# Patient Record
Sex: FEMALE | Race: WHITE | NOT HISPANIC OR LATINO | Employment: OTHER | URBAN - METROPOLITAN AREA
[De-identification: names, ages, dates, MRNs, and addresses within clinical notes are randomized per-mention and may not be internally consistent; named-entity substitution may affect disease eponyms.]

---

## 2019-05-11 ENCOUNTER — TRANSFERRED RECORDS (OUTPATIENT)
Dept: SURGERY | Facility: CLINIC | Age: 79
End: 2019-05-11

## 2019-06-12 ENCOUNTER — HOSPITAL ENCOUNTER (OUTPATIENT)
Dept: LAB | Facility: CLINIC | Age: 79
Discharge: HOME | End: 2019-06-12
Attending: SURGERY | Admitting: SURGERY
Payer: COMMERCIAL

## 2019-06-12 ENCOUNTER — OFFICE VISIT (OUTPATIENT)
Dept: SURGERY | Facility: CLINIC | Age: 79
End: 2019-06-12
Payer: COMMERCIAL

## 2019-06-12 VITALS
WEIGHT: 243 LBS | SYSTOLIC BLOOD PRESSURE: 118 MMHG | HEIGHT: 66 IN | BODY MASS INDEX: 39.05 KG/M2 | HEART RATE: 96 BPM | RESPIRATION RATE: 16 BRPM | OXYGEN SATURATION: 98 % | DIASTOLIC BLOOD PRESSURE: 82 MMHG

## 2019-06-12 DIAGNOSIS — Z78.0 POST-MENOPAUSAL: ICD-10-CM

## 2019-06-12 DIAGNOSIS — E83.52 HYPERCALCEMIA: ICD-10-CM

## 2019-06-12 DIAGNOSIS — E83.52 HYPERCALCEMIA: Primary | ICD-10-CM

## 2019-06-12 DIAGNOSIS — Z91.89 AT RISK FOR LOSS OF BONE DENSITY: ICD-10-CM

## 2019-06-12 LAB
CALCIUM SERPL-MCNC: 10.2 MG/DL (ref 8.5–10.1)
PTH-INTACT SERPL-MCNC: 97 PG/ML (ref 18–80)

## 2019-06-12 PROCEDURE — 83970 ASSAY OF PARATHORMONE: CPT | Performed by: SURGERY

## 2019-06-12 PROCEDURE — 99204 OFFICE O/P NEW MOD 45 MIN: CPT | Performed by: SURGERY

## 2019-06-12 PROCEDURE — 82310 ASSAY OF CALCIUM: CPT | Performed by: SURGERY

## 2019-06-12 PROCEDURE — 36415 COLL VENOUS BLD VENIPUNCTURE: CPT | Performed by: SURGERY

## 2019-06-12 ASSESSMENT — MIFFLIN-ST. JEOR: SCORE: 1598.99

## 2019-06-12 NOTE — PATIENT INSTRUCTIONS
DEXA SCAN     Date: 7-18-19    Time: 10:00 AM     Location: [facility] [address]  [address]  [place]  [address]          Please check in at 9:45 am

## 2019-06-12 NOTE — LETTER
2019       Re: Raven Carlin - 1940    Raven Carlin is a 79 year old female who is sent by Ena Peraza PA-C for evaluation of hypercalcemia. She had an elevated calcium found on screening bloodwork.  She has a calcium level as high as 10.9 (upper normal for the lab is 10.5).   She has had the elevated calcium from a recent blood test.  She has constitutional symptoms of none.  She has no history of kidney stones.   She has had bony fractures, toes and ankle. A DEXA scan shows normal but some time ago.     There is no family history of parathyroid disease.  Raven has no prior neck surgery..  She is not on thyroid medications.       Smoking History:  has never smoked.     Review of Systems:  Back pain, high BP, easy bruising, stomach ulcers.     Physical Exam:  There were no vitals taken for this visit.  Well developed, well nourished female in no apparent distress, in a walker.  HEENT:  Normocephalic, atraumatic.  Neck:   Short and thick.              Range of motion is decreased.              No neck masses.  Thyroid:  Palpably normal.  Lymph:  No cervical adenopathy.  Respirations:  Unlabored.  Neurologic:  Alert.  Speech is clear.  Moves all extremities with reasonable strength.   Skin:  Warm, dry and no rash.  Psychologic:  Alert, appropriate range of emotions.     Labs:  Calcium-10.9  PTH-90     Imaging:  All imaging personally reviewed with Raven Estrada     Assessment and Plan:  Raven has hypercalcemia and possible primary hyperparathyroidism.  She does not meet criteria for surgery at present.  I would like to re-check her calcium and PTH and see if she meets criteria for surgery before proceeding with imaging.  Indications for surgery would be calcium level 1 standard deviation above normal, age <50, symptoms, kidney stones, osteoporosis or osteopenia.  It is reasonable to repeat her DEXA scan as it has been about 10 years she believes since her last one.  If she is a candidate for surgery,  "we discussed the imaging options and the possibility of single adenoma (85%) vs dual adenoma or hyperplasia (15%).  If the imaging studies suggest that there is a good probability of a single adenoma and she meets indications for surgery, I recommend a minimally invasive neck exploration with the sestimibi injection to minimize the operative incision and the rapid PTH assay to assess the completeness of the procedure.  Raven is aware of the risks to the recurrent laryngeal nerve and the risk of hypocalcemia, particularly with treatment of  parathyroid hyperplasia. She is also aware of the 1-2 % risk of \"failure to cure\".  She is not anxious to proceed with surgery unless needed.  If criteria are not met, her calcium should be checked every 6 months.     Latonia Anguiano MD        ADDENDUM:  Calcium 10.2 (normal 8.5-10.1) PTH was 97.  Awaiting DEXA scan.        "

## 2019-09-05 ENCOUNTER — ANCILLARY PROCEDURE (OUTPATIENT)
Dept: BONE DENSITY | Facility: CLINIC | Age: 79
End: 2019-09-05
Attending: SURGERY
Payer: COMMERCIAL

## 2019-09-05 DIAGNOSIS — Z78.0 POST-MENOPAUSAL: ICD-10-CM

## 2019-09-05 DIAGNOSIS — E83.52 HYPERCALCEMIA: ICD-10-CM

## 2019-09-05 PROCEDURE — 77080 DXA BONE DENSITY AXIAL: CPT | Performed by: INTERNAL MEDICINE

## 2019-09-11 ENCOUNTER — HOSPITAL ENCOUNTER (OUTPATIENT)
Age: 79
End: 2019-09-11
Payer: COMMERCIAL

## 2019-09-11 DIAGNOSIS — M85.80 OSTEOPENIA: Primary | ICD-10-CM

## 2019-09-18 ENCOUNTER — HOSPITAL ENCOUNTER (OUTPATIENT)
Dept: NUCLEAR MEDICINE | Facility: CLINIC | Age: 79
Setting detail: NUCLEAR MEDICINE
End: 2019-09-18
Attending: SURGERY
Payer: COMMERCIAL

## 2019-09-18 ENCOUNTER — HOSPITAL ENCOUNTER (OUTPATIENT)
Dept: NUCLEAR MEDICINE | Facility: CLINIC | Age: 79
Setting detail: NUCLEAR MEDICINE
Discharge: HOME | End: 2019-09-18
Attending: SURGERY | Admitting: SURGERY
Payer: COMMERCIAL

## 2019-09-18 DIAGNOSIS — M85.80 OSTEOPENIA: ICD-10-CM

## 2019-09-18 PROCEDURE — 78072 PARATHYRD PLANAR W/SPECT&CT: CPT

## 2019-09-18 PROCEDURE — A9516 IODINE I-123 SOD IODIDE MIC: HCPCS | Performed by: SURGERY

## 2019-09-18 PROCEDURE — 34300033 ZZH RX 343: Performed by: SURGERY

## 2019-09-18 PROCEDURE — A9500 TC99M SESTAMIBI: HCPCS | Performed by: SURGERY

## 2019-09-18 RX ADMIN — Medication 891 UCI.: at 09:34

## 2019-09-18 RX ADMIN — Medication 26.4 MILLICURIE: at 11:30

## 2019-09-19 ENCOUNTER — TELEPHONE (OUTPATIENT)
Dept: SURGERY | Facility: CLINIC | Age: 79
End: 2019-09-19

## 2019-09-19 DIAGNOSIS — E21.0 PRIMARY HYPERPARATHYROIDISM (H): Primary | ICD-10-CM

## 2019-09-19 NOTE — TELEPHONE ENCOUNTER
4D CT SOFT TISSUE NECK     Date: 9-25-19  Time: 11:40 AM   Location: [facility]  [id number] [place] [address]  [id number]        Please check in at 11:10 AM

## 2019-09-25 ENCOUNTER — HOSPITAL ENCOUNTER (OUTPATIENT)
Dept: CT IMAGING | Facility: CLINIC | Age: 79
Discharge: HOME | End: 2019-09-25
Attending: SURGERY | Admitting: SURGERY
Payer: COMMERCIAL

## 2019-09-25 DIAGNOSIS — E21.0 PRIMARY HYPERPARATHYROIDISM (H): ICD-10-CM

## 2019-09-25 LAB
CREAT BLD-MCNC: 1.3 MG/DL (ref 0.52–1.04)
GFR SERPL CREATININE-BSD FRML MDRD: 40 ML/MIN/{1.73_M2}

## 2019-09-25 PROCEDURE — 25000125 ZZHC RX 250: Performed by: SURGERY

## 2019-09-25 PROCEDURE — 70492 CT SFT TSUE NCK W/O & W/DYE: CPT

## 2019-09-25 PROCEDURE — 82565 ASSAY OF CREATININE: CPT

## 2019-09-25 PROCEDURE — 25000128 H RX IP 250 OP 636: Performed by: SURGERY

## 2019-09-25 RX ORDER — IOPAMIDOL 755 MG/ML
500 INJECTION, SOLUTION INTRAVASCULAR ONCE
Status: COMPLETED | OUTPATIENT
Start: 2019-09-25 | End: 2019-09-25

## 2019-09-25 RX ADMIN — SODIUM CHLORIDE 65 ML: 9 INJECTION, SOLUTION INTRAVENOUS at 11:32

## 2019-09-25 RX ADMIN — IOPAMIDOL 75 ML: 755 INJECTION, SOLUTION INTRAVENOUS at 11:32

## 2019-10-02 ENCOUNTER — TELEPHONE (OUTPATIENT)
Dept: SURGERY | Facility: CLINIC | Age: 79
End: 2019-10-02

## 2019-10-02 NOTE — TELEPHONE ENCOUNTER
Called patient about results.  Raven would like to come in and see me before scheduling surgery.  Please have the  call her to schedule a follow up appointment.    Will discuss results at follow up and schedule at that time.  I believe the superior findings are NOT parathyroid glands, the left inferior nodule is suspicious for an adenoma.  Latonia Anguiano MD

## 2019-10-09 ENCOUNTER — TELEPHONE (OUTPATIENT)
Dept: SURGERY | Facility: CLINIC | Age: 79
End: 2019-10-09

## 2019-10-09 ENCOUNTER — OFFICE VISIT (OUTPATIENT)
Dept: SURGERY | Facility: CLINIC | Age: 79
End: 2019-10-09
Payer: COMMERCIAL

## 2019-10-09 ENCOUNTER — PREP FOR PROCEDURE (OUTPATIENT)
Dept: SURGERY | Facility: CLINIC | Age: 79
End: 2019-10-09

## 2019-10-09 VITALS
HEIGHT: 66 IN | HEART RATE: 123 BPM | DIASTOLIC BLOOD PRESSURE: 78 MMHG | SYSTOLIC BLOOD PRESSURE: 116 MMHG | RESPIRATION RATE: 16 BRPM | OXYGEN SATURATION: 96 % | WEIGHT: 243 LBS | BODY MASS INDEX: 39.05 KG/M2

## 2019-10-09 DIAGNOSIS — E21.0 PRIMARY HYPERPARATHYROIDISM (H): Primary | ICD-10-CM

## 2019-10-09 PROCEDURE — 99213 OFFICE O/P EST LOW 20 MIN: CPT | Performed by: SURGERY

## 2019-10-09 ASSESSMENT — MIFFLIN-ST. JEOR: SCORE: 1598.99

## 2019-10-09 NOTE — LETTER
[facility]    [address] [facility], [address]  [place], [place] [address]  Phone [phone]  Fax [phone] [address], [address]  [place] [address]  Phone [phone]  Fax [phone]    [email or web address]   October 9, 2019      Raven JAKOB Carlin  [address] [facility] [address]      We realize with scheduling surgery, one of your first questions is, how much will this cost?  Below we have provided you with the information you will need to receive an estimate for your surgery.    You are scheduled for the following procedure:  Neck exploration, excision parathyroid adenoma with rapid PTH assay and pre-operative sestamibi injection      Surgeon:  Dr. Anguiano      Physician Assistant:  Yes      Please make sure to have your insurance card available at the time of calling.    Surgeon & Physician Assistant charges and facility charges for [facility], [facility] or [facility]:    Consumer Price Line at [phone]   -  It is important to note that there may be a Physician Assistant assisting with your surgery.  Please be sure to mention this when calling for the estimate.      Facility Charges at [facility], [facility] or [facility]:  [facility] [place] at [phone]  [facility] at [phone]  [facility] at [phone] or [phone]    Anesthesiologist Charges:  [facility], [place], operated by [facility] at [phone]    [facility] - Nurse Anesthetist Charges:  [facility] at [phone]

## 2019-10-09 NOTE — TELEPHONE ENCOUNTER
Type of surgery: NECK EXPLORATION, EXCISION PARATHYROID ADENOMA WITH RAPID PTH ASSAY AND PRE-OPERATIVE SESTAMIBI INJECTION   Location of surgery: [facility]  Date and time of surgery: 11-25-19, 9 AM   Surgeon: DR. GOMEZ   Pre-Op Appt Date: PATIENT TO SCHEDULE   Post-Op Appt Date: PATIENT TO SCHEDULE    Packet sent out: GIVEN TO PATIENT   Pre-cert/Authorization completed:  Not Applicable  Date: 10-9-19       NECK EXPLORATION, EXCISION PARATHYROID ADENOMA WITH RAPID PTH ASSAY AND PRE-OPERATIVE SESTAMIBI INJECTION    GENERAL   PT INST TO HAVE H&P WITH DR. TSAI   2.5 HRS REQ   *AKS/JCK ASSIST NLG*   ALW   Sestamibi Inj at 8 am  alw   Rapid PTH assay ordered  alw

## 2019-11-20 RX ORDER — CELECOXIB 200 MG/1
200 CAPSULE ORAL 2 TIMES DAILY
COMMUNITY
End: 2020-04-06

## 2019-11-20 RX ORDER — NIACIN 500 MG
500 TABLET ORAL
Status: ON HOLD | COMMUNITY
End: 2022-04-28

## 2019-11-20 RX ORDER — CEPHALEXIN 500 MG/1
500 CAPSULE ORAL DAILY
Status: ON HOLD | COMMUNITY
End: 2022-04-10

## 2019-11-20 RX ORDER — TOLTERODINE TARTRATE 2 MG/1
2 TABLET, EXTENDED RELEASE ORAL 2 TIMES DAILY
COMMUNITY

## 2019-11-20 RX ORDER — OXYCODONE AND ACETAMINOPHEN 7.5; 325 MG/1; MG/1
1 TABLET ORAL 2 TIMES DAILY
Status: ON HOLD | COMMUNITY
End: 2022-04-28

## 2019-11-20 RX ORDER — BACLOFEN 20 MG
500 TABLET ORAL DAILY
Status: ON HOLD | COMMUNITY
End: 2022-04-28

## 2019-11-20 RX ORDER — PERPHENAZINE/AMITRIPTYLINE HCL 4 MG-25 MG
1 TABLET ORAL DAILY
Status: ON HOLD | COMMUNITY
End: 2022-04-28

## 2019-11-25 ENCOUNTER — APPOINTMENT (OUTPATIENT)
Dept: SURGERY | Facility: PHYSICIAN GROUP | Age: 79
End: 2019-11-25
Payer: COMMERCIAL

## 2019-11-25 ENCOUNTER — HOSPITAL ENCOUNTER (OUTPATIENT)
Facility: CLINIC | Age: 79
Discharge: HOME | End: 2019-11-25
Attending: SURGERY | Admitting: SURGERY
Payer: COMMERCIAL

## 2019-11-25 ENCOUNTER — HOSPITAL ENCOUNTER (OUTPATIENT)
Dept: NUCLEAR MEDICINE | Facility: CLINIC | Age: 79
Setting detail: NUCLEAR MEDICINE
Discharge: HOME | End: 2019-11-25
Attending: SURGERY | Admitting: SURGERY
Payer: COMMERCIAL

## 2019-11-25 ENCOUNTER — ANESTHESIA (OUTPATIENT)
Dept: SURGERY | Facility: CLINIC | Age: 79
End: 2019-11-25
Payer: COMMERCIAL

## 2019-11-25 ENCOUNTER — ANESTHESIA EVENT (OUTPATIENT)
Dept: SURGERY | Facility: CLINIC | Age: 79
End: 2019-11-25
Payer: COMMERCIAL

## 2019-11-25 VITALS
BODY MASS INDEX: 39.22 KG/M2 | TEMPERATURE: 97.8 F | HEART RATE: 97 BPM | DIASTOLIC BLOOD PRESSURE: 90 MMHG | OXYGEN SATURATION: 100 % | SYSTOLIC BLOOD PRESSURE: 136 MMHG | RESPIRATION RATE: 16 BRPM | HEIGHT: 66 IN

## 2019-11-25 DIAGNOSIS — E21.0 PRIMARY HYPERPARATHYROIDISM (H): ICD-10-CM

## 2019-11-25 LAB
PTH-INTACT SERPL-MCNC: 28.2 PG/ML (ref 18–80)
PTH-INTACT SERPL-MCNC: 34.8 PG/ML (ref 18–80)
PTH-INTACT SERPL-MCNC: 72 PG/ML (ref 18–80)

## 2019-11-25 PROCEDURE — 88331 PATH CONSLTJ SURG 1 BLK 1SPC: CPT | Mod: 26 | Performed by: SURGERY

## 2019-11-25 PROCEDURE — 83970 ASSAY OF PARATHORMONE: CPT | Mod: 91 | Performed by: SURGERY

## 2019-11-25 PROCEDURE — 36000093 ZZH SURGERY LEVEL 4 1ST 30 MIN: Performed by: SURGERY

## 2019-11-25 PROCEDURE — 25800030 ZZH RX IP 258 OP 636: Performed by: NURSE ANESTHETIST, CERTIFIED REGISTERED

## 2019-11-25 PROCEDURE — 88305 TISSUE EXAM BY PATHOLOGIST: CPT | Performed by: SURGERY

## 2019-11-25 PROCEDURE — 34300033 ZZH RX 343: Performed by: SURGERY

## 2019-11-25 PROCEDURE — 25000125 ZZHC RX 250: Performed by: NURSE ANESTHETIST, CERTIFIED REGISTERED

## 2019-11-25 PROCEDURE — 37000008 ZZH ANESTHESIA TECHNICAL FEE, 1ST 30 MIN: Performed by: SURGERY

## 2019-11-25 PROCEDURE — 25000128 H RX IP 250 OP 636: Performed by: NURSE ANESTHETIST, CERTIFIED REGISTERED

## 2019-11-25 PROCEDURE — 83970 ASSAY OF PARATHORMONE: CPT | Mod: 91 | Performed by: PHYSICIAN ASSISTANT

## 2019-11-25 PROCEDURE — 93010 ELECTROCARDIOGRAM REPORT: CPT | Performed by: INTERNAL MEDICINE

## 2019-11-25 PROCEDURE — 60500 EXPLORE PARATHYROID GLANDS: CPT | Mod: AS | Performed by: PHYSICIAN ASSISTANT

## 2019-11-25 PROCEDURE — 71000014 ZZH RECOVERY PHASE 1 LEVEL 2 FIRST HR: Performed by: SURGERY

## 2019-11-25 PROCEDURE — 25000128 H RX IP 250 OP 636: Performed by: SURGERY

## 2019-11-25 PROCEDURE — 25000128 H RX IP 250 OP 636: Performed by: PHYSICIAN ASSISTANT

## 2019-11-25 PROCEDURE — 25800030 ZZH RX IP 258 OP 636: Performed by: ANESTHESIOLOGY

## 2019-11-25 PROCEDURE — 27210794 ZZH OR GENERAL SUPPLY STERILE: Performed by: SURGERY

## 2019-11-25 PROCEDURE — 71000027 ZZH RECOVERY PHASE 2 EACH 15 MINS: Performed by: SURGERY

## 2019-11-25 PROCEDURE — A9500 TC99M SESTAMIBI: HCPCS | Performed by: SURGERY

## 2019-11-25 PROCEDURE — 40000306 ZZH STATISTIC PRE PROC ASSESS II: Performed by: SURGERY

## 2019-11-25 PROCEDURE — 36415 COLL VENOUS BLD VENIPUNCTURE: CPT | Performed by: PHYSICIAN ASSISTANT

## 2019-11-25 PROCEDURE — 88305 TISSUE EXAM BY PATHOLOGIST: CPT | Mod: 26 | Performed by: SURGERY

## 2019-11-25 PROCEDURE — 37000009 ZZH ANESTHESIA TECHNICAL FEE, EACH ADDTL 15 MIN: Performed by: SURGERY

## 2019-11-25 PROCEDURE — 60500 EXPLORE PARATHYROID GLANDS: CPT | Performed by: SURGERY

## 2019-11-25 PROCEDURE — 40000113 NM PARATHYROID SURGICAL INJECTION

## 2019-11-25 PROCEDURE — 36000063 ZZH SURGERY LEVEL 4 EA 15 ADDTL MIN: Performed by: SURGERY

## 2019-11-25 PROCEDURE — 88331 PATH CONSLTJ SURG 1 BLK 1SPC: CPT | Performed by: SURGERY

## 2019-11-25 RX ORDER — LIDOCAINE 40 MG/G
CREAM TOPICAL
Status: DISCONTINUED | OUTPATIENT
Start: 2019-11-25 | End: 2019-11-25 | Stop reason: HOSPADM

## 2019-11-25 RX ORDER — DIMENHYDRINATE 50 MG/ML
25 INJECTION, SOLUTION INTRAMUSCULAR; INTRAVENOUS
Status: DISCONTINUED | OUTPATIENT
Start: 2019-11-25 | End: 2019-11-25 | Stop reason: HOSPADM

## 2019-11-25 RX ORDER — FENTANYL CITRATE 50 UG/ML
INJECTION, SOLUTION INTRAMUSCULAR; INTRAVENOUS PRN
Status: DISCONTINUED | OUTPATIENT
Start: 2019-11-25 | End: 2019-11-25

## 2019-11-25 RX ORDER — DEXAMETHASONE SODIUM PHOSPHATE 4 MG/ML
INJECTION, SOLUTION INTRA-ARTICULAR; INTRALESIONAL; INTRAMUSCULAR; INTRAVENOUS; SOFT TISSUE PRN
Status: DISCONTINUED | OUTPATIENT
Start: 2019-11-25 | End: 2019-11-25

## 2019-11-25 RX ORDER — MEPERIDINE HYDROCHLORIDE 25 MG/ML
12.5 INJECTION INTRAMUSCULAR; INTRAVENOUS; SUBCUTANEOUS
Status: DISCONTINUED | OUTPATIENT
Start: 2019-11-25 | End: 2019-11-25 | Stop reason: HOSPADM

## 2019-11-25 RX ORDER — ONDANSETRON 2 MG/ML
INJECTION INTRAMUSCULAR; INTRAVENOUS PRN
Status: DISCONTINUED | OUTPATIENT
Start: 2019-11-25 | End: 2019-11-25

## 2019-11-25 RX ORDER — FENTANYL CITRATE 50 UG/ML
25-50 INJECTION, SOLUTION INTRAMUSCULAR; INTRAVENOUS
Status: DISCONTINUED | OUTPATIENT
Start: 2019-11-25 | End: 2019-11-25 | Stop reason: HOSPADM

## 2019-11-25 RX ORDER — ONDANSETRON 4 MG/1
4 TABLET, ORALLY DISINTEGRATING ORAL EVERY 30 MIN PRN
Status: DISCONTINUED | OUTPATIENT
Start: 2019-11-25 | End: 2019-11-25 | Stop reason: HOSPADM

## 2019-11-25 RX ORDER — HYDROCODONE BITARTRATE AND ACETAMINOPHEN 5; 325 MG/1; MG/1
1-2 TABLET ORAL EVERY 4 HOURS PRN
Qty: 15 TABLET | Refills: 0 | Status: SHIPPED | OUTPATIENT
Start: 2019-11-25 | End: 2020-04-06

## 2019-11-25 RX ORDER — SODIUM CHLORIDE, SODIUM LACTATE, POTASSIUM CHLORIDE, CALCIUM CHLORIDE 600; 310; 30; 20 MG/100ML; MG/100ML; MG/100ML; MG/100ML
INJECTION, SOLUTION INTRAVENOUS CONTINUOUS
Status: DISCONTINUED | OUTPATIENT
Start: 2019-11-25 | End: 2019-11-25 | Stop reason: HOSPADM

## 2019-11-25 RX ORDER — NALOXONE HYDROCHLORIDE 0.4 MG/ML
.1-.4 INJECTION, SOLUTION INTRAMUSCULAR; INTRAVENOUS; SUBCUTANEOUS
Status: DISCONTINUED | OUTPATIENT
Start: 2019-11-25 | End: 2019-11-25 | Stop reason: HOSPADM

## 2019-11-25 RX ORDER — HYDROMORPHONE HYDROCHLORIDE 1 MG/ML
.3-.5 INJECTION, SOLUTION INTRAMUSCULAR; INTRAVENOUS; SUBCUTANEOUS EVERY 10 MIN PRN
Status: DISCONTINUED | OUTPATIENT
Start: 2019-11-25 | End: 2019-11-25 | Stop reason: HOSPADM

## 2019-11-25 RX ORDER — PROPOFOL 10 MG/ML
INJECTION, EMULSION INTRAVENOUS PRN
Status: DISCONTINUED | OUTPATIENT
Start: 2019-11-25 | End: 2019-11-25

## 2019-11-25 RX ORDER — BUPIVACAINE HYDROCHLORIDE 2.5 MG/ML
INJECTION, SOLUTION EPIDURAL; INFILTRATION; INTRACAUDAL PRN
Status: DISCONTINUED | OUTPATIENT
Start: 2019-11-25 | End: 2019-11-25 | Stop reason: HOSPADM

## 2019-11-25 RX ORDER — ONDANSETRON 2 MG/ML
4 INJECTION INTRAMUSCULAR; INTRAVENOUS EVERY 30 MIN PRN
Status: DISCONTINUED | OUTPATIENT
Start: 2019-11-25 | End: 2019-11-25 | Stop reason: HOSPADM

## 2019-11-25 RX ORDER — ALBUTEROL SULFATE 0.83 MG/ML
2.5 SOLUTION RESPIRATORY (INHALATION) EVERY 4 HOURS PRN
Status: DISCONTINUED | OUTPATIENT
Start: 2019-11-25 | End: 2019-11-25 | Stop reason: HOSPADM

## 2019-11-25 RX ORDER — METOPROLOL TARTRATE 1 MG/ML
1-2 INJECTION, SOLUTION INTRAVENOUS EVERY 5 MIN PRN
Status: DISCONTINUED | OUTPATIENT
Start: 2019-11-25 | End: 2019-11-25 | Stop reason: HOSPADM

## 2019-11-25 RX ORDER — CALCIUM CARBONATE 500(1250)
2 TABLET ORAL EVERY 8 HOURS
Qty: 100 TABLET | Refills: 0 | Status: SHIPPED | OUTPATIENT
Start: 2019-11-25 | End: 2019-12-05

## 2019-11-25 RX ORDER — CEFAZOLIN SODIUM 2 G/100ML
2 INJECTION, SOLUTION INTRAVENOUS
Status: COMPLETED | OUTPATIENT
Start: 2019-11-25 | End: 2019-11-25

## 2019-11-25 RX ORDER — GLYCOPYRROLATE 0.2 MG/ML
INJECTION, SOLUTION INTRAMUSCULAR; INTRAVENOUS PRN
Status: DISCONTINUED | OUTPATIENT
Start: 2019-11-25 | End: 2019-11-25

## 2019-11-25 RX ORDER — CEFAZOLIN SODIUM 1 G/3ML
1 INJECTION, POWDER, FOR SOLUTION INTRAMUSCULAR; INTRAVENOUS SEE ADMIN INSTRUCTIONS
Status: DISCONTINUED | OUTPATIENT
Start: 2019-11-25 | End: 2019-11-25 | Stop reason: HOSPADM

## 2019-11-25 RX ORDER — HYDROCODONE BITARTRATE AND ACETAMINOPHEN 5; 325 MG/1; MG/1
2 TABLET ORAL
Status: DISCONTINUED | OUTPATIENT
Start: 2019-11-25 | End: 2019-11-25 | Stop reason: HOSPADM

## 2019-11-25 RX ORDER — LIDOCAINE HYDROCHLORIDE 10 MG/ML
INJECTION, SOLUTION INFILTRATION; PERINEURAL PRN
Status: DISCONTINUED | OUTPATIENT
Start: 2019-11-25 | End: 2019-11-25

## 2019-11-25 RX ADMIN — FENTANYL CITRATE 250 MCG: 50 INJECTION, SOLUTION INTRAMUSCULAR; INTRAVENOUS at 12:10

## 2019-11-25 RX ADMIN — ONDANSETRON HYDROCHLORIDE 4 MG: 2 INJECTION, SOLUTION INTRAVENOUS at 13:30

## 2019-11-25 RX ADMIN — Medication 5 MG: at 12:10

## 2019-11-25 RX ADMIN — Medication 15 MG: at 12:23

## 2019-11-25 RX ADMIN — PROPOFOL 150 MG: 10 INJECTION, EMULSION INTRAVENOUS at 12:10

## 2019-11-25 RX ADMIN — DEXAMETHASONE SODIUM PHOSPHATE 4 MG: 4 INJECTION, SOLUTION INTRA-ARTICULAR; INTRALESIONAL; INTRAMUSCULAR; INTRAVENOUS; SOFT TISSUE at 12:10

## 2019-11-25 RX ADMIN — GLYCOPYRROLATE 0.2 MG: 0.2 INJECTION, SOLUTION INTRAMUSCULAR; INTRAVENOUS at 12:10

## 2019-11-25 RX ADMIN — Medication 24.8 MILLICURIE: at 11:26

## 2019-11-25 RX ADMIN — Medication 100 MG: at 12:10

## 2019-11-25 RX ADMIN — PHENYLEPHRINE HYDROCHLORIDE 50 MCG: 10 INJECTION INTRAVENOUS at 13:09

## 2019-11-25 RX ADMIN — SODIUM CHLORIDE, POTASSIUM CHLORIDE, SODIUM LACTATE AND CALCIUM CHLORIDE: 600; 310; 30; 20 INJECTION, SOLUTION INTRAVENOUS at 10:32

## 2019-11-25 RX ADMIN — HYDROMORPHONE HYDROCHLORIDE 1 MG: 1 INJECTION, SOLUTION INTRAMUSCULAR; INTRAVENOUS; SUBCUTANEOUS at 12:41

## 2019-11-25 RX ADMIN — LIDOCAINE HYDROCHLORIDE 30 MG: 10 INJECTION, SOLUTION INFILTRATION; PERINEURAL at 12:10

## 2019-11-25 RX ADMIN — PROPOFOL 50 MG: 10 INJECTION, EMULSION INTRAVENOUS at 12:31

## 2019-11-25 RX ADMIN — CEFAZOLIN SODIUM 2 G: 2 INJECTION, SOLUTION INTRAVENOUS at 12:04

## 2019-11-25 RX ADMIN — PROPOFOL 30 MG: 10 INJECTION, EMULSION INTRAVENOUS at 12:46

## 2019-11-25 ASSESSMENT — LIFESTYLE VARIABLES: TOBACCO_USE: 1

## 2019-11-25 NOTE — DISCHARGE INSTRUCTIONS
HOME CARE FOLLOWING PARATHYROID SURGERY  RAND Machado J. Shaheen    Special instructions for Raven Carlin:  --Discharge medications: Calcium supplement and Norco  Calcium Taper Schedule:  Take two tablets every 8 hours for one week, then decrease to two tablets every 12 hours for one week, then decrease to one tablet every 12 hours for one week, then decrease to one tablet daily until gone.  --Follow up appointment with Dr. Anguiano in 1-3 weeks, follow up with PCP in 4-8 weeks.     RESULTS:  Call the office regarding your final pathology report if you have not received your results after 2 full business days.     INCISIONAL CARE:    You may expect a small amount of drainage from your previous drain site.  Cover with bandage as needed.    After removing the dressing, you may shower.  Do not submerse the incision for 1 week.    Sutures will absorb and do not need to be removed.    Leave the steri-strip (white paper tape) in place until it falls off, or remove at 2 weeks after surgery.    A lump/ridge under the incision is normal and will gradually resolve.    ACTIVITY:  Light Activity -- you may immediately be up and about as tolerated.  Driving -- you may drive when comfortable and off narcotic pain medications.  Light Work -- resume when comfortable off pain medications.  (If you can drive, you probably can work.)  Strenuous Work/Activity -- limit lifting to less than 10 pounds for 1 week.  Progressively increase with time.  Active Sports (running, biking, etc.) -- resume when comfortable.    DIET:  No restrictions.  Increased fluid intake is recommended. While taking pain medications, increase dietary fiber or add a fiber supplementation like Metamucil or Citrucel to help prevent constipation - a possible side effect of pain medications.    DISCOMFORT:  Use pain medications as prescribed by your surgeon.  Take the pain medication with some food, when possible, to minimize side effects.   Intermittent use of ice packs may help during the first 48 hours.  Expect gradual improvement.    CALCIUM SUPPLEMENTATION:  Most patients are prescribed to take a calcium supplement after surgery.  Please take as prescribed.  Watch for symptoms of numbness or tingling around the mouth or in the fingers or toes.  This may be a sign of a low calcium level.  If this happens, take an extra dose of your calcium supplement.  If the symptoms persist, please contact the office.  You may need to have your blood calcium level checked by doing a simple blood test.  We may also need to make further adjustments in your dose of calcium supplementation.  At times, an office visit is required.     RETURN APPOINTMENT:  Schedule a follow-up visit 1-3 weeks post-op.  Office Phone:  [phone]     CONTACT US IF THE FOLLOWING DEVELOPS:   1. A fever that is above 101     2. If there is a large amount of drainage, bleeding, or swelling.   3. Severe pain that is not relieved by your prescription.   4. Drainage that is thick, cloudy, yellow, green or white.   5. Any other questions not answered by  Frequently Asked Questions  sheet.      FREQUENTLY ASKED QUESTIONS:    Q:  How should my incision look?    A:  Normally your incision will appear slightly swollen with light redness directly along the incision itself as it heals.  It may feel like a bump or ridge as the healing/scarring happens, and over time (3-4 months) this bump or ridge feeling should slowly go away.  In general, clear or pink watery drainage can be normal at first as your incision heals, but should decrease over time.    Q:  How do I know if my incision is infected?  A:  Look at your incision for signs of infection, like redness around the incision spreading to surrounding skin, or drainage of cloudy or foul-smelling drainage.  If you feel warm, check your temperature to see if you are running a fever.    **If any of these things occur, please notify the nurse at our  office.  We may need you to come into the office for an incision check.      Q:  How do I take care of my incision?  A:  If you have a dressing in place - Starting the day after surgery, replace the dressing 1-2 times a day until there is no further drainage from the incision.  At that time, a dressing is no longer needed.  Try to minimize tape on the skin if irritation is occurring at the tape sites.  If you have significant irritation from tape on the skin, please call the office to discuss other method of dressing your incision.    Small pieces of tape called  steri-strips  may be present directly overlying your incision; these may be removed 10 days after surgery unless otherwise specified by your surgeon.  If these tapes start to loosen at the ends, you may trim them back until they fall off or are removed.    A:  If you had  Dermabond  tissue glue used as a dressing (this causes your incision to look shiny with a clear covering over it) - This type of dressing wears off with time and does not require more dressings over the top unless it is draining around the glue as it wears off.  Do not apply ointments or lotions over the incisions until the glue has completely worn off.    Q:  There is a piece of tape or a sticky  lead  still on my skin.  Can I remove this?  A:  Sometimes the sticky  leads  used for monitoring during surgery or for evaluation in the emergency department are not all removed while you are in the hospital.  These sometimes have a tab or metal dot on them.  You can easily remove these on your own, like taking off a band-aid.  If there is a gel substance under the  lead , simply wipe/clean it off with a washcloth or paper towel.      Q:  What can I do to minimize constipation (very hard stools, or lack of stools)?  A:  Stay well hydrated.  Increase your dietary fiber intake or take a fiber supplement -with plenty of water.  Walk around frequently.  You may consider an over-the-counter  stool-softener.  Your Pharmacist can assist you with choosing one that is stocked at your pharmacy.  Constipation is also one of the most common side effects of pain medication.  If you are using pain medication, be pro-active and try to PREVENT problems with constipation by taking the steps above BEFORE constipation becomes a problem.    Q:  What do I do if I need more pain medications?  A:  Call the office to receive refills.  Be aware that certain pain meds cannot be called into a pharmacy and actually require a paper prescription.  A change may be made in your pain med as you progress thru your recovery period or if you have side effects to certain meds.    --Pain meds are NOT refilled after 5pm on weekdays, and NOT AT ALL on the weekends, so please look ahead to prevent problems.      Q:  Why am I having a hard time sleeping now that I am at home?  A:  Many medications you receive while you are in the hospital can impact your sleep for a number of days after your surgery/hospitalization.  Decreased level of activity and naps during the day may also make sleeping at night difficult.  Try to minimize day-time naps, and get up frequently during the day to walk around your home during your recovery time.  Sleep aides may be of some help, but are not recommended for long-term use.      Q:  I am having some back discomfort.  What should I do?  A:  This may be related to certain positioning that was required for your surgery, extended periods of time in bed, or other changes in your overall activity level.  You may try ice, heat, acetaminophen, or ibuprofen to treat this temporarily.  Note that many pain medications have acetaminophen in them and would state this on the prescription bottle.  Be sure not to exceed the maximum of 4000mg per day of acetaminophen.     **If the pain you are having does not resolve, is severe, or is a flare of back pain you have had on other occasions prior to surgery, please contact your  primary physician for further recommendations or for an appointment to be examined at their office.    Q:  Why am I having headaches?  A:  Headaches can be caused by many things:  caffeine withdrawal, use of pain meds, dehydration, high blood pressure, lack of sleep, over-activity/exhaustion, flare-up of usual migraine headaches.  If you feel this is related to muscle tension (a band-like feeling around the head, or a pressure at the low-back of the head) you may try ice or heat to this area.  You may need to drink more fluids (try electrolyte drink like Gatorade), rest, or take your usual migraine medications.   **If your headaches do not resolve, worsen, are accompanied by other symptoms, or if your blood pressure is high, please call your primary physician for recommendation and/or examination.    Q:  I am unable to urinate.  What do I do?  A:  A small percentage of people can have difficulty urinating initially after surgery.  This includes being able to urinate only a very small amount at a time and feeling discomfort or pressure in the very low abdomen.  This is called  urinary retention , and is actually an urgent situation.  Proceed to your nearest Emergency department for evaluation (not an Urgent Care Center).  Sometimes the bladder does not work correctly after certain medications you receive during surgery, or related to certain procedures.  You may need to have a catheter placed until your bladder recovers.  When planning to go to an Emergency department, it may help to call the ER to let them know you are coming in for this problem after a surgery.  This may help you get in quicker to be evaluated.  **If you have symptoms of a urinary tract infection, please contact your primary physician for the proper evaluation and treatment.          If you have other questions, please call the office Monday thru Friday between 8am and 5pm to discuss with the nurse or physician assistant.  #[phone]    There  is a surgeon ON CALL on weekday evenings and over the weekend in case of urgent need only, and may be contacted at the same number.    If you are having an emergency, call 911 or proceed to your nearest emergency department.    GENERAL ANESTHESIA OR SEDATION ADULT DISCHARGE INSTRUCTIONS   SPECIAL PRECAUTIONS FOR 24 HOURS AFTER SURGERY    IT IS NOT UNUSUAL TO FEEL LIGHT-HEADED OR FAINT, UP TO 24 HOURS AFTER SURGERY OR WHILE TAKING PAIN MEDICATION.  IF YOU HAVE THESE SYMPTOMS; SIT FOR A FEW MINUTES BEFORE STANDING AND HAVE SOMEONE ASSIST YOU WHEN YOU GET UP TO WALK OR USE THE BATHROOM.    YOU SHOULD REST AND RELAX FOR THE NEXT 24 HOURS AND YOU MUST MAKE ARRANGEMENTS TO HAVE SOMEONE STAY WITH YOU FOR AT LEAST 24 HOURS AFTER YOUR DISCHARGE.  AVOID HAZARDOUS AND STRENUOUS ACTIVITIES.  DO NOT MAKE IMPORTANT DECISIONS FOR 24 HOURS.    DO NOT DRIVE ANY VEHICLE OR OPERATE MECHANICAL EQUIPMENT FOR 24 HOURS FOLLOWING THE END OF YOUR SURGERY.  EVEN THOUGH YOU MAY FEEL NORMAL, YOUR REACTIONS MAY BE AFFECTED BY THE MEDICATION YOU HAVE RECEIVED.    DO NOT DRINK ALCOHOLIC BEVERAGES FOR 24 HOURS FOLLOWING YOUR SURGERY.    DRINK CLEAR LIQUIDS (APPLE JUICE, GINGER ALE, 7-UP, BROTH, ETC.).  PROGRESS TO YOUR REGULAR DIET AS YOU FEEL ABLE.    YOU MAY HAVE A DRY MOUTH, A SORE THROAT, MUSCLES ACHES OR TROUBLE SLEEPING.  THESE SHOULD GO AWAY AFTER 24 HOURS.    CALL YOUR DOCTOR FOR ANY OF THE FOLLOWING:  SIGNS OF INFECTION (FEVER, GROWING TENDERNESS AT THE SURGERY SITE, A LARGE AMOUNT OF DRAINAGE OR BLEEDING, SEVERE PAIN, FOUL-SMELLING DRAINAGE, REDNESS OR SWELLING.    IT HAS BEEN OVER 8 TO 10 HOURS SINCE SURGERY AND YOU ARE STILL NOT ABLE TO URINATE (PASS WATER).     Maximum acetaminophen (Tylenol) dose from all sources should not exceed 4 grams (4000 mg) per day. You have been prescribed Norco which contains tylenol 325 mg.

## 2019-11-25 NOTE — PROGRESS NOTES
"SPIRITUAL HEALTH SERVICES Progress Note  [facility] Pre-surgical    SH consult per pt's request for chaplaincy support prior to her procedure.   Met with pt, Raven, and her dtr, Rhonda.   Raven notes that she identifies as Judaism. She shares that she is feeling \"worried any time I get put to sleep\" and expresses desire for prayers.   We shared in prayer together during which she became tearful. Raven then shared memories around the death of her spouse in 2013 and how he gave her their cat, \"Cornell\", which she cherishes. Provided supportive listening, affirmation of her expressed hopes for surgery, and emotional support through normalization/validation of her expressed emotions. SH remains available.     PRATIK Coleman.  Staff    Pager #[phone]   Pronouns: he/him/his    "

## 2019-11-25 NOTE — ANESTHESIA POSTPROCEDURE EVALUATION
Patient: Raven Carlin    Procedure(s):  NECK EXPLORATION, EXCISION PARATHYROID ADENOMA WITH RAPID PTH ASSAY AND PRE-OPERATIVE SESTAMIBI INJECTION    Diagnosis:Primary hyperparathyroidism (H) [E21.0]  Diagnosis Additional Information: No value filed.    Anesthesia Type:  General, ETT    Note:  Anesthesia Post Evaluation    Patient location during evaluation: PACU  Patient participation: Able to fully participate in evaluation  Level of consciousness: awake and alert  Pain management: adequate  multimodal analgesia used between 6 hours prior to anesthesia start to PACU dischargeAirway patency: patent  Cardiovascular status: acceptable  Respiratory status: acceptable  two or more mitigation strategies used for obstructive sleep apneaHydration status: acceptable  PONV: none     Anesthetic complications: None          Last vitals:  Vitals:    11/25/19 1415 11/25/19 1430 11/25/19 1445   BP: (!) 133/103 (!) 153/117 (!) 158/93   Pulse:      Resp: 23 9 (!) 43   Temp:      SpO2: 99% 100% 100%         Electronically Signed By: Albaro Arredondo MD  November 25, 2019  3:03 PM

## 2019-11-25 NOTE — ANESTHESIA CARE TRANSFER NOTE
Patient: Raven Carlin    Procedure(s):  NECK EXPLORATION, EXCISION PARATHYROID ADENOMA WITH RAPID PTH ASSAY AND PRE-OPERATIVE SESTAMIBI INJECTION    Diagnosis: Primary hyperparathyroidism (H) [E21.0]  Diagnosis Additional Information: No value filed.    Anesthesia Type:   General, ETT     Note:  Airway :Face Mask  Patient transferred to:PACU  Handoff Report: Identifed the Patient, Identified the Reponsible Provider, Reviewed the pertinent medical history, Discussed the surgical course, Reviewed Intra-OP anesthesia mangement and issues during anesthesia, Set expectations for post-procedure period and Allowed opportunity for questions and acknowledgement of understanding      Vitals: (Last set prior to Anesthesia Care Transfer)    [facility] VITALS  11/25/2019 1326 - 11/25/2019 1406      11/25/2019             Resp Rate (observed):  (!) 7                Electronically Signed By: Dean Dennis Severson, APRN CRNA  November 25, 2019  2:06 PM

## 2019-11-25 NOTE — OP NOTE
General Surgery Operative Note    PREOPERATIVE DIAGNOSIS:  Primary hyperparathyroidism.    POSTOPERATIVE DIAGNOSIS:  Primary hyperparathyroidism.    PROCEDURE:   Excision of left inferior parathyroid adenoma, Focused neck exploration.    ANESTHESIA:  General.    PREOPERATIVE MEDICATIONS:  Ancef 2 gm.    SURGEON:  Latonia Anguiano MD    ASSISTANT:  Sayda Alvarez PA-C  - the physician assistant was medically necessary in providing adequate exposure in the operating field, maintaining hemostasis, cutting suture, clamping and ligating blood vessels, and visualization of the anatomic structures throughout the surgical procedure.     ESTIMATED BLOOD LOSS:  20 cc's    INDICATIONS:  Raven Carlin is a 79 year old female who has primary hyperparathyroidism. She has had symptoms of none.  She has been found to have an elevated calcium of 10.9.  She has a localizing 4 D CT in the left inferior neck.  She presents today for neck exploration, excision of parathyroid adenoma.  Her PTH preoperatively is 72.  Her BMI is 39.22.    DESCRIPTION OF PROCEDURE:  Raven was placed supine, head and neck in extension and a bump between the scapulae.  Transverse cervical neck creases had been marked in the preinduction area and the one most suitable was utilized for exposure.  The gamma probe was utilized to find the area of increased counts which correlated with the preoperative localizing sestimibi.  Incision was made, superior and inferior skin flaps raised.  Midline fascia opened and reflected to the left.  An abnormal parathyroid was identified at the left inferior location just deep to the left thyroid lower pole.  It was meticulously dissected from the surrounding tissue and submitted for frozen section which confirmed a 0.402 gram hypercellular parathyroid.  The site was irrigated and inspected for hemostasis.  The PTH assays were sent at 15 and 25 minutes post-excision and the first value came back at 34.8, signifying the  completeness of the dissection.  The patient's incision site was then closed with running 3-0 Vicryl for the midline fascia, interrupted for platysma and 4-0 subcuticular Monocryl for skin.  Marcaine 0.25% plain was instilled and the patient transferred to recovery in good condition.    INTRAOPERATIVE FINDINGS:  1.  A 0.402 gram hypercellular left inferior parathyroid correlated nicely with sestamibi scan.  2.  PTH assay diminished from 72 to 34.8 at 15 minutes post excision.  3.  Grossly normal thyroid.    Specimens:   ID Type Source Tests Collected by Time Destination   1 : PTH O.R. Blood, venous Blood PARATHYROID HORMONE INTACT INTRAOPERATIVE Latonia Anguiano MD 11/25/2019  1:18 PM    2 : PTH O.R. Blood, venous Blood PARATHYROID HORMONE INTACT INTRAOPERATIVE Latonia Anguiano MD 11/25/2019  1:28 PM    A : Left Inferior Parathyroid Gland Tissue Parathyroid SURGICAL PATHOLOGY EXAM Latonia Anguiano MD 11/25/2019  1:03 PM        Latonia Anguiano MD

## 2019-11-25 NOTE — ANESTHESIA PREPROCEDURE EVALUATION
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 but states she is clear     Dyslipidemia      Dyspnea on exertion      Eczema      Fractured pelvis (H)      Gastric ulcer, unspecified as acute or chronic, without mention of hemorrhage or perforation      Gastro-oesophageal reflux disease     pt denies     Hypertension      Sleep apnea     CPAP     Past Surgical History:   Procedure Laterality Date     ARTHROPLASTY KNEE  2011    right     ARTHROPLASTY KNEE  2011    Left, Surgeon:SOO GOODRICH     ARTHROPLASTY KNEE  2012    Procedure:ARTHROPLASTY KNEE; LEFT KNEE REMOVAL OF ANTIBIOTIC SPACER , REPLACEMENT WITH LEFT HINGED KNEE; Surgeon:NE FERRARA; Location: OR     ARTHROPLASTY REVISION KNEE  11/15/2011    Procedure:ARTHROPLASTY REVISION KNEE; Left Knee Poly-Exchange and Femoral Component Revision   ; Surgeon:SOO GOODRICH; Location:[facility] OR     ASPIRATION NEEDLE KNEE Left 2015    Procedure: ASPIRATION NEEDLE KNEE;  Surgeon: Soo Goodrich MD;  Location:  OR     EXCHANGE POLY COMPONENT ARTHROPLASTY KNEE  2012    Procedure: EXCHANGE POLY COMPONENT ARTHROPLASTY KNEE;  left knee arthroplasty pole exchange;  Surgeon: Ne Ferrara MD;  Location:  OR     HERNIA REPAIR       MAMMOPLASTY AUGMENTATION       REMOVE HARDWARE ARTHROPLASTY KNEE, IRRIG & JOE, PLACE ANTIBIOTIC CEMENT SPACER, COMBINED Left 7/15/2015    Procedure: COMBINED REMOVE HARDWARE ARTHROPLASTY KNEE, IRRIGATION AND DEBRIDEMENT, PLACE ANTIBIOTIC CEMENT BEADS / SPACER;  Surgeon: Soo Goodrich MD;   Location: [facility] OR     RHINOPLASTY       TONSILLECTOMY       Anesthesia Evaluation     .             ROS/MED HX    ENT/Pulmonary:     (+)sleep apnea, tobacco use, Past use asthma uses CPAP , . .    Neurologic:  - neg neurologic ROS     Cardiovascular:  - neg cardiovascular ROS   (+) hypertension----. : . . . :. .       METS/Exercise Tolerance:     Hematologic:  - neg hematologic  ROS       Musculoskeletal:  - neg musculoskeletal ROS       GI/Hepatic:     (+) GERD       Renal/Genitourinary:     (+) chronic renal disease, type: CRI,       Endo: Comment: .Body mass index is 39.22 kg/m .      (+) Obesity, .      Psychiatric:  - neg psychiatric ROS   (+) psychiatric history depression      Infectious Disease:  - neg infectious disease ROS       Malignancy:         Other: Comment: .Lab Test        01/05/16     11/30/15     11/16/15      --          07/18/15     07/17/15     07/16/15                                                                              --           0830          0620          0700          WBC          4.8          3.9          5.4            < >        7.1          6.4          7.8           HGB          9.9*         8.4*         8.9*           < >        8.5*         8.8*         9.1*  9.1*   MCV          92           94           90             < >        99           100          101*          PLT          220          234          279            < >        157          137*         148*          INR           --           --           --           --          2.37*        1.78*        1.24*          < > = values in this interval not displayed.                  Lab Test        06/12/19     01/18/16     01/05/16     11/30/15     11/16/15     11/09/15      --          09/21/15                       1505                                                                                                     --                             NA            --          142          140          140          140           140           --          140           POTASSIUM     --          4.5          4.5          4.4          4.0          4.1           --          4.2           CHLORIDE      --          108          106          105          105          107           --          106           CO2           --           --           --          29           27           26            --           --           BUN           --          26           28            --           --           --           --          29            CR            --          1.31         1.46         1.56         1.12         1.05           < >        1.48          ANIONGAP      --          5            9            6            8            7             --          5             LAUREL          10.2*        9.4          9.9           --           --           --           --          10.2          GLC           --          79           90            --           --           --           --          90             < > = values in this interval not displayed.                 \                         Physical Exam  Normal systems: cardiovascular and pulmonary    Airway   Mallampati: II    Dental     Cardiovascular   Rhythm and rate: regular and normal      Pulmonary    breath sounds clear to auscultation            Lab Results   Component Value Date    WBC 4.8 01/05/2016    HGB 9.9 (A) 01/05/2016    HCT 30.6 01/05/2016     01/05/2016    CRP 13.6 08/27/2015    SED 89 08/27/2015     01/18/2016    POTASSIUM 4.5 01/18/2016    CHLORIDE 108 01/18/2016    CO2 29 11/30/2015    BUN 26 01/18/2016    CR 1.31 01/18/2016    GLC 79 01/18/2016    LAUREL 10.2 (H) 06/12/2019    PHOS 4.9 (H) 02/03/2012    MAG 1.9 02/03/2012    ALBUMIN 3.5 02/01/2012    PROTTOTAL 6.3 (L) 02/01/2012    ALT 33 11/30/2015    AST 36 11/30/2015    ALKPHOS 112 11/30/2015    BILITOTAL 0.5 02/01/2012    LIPASE 51 04/22/2009    AMYLASE 37 04/22/2009    PTT 45 (H) 02/02/2012    INR 2.37  "(H) 07/18/2015    HCG Negative 03/14/2011       Preop Vitals  BP Readings from Last 3 Encounters:   11/25/19 (!) 136/91   10/09/19 116/78   06/12/19 118/82    Pulse Readings from Last 3 Encounters:   10/09/19 123   06/12/19 96   02/05/16 64      Resp Readings from Last 3 Encounters:   11/25/19 18   10/09/19 16   06/12/19 16    SpO2 Readings from Last 3 Encounters:   11/25/19 97%   10/09/19 96%   06/12/19 98%      Temp Readings from Last 1 Encounters:   11/25/19 99  F (37.2  C) (Temporal)    Ht Readings from Last 1 Encounters:   11/25/19 1.676 m (5' 6\")      Wt Readings from Last 1 Encounters:   10/09/19 110.2 kg (243 lb)    Estimated body mass index is 39.22 kg/m  as calculated from the following:    Height as of this encounter: 1.676 m (5' 6\").    Weight as of 10/9/19: 110.2 kg (243 lb).       Anesthesia Plan      History & Physical Review  History and physical reviewed and following examination; no interval change.    ASA Status:  3 .        Plan for General and ETT with Intravenous induction.   PONV prophylaxis:  Ondansetron (or other 5HT-3) and Dexamethasone or Solumedrol  Additional equipment: Videolaryngoscope      Postoperative Care      Consents  Anesthetic plan, risks, benefits and alternatives discussed with:  Patient or representative..                 Mao Moody DO                    .  "

## 2019-11-25 NOTE — PROGRESS NOTES
24.8mCi Tc99m Sestamibi injected in Left hand IV in Pre-Op for a parathyroid adenoma excision. Injected at 11:30am- D[facility]

## 2019-11-26 LAB — COPATH REPORT: NORMAL

## 2019-11-26 NOTE — RESULT ENCOUNTER NOTE
These results are as expected and will be discussed at the follow up visit.  Latonia Anguiano MD

## 2019-12-04 LAB — INTERPRETATION ECG - MUSE: NORMAL

## 2019-12-08 DIAGNOSIS — E21.0 PRIMARY HYPERPARATHYROIDISM (H): ICD-10-CM

## 2019-12-09 RX ORDER — CALCIUM CARBONATE/VITAMIN D3 500MG-5MCG
TABLET ORAL
Qty: 72 TABLET | Refills: 1 | Status: ON HOLD | OUTPATIENT
Start: 2019-12-09 | End: 2022-04-28

## 2019-12-16 ENCOUNTER — TELEPHONE (OUTPATIENT)
Dept: SURGERY | Facility: CLINIC | Age: 79
End: 2019-12-16

## 2019-12-16 NOTE — TELEPHONE ENCOUNTER
S/P Excision of left inferior parathyroid adenoma, Focused neck exploration with Dr. Anguiano 11/25/19.    Patient calling for pathology results.  Notified, parathyroid adenoma (benign). Dr. Anguiano will further review with patient at PO appointment.  Kiley Rivera RN on 12/16/2019 at 3:43 PM

## 2020-01-21 ENCOUNTER — OFFICE VISIT (OUTPATIENT)
Dept: SURGERY | Facility: CLINIC | Age: 80
End: 2020-01-21
Payer: COMMERCIAL

## 2020-01-21 VITALS
RESPIRATION RATE: 16 BRPM | HEART RATE: 112 BPM | BODY MASS INDEX: 41.3 KG/M2 | HEIGHT: 66 IN | WEIGHT: 257 LBS | OXYGEN SATURATION: 96 % | DIASTOLIC BLOOD PRESSURE: 78 MMHG | SYSTOLIC BLOOD PRESSURE: 116 MMHG

## 2020-01-21 DIAGNOSIS — Z09 SURGICAL FOLLOWUP VISIT: Primary | ICD-10-CM

## 2020-01-21 PROCEDURE — 99024 POSTOP FOLLOW-UP VISIT: CPT | Performed by: SURGERY

## 2020-01-21 ASSESSMENT — MIFFLIN-ST. JEOR: SCORE: 1662.49

## 2020-01-21 NOTE — PROGRESS NOTES
Progress Note/Post-op Follow up:  Raven is here for follow up after focused neck exploration, excision of a single parathyroid adenoma 7 weeks ago.  She reports good energy.  Overall, she has noticed improvement in her symptoms of voice and mood.  Denies numbness or tingling.  Incisional discomfort is resolved.    Smoking History:  has never smoked.    Physical Exam:  There were no vitals taken for this visit.  General:  Appears well, in no acute distress  HEENT:  Chvostek's sign is negative.   Neck:  Incision site healing nicely, Healing ridge is minimal.             Range of motion is normal.  Neuro:  Voice is Normal.    Pathology:  Parathyroid adenoma, 0.402 gm, left inferior parathyroid gland,  Hypercellular and consistent with an adenoma.    Assessment and Plan:  Raven is doing well after focused neck exploration, excision of a single parathyroid adenoma.  I recommend a follow up with Dr Peraza in the next few weeks for follow up.  I would recommend a follow up calcium level annually and only check PTH if she has an elevated calcium level.  I would be happy to see her if there are any ongoing issues, but currently, there are no signs of post-operative complications.    Latonia Anguiano MD  Please route or send letter to:  Primary Care Provider (PCP)

## 2020-01-21 NOTE — LETTER
2020    RE: Raven aCrlin, : 1940      Progress Note/Post-op Follow up:  Raven is here for follow up after focused neck exploration, excision of a single parathyroid adenoma 7 weeks ago.  She reports good energy.  Overall, she has noticed improvement in her symptoms of voice and mood.  Denies numbness or tingling.  Incisional discomfort is resolved.     Smoking History:  Has never smoked.     Physical Exam:  There were no vitals taken for this visit.  General:  Appears well, in no acute distress  HEENT:  Chvostek's sign is negative.   Neck:  Incision site healing nicely, Healing ridge is minimal.             Range of motion is normal.  Neuro:  Voice is Normal.     Pathology:  Parathyroid adenoma, 0.402 gm, left inferior parathyroid gland,  Hypercellular and consistent with an adenoma.     Assessment and Plan:  Raven is doing well after focused neck exploration, excision of a single parathyroid adenoma.  I recommend a follow up with Dr Peraza in the next few weeks for follow up.  I would recommend a follow up calcium level annually and only check PTH if she has an elevated calcium level.  I would be happy to see her if there are any ongoing issues, but currently, there are no signs of post-operative complications.     Latonia Anguiano MD

## 2020-02-19 ENCOUNTER — TELEPHONE (OUTPATIENT)
Dept: SURGERY | Facility: CLINIC | Age: 80
End: 2020-02-19

## 2020-02-19 NOTE — TELEPHONE ENCOUNTER
S/p Excision of left inferior parathyroid adenoma, Focused neck exploration. 11/25/19  Surgeon:  Dr. Anguiano    Patient was last seen in clinic for post-op visit on 1/21/20.  Rx for calcium was e-prescribed to [facility].  Received faxed request from [facility] to fill as 90 day supply.    I spoke with patient over the phone and let her know that Dr. Anguiano will not be in clinic until next week to sign rx.  Patient states that she has plenty at home right now so she is fine with waiting.     I reminded her to schedule a follow up appointment with her PCP as instructed by  at 1/21 OV.  She is aware that after this rx for calcium she should have PCP take over  prescribing calcium.

## 2020-03-12 ENCOUNTER — HOSPITAL ENCOUNTER (EMERGENCY)
Facility: CLINIC | Age: 80
Discharge: HOME | End: 2020-03-12
Attending: EMERGENCY MEDICINE | Admitting: EMERGENCY MEDICINE
Payer: COMMERCIAL

## 2020-03-12 ENCOUNTER — APPOINTMENT (OUTPATIENT)
Dept: GENERAL RADIOLOGY | Facility: CLINIC | Age: 80
End: 2020-03-12
Attending: EMERGENCY MEDICINE
Payer: COMMERCIAL

## 2020-03-12 VITALS
SYSTOLIC BLOOD PRESSURE: 136 MMHG | TEMPERATURE: 98.5 F | RESPIRATION RATE: 24 BRPM | HEART RATE: 106 BPM | OXYGEN SATURATION: 95 % | DIASTOLIC BLOOD PRESSURE: 102 MMHG

## 2020-03-12 DIAGNOSIS — I48.91 ATRIAL FIBRILLATION, UNSPECIFIED TYPE (H): ICD-10-CM

## 2020-03-12 LAB
ANION GAP SERPL CALCULATED.3IONS-SCNC: 13 MMOL/L (ref 3–14)
BASOPHILS # BLD AUTO: 0.1 10E9/L (ref 0–0.2)
BASOPHILS NFR BLD AUTO: 0.7 %
BUN SERPL-MCNC: 36 MG/DL (ref 7–30)
CALCIUM SERPL-MCNC: 9.5 MG/DL (ref 8.5–10.1)
CHLORIDE SERPL-SCNC: 102 MMOL/L (ref 94–109)
CO2 SERPL-SCNC: 21 MMOL/L (ref 20–32)
CREAT SERPL-MCNC: 1.23 MG/DL (ref 0.52–1.04)
DIFFERENTIAL METHOD BLD: ABNORMAL
EOSINOPHIL # BLD AUTO: 0.3 10E9/L (ref 0–0.7)
EOSINOPHIL NFR BLD AUTO: 2.2 %
ERYTHROCYTE [DISTWIDTH] IN BLOOD BY AUTOMATED COUNT: 12.5 % (ref 10–15)
GFR SERPL CREATININE-BSD FRML MDRD: 42 ML/MIN/{1.73_M2}
GLUCOSE SERPL-MCNC: 128 MG/DL (ref 70–99)
HCT VFR BLD AUTO: 44.1 % (ref 35–47)
HGB BLD-MCNC: 14.3 G/DL (ref 11.7–15.7)
IMM GRANULOCYTES # BLD: 0.1 10E9/L (ref 0–0.4)
IMM GRANULOCYTES NFR BLD: 0.4 %
LYMPHOCYTES # BLD AUTO: 2.2 10E9/L (ref 0.8–5.3)
LYMPHOCYTES NFR BLD AUTO: 18.6 %
MCH RBC QN AUTO: 30 PG (ref 26.5–33)
MCHC RBC AUTO-ENTMCNC: 32.4 G/DL (ref 31.5–36.5)
MCV RBC AUTO: 93 FL (ref 78–100)
MONOCYTES # BLD AUTO: 0.8 10E9/L (ref 0–1.3)
MONOCYTES NFR BLD AUTO: 6.3 %
NEUTROPHILS # BLD AUTO: 8.6 10E9/L (ref 1.6–8.3)
NEUTROPHILS NFR BLD AUTO: 71.8 %
NRBC # BLD AUTO: 0 10*3/UL
NRBC BLD AUTO-RTO: 0 /100
NT-PROBNP SERPL-MCNC: 774 PG/ML (ref 0–1800)
PLATELET # BLD AUTO: 237 10E9/L (ref 150–450)
POTASSIUM SERPL-SCNC: 4.2 MMOL/L (ref 3.4–5.3)
RBC # BLD AUTO: 4.77 10E12/L (ref 3.8–5.2)
SODIUM SERPL-SCNC: 136 MMOL/L (ref 133–144)
TROPONIN I SERPL-MCNC: <0.015 UG/L (ref 0–0.04)
WBC # BLD AUTO: 12 10E9/L (ref 4–11)

## 2020-03-12 PROCEDURE — 83880 ASSAY OF NATRIURETIC PEPTIDE: CPT | Performed by: EMERGENCY MEDICINE

## 2020-03-12 PROCEDURE — 71046 X-RAY EXAM CHEST 2 VIEWS: CPT

## 2020-03-12 PROCEDURE — 99285 EMERGENCY DEPT VISIT HI MDM: CPT | Mod: 25

## 2020-03-12 PROCEDURE — 84484 ASSAY OF TROPONIN QUANT: CPT | Performed by: EMERGENCY MEDICINE

## 2020-03-12 PROCEDURE — 80048 BASIC METABOLIC PNL TOTAL CA: CPT | Performed by: EMERGENCY MEDICINE

## 2020-03-12 PROCEDURE — 85025 COMPLETE CBC W/AUTO DIFF WBC: CPT | Performed by: EMERGENCY MEDICINE

## 2020-03-12 PROCEDURE — 93005 ELECTROCARDIOGRAM TRACING: CPT

## 2020-03-12 ASSESSMENT — ENCOUNTER SYMPTOMS
PALPITATIONS: 0
SHORTNESS OF BREATH: 0

## 2020-03-12 NOTE — ED TRIAGE NOTES
Has had a rapid heart rate for several  Months.  Went to clinic today for regular visit, they did an EKG due to elevated heart rate.  Her EKG showed a-fib which is new for patient.  patient is very anxious, hyperventilating.

## 2020-03-12 NOTE — ED PROVIDER NOTES
History     Chief Complaint:  Tachycardia       The history is provided by the patient.      Raven Carlin is a 79 year old female with a history of asthma, GERD and hypertension who presents for evaluation of tachycardia. The patient states that she was sent from clinic after a routine check up due to her heart rate being elevated and EKG revealing her to be in atrial fibrillation. The patient states that she had her parathyroid removed in November and since then has checked her blood pressure and heart rate daily. She states that her blood pressure has been around 120s/130s over 80 during this time, however that her heart rate has been over 100 almost daily. She states that she went into clinic today for a routine check up, and denies any recent chest pain, shortness of breath or palpitations. She notes that she felt well today until her appointment, but is now nervous. She also notes that she has chronic lymphedema in her legs which is under control. She presents today concerned for her atrial fibrillation, sent from her clinic.     Allergies:  Bactrim [Sulfamethoxazole W-Trimethoprim]  Epinephrine  Ketamine  Lisinopril  Simvastatin     Medications:    Amlodipine  Celebrex  Zyrtec  Glucosamine  Lutein  Niacin  Reservatrol  Detrol    Past Medical History:    Arthritis   Asthma   Chronic infection   Dyslipidemia   Dyspnea on exertion   Eczema   Fractured pelvis  Gastric ulcer, unspecified as acute or chronic, without mention of hemorrhage or perforation   Gastro-oesophageal reflux disease   Hypertension  Sleep apnea     Past Surgical History:    Tonsillectomy  Rhinoplasty  Remove hardware arthroplasty knee  Parathyroidectomy  Mammoplasty augmentation  Hernia repair  Aspiration needle knee, left  Arthroplasty revision knee  Arthroplasty knee x2    Family History:    Colon Cancer  Hypertension    Social History:  The patient presents to the ED with a male .  Smoking Status: Former Smoker  Smokeless  Tobacco: Never Used  Alcohol Use: Yes  Drug Use: No  PCP: Ena Peraza     Review of Systems   Respiratory: Negative for shortness of breath.    Cardiovascular: Negative for chest pain, palpitations and leg swelling (outside of baseline).   All other systems reviewed and are negative.    Physical Exam     Patient Vitals for the past 24 hrs:   BP Temp Temp src Pulse Resp SpO2   03/12/20 1800 (!) 136/102 -- -- 106 -- 95 %   03/12/20 1614 (!) 150/89 98.5  F (36.9  C) Temporal 112 24 96 %       Physical Exam  Constitutional: Alert, attentive  HENT:    Nose: Nose normal.    Mouth/Throat: Oropharynx is clear, mucous membranes are moist   Eyes: EOM are normal.   CV: tachycardic, irregularly irregular  Chest: Effort normal and breath sounds normal.   GI:  There is no tenderness. No distension. Normal bowel sounds  MSK: Normal range of motion. Trace bilateral pedal edema (baseline per patient)  Neurological: Alert, attentive  Skin: Skin is warm and dry.      Emergency Department Course     ECG:  Indication: Tachycardia  Time: 1629  Vent. Rate 108 bpm. AZ interval *. QRS duration 82. QT/QTc 318/426. P-R-T axis * -6. 166. Atrial fibrillation with rapid ventricular response. Inferior infarct, age undetermined. ST & T wave abnormality, consider alteral ischemia. Abnormal ECG.  No significant change compared to EKG dated 11/25/19  Read time: 1700      Imaging:  Radiology findings were communicated with the patient who voiced understanding of the findings.    XR Chest 2 views:   1.  Torsion aorta. Heart size upper limits of normal. Lungs clear. Calcified left breast prosthesis. Degenerative changes in the shoulders.  As per radiology.    Echocardiogram Complete:  Ordered    Laboratory:  Laboratory findings were communicated with the patient who voiced understanding of the findings.    CBC: WBC: 12.0 (high), HGB: 14.3, PLT: 237    BMP: Glucose 128 (high), Urea Nitrogen 36 (high) Creatinine 1.23 (high) GFR 42 (low), o/w  WNL    BNP: 774     1630 Troponin: <0.015     Emergency Department Course:  Past medical records, nursing notes, and vitals reviewed.    1639 I performed an exam of the patient as documented above.     EKG obtained in the ED, see results above.   IV was inserted and blood was drawn for laboratory testing, results above.  The patient was sent for a chest x-ray while in the emergency department, results above.     1740 I rechecked the patient and discussed the results of her workup thus far.     Findings and plan explained to the patient . Patient discharged home with instructions regarding supportive care, medications, and reasons to return. The importance of close follow-up was reviewed.    I personally reviewed the laboratory and imaging results with the patient and answered all related questions prior to discharge.     Impression & Plan     Medical Decision Making:  This is a 79-year-old female who presents for evaluation of tachycardia from clinic, found to be in atrial fibrillation.  She is asymptomatic and was presenting solely for routine clinic checkup.  She denies symptoms of shortness of breath, chest pain, or others to suggest CHF, and has unremarkable exam compared to her baseline of mild lower extremity lymphedema.  She is mildly tachycardic but otherwise stable.  Screening work-up shows no acute kidney injury, evidence of AMI, or chest x-ray abnormality such as CHF, PNA, etc.  We had an extended shared decision-making conversation.  It appears she may have been intermittently in atrial fibrillation -- although not aware of this -- since November.  She declines full anticoagulation at this time with a NOAC, preferring cardiology appointment first, demonstrating understanding of the risks of not doing so, specifically thromboembolic stroke.  Plan full aspirin daily until patient can be evaluated further and cardiology clinic.  Outpatient transthoracic echocardiogram ordered as well.  Strict return  precautions for shortness of breath, chest pain, or any other concerns.    Diagnosis:    ICD-10-CM    1. Atrial fibrillation, unspecified type (H)  I48.91 Echocardiogram Complete     CARDIOLOGY EVAL ADULT REFERRAL       Disposition:  Discharged to home.    Scribe Disclosure:  I, Mumtaz Haney, am serving as a scribe at 4:39 PM on 3/12/2020 to document services personally performed by Sonny Munoz MD based on my observations and the provider's statements to me.      Sonny Munoz MD  03/13/20 0232

## 2020-03-12 NOTE — ED AVS SNAPSHOT
[facility]  [address]  [facility] [address]  Phone:  [phone]  Fax:  [phone]                                    Raven Carlin   MRN: [id number]    Department:  [facility]   Date of Visit:  3/12/2020           After Visit Summary Signature Page    I have received my discharge instructions, and my questions have been answered. I have discussed any challenges I see with this plan with the nurse or doctor.    ..........................................................................................................................................  Patient/Patient Representative Signature      ..........................................................................................................................................  Patient Representative Print Name and Relationship to Patient    ..................................................               ................................................  Date                                   Time    ..........................................................................................................................................  Reviewed by Signature/Title    ...................................................              ..............................................  Date                                               Time          22EPIC Rev 08/18

## 2020-03-13 LAB — INTERPRETATION ECG - MUSE: NORMAL

## 2020-04-03 ENCOUNTER — HOSPITAL ENCOUNTER (OUTPATIENT)
Dept: CARDIOLOGY | Facility: CLINIC | Age: 80
Discharge: HOME | End: 2020-04-03
Attending: EMERGENCY MEDICINE | Admitting: EMERGENCY MEDICINE
Payer: COMMERCIAL

## 2020-04-03 ENCOUNTER — TELEPHONE (OUTPATIENT)
Dept: CARDIOLOGY | Facility: CLINIC | Age: 80
End: 2020-04-03

## 2020-04-03 DIAGNOSIS — I48.91 ATRIAL FIBRILLATION, UNSPECIFIED TYPE (H): ICD-10-CM

## 2020-04-03 PROCEDURE — 25500064 ZZH RX 255 OP 636: Performed by: EMERGENCY MEDICINE

## 2020-04-03 PROCEDURE — 93306 TTE W/DOPPLER COMPLETE: CPT | Mod: 26 | Performed by: INTERNAL MEDICINE

## 2020-04-03 RX ADMIN — HUMAN ALBUMIN MICROSPHERES AND PERFLUTREN 3 ML: 10; .22 INJECTION, SOLUTION INTRAVENOUS at 13:29

## 2020-04-06 ENCOUNTER — VIRTUAL VISIT (OUTPATIENT)
Dept: CARDIOLOGY | Facility: CLINIC | Age: 80
End: 2020-04-06
Attending: EMERGENCY MEDICINE
Payer: COMMERCIAL

## 2020-04-06 VITALS
SYSTOLIC BLOOD PRESSURE: 134 MMHG | BODY MASS INDEX: 39.05 KG/M2 | WEIGHT: 243 LBS | DIASTOLIC BLOOD PRESSURE: 92 MMHG | HEIGHT: 66 IN | HEART RATE: 106 BPM

## 2020-04-06 DIAGNOSIS — E21.0 PRIMARY HYPERPARATHYROIDISM (H): ICD-10-CM

## 2020-04-06 DIAGNOSIS — G47.33 OSA (OBSTRUCTIVE SLEEP APNEA): ICD-10-CM

## 2020-04-06 DIAGNOSIS — E66.01 MORBID OBESITY (H): ICD-10-CM

## 2020-04-06 DIAGNOSIS — I48.21 PERMANENT ATRIAL FIBRILLATION (H): Primary | ICD-10-CM

## 2020-04-06 DIAGNOSIS — I10 ESSENTIAL HYPERTENSION: ICD-10-CM

## 2020-04-06 DIAGNOSIS — N18.2 CHRONIC RENAL FAILURE, STAGE 2 (MILD): ICD-10-CM

## 2020-04-06 PROCEDURE — 99214 OFFICE O/P EST MOD 30 MIN: CPT | Mod: 95 | Performed by: INTERNAL MEDICINE

## 2020-04-06 RX ORDER — METOPROLOL TARTRATE 50 MG
50 TABLET ORAL 2 TIMES DAILY
Qty: 60 TABLET | Refills: 11 | Status: SHIPPED | OUTPATIENT
Start: 2020-04-06 | End: 2021-03-02

## 2020-04-06 ASSESSMENT — MIFFLIN-ST. JEOR: SCORE: 1593.99

## 2020-04-06 NOTE — PROGRESS NOTES
"Raven Carlin is a 80 year old female who is being evaluated via a billable telephone visit.      The patient has been notified of following:     \"This telephone visit will be conducted via a call between you and your physician/provider. We have found that certain health care needs can be provided without the need for a physical exam.  This service lets us provide the care you need with a  phone conversation.  If a prescription is necessary we can send it directly to your pharmacy.  If lab work is needed we can place an order for that and you can then stop by our lab to have the test done at a later time.    If during the course of the call the physician/provider feels a telephone visit is not appropriate, you will not be charged for this service.\"     Patient has given verbal consent for Telephone visit?  Yes    Raven Carlin complains of  No chief complaint on file.      I have reviewed and updated the patient's Past Medical History, Social History, Family History and Medication List.    ALLERGIES  Bactrim [sulfamethoxazole w-trimethoprim]; Epinephrine; Ketamine; Lisinopril; and Simvastatin      Patient questions amount of ASA daily, currently taking four baby aspirin daily per hospital.  Vitals obtained today are:  /92  Pulse 106  Her BP as been running between 106/44 to 132/87.    DENNYS Diaz MD provider Note    I had the pleasure of seeing Raven Carlin in cardiology consultation as part of a telephone visit due to ongoing coronavirus/covid 19 situation.    Raven is a pleasant 80-year-old female.  She has past medical history of hypertension, obstructive sleep apnea, obesity and some lymphedema.  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  Since her surgery, she has felt somewhat tired and fatigued and has had heart rates in the low 100s at rest.  She recently went to see her primary care physician for getting her medication refills and complaint of this " elevated heart rate.  They did an EKG which revealed atrial fibrillation with nonspecific ST changes.  I was able to review the EKG that occurred in the emergency room the same day.  She was sent in the emergency room by her primary care physician.  The emergency room physician put her on aspirin as she was hesitant to start anticoagulation at that time.  No AV ntae blockers were prescribed.  I also was able to review an EKG from November 25 which also revealed atrial fibrillation but patient was not told about that.  She now tells me that the EKG was done on the 25th as part of pre-surgery but the results were never conveyed to her.    For blood pressure, she is on amlodipine 10 mg/day.  She is on several supplements.  She is also on omega fatty acids and fish oil capsules.  She has chronic pain in her shoulders and back and takes morphine as well as other narcotics.    She did have an echo on 3 April which revealed normal systolic function with mild mitral dilatation and mild increase in gradients across the mitral valve with mitral valve thickening and calcification.  The mean brain was 4 mm.    Physical exam not performed as this was a telephone visit.    Impression  1.  Persistent atrial fibrillation since at least November of last year  Rate suboptimally controlled as noted from the EKG done in the emergency room.  Patient also feels elevated heart rate at home.  Her blood pressure at home by her home instrument was 134 x 92.  Today,, I discussed with her the pathophysiology of atrial fibrillation, risk of stroke in absence of anticoagulation.  We talked about her chads vascular score which in her case would be 4 given her age, hypertension and female sex.  With that score, she would benefit from anticoagulation.The indication for anticoagulation was discussed with the patient and/or his family in detail. The pros and cons of using warfarin versus newer anticoagulants was discussed including the need for INR  monitoring with warfarin, dietary restrictions with warfarin and low cost of warfarin versus high co-pay for newer anticoagulants and lack of reversal agent with agents like Eliquis and Xarelto. Agents like Pradaxa has a reversal agent although the reversal agent can cause serious adverse events. The risk of bleeding including major bleeding and intracranial bleeding was discussed with all these agents and the data of efficacy and safety of these agents versus warfarin was communicated. After detailed discussion, patient and family have decided to eliquis.  Her hemoglobin and platelet count were normal in the emergency room.  At this time, asked her to stop aspirin, NSAIDs and avoid fish oil capsules.  We also talked about starting rate control approach with metoprolol tartrate 50 mg twice daily.  While on metoprolol, will stop her amlodipine as her blood pressures remain somewhat on the low side at home.  In addition, amlodipine may be contributing to her leg edema.  Leg I would recommend a 24-hour Holter Holter in a month after starting metoprolol and then coming back and see my nurse practitioner at the [place] clinic.  I do not see a recent TSH and I recommended drawing a TSH at follow-up in a month.    2.  Hypertension  Hold amlodipine and start metoprolol.  If blood pressure remains high, we may be able to add some diltiazem if heart rate allows.    3.  Mild chronic renal insufficiency, last creatinine 1.13    4.  Obstructive sleep apnea  Continue CPAP    Eventually, after adequate anticoagulation, we may attempt an elective cardioversion at least once to see if she can convert to sinus rhythm and maintain it.  We will assess that at the next outpatient visit with my nurse practitioner/midlevel provider.    Thank you for allowing us to participate in the care of this nice patient.    Sincerely,    Holland Marcelo MD    ccopy KATY Zee    Phone call duration:23 minutes

## 2020-04-10 ENCOUNTER — TELEPHONE (OUTPATIENT)
Dept: CARDIOLOGY | Facility: CLINIC | Age: 80
End: 2020-04-10

## 2020-04-10 NOTE — TELEPHONE ENCOUNTER
Patient called and left  inquiring about medication changes from virtual visit on 4/6/20 with Dr. Marcelo. RN called patient and reviewed with her the changes. All of patient's questions were answered. Patient has no further questions at this time.

## 2020-04-29 ENCOUNTER — TELEPHONE (OUTPATIENT)
Dept: CARDIOLOGY | Facility: CLINIC | Age: 80
End: 2020-04-29

## 2020-04-29 NOTE — TELEPHONE ENCOUNTER
Called Pt and she was informed that holter OV and labs are to be moved out to June. Spoke with scheduling and Pt would have to come to [facility] for holter, and Pt wanted BV. Will inform Pt can wait 2 weeks for appts to see if BV reopens. JUSTIN Robert RN

## 2020-05-04 ENCOUNTER — TELEPHONE (OUTPATIENT)
Dept: CARDIOLOGY | Facility: CLINIC | Age: 80
End: 2020-05-04

## 2020-05-04 NOTE — TELEPHONE ENCOUNTER
Pt called in her after visit akbar said she was to stop her calcium and vit D supplement. Pt very concerned and has not stopped med and asking if this was intentional. Dr's note said nothing about stopping. JUSTIN Robert RN

## 2020-05-05 NOTE — TELEPHONE ENCOUNTER
Pt informed She is fine taking her Calcium and vit D it was not intentionally omitted from her med list per DR Marcelo. JUSTIN Robert RN

## 2020-06-08 ENCOUNTER — HOSPITAL ENCOUNTER (OUTPATIENT)
Dept: LAB | Facility: CLINIC | Age: 80
End: 2020-06-08
Attending: INTERNAL MEDICINE
Payer: COMMERCIAL

## 2020-06-08 ENCOUNTER — HOSPITAL ENCOUNTER (OUTPATIENT)
Dept: CARDIOLOGY | Facility: CLINIC | Age: 80
End: 2020-06-08
Attending: INTERNAL MEDICINE
Payer: COMMERCIAL

## 2020-06-08 DIAGNOSIS — I48.21 PERMANENT ATRIAL FIBRILLATION (H): ICD-10-CM

## 2020-06-08 LAB — TSH SERPL DL<=0.005 MIU/L-ACNC: 1.01 MU/L (ref 0.4–4)

## 2020-06-08 PROCEDURE — 93227 XTRNL ECG REC<48 HR R&I: CPT | Performed by: INTERNAL MEDICINE

## 2020-06-08 PROCEDURE — 93225 XTRNL ECG REC<48 HRS REC: CPT

## 2020-06-08 PROCEDURE — 84443 ASSAY THYROID STIM HORMONE: CPT | Performed by: INTERNAL MEDICINE

## 2020-06-08 PROCEDURE — 36415 COLL VENOUS BLD VENIPUNCTURE: CPT | Performed by: INTERNAL MEDICINE

## 2020-06-16 ENCOUNTER — VIRTUAL VISIT (OUTPATIENT)
Dept: CARDIOLOGY | Facility: CLINIC | Age: 80
End: 2020-06-16
Attending: INTERNAL MEDICINE
Payer: COMMERCIAL

## 2020-06-16 DIAGNOSIS — I48.21 PERMANENT ATRIAL FIBRILLATION (H): ICD-10-CM

## 2020-06-16 DIAGNOSIS — I48.0 PAROXYSMAL A-FIB (H): Primary | ICD-10-CM

## 2020-06-16 DIAGNOSIS — R06.09 DYSPNEA ON EXERTION: ICD-10-CM

## 2020-06-16 PROCEDURE — 99214 OFFICE O/P EST MOD 30 MIN: CPT | Mod: 95 | Performed by: PHYSICIAN ASSISTANT

## 2020-06-16 RX ORDER — HYDROXYZINE HYDROCHLORIDE 50 MG/1
TABLET, FILM COATED ORAL
Status: ON HOLD | COMMUNITY
Start: 2020-05-21 | End: 2022-04-10

## 2020-06-16 RX ORDER — FOLIC ACID 1 MG/1
1 TABLET ORAL DAILY
Status: ON HOLD | COMMUNITY
End: 2022-04-28

## 2020-06-16 NOTE — LETTER
"6/16/2020    Ena Peraza PA-C  [employer] [address]  [facility] [address]    RE: Raven ROBERT Tesfaye       Dear Colleague,    I had the pleasure of seeing Raven Carlin in the [facility].    Ms. Carlin is a pleasant 80 year old female with a PMHx including hypertension, obstructive sleep apnea, parathyroid tumor, and chronic lymphedema.  She had surgery for her parathyroid tumor last fall and since that time has felt somewhat tired and fatigued with higher heart rates in the low 100s. She was seen by Dr. Marcelo for evaluation in April after she saw her PCP and EKG revealed atrial fibrillation with nonspecific ST changes. She was sent to the ED and was put only on ASA as she was hesitant to start anticoagulation at that time.  No AV nate blockers were prescribed. Upon review by Dr. Marcelo, it appears she had atrial fibrillation last November as part of her presurgical evaluation EKG as well, but results were never conveyed to her.    As part of further evaluation, she had an echocardiogram in April which showed normal LV function 65-70%. Dr. Marcelo felt she had likely been in persistent atrial fibrillation since likely November of last year and rates appeared to be suboptimally controlled. He started her metoprolol tartrate 50mg BID and stopped her amlodipine. In addition, due to stroke risk, she was taken off the ASA and fish oil and placed on Eliquis 5mg BID. A follow up Holter monitor was requested after these changes.    Today, I was to see Raven for follow up of the atrial fibrillation. In efforts to limit possible exposures to COVID-19, we opted for a telephone visit instead, which she was agreeable to. She tells me that overall, she is feeling \"the best she's felt in a long time.\" She notes improved energy, and says she no longer requires leg wraps with much less swelling. Her weight was reported today at 217 lbs at home, and was in the 240s at her visit in April. " "She reports that home heart rates are down mostly in the 50-60s and BP is \"the best its been since I can remember\" running in the mid 100-110 range systolically. I relayed the results of her Holter monitor to her today which actually showed that she was no longer in atrial fibrillation, and was in sinus with first degree AVB with an average HR of 59 which is similar to what she reports at home from her BP monitor. She is tolerating anticoagulation well with no new concerns. She had a lab draw a few weeks ago showing her TSH within normal limits.         Assessment/Plan:    1. Paroxysmal atrial fibrillation.   --Seen in April 2020 for formal evaluation after EKG showed atrial fibrillation by her PCP. She had some fatigue but asymptomatic in regard to palpitations. In retrospect, it was also noted on an EKG in Nov 2019 as a preoperative eval, but she was not aware of this.    --After being placed on metoprolol 50mg BID, she has since converted back to sinus rhythm with heart rates in the 50-60s as confirmed via Holter monitor. Symptomatically she is much improved, and has had a significant amount of weight loss, no longer requiring lymphedema wraps. I've asked her to continue to monitor her heart rates at home via her BP cuff and call us if she notes a jump back to the 90s or higher. No changes were made today.    --Given her CHADS-VASC score of 4 given her age, hypertension and female gender, I recommended she continue the apixaban, which she was agreeable to. As she has been on this ~3 months, will check a routine CBC and BMP in the next few weeks.       2. Hypertension.   --Currently under excellent control, while back in sinus and on metoprolol 50mg BID. Her amlodipine was discontinued last visit which may also have been a contributor to her edema. She remains on low dose lasix as well which I continued, but may be able to adjust based on her labs (known CRI).    --Continue CPAP for known SAURABH.     Follow up plan: " Will request a 3-4 month follow up. As she requests to obtain her care in the [place] location will have her establish with a new MD at [facility] at that time.      I have reviewed the note as documented above.  This accurately captures the substance of my conversation with the patient.      Phone call contact time  Call Started at 1442  Call Ended at 1458    Betty Luna PA-C  [facility]  Pager [phone]      Thank you for allowing me to participate in the care of your patient.    Sincerely,     KATY Eugene     [facility] [place]

## 2020-06-16 NOTE — PATIENT INSTRUCTIONS
Visit Summary:    Today we discussed:   1. Your monitor showed you are back in normal sinus rhythm. I am pleased you are feeling well.  2. We will not make any medication changes today, continue everything without change.  3. Please keep checking you blood pressure and heart rates at home. If you notice the heart rates jump up to the 90-100s again, please give us a call.  4. Also call if you notice worsening swelling in your legs again or feeling dizzy/lightheaded.     Follow up:   We will check some routine labs in the next few weeks, and call you with results.  Plan a return to see a cardiologist in [place] (closer to your home) in ~4 months.     Please call my nurse Sveta at [phone] with any questions or concerns.

## 2020-06-16 NOTE — PROGRESS NOTES
CARDIOLOGY TELEPHONE VISIT    Raven Carlin is a 80 year old female who is being evaluated via a billable telephone visit.      The patient has been notified of following:     This telephone visit will be conducted via a call between you and your physician/provider. Given concern for spread of COVID 19 we are minimizing in person clinic visits when possible. We have found that certain health care needs can be provided without the need for a physical exam.  This service lets us provide the care you need with a short phone conversation.  If a prescription is necessary we can send it directly to your pharmacy.  If lab work is needed we can place an order for that and you can then stop by our lab to have the test done at a later time. If during the course of the call the physician/provider feels a telephone visit is not appropriate, you will not be charged for this service.    Telephone visits are billed at different rates depending on your insurance coverage. During this emergency period, for some insurers they may be billed the same as an in-person visit.  Please reach out to your insurance provider with any questions.    Patient has given verbal consent for Telephone visit?  Yes [phone]      I have reviewed and updated the patient's Past Medical History, Social History, Family History and Medication List.      MEDICATIONS:  Current Outpatient Medications   Medication Sig Dispense Refill     apixaban ANTICOAGULANT (ELIQUIS ANTICOAGULANT) 5 MG tablet Take 1 tablet (5 mg) by mouth 2 times daily 60 tablet 11     ascorbic acid (VITAMIN C) 500 MG CPCR Take 500 mg by mouth daily       BETA CAROTENE PO Take by mouth daily       BIOTIN FORTE PO Take by mouth daily       cephALEXin (KEFLEX) 500 MG capsule Take 500 mg by mouth daily       cetirizine (ZYRTEC) 5 MG tablet Take 10 mg by mouth daily        Cranberry (CRAN-MAX PO) Take by mouth daily       Flaxseed, Linseed, (FLAX SEEDS PO) Take by mouth daily        fluticasone  (FLOVENT DISKUS) 50 MCG/BLIST AEPB Spray 1 puff into both nostrils daily       FOLIC ACID PO Take 1 mg by mouth daily       FUROSEMIDE PO Take 20 mg by mouth daily        Garlic 10 MG CAPS Take by mouth 2 times daily       Glucos-MSM-C-Wu-Jkwcjd-Bibktt (GLUCOSAMINE MSM COMPLEX) TABS tablet Take 2 tablets by mouth daily       hydrOXYzine (ATARAX) 50 MG tablet TAKE 1 TABLET BY MOUTH TWICE A DAY AS NEEDED FOR ITCHING       Lutein 40 MG CAPS Take by mouth daily       Magnesium Oxide 500 MG TABS Take 1,000 mg by mouth daily       metoprolol tartrate (LOPRESSOR) 50 MG tablet Take 1 tablet (50 mg) by mouth 2 times daily 60 tablet 11     Morphine Sulfate (MS CONTIN PO) Take 15 mg by mouth every 12 hours       multivitamin, therapeutic with minerals (MULTI-VITAMIN) TABS Take 1 tablet by mouth daily       niacin 500 MG tablet Take by mouth daily (with breakfast)       NONFORMULARY 720 mg daily Tumeric, takes 2 tabs at bedtime       oxyCODONE-acetaminophen (PERCOCET) 7.5-325 MG per tablet Take 1 tablet by mouth 2 times daily Takes two in the morning and one before bedtime       OYSCO 500 + D 500-200 MG-UNIT tablet TAKE 2 TABLETS (1,000 MG) BY MOUTH EVERY 8 HOURS SEE DISCHARGE INSTRUCTION SHEET FOR TAPER. 72 tablet 1     potassium 99 MG TABS Take by mouth daily       Probiotic Product (PROBIOTIC DAILY PO) Take by mouth daily       RESVERATROL PO Take by mouth daily       tolterodine (DETROL) 2 MG tablet Take 2 mg by mouth 2 times daily       VITAMIN E COMPLEX PO Take 400 Units by mouth daily       Zinc Gluconate 50 MG CAPS Take by mouth daily         ALLERGIES  Bactrim [sulfamethoxazole w-trimethoprim]; Epinephrine; Ketamine; Lisinopril; and Simvastatin      Review Of Systems:    Ears/Nose/Throat: negative  Respiratory: No shortness of breath, dyspnea on exertion, cough, or hemoptysis  Cardiovascular: negative  Gastrointestinal: negative  Genitourinary: negative  Musculoskeletal: negative  Neurologic: negative  Psychiatric:  "negative  Hematologic/Lymphatic/Immunologic: negative  Endocrine: negative  Patient c/o itchiness  Ernestine Mckeon LPN    Self reported vitals: taken on 6/15/2020  Weight: 217LB  BP: 102/58  HR :54      Most recent labs: No new labs last 30 days.      Primary cardiologist: Dr. Holland Marcelo      HPI:  Ms. Carlin is a pleasant 80 year old female with a PMHx including hypertension, obstructive sleep apnea, parathyroid tumor, and chronic lymphedema.  She had surgery for her parathyroid tumor last fall and since that time has felt somewhat tired and fatigued with higher heart rates in the low 100s. She was seen by Dr. Marcelo for evaluation in April after she saw her PCP and EKG revealed atrial fibrillation with nonspecific ST changes. She was sent to the ED and was put only on ASA as she was hesitant to start anticoagulation at that time.  No AV nate blockers were prescribed. Upon review by Dr. Marcelo, it appears she had atrial fibrillation last November as part of her presurgical evaluation EKG as well, but results were never conveyed to her.    As part of further evaluation, she had an echocardiogram in April which showed normal LV function 65-70%. Dr. Marcelo felt she had likely been in persistent atrial fibrillation since likely November of last year and rates appeared to be suboptimally controlled. He started her metoprolol tartrate 50mg BID and stopped her amlodipine. In addition, due to stroke risk, she was taken off the ASA and fish oil and placed on Eliquis 5mg BID. A follow up Holter monitor was requested after these changes.    Today, I was to see Raven for follow up of the atrial fibrillation. In efforts to limit possible exposures to COVID-19, we opted for a telephone visit instead, which she was agreeable to. She tells me that overall, she is feeling \"the best she's felt in a long time.\" She notes improved energy, and says she no longer requires leg wraps with much less swelling. Her weight was reported today at " "217 lbs at home, and was in the 240s at her visit in April. She reports that home heart rates are down mostly in the 50-60s and BP is \"the best its been since I can remember\" running in the mid 100-110 range systolically. I relayed the results of her Holter monitor to her today which actually showed that she was no longer in atrial fibrillation, and was in sinus with first degree AVB with an average HR of 59 which is similar to what she reports at home from her BP monitor. She is tolerating anticoagulation well with no new concerns. She had a lab draw a few weeks ago showing her TSH within normal limits.         Assessment/Plan:    1. Paroxysmal atrial fibrillation.   --Seen in April 2020 for formal evaluation after EKG showed atrial fibrillation by her PCP. She had some fatigue but asymptomatic in regard to palpitations. In retrospect, it was also noted on an EKG in Nov 2019 as a preoperative eval, but she was not aware of this.    --After being placed on metoprolol 50mg BID, she has since converted back to sinus rhythm with heart rates in the 50-60s as confirmed via Holter monitor. Symptomatically she is much improved, and has had a significant amount of weight loss, no longer requiring lymphedema wraps. I've asked her to continue to monitor her heart rates at home via her BP cuff and call us if she notes a jump back to the 90s or higher. No changes were made today.    --Given her CHADS-VASC score of 4 given her age, hypertension and female gender, I recommended she continue the apixaban, which she was agreeable to. As she has been on this ~3 months, will check a routine CBC and BMP in the next few weeks.       2. Hypertension.   --Currently under excellent control, while back in sinus and on metoprolol 50mg BID. Her amlodipine was discontinued last visit which may also have been a contributor to her edema. She remains on low dose lasix as well which I continued, but may be able to adjust based on her labs (known " CRI).    --Continue CPAP for known SAURABH.     Follow up plan: Will request a 3-4 month follow up. As she requests to obtain her care in the [place] location will have her establish with a new MD at [facility] at that time.      I have reviewed the note as documented above.  This accurately captures the substance of my conversation with the patient.      Phone call contact time  Call Started at 1442  Call Ended at 1458    Betty Luna PA-C  [facility]  Pager [phone]

## 2020-06-17 ENCOUNTER — HOSPITAL ENCOUNTER (OUTPATIENT)
Dept: LAB | Facility: CLINIC | Age: 80
Discharge: HOME | End: 2020-06-17
Admitting: PHYSICIAN ASSISTANT
Payer: COMMERCIAL

## 2020-06-17 DIAGNOSIS — I48.0 PAROXYSMAL A-FIB (H): ICD-10-CM

## 2020-06-17 DIAGNOSIS — R06.09 DYSPNEA ON EXERTION: ICD-10-CM

## 2020-06-17 LAB
ANION GAP SERPL CALCULATED.3IONS-SCNC: 4 MMOL/L (ref 3–14)
BUN SERPL-MCNC: 35 MG/DL (ref 7–30)
CALCIUM SERPL-MCNC: 9.3 MG/DL (ref 8.5–10.1)
CHLORIDE SERPL-SCNC: 104 MMOL/L (ref 94–109)
CO2 SERPL-SCNC: 29 MMOL/L (ref 20–32)
CREAT SERPL-MCNC: 1.15 MG/DL (ref 0.52–1.04)
ERYTHROCYTE [DISTWIDTH] IN BLOOD BY AUTOMATED COUNT: 19.1 % (ref 10–15)
GFR SERPL CREATININE-BSD FRML MDRD: 45 ML/MIN/{1.73_M2}
GLUCOSE SERPL-MCNC: 115 MG/DL (ref 70–99)
HCT VFR BLD AUTO: 41.1 % (ref 35–47)
HGB BLD-MCNC: 12.8 G/DL (ref 11.7–15.7)
MCH RBC QN AUTO: 29.2 PG (ref 26.5–33)
MCHC RBC AUTO-ENTMCNC: 31.1 G/DL (ref 31.5–36.5)
MCV RBC AUTO: 94 FL (ref 78–100)
NT-PROBNP SERPL-MCNC: 1976 PG/ML (ref 0–450)
PLATELET # BLD AUTO: 215 10E9/L (ref 150–450)
POTASSIUM SERPL-SCNC: 4.4 MMOL/L (ref 3.4–5.3)
RBC # BLD AUTO: 4.38 10E12/L (ref 3.8–5.2)
SODIUM SERPL-SCNC: 137 MMOL/L (ref 133–144)
WBC # BLD AUTO: 6.8 10E9/L (ref 4–11)

## 2020-06-17 PROCEDURE — 80048 BASIC METABOLIC PNL TOTAL CA: CPT | Performed by: PHYSICIAN ASSISTANT

## 2020-06-17 PROCEDURE — 85027 COMPLETE CBC AUTOMATED: CPT | Performed by: PHYSICIAN ASSISTANT

## 2020-06-17 PROCEDURE — 36415 COLL VENOUS BLD VENIPUNCTURE: CPT | Performed by: PHYSICIAN ASSISTANT

## 2020-06-17 PROCEDURE — 83880 ASSAY OF NATRIURETIC PEPTIDE: CPT | Performed by: PHYSICIAN ASSISTANT

## 2020-06-18 ENCOUNTER — TELEPHONE (OUTPATIENT)
Dept: CARDIOLOGY | Facility: CLINIC | Age: 80
End: 2020-06-18

## 2020-06-18 NOTE — TELEPHONE ENCOUNTER
----- Message from KATY Eugene sent at 6/17/2020  4:13 PM CDT -----  Regarding: labs  Please let her know labs look ok.  Renal function is overall unchanged and no new anemia on the blood thinners.     Plan remains the same as discussed, no changes.    Thx  Betty    Component      Latest Ref Rng & Units 6/17/2020   Sodium      133 - 144 mmol/L 137   Potassium      3.4 - 5.3 mmol/L 4.4   Chloride      94 - 109 mmol/L 104   Carbon Dioxide      20 - 32 mmol/L 29   Anion Gap      3 - 14 mmol/L 4   Glucose      70 - 99 mg/dL 115 (H)   Urea Nitrogen      7 - 30 mg/dL 35 (H)   Creatinine      0.52 - 1.04 mg/dL 1.15 (H)   GFR Estimate      >60 mL/min/1.73:m2 45 (L)   GFR Estimate If Black      >60 mL/min/1.73:m2 52 (L)   Calcium      8.5 - 10.1 mg/dL 9.3   WBC      4.0 - 11.0 10e9/L 6.8   RBC Count      3.8 - 5.2 10e12/L 4.38   Hemoglobin      11.7 - 15.7 g/dL 12.8   Hematocrit      35.0 - 47.0 % 41.1   MCV      78 - 100 fl 94   MCH      26.5 - 33.0 pg 29.2   MCHC      31.5 - 36.5 g/dL 31.1 (L)   RDW      10.0 - 15.0 % 19.1 (H)   Platelet Count      150 - 450 10e9/L 215   N-Terminal Pro Bnp      0 - 450 pg/mL 1,976 (H)       ================    Called to patient with labs results and MARCUS comments and no change in plan. Patient states understanding and agreement, questions answered.  Sveta Arzate RN 06/18/20 9:04 AM

## 2020-07-23 ENCOUNTER — TELEPHONE (OUTPATIENT)
Dept: CARDIOLOGY | Facility: CLINIC | Age: 80
End: 2020-07-23

## 2020-07-23 NOTE — TELEPHONE ENCOUNTER
Call out to Pt she left message she would not be able to afford her Eliquis. [facility]. Will message regarding prior auth or if we need to get Pt assistance from ?  JUSTIN Robert RN

## 2020-07-24 ENCOUNTER — TELEPHONE (OUTPATIENT)
Dept: CARDIOLOGY | Facility: CLINIC | Age: 80
End: 2020-07-24

## 2020-07-24 NOTE — TELEPHONE ENCOUNTER
PA Not Needed, will attempt Tier Exception    PA Initiation    Medication: eliquis 5mg -   Insurance Company: [facility] - Phone [phone] Fax [phone]  Pharmacy Filling the Rx: [id number] - [place], [place] - [address]  Filling Pharmacy Phone: [phone]  Filling Pharmacy Fax: [phone]  Start Date: 7/24/2020           no deviation

## 2020-07-24 NOTE — TELEPHONE ENCOUNTER
Spoke with Pt informed her working on prior auth for Eliquis. Pt does not want to try warfarin. JUSTIN Robert rN

## 2020-07-24 NOTE — TELEPHONE ENCOUNTER
Prior Authorization Retail Medication Request    Medication/Dose: eliquis 5mg  ICD code (if different than what is on RX):  AF  Previously Tried and Failed:  asa  Rationale:  Persistent atrial fibrillation since at least November of last year  Rate suboptimally controlled as noted from the EKG done in the emergency room.  Patient also feels elevated heart rate at home.  Her blood pressure at home by her home instrument was 134 x 92.  Today,, I discussed with her the pathophysiology of atrial fibrillation, risk of stroke in absence of anticoagulation.  We talked about her chads vascular score which in her case would be 4 given her age, hypertension and female sex.  With that score, she would benefit from anticoagulation.The indication for anticoagulation was discussed with the patient and/or his family in detail. The pros and cons of using warfarin versus newer anticoagulants was discussed including the need for INR monitoring with warfarin, dietary restrictions with warfarin and low cost of warfarin versus high co-pay for newer anticoagulants and lack of reversal agent with agents like Eliquis and Xarelto. Agents like Pradaxa has a reversal agent although the reversal agent can cause serious adverse events. The risk of bleeding including major bleeding and intracranial bleeding was discussed with all these agents and the data of efficacy and safety of these agents versus warfarin was communicated. After detailed discussion, patient and family have decided to eliquis.  Her hemoglobin and platelet count were normal in the emergency room.  At this time, asked her to stop aspirin, NSAIDs and avoid fish oil capsules.  We also talked about starting rate control approach with metoprolol tartrate 50 mg twice daily.  Stable on eliquis started 4-6-2020     Insurance Name:  [facility]   Insurance ID:  [id number]      Pharmacy Information (if different than what is on RX)  Name:  [facility] on [address]  Phone:

## 2020-07-27 NOTE — TELEPHONE ENCOUNTER
Did discuss with Pt that prior auth team is already trying to get tire exception. JUSTIN Robert RN

## 2020-07-29 NOTE — TELEPHONE ENCOUNTER
"PRIOR AUTHORIZATION DENIED    Medication: eliquis 5mg - Tier Exception- DENIED    Denial Date: 7/24/2020    Denial Rational:     Medication is already covered on lowest tier possible        Appeal Information:     **Please advise if appeal is necessary and place a letter of medical necessity with clinical rationale under the \"letters\" tab in patient's chart and route back to [facility] [[id number]]        "

## 2020-07-30 NOTE — TELEPHONE ENCOUNTER
Send in an appeal or check with insurance if xarelto is cheaper. If either does not work, offer coumadin.

## 2020-08-14 ENCOUNTER — TELEPHONE (OUTPATIENT)
Dept: CARDIOLOGY | Facility: CLINIC | Age: 80
End: 2020-08-14

## 2020-09-28 ENCOUNTER — TELEPHONE (OUTPATIENT)
Dept: CARDIOLOGY | Facility: CLINIC | Age: 80
End: 2020-09-28

## 2020-09-28 NOTE — TELEPHONE ENCOUNTER
Pt is sending in paper work to get financial assist for Efrain Robert RN      9- I poke w/ pt and we decided that I would mail to her the application along w/ my printed directions, she will mail rickie and additional info back to me  mmunns lpn

## 2020-09-30 DIAGNOSIS — I48.21 PERMANENT ATRIAL FIBRILLATION (H): ICD-10-CM

## 2020-10-06 ENCOUNTER — DOCUMENTATION ONLY (OUTPATIENT)
Dept: CARDIOLOGY | Facility: CLINIC | Age: 80
End: 2020-10-06

## 2020-10-06 NOTE — PROGRESS NOTES
See Team 6 notes. Pt was given [employer] rickie, I had DR Marcelo sign his portion and RX, awaiting pt to send in her rickie, I discussed it with her, and she has my name, number and clinic address  mmunns lpn      Received pts completed application and accompanying documents-11 pages faxed to [facility]  10- mmunns lpn      10- received a fax yesterday dated 10--From [facility]-pt DID NOT qualify for free eliquis, house hold income over the current eligibility criteria    mmunns lpn

## 2020-10-28 NOTE — TELEPHONE ENCOUNTER
Called Pt and informed her she was denied for Pt assistance for drug coverage for Eliquis. JUSTIN Robert rN

## 2021-03-02 ENCOUNTER — VIRTUAL VISIT (OUTPATIENT)
Dept: CARDIOLOGY | Facility: CLINIC | Age: 81
End: 2021-03-02
Payer: COMMERCIAL

## 2021-03-02 DIAGNOSIS — I48.0 PAROXYSMAL A-FIB (H): ICD-10-CM

## 2021-03-02 PROCEDURE — 99213 OFFICE O/P EST LOW 20 MIN: CPT | Mod: 95 | Performed by: PHYSICIAN ASSISTANT

## 2021-03-02 RX ORDER — DILTIAZEM HYDROCHLORIDE 120 MG/1
120 CAPSULE, EXTENDED RELEASE ORAL DAILY
Qty: 30 CAPSULE | Refills: 1 | Status: SHIPPED | OUTPATIENT
Start: 2021-03-02 | End: 2021-03-29

## 2021-03-02 NOTE — LETTER
3/2/2021    Ena Peraza PA-C  [facility] [address]  [facility] [address]    RE: Raven Carlin       Dear Colleague,    I had the pleasure of seeing Raven Carlin in the [facility].    Raven is a 80 year old who is being evaluated via a billable telephone visit.      What phone number would you like to be contacted at? [phone]  How would you like to obtain your AVS? Mail a copy    Vital signs reported by patient  BP.135/59  P.70  Wt.N/A  Review Of Systems  Skin: NEGATIVE  Eyes:Ears/Nose/Throat: NEGATIVE  Respiratory: Sleep apnea, wears cpap  Cardiovascular:NEGATIVE  Gastrointestinal: NEGATIVE  Genitourinary:NEGATIVE   Musculoskeletal: NEGATIVE  Neurologic: NEGATIVE  Psychiatric: NEGATIVE  Hematologic/Lymphatic/Immunologic: NEGATIVE  Endocrine:  NEGATIVE  Ernestine Mckeon Lpn  __________________________    Raven Carlin is a 80 year old female who is being evaluated via a billable telephone visit.  This visit is being conducted as a virtual visit due to the emphasis on mitigation of the COVID-19 virus pandemic. The clinician has decided that the risk of an in-office visit outweighs the benefit for this patient.     I have reviewed and updated the patient's Past Medical History, Social History, Family History and Medication List.    ALLERGIES  Bactrim [sulfamethoxazole w-trimethoprim], Epinephrine, Ketamine, Lisinopril, and Simvastatin    MEDICATIONS  Current Outpatient Medications   Medication Sig Dispense Refill     apixaban ANTICOAGULANT (ELIQUIS ANTICOAGULANT) 5 MG tablet Take 1 tablet (5 mg) by mouth 2 times daily 180 tablet 3     ascorbic acid (VITAMIN C) 500 MG CPCR Take 500 mg by mouth daily       BETA CAROTENE PO Take by mouth daily       BIOTIN FORTE PO Take by mouth daily       cephALEXin (KEFLEX) 500 MG capsule Take 500 mg by mouth daily       Cranberry (CRAN-MAX PO) Take by mouth daily       Flaxseed, Linseed, (FLAX  SEEDS PO) Take by mouth daily        fluticasone (FLOVENT DISKUS) 50 MCG/BLIST AEPB Spray 1 puff into both nostrils daily       FOLIC ACID PO Take 1 mg by mouth daily       FUROSEMIDE PO Take 20 mg by mouth daily        Garlic 10 MG CAPS Take by mouth 2 times daily       Glucos-MSM-C-Cd-Zkkoiy-Rbkgzw (GLUCOSAMINE MSM COMPLEX) TABS tablet Take 2 tablets by mouth daily       hydrOXYzine (ATARAX) 50 MG tablet TAKE 1 TABLET BY MOUTH TWICE A DAY AS NEEDED FOR ITCHING       Lutein 40 MG CAPS Take by mouth daily       Magnesium Oxide 500 MG TABS Take 1,000 mg by mouth daily       metoprolol tartrate (LOPRESSOR) 50 MG tablet Take 1 tablet (50 mg) by mouth 2 times daily 60 tablet 11     Morphine Sulfate (MS CONTIN PO) Take 15 mg by mouth every 12 hours       multivitamin, therapeutic with minerals (MULTI-VITAMIN) TABS Take 1 tablet by mouth daily       niacin 500 MG tablet Take by mouth daily (with breakfast)       NONFORMULARY 720 mg daily Tumeric, takes 2 tabs at bedtime       oxyCODONE-acetaminophen (PERCOCET) 7.5-325 MG per tablet Take 1 tablet by mouth 2 times daily Takes two in the morning and one before bedtime       OYSCO 500 + D 500-200 MG-UNIT tablet TAKE 2 TABLETS (1,000 MG) BY MOUTH EVERY 8 HOURS SEE DISCHARGE INSTRUCTION SHEET FOR TAPER. 72 tablet 1     potassium 99 MG TABS Take by mouth daily       Probiotic Product (PROBIOTIC DAILY PO) Take by mouth daily       RESVERATROL PO Take by mouth daily       tolterodine (DETROL) 2 MG tablet Take 2 mg by mouth 2 times daily       VITAMIN E COMPLEX PO Take 400 Units by mouth daily       Zinc Gluconate 50 MG CAPS Take by mouth daily       cetirizine (ZYRTEC) 5 MG tablet Take 10 mg by mouth daily          HPI: (Please see Dr. Marcelo's note from 4/2020 for the patient's detailed history)  In brief, Ms. Carlin is a pleasant 80 year old female with a PMHx including hypertension, obstructive sleep apnea, parathyroid tumor s/p resection, paroxysmal atrial fibrillation and  atrial flutter.  She met Dr. Marcelo for evaluation in April 2020, after her PCP obtained an EKG that revealed atrial fibrillation with nonspecific ST changes. Upon review by Dr. Marcelo, it appears she also had documented atrial fibrillation in November 2019 during pre-surgical eval, but results were never conveyed to her. Echocardiogram was ordered, and showed normal LV function 65-70%. He started her metoprolol tartrate 50mg BID and stopped her amlodipine. Aspirin was stopped and Eliquis 5mg BID was initiated. A follow up Holter monitor in June showed sinus rhythm with 1st deg AVB and an average HR of 59 bpm. She had a follow up virtual visit with Betty after that, and was feeling great, no changes were made.     Today, she presents for an overdue follow up visit. She declined to come in for assessment due to COVID-19 concerns. Therefore, this is being conducted as a telephone visit. She tells me that overall, she is feeling great. She tells me about skinny ankles, controlled blood pressures and HR's per her home monitoring. She complains however, of a chronic loss of smell and taste as well as diffuse itching (with no rash), which started around the time she was placed on metoprolol. She is hesitant to change it because she feels it's working so well for her heart, but if she could take something similar that might not have the side effects she'd like to try it. Metoprolol does have a 5% side effect of pruritis per UpToDate. Of note, she has never had palpitations with her arrhythmias.    Assessment/Plan:  1. PAF/PAFL, rate-control strategy with Lopressor 50 BID, anticoagulated with Eliquis.   2. Hypertension, well-controlled per ambulatory recordings.   3. Reduced sense of smell and taste, diffuse pruritis, potentially related to metoprolol.     -- I've advised her that she can try switching from metoprolol to diltiazem 120 mg daily, to see if her potential side effects resolve. If not, she will go back to the  metoprolol at the current dose.   -- She's only been seen virtually by our clinic in the setting of COVID-19 concerns and limited mobility. She has not had blood work drawn since this past summer, and lives in [place]. Therefore, I have asked her to come in to our [place] clinic in 1 month for follow up with MARCUS + labs (CMP, Mag, CBC) and an EKG. She is agreeable.     Phone call duration: 15 minutes    Marlen Agrawal PA-C  [facility]  Pager: [phone]  Text Page  (7:30am - 4pm M-F)     cc:   KATY Eugene  [facility]  [address] [place] [phone]

## 2021-03-02 NOTE — PROGRESS NOTES
Alert/Awake Raven is a 80 year old who is being evaluated via a billable telephone visit.      What phone number would you like to be contacted at? [phone]  How would you like to obtain your AVS? Mail a copy    Vital signs reported by patient  BP.135/59  P.70  Wt.N/A  Review Of Systems  Skin: NEGATIVE  Eyes:Ears/Nose/Throat: NEGATIVE  Respiratory: Sleep apnea, wears cpap  Cardiovascular:NEGATIVE  Gastrointestinal: NEGATIVE  Genitourinary:NEGATIVE   Musculoskeletal: NEGATIVE  Neurologic: NEGATIVE  Psychiatric: NEGATIVE  Hematologic/Lymphatic/Immunologic: NEGATIVE  Endocrine:  NEGATIVE  Ernestine Mckeon Lpn  __________________________    Raven Carlin is a 80 year old female who is being evaluated via a billable telephone visit.  This visit is being conducted as a virtual visit due to the emphasis on mitigation of the COVID-19 virus pandemic. The clinician has decided that the risk of an in-office visit outweighs the benefit for this patient.     I have reviewed and updated the patient's Past Medical History, Social History, Family History and Medication List.    ALLERGIES  Bactrim [sulfamethoxazole w-trimethoprim], Epinephrine, Ketamine, Lisinopril, and Simvastatin    MEDICATIONS  Current Outpatient Medications   Medication Sig Dispense Refill     apixaban ANTICOAGULANT (ELIQUIS ANTICOAGULANT) 5 MG tablet Take 1 tablet (5 mg) by mouth 2 times daily 180 tablet 3     ascorbic acid (VITAMIN C) 500 MG CPCR Take 500 mg by mouth daily       BETA CAROTENE PO Take by mouth daily       BIOTIN FORTE PO Take by mouth daily       cephALEXin (KEFLEX) 500 MG capsule Take 500 mg by mouth daily       Cranberry (CRAN-MAX PO) Take by mouth daily       Flaxseed, Linseed, (FLAX SEEDS PO) Take by mouth daily        fluticasone (FLOVENT DISKUS) 50 MCG/BLIST AEPB Spray 1 puff into both nostrils daily       FOLIC ACID PO Take 1 mg by mouth daily       FUROSEMIDE PO Take 20 mg by mouth daily        Garlic 10 MG CAPS Take by mouth 2 times daily        Glucos-MSM-C-Jx-Ogxocy-Buuhlo (GLUCOSAMINE MSM COMPLEX) TABS tablet Take 2 tablets by mouth daily       hydrOXYzine (ATARAX) 50 MG tablet TAKE 1 TABLET BY MOUTH TWICE A DAY AS NEEDED FOR ITCHING       Lutein 40 MG CAPS Take by mouth daily       Magnesium Oxide 500 MG TABS Take 1,000 mg by mouth daily       metoprolol tartrate (LOPRESSOR) 50 MG tablet Take 1 tablet (50 mg) by mouth 2 times daily 60 tablet 11     Morphine Sulfate (MS CONTIN PO) Take 15 mg by mouth every 12 hours       multivitamin, therapeutic with minerals (MULTI-VITAMIN) TABS Take 1 tablet by mouth daily       niacin 500 MG tablet Take by mouth daily (with breakfast)       NONFORMULARY 720 mg daily Tumeric, takes 2 tabs at bedtime       oxyCODONE-acetaminophen (PERCOCET) 7.5-325 MG per tablet Take 1 tablet by mouth 2 times daily Takes two in the morning and one before bedtime       OYSCO 500 + D 500-200 MG-UNIT tablet TAKE 2 TABLETS (1,000 MG) BY MOUTH EVERY 8 HOURS SEE DISCHARGE INSTRUCTION SHEET FOR TAPER. 72 tablet 1     potassium 99 MG TABS Take by mouth daily       Probiotic Product (PROBIOTIC DAILY PO) Take by mouth daily       RESVERATROL PO Take by mouth daily       tolterodine (DETROL) 2 MG tablet Take 2 mg by mouth 2 times daily       VITAMIN E COMPLEX PO Take 400 Units by mouth daily       Zinc Gluconate 50 MG CAPS Take by mouth daily       cetirizine (ZYRTEC) 5 MG tablet Take 10 mg by mouth daily          HPI: (Please see Dr. Marcelo's note from 4/2020 for the patient's detailed history)  In brief, Ms. Carlin is a pleasant 80 year old female with a PMHx including hypertension, obstructive sleep apnea, parathyroid tumor s/p resection, paroxysmal atrial fibrillation and atrial flutter.  She met Dr. Marcelo for evaluation in April 2020, after her PCP obtained an EKG that revealed atrial fibrillation with nonspecific ST changes. Upon review by Dr. Marcelo, it appears she also had documented atrial fibrillation in November 2019 during pre-surgical  chencho, but results were never conveyed to her. Echocardiogram was ordered, and showed normal LV function 65-70%. He started her metoprolol tartrate 50mg BID and stopped her amlodipine. Aspirin was stopped and Eliquis 5mg BID was initiated. A follow up Holter monitor in June showed sinus rhythm with 1st deg AVB and an average HR of 59 bpm. She had a follow up virtual visit with Betty after that, and was feeling great, no changes were made.     Today, she presents for an overdue follow up visit. She declined to come in for assessment due to COVID-19 concerns. Therefore, this is being conducted as a telephone visit. She tells me that overall, she is feeling great. She tells me about skinny ankles, controlled blood pressures and HR's per her home monitoring. She complains however, of a chronic loss of smell and taste as well as diffuse itching (with no rash), which started around the time she was placed on metoprolol. She is hesitant to change it because she feels it's working so well for her heart, but if she could take something similar that might not have the side effects she'd like to try it. Metoprolol does have a 5% side effect of pruritis per UpToDate. Of note, she has never had palpitations with her arrhythmias.    Assessment/Plan:  1. PAF/PAFL, rate-control strategy with Lopressor 50 BID, anticoagulated with Eliquis.   2. Hypertension, well-controlled per ambulatory recordings.   3. Reduced sense of smell and taste, diffuse pruritis, potentially related to metoprolol.     -- I've advised her that she can try switching from metoprolol to diltiazem 120 mg daily, to see if her potential side effects resolve. If not, she will go back to the metoprolol at the current dose.   -- She's only been seen virtually by our clinic in the setting of COVID-19 concerns and limited mobility. She has not had blood work drawn since this past summer, and lives in [place]. Therefore, I have asked her to come in to our [place]  clinic in 1 month for follow up with MARCUS + labs (CMP, Mag, CBC) and an EKG. She is agreeable.     Phone call duration: 15 minutes    Marlen Agrawal PA-C  [facility]  Pager: [phone]  Text Page  (7:30am - 4pm M-F)

## 2021-03-11 ENCOUNTER — TELEPHONE (OUTPATIENT)
Dept: CARDIOLOGY | Facility: CLINIC | Age: 81
End: 2021-03-11

## 2021-03-11 NOTE — TELEPHONE ENCOUNTER
She needs to be seen in clinic soon and if symptoms worse, should go to ED as she may be back in afib. If has side effects with diltiazem, can go back to metoprolol. Virtual visit probably not ideal.

## 2021-03-11 NOTE — TELEPHONE ENCOUNTER
Called pt she now says she feels much better and all dizziness, and lightheadedness has resolved she is back to normal. Informed her if returns needs to be seen in clinic or go to [facility]. JUSTIN Robert RN

## 2021-03-11 NOTE — TELEPHONE ENCOUNTER
Pt called in she has elevated  with some dizziness and lightheaded, and BP' have decreased from 160-170 systolic to 127/76. Pt at last OV was switched from metoprolol tartrate to Diltiazem 120 mg because of pruritis and loss of smell and taste after further discussion she has had issues with all these symptoms for a long time the itching more so on metoprolol but feels metoprolol works very well for her??  Pt said last dose of diltiazem was yesterday. Per NP note she had informed Pt if switch did not work she could go back to metoprolol tartrate 50 mg BID. Per Pt non of her symptoms changed after 4 days on new med. Informed Pt she may be back in afib if her symptoms do not improve over next hour or so she may need to go to er.  Pt advised to call back if no improvement. Will message provider for any other recommendations. JUSTIN Robert

## 2021-03-22 DIAGNOSIS — I48.21 PERMANENT ATRIAL FIBRILLATION (H): ICD-10-CM

## 2021-03-22 RX ORDER — METOPROLOL TARTRATE 50 MG
50 TABLET ORAL 2 TIMES DAILY
Qty: 180 TABLET | Refills: 3 | Status: SHIPPED | OUTPATIENT
Start: 2021-03-22 | End: 2021-03-22

## 2021-03-22 RX ORDER — METOPROLOL TARTRATE 50 MG
50 TABLET ORAL 2 TIMES DAILY
Qty: 180 TABLET | Refills: 3 | Status: SHIPPED | OUTPATIENT
Start: 2021-03-22 | End: 2022-01-25

## 2021-03-29 DIAGNOSIS — I48.0 PAROXYSMAL A-FIB (H): ICD-10-CM

## 2021-03-29 RX ORDER — DILTIAZEM HYDROCHLORIDE 120 MG/1
120 CAPSULE, EXTENDED RELEASE ORAL DAILY
Qty: 90 CAPSULE | Refills: 0 | Status: SHIPPED | OUTPATIENT
Start: 2021-03-29 | End: 2021-04-01

## 2021-04-01 NOTE — PROGRESS NOTES
Called pharmacy canceled med Pt had lightheadedness and dizziness and went back on metoprolol. JUSTIN Robert RN

## 2021-06-21 ENCOUNTER — APPOINTMENT (OUTPATIENT)
Dept: ULTRASOUND IMAGING | Facility: CLINIC | Age: 81
End: 2021-06-21
Attending: EMERGENCY MEDICINE
Payer: COMMERCIAL

## 2021-06-21 ENCOUNTER — HOSPITAL ENCOUNTER (EMERGENCY)
Facility: CLINIC | Age: 81
Discharge: ANOTHER HOSPITAL | End: 2021-06-21
Attending: EMERGENCY MEDICINE | Admitting: EMERGENCY MEDICINE
Payer: COMMERCIAL

## 2021-06-21 ENCOUNTER — APPOINTMENT (OUTPATIENT)
Dept: GENERAL RADIOLOGY | Facility: CLINIC | Age: 81
End: 2021-06-21
Attending: EMERGENCY MEDICINE
Payer: COMMERCIAL

## 2021-06-21 VITALS
DIASTOLIC BLOOD PRESSURE: 52 MMHG | WEIGHT: 270 LBS | BODY MASS INDEX: 44.98 KG/M2 | SYSTOLIC BLOOD PRESSURE: 121 MMHG | TEMPERATURE: 98.7 F | HEART RATE: 59 BPM | HEIGHT: 65 IN | RESPIRATION RATE: 18 BRPM | OXYGEN SATURATION: 97 %

## 2021-06-21 DIAGNOSIS — S80.11XA HEMATOMA OF RIGHT LOWER EXTREMITY, INITIAL ENCOUNTER: ICD-10-CM

## 2021-06-21 DIAGNOSIS — R31.9 URINARY TRACT INFECTION WITH HEMATURIA, SITE UNSPECIFIED: ICD-10-CM

## 2021-06-21 DIAGNOSIS — S81.802A WOUND OF LEFT LOWER EXTREMITY, INITIAL ENCOUNTER: ICD-10-CM

## 2021-06-21 DIAGNOSIS — N39.0 URINARY TRACT INFECTION WITH HEMATURIA, SITE UNSPECIFIED: ICD-10-CM

## 2021-06-21 DIAGNOSIS — L03.115 CELLULITIS OF RIGHT LEG: ICD-10-CM

## 2021-06-21 LAB
ABO + RH BLD: NORMAL
ABO + RH BLD: NORMAL
ALBUMIN SERPL-MCNC: 2.3 G/DL (ref 3.4–5)
ALBUMIN UR-MCNC: 10 MG/DL
ALP SERPL-CCNC: 138 U/L (ref 40–150)
ALT SERPL W P-5'-P-CCNC: 12 U/L (ref 0–50)
ANION GAP SERPL CALCULATED.3IONS-SCNC: 6 MMOL/L (ref 3–14)
APPEARANCE UR: ABNORMAL
AST SERPL W P-5'-P-CCNC: 13 U/L (ref 0–45)
BACTERIA #/AREA URNS HPF: ABNORMAL /HPF
BASOPHILS # BLD AUTO: 0 10E9/L (ref 0–0.2)
BASOPHILS NFR BLD AUTO: 0.2 %
BILIRUB DIRECT SERPL-MCNC: 0.4 MG/DL (ref 0–0.2)
BILIRUB SERPL-MCNC: 1.2 MG/DL (ref 0.2–1.3)
BILIRUB UR QL STRIP: ABNORMAL
BLD GP AB SCN SERPL QL: NORMAL
BLD PROD TYP BPU: NORMAL
BLD PROD TYP BPU: NORMAL
BLD UNIT ID BPU: 0
BLOOD BANK CMNT PATIENT-IMP: NORMAL
BLOOD PRODUCT CODE: NORMAL
BPU ID: NORMAL
BUN SERPL-MCNC: 44 MG/DL (ref 7–30)
CALCIUM SERPL-MCNC: 8.8 MG/DL (ref 8.5–10.1)
CHLORIDE SERPL-SCNC: 103 MMOL/L (ref 94–109)
CO2 SERPL-SCNC: 25 MMOL/L (ref 20–32)
COLOR UR AUTO: YELLOW
CREAT SERPL-MCNC: 1.96 MG/DL (ref 0.52–1.04)
DIFFERENTIAL METHOD BLD: ABNORMAL
EOSINOPHIL # BLD AUTO: 0.1 10E9/L (ref 0–0.7)
EOSINOPHIL NFR BLD AUTO: 0.5 %
ERYTHROCYTE [DISTWIDTH] IN BLOOD BY AUTOMATED COUNT: 13.3 % (ref 10–15)
GFR SERPL CREATININE-BSD FRML MDRD: 24 ML/MIN/{1.73_M2}
GLUCOSE SERPL-MCNC: 132 MG/DL (ref 70–99)
GLUCOSE UR STRIP-MCNC: NEGATIVE MG/DL
HCT VFR BLD AUTO: 21.8 % (ref 35–47)
HGB BLD-MCNC: 6.6 G/DL (ref 11.7–15.7)
HGB UR QL STRIP: NEGATIVE
IMM GRANULOCYTES # BLD: 0.1 10E9/L (ref 0–0.4)
IMM GRANULOCYTES NFR BLD: 0.6 %
INR PPP: 1.78 (ref 0.86–1.14)
KETONES UR STRIP-MCNC: 10 MG/DL
LABORATORY COMMENT REPORT: NORMAL
LACTATE BLD-SCNC: 1.7 MMOL/L (ref 0.7–2)
LEUKOCYTE ESTERASE UR QL STRIP: ABNORMAL
LYMPHOCYTES # BLD AUTO: 0.8 10E9/L (ref 0.8–5.3)
LYMPHOCYTES NFR BLD AUTO: 6.2 %
MCH RBC QN AUTO: 26.5 PG (ref 26.5–33)
MCHC RBC AUTO-ENTMCNC: 30.3 G/DL (ref 31.5–36.5)
MCV RBC AUTO: 88 FL (ref 78–100)
MONOCYTES # BLD AUTO: 0.8 10E9/L (ref 0–1.3)
MONOCYTES NFR BLD AUTO: 5.9 %
MUCOUS THREADS #/AREA URNS LPF: PRESENT /LPF
NEUTROPHILS # BLD AUTO: 11.1 10E9/L (ref 1.6–8.3)
NEUTROPHILS NFR BLD AUTO: 86.6 %
NITRATE UR QL: NEGATIVE
NRBC # BLD AUTO: 0 10*3/UL
NRBC BLD AUTO-RTO: 0 /100
NUM BPU REQUESTED: 1
PH UR STRIP: 7 PH (ref 5–7)
PLATELET # BLD AUTO: 173 10E9/L (ref 150–450)
POTASSIUM SERPL-SCNC: 4.6 MMOL/L (ref 3.4–5.3)
PROT SERPL-MCNC: 6.2 G/DL (ref 6.8–8.8)
RBC # BLD AUTO: 2.49 10E12/L (ref 3.8–5.2)
RBC #/AREA URNS AUTO: 3 /HPF (ref 0–2)
SARS-COV-2 RNA RESP QL NAA+PROBE: NEGATIVE
SODIUM SERPL-SCNC: 134 MMOL/L (ref 133–144)
SOURCE: ABNORMAL
SP GR UR STRIP: 1.02 (ref 1–1.03)
SPECIMEN EXP DATE BLD: NORMAL
SPECIMEN SOURCE: NORMAL
SQUAMOUS #/AREA URNS AUTO: 1 /HPF (ref 0–1)
TRANSFUSION STATUS PATIENT QL: NORMAL
TRANSFUSION STATUS PATIENT QL: NORMAL
UROBILINOGEN UR STRIP-MCNC: 6 MG/DL (ref 0–2)
WBC # BLD AUTO: 12.8 10E9/L (ref 4–11)
WBC #/AREA URNS AUTO: 159 /HPF (ref 0–5)

## 2021-06-21 PROCEDURE — 85025 COMPLETE CBC W/AUTO DIFF WBC: CPT | Performed by: EMERGENCY MEDICINE

## 2021-06-21 PROCEDURE — 250N000011 HC RX IP 250 OP 636: Performed by: EMERGENCY MEDICINE

## 2021-06-21 PROCEDURE — 73590 X-RAY EXAM OF LOWER LEG: CPT | Mod: RT

## 2021-06-21 PROCEDURE — 85610 PROTHROMBIN TIME: CPT | Performed by: EMERGENCY MEDICINE

## 2021-06-21 PROCEDURE — 86922 COMPATIBILITY TEST ANTIGLOB: CPT | Performed by: EMERGENCY MEDICINE

## 2021-06-21 PROCEDURE — 80048 BASIC METABOLIC PNL TOTAL CA: CPT | Performed by: EMERGENCY MEDICINE

## 2021-06-21 PROCEDURE — 96375 TX/PRO/DX INJ NEW DRUG ADDON: CPT

## 2021-06-21 PROCEDURE — 36430 TRANSFUSION BLD/BLD COMPNT: CPT

## 2021-06-21 PROCEDURE — 80076 HEPATIC FUNCTION PANEL: CPT | Performed by: EMERGENCY MEDICINE

## 2021-06-21 PROCEDURE — 93005 ELECTROCARDIOGRAM TRACING: CPT

## 2021-06-21 PROCEDURE — 76882 US LMTD JT/FCL EVL NVASC XTR: CPT | Mod: RT

## 2021-06-21 PROCEDURE — 87086 URINE CULTURE/COLONY COUNT: CPT | Performed by: EMERGENCY MEDICINE

## 2021-06-21 PROCEDURE — 96365 THER/PROPH/DIAG IV INF INIT: CPT

## 2021-06-21 PROCEDURE — 99285 EMERGENCY DEPT VISIT HI MDM: CPT | Mod: 25

## 2021-06-21 PROCEDURE — 87040 BLOOD CULTURE FOR BACTERIA: CPT | Performed by: EMERGENCY MEDICINE

## 2021-06-21 PROCEDURE — 93970 EXTREMITY STUDY: CPT

## 2021-06-21 PROCEDURE — 258N000003 HC RX IP 258 OP 636: Performed by: EMERGENCY MEDICINE

## 2021-06-21 PROCEDURE — 87186 SC STD MICRODIL/AGAR DIL: CPT | Performed by: EMERGENCY MEDICINE

## 2021-06-21 PROCEDURE — 87635 SARS-COV-2 COVID-19 AMP PRB: CPT | Performed by: EMERGENCY MEDICINE

## 2021-06-21 PROCEDURE — 87088 URINE BACTERIA CULTURE: CPT | Performed by: EMERGENCY MEDICINE

## 2021-06-21 PROCEDURE — C9803 HOPD COVID-19 SPEC COLLECT: HCPCS

## 2021-06-21 PROCEDURE — 86900 BLOOD TYPING SEROLOGIC ABO: CPT | Performed by: EMERGENCY MEDICINE

## 2021-06-21 PROCEDURE — 81001 URINALYSIS AUTO W/SCOPE: CPT | Performed by: EMERGENCY MEDICINE

## 2021-06-21 PROCEDURE — 71045 X-RAY EXAM CHEST 1 VIEW: CPT

## 2021-06-21 PROCEDURE — 86850 RBC ANTIBODY SCREEN: CPT | Performed by: EMERGENCY MEDICINE

## 2021-06-21 PROCEDURE — P9016 RBC LEUKOCYTES REDUCED: HCPCS | Performed by: EMERGENCY MEDICINE

## 2021-06-21 PROCEDURE — 86901 BLOOD TYPING SEROLOGIC RH(D): CPT | Performed by: EMERGENCY MEDICINE

## 2021-06-21 PROCEDURE — 36415 COLL VENOUS BLD VENIPUNCTURE: CPT | Performed by: EMERGENCY MEDICINE

## 2021-06-21 PROCEDURE — 83605 ASSAY OF LACTIC ACID: CPT | Performed by: EMERGENCY MEDICINE

## 2021-06-21 RX ORDER — FUROSEMIDE 10 MG/ML
20 INJECTION INTRAMUSCULAR; INTRAVENOUS ONCE
Status: COMPLETED | OUTPATIENT
Start: 2021-06-21 | End: 2021-06-21

## 2021-06-21 RX ORDER — CEFAZOLIN SODIUM 1 G/50ML
1750 SOLUTION INTRAVENOUS ONCE
Status: COMPLETED | OUTPATIENT
Start: 2021-06-21 | End: 2021-06-21

## 2021-06-21 RX ADMIN — TAZOBACTAM SODIUM AND PIPERACILLIN SODIUM 4.5 G: 500; 4 INJECTION, SOLUTION INTRAVENOUS at 16:21

## 2021-06-21 RX ADMIN — FUROSEMIDE 20 MG: 10 INJECTION, SOLUTION INTRAMUSCULAR; INTRAVENOUS at 17:31

## 2021-06-21 RX ADMIN — VANCOMYCIN HYDROCHLORIDE 1750 MG: 10 INJECTION, POWDER, LYOPHILIZED, FOR SOLUTION INTRAVENOUS at 17:13

## 2021-06-21 ASSESSMENT — ENCOUNTER SYMPTOMS
LIGHT-HEADEDNESS: 1
COUGH: 0
WOUND: 1
RHINORRHEA: 0
COLOR CHANGE: 1
FEVER: 0
BLOOD IN STOOL: 0

## 2021-06-21 ASSESSMENT — MIFFLIN-ST. JEOR: SCORE: 1695.59

## 2021-06-21 NOTE — ED PROVIDER NOTES
History   Chief Complaint:  Bleeding/Bruising       HPI   Raven Carlin is a 81 year old female with history of chronic infection, CKD, hypertension, lymphedema, osteoarthritis, and atrial fibrillation on Eliquis who presents with concern for wound evaluation. Patient states she has a history of bilateral knee replacement, but currently has no left knee joint in place. Four days ago, she was trying to ambulate and had an onset of weakness. She sat down on the ground and EMS was called for assistance as she could not lift herself back up. No head injury. During their lift assist, patient reportedly struck her right leg and sustained bruising and a hematoma to her right low leg. Patient's ecchymosis is now spreading up her right thigh she reports. She endorses right leg pain with any movement. Patient also has an history of an abscess on her left posterior thigh.  Patient is unable to ambulate independently and was referred to the ED by her family doctor today. Patient also feels lightheaded here. No fever, chest pain, cough, rhinnorhea, black/bloody stool. Patient is taking Keflex prophylactically twice per day by her infectious disease provider given her chronic posterior thigh wound.    Review of Systems   Constitutional: Negative for fever.   HENT: Negative for rhinorrhea.    Respiratory: Negative for cough.    Cardiovascular: Negative for chest pain.   Gastrointestinal: Negative for blood in stool.   Skin: Positive for color change and wound.   Neurological: Positive for light-headedness.   All other systems reviewed and are negative.    Allergies:  Bactrim  Epinephrine  Ketamine  Lisinopril  Simvastatin  Clindamycin  Losartan   Atorvastatin      Medications:  Eliquis  Keflex  Zyrtec  Fluticasone  Glucosamine  Atarax  Lutein  Lopressor  Morphine sulfate  Niacin  Percocet  Oysco   Resveratrol  Detrol  Lasix    Past Medical History:    Arthritis   Asthma   Chronic infection  Chronic pain  CKD  Dyslipidemia  "  Eczema   Fractured pelvis  Gastric ulcer  GERD  Hypertension   Overactive bladder  Sleep apnea  Atrial fibrillation  Hyperparathyroidism   Osteoarthritis   Abdominal mass  Osteomyelitis   Anxiety  Lymphedema   Chronic edema  MRSA infection, left knee     Past Surgical History:    Knee arthroplasty, bilateral  Arthroplasty revision  Knee aspiration  Hernia repair  Mammoplasty augmentation  Exchange arthroplasty component, knee  Rhinoplasty   Tonsillectomy   Remove hardware arthroplasty, knee  Parathyroidectomy     Family History:    Colon cancer  Diabetes  Hypertension    Social History:  Patient presents with .    Physical Exam     Patient Vitals for the past 24 hrs:   BP Temp Temp src Pulse Resp SpO2 Height Weight   06/21/21 1630 -- -- -- -- -- 96 % -- --   06/21/21 1615 125/56 -- -- 62 -- 99 % -- --   06/21/21 1600 (!) 148/53 -- -- 146 -- -- -- --   06/21/21 1527 -- -- -- -- -- -- 1.651 m (5' 5\") 122.5 kg (270 lb)   06/21/21 1500 -- -- -- -- -- -- 1.676 m (5' 6\") --   06/21/21 1325 118/54 98.7  F (37.1  C) Oral 56 20 92 % -- --       Physical Exam  General: Alert. Appears uncomfortable  Head:  The scalp, face, and head appear normal without trauma  Eyes:  Sclera white;   ENT:    External ears normal.  No hemotympanum.      External nares normal.  No septal hematoma.    Neck:  No midline tenderness or pain with full ROM.  CV:  Rate as above with regular rhythm  2/2 radial and dorsal pedal pulses  Resp:  Lungs with bibasilar rales    Non-labored, no retractions or accessory muscle use  GI:  Abdomen soft, non-tender, non-distended    No rebound tenderness or guarding  MSK:  No midline tenderness or bony step-off    No deformity    Moves all extremities equally; L. Lower calf with significant ecchymosis extending up to mid thigh, soft compartments; TTP. No crepitance, though surrounding warmth/erythema to L. Calf; R. Calf with +1 LE swelling, no significant TTP; L. Posterior upper thigh with concerns for " underlying chronic abscess/cellulitis. Exam limited 2/2 to patient cooperation  Skin:  No rash or lesions noted.  Neuro:   No apparent deficit.    Strength 5/5 x4.  Sensation intact x4.     GCS: 15  Psych:  Normal affect.        Picture #1: RLE      Picture #2: L. Posterior upper thigh    Emergency Department Course     ECG (17:56:06):  Vent. rate 58 BPM LA interval 168 ms QRS duration 84 ms QT/QTc 478/469 ms P-R-T axes 56 -7 154    Sinus bradycardia with sinus arrhythmia  Low voltage QRS  ST & T wave abnormality, consider anterior ischmia     Interpreted at 1800     Imaging:  US bilateral lower extremity venous duplex  1. No evidence of deep venous thrombosis in the right lower extremity.  2. No evidence of deep venous thrombosis in the left lower extremity.  Left peroneal vein was not visualized.  Per radiology.    US Right extremity non vascular  1.  Large complex collection anterior shin would be consistent with complex hematoma  Per radiology.    XR Chest portable  Single portable AP view of the chest was obtained.  Cardiomediastinal silhouette is within normal limits. No suspicious  focal pulmonary opacities. Bilateral calcified breast implants.  Significant degenerative changes of the shoulder joints.  Per radiology.    XR Tibia & fibula right  Right total knee arthroplasty in place. It appears that  the patellar prosthetic component has been displaced inferior to the  patella. There is remodeling of the patella. Large amount of soft  tissue swelling in the anterior leg. No evidence of acute fracture.   Per radiology.    Laboratory:   CBC: WBC 12.8 (H), HGB 6.6 (LL),    BMP: Glucose 132 (H), BUN 44 (H), Creatinine 1.96 (H), GFR 24 (L), o/w WNL  INR: 1.78 (H)  Lactic acid (result time 1531) 1.7   ABO/Rh: ABO AB, RH pos, Ab neg  Hepatic panel: Bilirubin 0.4 (H), Albumin 2.3 (L), Protein total 6.2 (L), o/w WNL  UA: Bilin small (A), Ketones 10 (A), Albumin 10 (A), Urobilinogen 6.0 (H), Leukocyte esterase  large (A),  (H), RBC 3 (H), Bacteria few (A), Mucous present (A) o/w WNL  Asymptomatic COVID-19 virus by PCR: negative  Blood culture x2: pending  Urine culture: pending    Emergency Department Course:  Reviewed:  I reviewed nursing notes, vitals, past medical history and care everywhere    Assessments:  1456 I obtained history and examined the patient as noted above.     Consults:   4107 I spoke with Dr. Powers from [facility] regarding patient's presentation, findings, and plan of care.    Interventions:  1621 Zosyn, 4.5 g, IV  1713 Vancomycin, 1750 mg, IV  1731 Lasix, 20 mg, IV    Disposition:  The patient was transferred to [facility] via EMS. Dr. Powers accepted the patient for transfer.     Impression & Plan     Medical Decision Making:  Patient is an 81-year-old female presenting with reported leg wound.  She reports history of trauma 4 days prior and a known history of anticoagulation.  On exam she has notable swelling and ecchymosis to her right lower extremity.  Initial labs and imaging were started by my partner.  Fortunately no fracture to her lower extremity.  She did undergo formal ultrasound which shows concerns for an underlying complex hematoma.  Her hemoglobin is noted to be downtrending as well.  She was consented for blood, 1 unit transfused as well as given gentle diuresis given her known underlying history of CHF.  She is also noted to have a chronic appearing wound to her L. Posterior thigh. Labs with mild worsening of her creatinine as well as leukocytosis today.  I cannot exclude early sepsis for cellulitis around RLE. UA concerning for infection as well. She was given IV Zosyn as well as vancomycin.  Blood cultures have been sent.  She remained hemodynamically stable during her time in the ED.  Unfortunately no beds available within the [facility] as well as multiple surrounding hospitals.  [facility] graciously accepted the patient and family agreeable as well as patient to transfer to their  facility.  Patient to benefit from formal hematoma evacuation.  I did discuss INR reversal with [facility] though the were agreeable to hold off at this point time which I think is reasonable as I do not suspect that this is an active hematoma formation.  Her compartments are soft, no evidence to suggest deep soft tissue infection or compartment syndrome at this time    Covid-19  Raven Carlin was evaluated during a global COVID-19 pandemic, which necessitated consideration that the patient might be at risk for infection with the SARS-CoV-2 virus that causes COVID-19.   Applicable protocols for evaluation were followed during the patient's care.   COVID-19 was considered as part of the patient's evaluation. The plan for testing is:  a test was obtained during this visit.    Diagnosis:    ICD-10-CM    1. Wound of left lower extremity, initial encounter  S81.802A Blood component   2. Hematoma of right lower extremity, initial encounter  S80.11XA    3. Urinary tract infection with hematuria, site unspecified  N39.0     R31.9    4. Cellulitis of right leg  L03.115        Scribe Disclosure:  I, Etta Gonzalez, am serving as a scribe at 2:56 PM on 6/21/2021 to document services personally performed by Julia Haskins DO based on my observations and the provider's statements to me.      Julia Haskins DO  06/21/21 1916

## 2021-06-21 NOTE — ED NOTES
Report given to EMS and report was called to Kevin at [facility] ED earlier. Pt transferring with Vancomycin running and 1 unit of PRBCs running as well. Vitals stable. No signs of transfusion reaction.

## 2021-06-21 NOTE — ED NOTES
Bed: ED16  Expected date: 6/21/21  Expected time: 1:17 PM  Means of arrival: Ambulance  Comments:  BV1  81F  Leg injury/UTI

## 2021-06-21 NOTE — ED TRIAGE NOTES
Lift assist 5 days ago and bruised right shin. Getting worse. Upper thigh down to foot ecchymosis. Patient on thinners. Smells of urine. Was unable to get out of chair to get to toilet.

## 2021-06-22 LAB — INTERPRETATION ECG - MUSE: NORMAL

## 2021-06-25 LAB
BACTERIA SPEC CULT: ABNORMAL
BACTERIA SPEC CULT: ABNORMAL
Lab: ABNORMAL
SPECIMEN SOURCE: ABNORMAL

## 2021-06-27 LAB
BACTERIA SPEC CULT: NO GROWTH
BACTERIA SPEC CULT: NO GROWTH
SPECIMEN SOURCE: NORMAL
SPECIMEN SOURCE: NORMAL

## 2021-08-04 ENCOUNTER — TELEPHONE (OUTPATIENT)
Dept: CARDIOLOGY | Facility: CLINIC | Age: 81
End: 2021-08-04

## 2021-08-04 NOTE — TELEPHONE ENCOUNTER
"Patient called and advised RN she has been having some intermittent dizziness. Patient reports she was hospitalized for a couple of weeks last month d/t low Hgb after sustaining a fall. Patient reported to RN that her BP and HR were \"good\" per home care's report. Patient reports to RN she is unable to leave her home d/t immobility. Patient reports that she has not been evaluated by PMD since discharge from hospital d/t immobility. Patient also reports she has wound care coming three times weekly, but is unable to secure home care for her other needs. RN reviewed patient's PMD's notes and it appears that [facility] services are involved d/t vulnerable adult status. RN advised patient that if patient is not feeling well and is unable to come into clinic for evaluation she needs to go to ED via EMS. Patient verbalized understanding, but declined to call EMS at this time. RN informed patient that this RN was going to update her PMD team with her current status. Patient verbalized understanding. RN subsequently called patient's PMD and advised them of patient's current status today and inquired if they have an option to send someone out to patient's home for home care for further evaluation/check labs (Hgb). RN advised patient's PMD team to call this RN back should they have any further questions.   "

## 2022-01-20 DIAGNOSIS — I48.21 PERMANENT ATRIAL FIBRILLATION (H): ICD-10-CM

## 2022-01-21 ENCOUNTER — TELEPHONE (OUTPATIENT)
Dept: CARDIOLOGY | Facility: CLINIC | Age: 82
End: 2022-01-21
Payer: COMMERCIAL

## 2022-01-21 NOTE — TELEPHONE ENCOUNTER
I called Raven and told her I received a fax from [facility] requesting medication refills. We have sent her prescriptions to [facility] on [address] in [place] in the past. I asked her to call me back regarding where she wants her refills.    [FreeTextEntry1] : The patient was given verbal and written instructions. The patient verbalized understanding of the instructions. Will follow up next week for post procedure u/s.

## 2022-01-25 DIAGNOSIS — I48.21 PERMANENT ATRIAL FIBRILLATION (H): ICD-10-CM

## 2022-01-25 RX ORDER — METOPROLOL TARTRATE 50 MG
50 TABLET ORAL 2 TIMES DAILY
Qty: 180 TABLET | Refills: 0 | Status: ON HOLD | OUTPATIENT
Start: 2022-01-25 | End: 2022-04-28

## 2022-04-09 ENCOUNTER — APPOINTMENT (OUTPATIENT)
Dept: CT IMAGING | Facility: CLINIC | Age: 82
DRG: 872 | End: 2022-04-09
Attending: EMERGENCY MEDICINE
Payer: COMMERCIAL

## 2022-04-09 ENCOUNTER — HOSPITAL ENCOUNTER (INPATIENT)
Facility: CLINIC | Age: 82
LOS: 9 days | Discharge: HOME HEALTH | DRG: 872 | End: 2022-04-18
Attending: EMERGENCY MEDICINE | Admitting: INTERNAL MEDICINE
Payer: COMMERCIAL

## 2022-04-09 ENCOUNTER — APPOINTMENT (OUTPATIENT)
Dept: GENERAL RADIOLOGY | Facility: CLINIC | Age: 82
DRG: 872 | End: 2022-04-09
Attending: EMERGENCY MEDICINE
Payer: COMMERCIAL

## 2022-04-09 DIAGNOSIS — K80.50 CHOLEDOCHOLITHIASIS: Primary | ICD-10-CM

## 2022-04-09 DIAGNOSIS — R19.00 ABDOMINAL MASS, UNSPECIFIED ABDOMINAL LOCATION: ICD-10-CM

## 2022-04-09 DIAGNOSIS — Z96.652 S/P REVISION OF TOTAL KNEE, LEFT: ICD-10-CM

## 2022-04-09 DIAGNOSIS — R53.81 PHYSICAL DECONDITIONING: ICD-10-CM

## 2022-04-09 DIAGNOSIS — N39.0 URINARY TRACT INFECTION WITHOUT HEMATURIA, SITE UNSPECIFIED: ICD-10-CM

## 2022-04-09 DIAGNOSIS — A41.9 SEVERE SEPSIS (H): ICD-10-CM

## 2022-04-09 DIAGNOSIS — R65.20 SEVERE SEPSIS (H): ICD-10-CM

## 2022-04-09 LAB
ALBUMIN SERPL-MCNC: 3.4 G/DL (ref 3.4–5)
ALBUMIN UR-MCNC: 30 MG/DL
ALP SERPL-CCNC: 120 U/L (ref 40–150)
ALT SERPL W P-5'-P-CCNC: 13 U/L (ref 0–50)
ANION GAP SERPL CALCULATED.3IONS-SCNC: 7 MMOL/L (ref 3–14)
APPEARANCE UR: ABNORMAL
AST SERPL W P-5'-P-CCNC: 14 U/L (ref 0–45)
BACTERIA #/AREA URNS HPF: ABNORMAL /HPF
BASOPHILS # BLD AUTO: 0 10E3/UL (ref 0–0.2)
BASOPHILS NFR BLD AUTO: 0 %
BILIRUB DIRECT SERPL-MCNC: 0.4 MG/DL (ref 0–0.2)
BILIRUB SERPL-MCNC: 1.4 MG/DL (ref 0.2–1.3)
BILIRUB UR QL STRIP: ABNORMAL
BUN SERPL-MCNC: 37 MG/DL (ref 7–30)
CALCIUM SERPL-MCNC: 9.1 MG/DL (ref 8.5–10.1)
CHLORIDE BLD-SCNC: 103 MMOL/L (ref 94–109)
CO2 SERPL-SCNC: 27 MMOL/L (ref 20–32)
COLOR UR AUTO: YELLOW
CREAT SERPL-MCNC: 1.71 MG/DL (ref 0.52–1.04)
EOSINOPHIL # BLD AUTO: 0 10E3/UL (ref 0–0.7)
EOSINOPHIL NFR BLD AUTO: 0 %
ERYTHROCYTE [DISTWIDTH] IN BLOOD BY AUTOMATED COUNT: 15.1 % (ref 10–15)
FLUAV RNA SPEC QL NAA+PROBE: NEGATIVE
FLUBV RNA RESP QL NAA+PROBE: NEGATIVE
GFR SERPL CREATININE-BSD FRML MDRD: 30 ML/MIN/1.73M2
GLUCOSE BLD-MCNC: 155 MG/DL (ref 70–99)
GLUCOSE UR STRIP-MCNC: NEGATIVE MG/DL
HCT VFR BLD AUTO: 41 % (ref 35–47)
HGB BLD-MCNC: 13 G/DL (ref 11.7–15.7)
HGB UR QL STRIP: ABNORMAL
IMM GRANULOCYTES # BLD: 0.2 10E3/UL
IMM GRANULOCYTES NFR BLD: 1 %
KETONES UR STRIP-MCNC: 10 MG/DL
LACTATE SERPL-SCNC: 2.7 MMOL/L (ref 0.7–2)
LACTATE SERPL-SCNC: 2.8 MMOL/L (ref 0.7–2)
LEUKOCYTE ESTERASE UR QL STRIP: ABNORMAL
LIPASE SERPL-CCNC: 64 U/L (ref 73–393)
LYMPHOCYTES # BLD AUTO: 0.5 10E3/UL (ref 0.8–5.3)
LYMPHOCYTES NFR BLD AUTO: 3 %
MCH RBC QN AUTO: 26.8 PG (ref 26.5–33)
MCHC RBC AUTO-ENTMCNC: 31.7 G/DL (ref 31.5–36.5)
MCV RBC AUTO: 85 FL (ref 78–100)
MONOCYTES # BLD AUTO: 0.7 10E3/UL (ref 0–1.3)
MONOCYTES NFR BLD AUTO: 4 %
MUCOUS THREADS #/AREA URNS LPF: PRESENT /LPF
NEUTROPHILS # BLD AUTO: 17.8 10E3/UL (ref 1.6–8.3)
NEUTROPHILS NFR BLD AUTO: 92 %
NITRATE UR QL: POSITIVE
NRBC # BLD AUTO: 0 10E3/UL
NRBC BLD AUTO-RTO: 0 /100
PH UR STRIP: 6.5 [PH] (ref 5–7)
PLATELET # BLD AUTO: 200 10E3/UL (ref 150–450)
POTASSIUM BLD-SCNC: 4.5 MMOL/L (ref 3.4–5.3)
PROT SERPL-MCNC: 6.9 G/DL (ref 6.8–8.8)
RBC # BLD AUTO: 4.85 10E6/UL (ref 3.8–5.2)
RBC URINE: 18 /HPF
RSV RNA SPEC NAA+PROBE: NEGATIVE
SARS-COV-2 RNA RESP QL NAA+PROBE: NEGATIVE
SODIUM SERPL-SCNC: 137 MMOL/L (ref 133–144)
SP GR UR STRIP: 1.02 (ref 1–1.03)
UROBILINOGEN UR STRIP-MCNC: 6 MG/DL
WBC # BLD AUTO: 19.1 10E3/UL (ref 4–11)
WBC CLUMPS #/AREA URNS HPF: PRESENT /HPF
WBC URINE: >182 /HPF

## 2022-04-09 PROCEDURE — 96365 THER/PROPH/DIAG IV INF INIT: CPT

## 2022-04-09 PROCEDURE — 74177 CT ABD & PELVIS W/CONTRAST: CPT

## 2022-04-09 PROCEDURE — 80048 BASIC METABOLIC PNL TOTAL CA: CPT | Performed by: EMERGENCY MEDICINE

## 2022-04-09 PROCEDURE — 96361 HYDRATE IV INFUSION ADD-ON: CPT

## 2022-04-09 PROCEDURE — 87186 SC STD MICRODIL/AGAR DIL: CPT | Performed by: EMERGENCY MEDICINE

## 2022-04-09 PROCEDURE — 96375 TX/PRO/DX INJ NEW DRUG ADDON: CPT

## 2022-04-09 PROCEDURE — C9803 HOPD COVID-19 SPEC COLLECT: HCPCS

## 2022-04-09 PROCEDURE — 81001 URINALYSIS AUTO W/SCOPE: CPT | Performed by: EMERGENCY MEDICINE

## 2022-04-09 PROCEDURE — 250N000013 HC RX MED GY IP 250 OP 250 PS 637: Performed by: EMERGENCY MEDICINE

## 2022-04-09 PROCEDURE — 250N000009 HC RX 250: Performed by: EMERGENCY MEDICINE

## 2022-04-09 PROCEDURE — 250N000011 HC RX IP 250 OP 636: Performed by: EMERGENCY MEDICINE

## 2022-04-09 PROCEDURE — 258N000003 HC RX IP 258 OP 636: Performed by: EMERGENCY MEDICINE

## 2022-04-09 PROCEDURE — 80076 HEPATIC FUNCTION PANEL: CPT | Performed by: EMERGENCY MEDICINE

## 2022-04-09 PROCEDURE — 83690 ASSAY OF LIPASE: CPT | Performed by: EMERGENCY MEDICINE

## 2022-04-09 PROCEDURE — 85025 COMPLETE CBC W/AUTO DIFF WBC: CPT | Performed by: EMERGENCY MEDICINE

## 2022-04-09 PROCEDURE — 83605 ASSAY OF LACTIC ACID: CPT | Performed by: EMERGENCY MEDICINE

## 2022-04-09 PROCEDURE — 120N000001 HC R&B MED SURG/OB

## 2022-04-09 PROCEDURE — 87040 BLOOD CULTURE FOR BACTERIA: CPT | Performed by: EMERGENCY MEDICINE

## 2022-04-09 PROCEDURE — 99285 EMERGENCY DEPT VISIT HI MDM: CPT | Mod: 25

## 2022-04-09 PROCEDURE — 87637 SARSCOV2&INF A&B&RSV AMP PRB: CPT | Performed by: EMERGENCY MEDICINE

## 2022-04-09 PROCEDURE — 96366 THER/PROPH/DIAG IV INF ADDON: CPT

## 2022-04-09 PROCEDURE — 96368 THER/DIAG CONCURRENT INF: CPT

## 2022-04-09 PROCEDURE — 99223 1ST HOSP IP/OBS HIGH 75: CPT | Mod: AI | Performed by: INTERNAL MEDICINE

## 2022-04-09 PROCEDURE — 71046 X-RAY EXAM CHEST 2 VIEWS: CPT

## 2022-04-09 PROCEDURE — 36415 COLL VENOUS BLD VENIPUNCTURE: CPT | Performed by: EMERGENCY MEDICINE

## 2022-04-09 RX ORDER — IOPAMIDOL 755 MG/ML
500 INJECTION, SOLUTION INTRAVASCULAR ONCE
Status: COMPLETED | OUTPATIENT
Start: 2022-04-09 | End: 2022-04-09

## 2022-04-09 RX ORDER — CEFAZOLIN SODIUM 1 G/50ML
2500 SOLUTION INTRAVENOUS ONCE
Status: COMPLETED | OUTPATIENT
Start: 2022-04-09 | End: 2022-04-09

## 2022-04-09 RX ORDER — SODIUM CHLORIDE, SODIUM LACTATE, POTASSIUM CHLORIDE, CALCIUM CHLORIDE 600; 310; 30; 20 MG/100ML; MG/100ML; MG/100ML; MG/100ML
INJECTION, SOLUTION INTRAVENOUS CONTINUOUS
Status: DISCONTINUED | OUTPATIENT
Start: 2022-04-09 | End: 2022-04-10

## 2022-04-09 RX ORDER — ACETAMINOPHEN 500 MG
1000 TABLET ORAL ONCE
Status: COMPLETED | OUTPATIENT
Start: 2022-04-09 | End: 2022-04-09

## 2022-04-09 RX ORDER — ONDANSETRON 2 MG/ML
4 INJECTION INTRAMUSCULAR; INTRAVENOUS EVERY 30 MIN PRN
Status: DISCONTINUED | OUTPATIENT
Start: 2022-04-09 | End: 2022-04-10

## 2022-04-09 RX ORDER — MEROPENEM 1 G/1
1 INJECTION, POWDER, FOR SOLUTION INTRAVENOUS ONCE
Status: COMPLETED | OUTPATIENT
Start: 2022-04-09 | End: 2022-04-09

## 2022-04-09 RX ADMIN — VANCOMYCIN HYDROCHLORIDE 2500 MG: 10 INJECTION, POWDER, LYOPHILIZED, FOR SOLUTION INTRAVENOUS at 20:19

## 2022-04-09 RX ADMIN — ONDANSETRON 4 MG: 2 INJECTION INTRAMUSCULAR; INTRAVENOUS at 17:43

## 2022-04-09 RX ADMIN — SODIUM CHLORIDE, POTASSIUM CHLORIDE, SODIUM LACTATE AND CALCIUM CHLORIDE 2000 ML: 600; 310; 30; 20 INJECTION, SOLUTION INTRAVENOUS at 20:18

## 2022-04-09 RX ADMIN — ACETAMINOPHEN 1000 MG: 500 TABLET, FILM COATED ORAL at 18:21

## 2022-04-09 RX ADMIN — SODIUM CHLORIDE 65 ML: 9 INJECTION, SOLUTION INTRAVENOUS at 20:02

## 2022-04-09 RX ADMIN — IOPAMIDOL 100 ML: 755 INJECTION, SOLUTION INTRAVENOUS at 20:02

## 2022-04-09 RX ADMIN — MEROPENEM 1 G: 1 INJECTION, POWDER, FOR SOLUTION INTRAVENOUS at 18:45

## 2022-04-09 RX ADMIN — SODIUM CHLORIDE 1000 ML: 9 INJECTION, SOLUTION INTRAVENOUS at 17:47

## 2022-04-09 ASSESSMENT — ENCOUNTER SYMPTOMS
NAUSEA: 1
HEADACHES: 0
CHILLS: 1
DIARRHEA: 0
DYSURIA: 0
VOMITING: 0
SHORTNESS OF BREATH: 0
ABDOMINAL PAIN: 1
SORE THROAT: 0
FREQUENCY: 1
COUGH: 0

## 2022-04-09 ASSESSMENT — ACTIVITIES OF DAILY LIVING (ADL)
ADLS_ACUITY_SCORE: 12
ADLS_ACUITY_SCORE: 12

## 2022-04-09 ASSESSMENT — VISUAL ACUITY: OS: HAND MOTION

## 2022-04-09 NOTE — ED TRIAGE NOTES
"Presents via EMS from home. Pt is concerned for UTI. Pt is very verbal stating \"I've never been so sick.\" Pt has history of recurrent UTIs. Pt states last one was last week. Pt stated temp at home was 101.3. denies taking anything pain or fever. GCS 15. CMS intact.  "

## 2022-04-09 NOTE — ED PROVIDER NOTES
History   Chief Complaint:  Rule out Urinary Tract Infection    HPI   Raven Carlin is a 82 year old female,on Eliquis, who presents with nausea. The patient reports feeling sick to her stomach starting today. She is experiencing chills and abdominal tenderness. There has been an increased frequency of urination in an unspecified time frame, but no burning. She has no vomiting, body aches, sore throat, or headache. She is not experiencing any shortness of breathe or chest pain.     Review of Systems   Constitutional: Positive for chills.   HENT: Negative for sore throat.    Eyes: Negative for visual disturbance.   Respiratory: Negative for cough and shortness of breath.    Cardiovascular: Negative for chest pain and leg swelling.   Gastrointestinal: Positive for abdominal pain and nausea. Negative for diarrhea and vomiting.   Genitourinary: Positive for frequency. Negative for dysuria.   Skin: Negative for rash.   Neurological: Negative for headaches.   All other systems reviewed and are negative.        Allergies:  Bactrim [Sulfamethoxazole W-Trimethoprim]  Epinephrine  Ketamine  Lisinopril  Simvastatin  Clindamycin  Losartan  Atorvastatine    Medications:  apixaban  cephalexin  cetirizine  fluticasone  furosemide  hydroxyzine  Lutein  metoprolol tartrate  Morphine Sulfate  Nystatin  Percocet  Oysco  tolterodine  Dimethicone  Hydroxyzine pamoate  Polyethylene glycol  Sennoside  amlodipine  Ranitidine  celecoxib  cerizine    Past Medical History:     Arthritis   Asthma   Chronic infection   Chronic pain   Chronic kidney disease, stage III   Dyslipidemia   Dyspnea   Eczema   Fractured pelvis  Gastric ulcer   Gastro-oesophageal reflux disease   Hypertension   Overactive bladder  Sleep apnea   Obstructive sleep apnea  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  MRSA infection  Physical deconditioning  Infection and inflammatory reaction  due to internal orthopedic device  Lymphedema  Osteomyelitis  Anxiety  Abdominal mass  Edema  Urinary tract infection without hematuria  Primary osteoarthritis involving multiple joints  Urinary frequency  Primary hyperparathyroidism  Permanent atrial fibrillation  Cellulitis  Hematoma of lower leg  Degenerative joint disease of hip  Lymphedema  Hospital-acquired pneumonia    Past Surgical History:    Arthroplasty knee, bilateral  Arthroplasty knee, revision  Aspiration needle knee  Hernia repair  Mammoplasty augmentation  Parathyroidectomy  Remove hardware arthroplasty knee, irrig & praveena  Rhinoplasty  Tonsillectomy    Family History:    Father: Colon cancer  Daughter: Diabetes  Mother: Hypertension, Colon cancer    Social History:  Patient arrived to ED via EMS.  She currently lives with her daughter.    Physical Exam     Patient Vitals for the past 24 hrs:   BP Temp Temp src Pulse Resp SpO2 Weight   04/09/22 1950 -- -- -- -- -- 95 % --   04/09/22 1945 109/63 99.1  F (37.3  C) Oral 78 -- 92 % --   04/09/22 1940 (!) 128/39 -- -- 82 -- 97 % --   04/09/22 1820 -- -- -- -- -- -- 109.5 kg (241 lb 6.4 oz)   04/09/22 1800 (!) 140/85 -- -- -- -- 92 % --   04/09/22 1730 -- -- -- -- -- 90 % --   04/09/22 1711 -- (!) 100.8  F (38.2  C) Oral 74 22 94 % --       Physical Exam  Constitutional: Well developed, ill, nontox appearance  Head: Atraumatic.   Mouth/Throat: Oropharynx is clear and dry  Neck:  no stridor  Eyes: no scleral icterus  Cardiovascular: RRR, 2+ bilat radial pulses  Pulmonary/Chest: nml resp effort, Clear BS bilat  Abdominal: ND, soft, diffuse tenderness, no rebound or guarding   : CVA tenderness bilat  Ext: Warm, well perfused, no edema  Neurological: A&O, symmetric facies, moves ext x4  Skin: Skin is warm and dry.   Psychiatric: Somewhat histrionic and anxious l.   Nursing note and vitals reviewed.    Emergency Department Course     Imaging:  XR Chest 2 Views   Final Result   IMPRESSION: Lungs are grossly  clear. No consolidation. No pleural effusion. Stable borderline enlarged cardiac silhouette size. No pulmonary edema. Aortic atherosclerosis. Breast prosthetics.            CT Abdomen Pelvis w Contrast   Final Result   IMPRESSION:    1.  Choledocholithiasis with at least 5 stones in the common bile duct. No associated biliary dilatation. Unclear whether this is related to the patient's acute symptoms. Correlation with biliary markers is recommended. No evidence of pancreatitis.   2.  Cholelithiasis.   3.  Diffuse fatty infiltration of the liver.   4.  A 1.5 cm right adrenal nodule is present and is otherwise not well evaluated due to single phase contrast. In retrospect, it was likely present in 2009 when it measured 0.9 cm. This is likely a benign adenoma. If the patient has a history of    malignancy, a dedicated adrenal CT or a follow-up scan in one year could be considered. Otherwise, no specific imaging follow-up is recommended. Laboratory or clinical evaluation can be considered if the patient has signs or symptoms of a    hyperfunctioning adrenal nodule.        Report per radiology    Laboratory:  Labs Ordered and Resulted from Time of ED Arrival to Time of ED Departure   BASIC METABOLIC PANEL - Abnormal       Result Value    Sodium 137      Potassium 4.5      Chloride 103      Carbon Dioxide (CO2) 27      Anion Gap 7      Urea Nitrogen 37 (*)     Creatinine 1.71 (*)     Calcium 9.1      Glucose 155 (*)     GFR Estimate 30 (*)    LACTIC ACID WHOLE BLOOD - Abnormal    Lactic Acid 2.7 (*)    ROUTINE UA WITH MICROSCOPIC REFLEX TO CULTURE - Abnormal    Color Urine Yellow      Appearance Urine Cloudy (*)     Glucose Urine Negative      Bilirubin Urine Small (*)     Ketones Urine 10  (*)     Specific Gravity Urine 1.016      Blood Urine Small (*)     pH Urine 6.5      Protein Albumin Urine 30  (*)     Urobilinogen Urine 6.0 (*)     Nitrite Urine Positive (*)     Leukocyte Esterase Urine Large (*)     Bacteria Urine  Few (*)     WBC Clumps Urine Present (*)     Mucus Urine Present (*)     RBC Urine 18 (*)     WBC Urine >182 (*)    CBC WITH PLATELETS AND DIFFERENTIAL - Abnormal    WBC Count 19.1 (*)     RBC Count 4.85      Hemoglobin 13.0      Hematocrit 41.0      MCV 85      MCH 26.8      MCHC 31.7      RDW 15.1 (*)     Platelet Count 200      % Neutrophils 92      % Lymphocytes 3      % Monocytes 4      % Eosinophils 0      % Basophils 0      % Immature Granulocytes 1      NRBCs per 100 WBC 0      Absolute Neutrophils 17.8 (*)     Absolute Lymphocytes 0.5 (*)     Absolute Monocytes 0.7      Absolute Eosinophils 0.0      Absolute Basophils 0.0      Absolute Immature Granulocytes 0.2      Absolute NRBCs 0.0     HEPATIC FUNCTION PANEL - Abnormal    Bilirubin Total 1.4 (*)     Bilirubin Direct 0.4 (*)     Protein Total 6.9      Albumin 3.4      Alkaline Phosphatase 120      AST 14      ALT 13     LIPASE - Abnormal    Lipase 64 (*)    INFLUENZA A/B & SARS-COV2 PCR MULTIPLEX - Normal    Influenza A PCR Negative      Influenza B PCR Negative      RSV PCR Negative      SARS CoV2 PCR Negative     LACTIC ACID WHOLE BLOOD   BLOOD CULTURE   BLOOD CULTURE   URINE CULTURE        Procedures      Emergency Department Course:             Reviewed:  I reviewed nursing notes, vitals, past medical history and Care Everywhere    Assessments:  1725 I obtained history and examined the patient as noted above.     Consults:  2123 I consulted with Dr. Cuevas of hospital services.    Interventions:  1743 Ondansetron 4 mg IV  1747 NS 1L IV Bolus  1821 acetaminophen 1,000 mg PO  1845 Meropenem 1 g attached to  mL bag IV  2018 LR 2 2L IV Bolus  2019 Vancomycin 2,500 mg in  mL IV    Disposition:  The patient was admitted to the hospital under the care of Dr. Cuevas.     Impression & Plan     CMS Diagnoses:   The patient has signs of Severe Sepsis        If one the following conditions is present, a 30 mL/kg bolus is recommended as part of the 6  hour bundle (IBW can be used for BMI >30, or document refusal/contraindication):      1.   Initial hypotension  defined as 2 bps < 90 or map < 65 in the 6hrs before or 3hrs after time zero.     2.  Lactate >4.      The patient has signs of Severe Sepsis as evidenced by:    1. 2 SIRS criteria, AND  2. Suspected infection, AND   3. Organ dysfunction: Lactic Acidosis with value >2.0    Time severe sepsis diagnosis confirmed: 22 as this was the time when Lactate resulted, and the level was > 2.0    3 Hour Severe Sepsis Bundle Completion:    1. Initial Lactic Acid Result:   Recent Labs   Lab Test 22  17321  1512   LACT 2.7* 1.7     2. Blood Cultures before Antibiotics: Yes  3. Broad Spectrum Antibiotics Administered:  yes       Anti-infectives (From admission through now)    Start     Dose/Rate Route Frequency Ordered Stop    22 1835  vancomycin (VANCOCIN) 2,500 mg in sodium chloride 0.9 % 500 mL intermittent infusion         2,500 mg  over 2 Hours Intravenous ONCE 22 1830      22 1830  meropenem (MERREM) 1 g vial to attach to  mL bag         1 g  over 30 Minutes Intravenous ONCE 22 1828 22 1915          4. Is initial hypotension present?     No (IV fluid bolus NOT required). IV Fluid volume administered: 3000 ml                    Severe Sepsis reassessment:  1. Repeat Lactic Acid Level within 6 hours of time zero: 2.8  2. MAP>65 after initial IVF bolus, will continue to monitor fluid status and vital signs    Medical Decision Makin year old female presenting w/ fever, body aches     DDx includes severe sepsis, sepsis, UTI, pyelonephritis, intra-abdominal infection, pneumonia, viral illness, Covid, influenza.  EKG interpretation as noted above.  Doubt vascular catastrophe given patient is febrile.  Labs significant for elevated lactic acid level, leukocytosis.  Presentation is consistent with severe sepsis therefore sepsis protocol antibiotics ordered.   Patient was not given a full 30 ml/kg fluid bolus given concerns for volume overload/CHF.  Imaging sig for cholelithiasis and choledocholithiasis as described above although obstructive biliary etiology seems inconsistent with the patient's symptoms.  Upon reevaluation, patient appears significantly improved with normalization of her vital signs.  Her repeat abdominal exam demonstrated no right upper quadrant tenderness.  Presentation seems most consistent with severe sepsis secondary to urinary tract infection.  The patient was subsequently admitted to the hospitalist service for further evaluation and management.  Pt and son counseled on all results, disposition and diagnosis.  They are understanding and agreeable to plan. Patient admitted in guarded condition.       Critical care time: 30 minutes excluding procedures    Diagnosis:    ICD-10-CM    1. Severe sepsis (H)  A41.9     R65.20    2. Urinary tract infection without hematuria, site unspecified  N39.0        Discharge Medications:  New Prescriptions    No medications on file       Scribe Disclosure:  Benny GARCIA, am serving as a scribe at 5:25 PM on 4/9/2022 to document services personally performed by Leonard Ballard MD based on my observations and the provider's statements to me.              Leonard Ballard MD  04/10/22 0106

## 2022-04-10 LAB
ALBUMIN SERPL-MCNC: 2.5 G/DL (ref 3.4–5)
ALP SERPL-CCNC: 82 U/L (ref 40–150)
ALT SERPL W P-5'-P-CCNC: 10 U/L (ref 0–50)
AST SERPL W P-5'-P-CCNC: 12 U/L (ref 0–45)
BILIRUB DIRECT SERPL-MCNC: 0.3 MG/DL (ref 0–0.2)
BILIRUB SERPL-MCNC: 0.9 MG/DL (ref 0.2–1.3)
LACTATE SERPL-SCNC: 1.1 MMOL/L (ref 0.7–2)
LACTATE SERPL-SCNC: 2.5 MMOL/L (ref 0.7–2)
MAGNESIUM SERPL-MCNC: 2.1 MG/DL (ref 1.6–2.3)
POTASSIUM BLD-SCNC: 4.5 MMOL/L (ref 3.4–5.3)
PROT SERPL-MCNC: 5.5 G/DL (ref 6.8–8.8)

## 2022-04-10 PROCEDURE — 83605 ASSAY OF LACTIC ACID: CPT | Performed by: INTERNAL MEDICINE

## 2022-04-10 PROCEDURE — 83735 ASSAY OF MAGNESIUM: CPT | Performed by: INTERNAL MEDICINE

## 2022-04-10 PROCEDURE — 120N000001 HC R&B MED SURG/OB

## 2022-04-10 PROCEDURE — 250N000013 HC RX MED GY IP 250 OP 250 PS 637: Performed by: INTERNAL MEDICINE

## 2022-04-10 PROCEDURE — 94660 CPAP INITIATION&MGMT: CPT

## 2022-04-10 PROCEDURE — 36415 COLL VENOUS BLD VENIPUNCTURE: CPT | Performed by: INTERNAL MEDICINE

## 2022-04-10 PROCEDURE — 84132 ASSAY OF SERUM POTASSIUM: CPT | Performed by: INTERNAL MEDICINE

## 2022-04-10 PROCEDURE — 999N000157 HC STATISTIC RCP TIME EA 10 MIN

## 2022-04-10 PROCEDURE — 250N000011 HC RX IP 250 OP 636: Performed by: INTERNAL MEDICINE

## 2022-04-10 PROCEDURE — 99233 SBSQ HOSP IP/OBS HIGH 50: CPT | Performed by: INTERNAL MEDICINE

## 2022-04-10 PROCEDURE — 258N000003 HC RX IP 258 OP 636: Performed by: INTERNAL MEDICINE

## 2022-04-10 PROCEDURE — 5A09357 ASSISTANCE WITH RESPIRATORY VENTILATION, LESS THAN 24 CONSECUTIVE HOURS, CONTINUOUS POSITIVE AIRWAY PRESSURE: ICD-10-PCS | Performed by: INTERNAL MEDICINE

## 2022-04-10 PROCEDURE — 80076 HEPATIC FUNCTION PANEL: CPT | Performed by: INTERNAL MEDICINE

## 2022-04-10 RX ORDER — FAMOTIDINE 10 MG
10 TABLET ORAL 2 TIMES DAILY
Status: DISCONTINUED | OUTPATIENT
Start: 2022-04-10 | End: 2022-04-18 | Stop reason: HOSPADM

## 2022-04-10 RX ORDER — LACTOBACILLUS RHAMNOSUS GG 10B CELL
1 CAPSULE ORAL DAILY
Status: DISCONTINUED | OUTPATIENT
Start: 2022-04-10 | End: 2022-04-18 | Stop reason: HOSPADM

## 2022-04-10 RX ORDER — SODIUM CHLORIDE, SODIUM LACTATE, POTASSIUM CHLORIDE, CALCIUM CHLORIDE 600; 310; 30; 20 MG/100ML; MG/100ML; MG/100ML; MG/100ML
INJECTION, SOLUTION INTRAVENOUS CONTINUOUS
Status: DISCONTINUED | OUTPATIENT
Start: 2022-04-10 | End: 2022-04-10

## 2022-04-10 RX ORDER — POLYETHYLENE GLYCOL 3350 17 G/17G
17 POWDER, FOR SOLUTION ORAL DAILY PRN
Status: DISCONTINUED | OUTPATIENT
Start: 2022-04-10 | End: 2022-04-18 | Stop reason: HOSPADM

## 2022-04-10 RX ORDER — ONDANSETRON 2 MG/ML
4 INJECTION INTRAMUSCULAR; INTRAVENOUS EVERY 6 HOURS PRN
Status: DISCONTINUED | OUTPATIENT
Start: 2022-04-10 | End: 2022-04-16

## 2022-04-10 RX ORDER — NALOXONE HYDROCHLORIDE 0.4 MG/ML
0.2 INJECTION, SOLUTION INTRAMUSCULAR; INTRAVENOUS; SUBCUTANEOUS
Status: DISCONTINUED | OUTPATIENT
Start: 2022-04-10 | End: 2022-04-18 | Stop reason: HOSPADM

## 2022-04-10 RX ORDER — PROCHLORPERAZINE MALEATE 5 MG
5 TABLET ORAL EVERY 6 HOURS PRN
Status: DISCONTINUED | OUTPATIENT
Start: 2022-04-10 | End: 2022-04-18 | Stop reason: HOSPADM

## 2022-04-10 RX ORDER — FLUOCINOLONE ACETONIDE 0.1 MG/ML
SOLUTION TOPICAL 2 TIMES DAILY
Status: ON HOLD | COMMUNITY
End: 2022-04-28

## 2022-04-10 RX ORDER — PROCHLORPERAZINE 25 MG
12.5 SUPPOSITORY, RECTAL RECTAL EVERY 12 HOURS PRN
Status: DISCONTINUED | OUTPATIENT
Start: 2022-04-10 | End: 2022-04-18 | Stop reason: HOSPADM

## 2022-04-10 RX ORDER — FERROUS GLUCONATE 324(37.5)
1 TABLET ORAL DAILY
Status: ON HOLD | COMMUNITY
End: 2022-04-28

## 2022-04-10 RX ORDER — HYDROXYZINE HYDROCHLORIDE 25 MG/1
25-50 TABLET, FILM COATED ORAL EVERY 6 HOURS PRN
Status: DISCONTINUED | OUTPATIENT
Start: 2022-04-09 | End: 2022-04-10

## 2022-04-10 RX ORDER — ACETAMINOPHEN 325 MG/1
650 TABLET ORAL EVERY 4 HOURS PRN
Status: DISCONTINUED | OUTPATIENT
Start: 2022-04-10 | End: 2022-04-18 | Stop reason: HOSPADM

## 2022-04-10 RX ORDER — OXYCODONE AND ACETAMINOPHEN 7.5; 325 MG/1; MG/1
1 TABLET ORAL EVERY 6 HOURS PRN
Status: DISCONTINUED | OUTPATIENT
Start: 2022-04-09 | End: 2022-04-18 | Stop reason: HOSPADM

## 2022-04-10 RX ORDER — FLUTICASONE PROPIONATE 50 MCG
2 SPRAY, SUSPENSION (ML) NASAL DAILY
Status: ON HOLD | COMMUNITY
End: 2022-04-28

## 2022-04-10 RX ORDER — CETIRIZINE HYDROCHLORIDE 10 MG/1
10 TABLET ORAL DAILY
Status: DISCONTINUED | OUTPATIENT
Start: 2022-04-10 | End: 2022-04-18 | Stop reason: HOSPADM

## 2022-04-10 RX ORDER — LIDOCAINE 40 MG/G
CREAM TOPICAL
Status: DISCONTINUED | OUTPATIENT
Start: 2022-04-09 | End: 2022-04-18 | Stop reason: HOSPADM

## 2022-04-10 RX ORDER — AMOXICILLIN 250 MG
2 CAPSULE ORAL 2 TIMES DAILY PRN
Status: DISCONTINUED | OUTPATIENT
Start: 2022-04-10 | End: 2022-04-18 | Stop reason: HOSPADM

## 2022-04-10 RX ORDER — METOPROLOL TARTRATE 50 MG
50 TABLET ORAL 2 TIMES DAILY
Status: DISCONTINUED | OUTPATIENT
Start: 2022-04-10 | End: 2022-04-18 | Stop reason: HOSPADM

## 2022-04-10 RX ORDER — MEROPENEM 1 G/1
1 INJECTION, POWDER, FOR SOLUTION INTRAVENOUS EVERY 12 HOURS
Status: DISCONTINUED | OUTPATIENT
Start: 2022-04-10 | End: 2022-04-12

## 2022-04-10 RX ORDER — ALBUTEROL SULFATE 0.83 MG/ML
2.5 SOLUTION RESPIRATORY (INHALATION)
Status: DISCONTINUED | OUTPATIENT
Start: 2022-04-09 | End: 2022-04-18 | Stop reason: HOSPADM

## 2022-04-10 RX ORDER — TOLTERODINE TARTRATE 1 MG/1
2 TABLET, EXTENDED RELEASE ORAL 2 TIMES DAILY
Status: DISCONTINUED | OUTPATIENT
Start: 2022-04-10 | End: 2022-04-18 | Stop reason: HOSPADM

## 2022-04-10 RX ORDER — NALOXONE HYDROCHLORIDE 0.4 MG/ML
0.4 INJECTION, SOLUTION INTRAMUSCULAR; INTRAVENOUS; SUBCUTANEOUS
Status: DISCONTINUED | OUTPATIENT
Start: 2022-04-10 | End: 2022-04-18 | Stop reason: HOSPADM

## 2022-04-10 RX ORDER — ONDANSETRON 4 MG/1
4 TABLET, ORALLY DISINTEGRATING ORAL EVERY 6 HOURS PRN
Status: DISCONTINUED | OUTPATIENT
Start: 2022-04-10 | End: 2022-04-16

## 2022-04-10 RX ORDER — MORPHINE SULFATE 15 MG/1
15 TABLET, FILM COATED, EXTENDED RELEASE ORAL EVERY 12 HOURS
Status: DISCONTINUED | OUTPATIENT
Start: 2022-04-10 | End: 2022-04-18 | Stop reason: HOSPADM

## 2022-04-10 RX ORDER — HYDROXYZINE PAMOATE 50 MG/1
50 CAPSULE ORAL 2 TIMES DAILY PRN
Status: ON HOLD | COMMUNITY
End: 2022-04-28

## 2022-04-10 RX ORDER — AMOXICILLIN 250 MG
1 CAPSULE ORAL 2 TIMES DAILY PRN
Status: DISCONTINUED | OUTPATIENT
Start: 2022-04-10 | End: 2022-04-18 | Stop reason: HOSPADM

## 2022-04-10 RX ORDER — NYSTATIN 100000 U/G
CREAM TOPICAL 2 TIMES DAILY
Status: ON HOLD | COMMUNITY
End: 2022-04-28

## 2022-04-10 RX ORDER — FLUTICASONE PROPIONATE 50 MCG
2 SPRAY, SUSPENSION (ML) NASAL DAILY
Status: DISCONTINUED | OUTPATIENT
Start: 2022-04-10 | End: 2022-04-18 | Stop reason: HOSPADM

## 2022-04-10 RX ADMIN — METOPROLOL TARTRATE 50 MG: 50 TABLET, FILM COATED ORAL at 21:07

## 2022-04-10 RX ADMIN — VANCOMYCIN HYDROCHLORIDE 750 MG: 1 INJECTION, POWDER, LYOPHILIZED, FOR SOLUTION INTRAVENOUS at 21:11

## 2022-04-10 RX ADMIN — OXYCODONE AND ACETAMINOPHEN 1 TABLET: 7.5; 325 TABLET ORAL at 13:31

## 2022-04-10 RX ADMIN — APIXABAN 2.5 MG: 2.5 TABLET, FILM COATED ORAL at 13:40

## 2022-04-10 RX ADMIN — SENNOSIDES AND DOCUSATE SODIUM 1 TABLET: 8.6; 5 TABLET ORAL at 22:44

## 2022-04-10 RX ADMIN — MEROPENEM 1 G: 1 INJECTION, POWDER, FOR SOLUTION INTRAVENOUS at 07:08

## 2022-04-10 RX ADMIN — POLYETHYLENE GLYCOL 3350 17 G: 17 POWDER, FOR SOLUTION ORAL at 09:26

## 2022-04-10 RX ADMIN — MORPHINE SULFATE 15 MG: 15 TABLET, EXTENDED RELEASE ORAL at 09:26

## 2022-04-10 RX ADMIN — Medication 1 CAPSULE: at 16:02

## 2022-04-10 RX ADMIN — TOLTERODINE TARTRATE 2 MG: 1 TABLET, FILM COATED ORAL at 09:40

## 2022-04-10 RX ADMIN — CETIRIZINE HYDROCHLORIDE 10 MG: 10 TABLET, FILM COATED ORAL at 09:26

## 2022-04-10 RX ADMIN — MORPHINE SULFATE 15 MG: 15 TABLET, EXTENDED RELEASE ORAL at 21:07

## 2022-04-10 RX ADMIN — FLUTICASONE PROPIONATE 2 SPRAY: 50 SPRAY, METERED NASAL at 16:02

## 2022-04-10 RX ADMIN — SODIUM CHLORIDE, POTASSIUM CHLORIDE, SODIUM LACTATE AND CALCIUM CHLORIDE 500 ML: 600; 310; 30; 20 INJECTION, SOLUTION INTRAVENOUS at 01:07

## 2022-04-10 RX ADMIN — MEROPENEM 1 G: 1 INJECTION, POWDER, FOR SOLUTION INTRAVENOUS at 18:18

## 2022-04-10 RX ADMIN — TOLTERODINE TARTRATE 2 MG: 1 TABLET, FILM COATED ORAL at 21:06

## 2022-04-10 RX ADMIN — SENNOSIDES AND DOCUSATE SODIUM 1 TABLET: 8.6; 5 TABLET ORAL at 09:26

## 2022-04-10 RX ADMIN — FAMOTIDINE 10 MG: 10 TABLET ORAL at 21:07

## 2022-04-10 RX ADMIN — METOPROLOL TARTRATE 50 MG: 50 TABLET, FILM COATED ORAL at 09:26

## 2022-04-10 RX ADMIN — OXYCODONE AND ACETAMINOPHEN 1 TABLET: 7.5; 325 TABLET ORAL at 20:02

## 2022-04-10 ASSESSMENT — ACTIVITIES OF DAILY LIVING (ADL)
ADLS_ACUITY_SCORE: 17
ADLS_ACUITY_SCORE: 22
ADLS_ACUITY_SCORE: 22
ADLS_ACUITY_SCORE: 17
ADLS_ACUITY_SCORE: 22
ADLS_ACUITY_SCORE: 21
ADLS_ACUITY_SCORE: 17
ADLS_ACUITY_SCORE: 22
ADLS_ACUITY_SCORE: 22
ADLS_ACUITY_SCORE: 17
ADLS_ACUITY_SCORE: 22
ADLS_ACUITY_SCORE: 22
ADLS_ACUITY_SCORE: 17
ADLS_ACUITY_SCORE: 22
ADLS_ACUITY_SCORE: 22
ADLS_ACUITY_SCORE: 12
ADLS_ACUITY_SCORE: 17
ADLS_ACUITY_SCORE: 22

## 2022-04-10 NOTE — PLAN OF CARE
Goal Outcome Evaluation:    Plan of Care Reviewed With: patient     Pt on CPAP most of morning, stating she was still tired and wanting to sleep.  Refusing turns this am.    On RA this afternoon.   Spoke with pt about skin protection and convinced pt to turn.  Buttocks and back of thighs purple but blanchable.  Pt has chronic wound on back of L leg.  Foam drsg applied.  WOC ordered.   Lactic 2.5 today.  Pt drinking good PO.  LR stopped per order.   Afebrile.  Cont abx.  Bowel meds started today for constipation.   NPO after MN for possible tests.

## 2022-04-10 NOTE — ED NOTES
[facility]  ED Nurse Handoff Report    Raven Carlin is a 82 year old female   ED Chief complaint: Rule out Urinary Tract Infection  . ED Diagnosis:   Final diagnoses:   Severe sepsis (H)   Urinary tract infection without hematuria, site unspecified     Allergies:   Allergies   Allergen Reactions     Bactrim [Sulfamethoxazole W-Trimethoprim] Nausea     Epinephrine Other (See Comments)     Heart races     Ketamine      Felt like she was dying     Lisinopril Cough     Simvastatin Itching       Code Status: Full Code  Activity level - Baseline/Home:  Stand by Assist. Activity Level - Current:   Assist X 1. Lift room needed: No. Bariatric: Yes   Needed: No   Isolation: No. Infection: Not Applicable.     Vital Signs:   Vitals:    04/09/22 1820 04/09/22 1940 04/09/22 1945 04/09/22 1950   BP:  (!) 128/39 109/63    Pulse:  82 78    Resp:       Temp:   99.1  F (37.3  C)    TempSrc:   Oral    SpO2:  97% 92% 95%   Weight: 109.5 kg (241 lb 6.4 oz)          Cardiac Rhythm:  ,      Pain level:    Patient confused: No. Patient Falls Risk: Yes.   Elimination Status: Has voided   Patient Report - Initial Complaint: UTI.   Focused Assessment: Raven Carlin is a 82 year old female,on Eliquis, who presents with nausea. The patient reports feeling sick to her stomach starting today. She is experiencing chills and abdominal tenderness. There has been an increased frequency of urination in an unspecified time frame, but no burning. She has no vomiting, body aches, sore throat, or headache. She is not experiencing any shortness of breathe or chest pain.  Tests Performed:   Labs Ordered and Resulted from Time of ED Arrival to Time of ED Departure   BASIC METABOLIC PANEL - Abnormal       Result Value    Sodium 137      Potassium 4.5      Chloride 103      Carbon Dioxide (CO2) 27      Anion Gap 7      Urea Nitrogen 37 (*)     Creatinine 1.71 (*)     Calcium 9.1      Glucose 155 (*)     GFR Estimate 30 (*)    LACTIC  ACID WHOLE BLOOD - Abnormal    Lactic Acid 2.7 (*)    ROUTINE UA WITH MICROSCOPIC REFLEX TO CULTURE - Abnormal    Color Urine Yellow      Appearance Urine Cloudy (*)     Glucose Urine Negative      Bilirubin Urine Small (*)     Ketones Urine 10  (*)     Specific Gravity Urine 1.016      Blood Urine Small (*)     pH Urine 6.5      Protein Albumin Urine 30  (*)     Urobilinogen Urine 6.0 (*)     Nitrite Urine Positive (*)     Leukocyte Esterase Urine Large (*)     Bacteria Urine Few (*)     WBC Clumps Urine Present (*)     Mucus Urine Present (*)     RBC Urine 18 (*)     WBC Urine >182 (*)    CBC WITH PLATELETS AND DIFFERENTIAL - Abnormal    WBC Count 19.1 (*)     RBC Count 4.85      Hemoglobin 13.0      Hematocrit 41.0      MCV 85      MCH 26.8      MCHC 31.7      RDW 15.1 (*)     Platelet Count 200      % Neutrophils 92      % Lymphocytes 3      % Monocytes 4      % Eosinophils 0      % Basophils 0      % Immature Granulocytes 1      NRBCs per 100 WBC 0      Absolute Neutrophils 17.8 (*)     Absolute Lymphocytes 0.5 (*)     Absolute Monocytes 0.7      Absolute Eosinophils 0.0      Absolute Basophils 0.0      Absolute Immature Granulocytes 0.2      Absolute NRBCs 0.0     HEPATIC FUNCTION PANEL - Abnormal    Bilirubin Total 1.4 (*)     Bilirubin Direct 0.4 (*)     Protein Total 6.9      Albumin 3.4      Alkaline Phosphatase 120      AST 14      ALT 13     LIPASE - Abnormal    Lipase 64 (*)    LACTIC ACID WHOLE BLOOD - Abnormal    Lactic Acid 2.8 (*)    INFLUENZA A/B & SARS-COV2 PCR MULTIPLEX - Normal    Influenza A PCR Negative      Influenza B PCR Negative      RSV PCR Negative      SARS CoV2 PCR Negative     BLOOD CULTURE   BLOOD CULTURE   URINE CULTURE     XR Chest 2 Views   Final Result   IMPRESSION: Lungs are grossly clear. No consolidation. No pleural effusion. Stable borderline enlarged cardiac silhouette size. No pulmonary edema. Aortic atherosclerosis. Breast prosthetics.            CT Abdomen Pelvis w  Contrast   Final Result   IMPRESSION:    1.  Choledocholithiasis with at least 5 stones in the common bile duct. No associated biliary dilatation. Unclear whether this is related to the patient's acute symptoms. Correlation with biliary markers is recommended. No evidence of pancreatitis.   2.  Cholelithiasis.   3.  Diffuse fatty infiltration of the liver.   4.  A 1.5 cm right adrenal nodule is present and is otherwise not well evaluated due to single phase contrast. In retrospect, it was likely present in 2009 when it measured 0.9 cm. This is likely a benign adenoma. If the patient has a history of    malignancy, a dedicated adrenal CT or a follow-up scan in one year could be considered. Otherwise, no specific imaging follow-up is recommended. Laboratory or clinical evaluation can be considered if the patient has signs or symptoms of a    hyperfunctioning adrenal nodule.          Treatments provided: See Epic  Family Comments: NA  OBS brochure/video discussed/provided to patient:  No  ED Medications:   Medications   ondansetron (ZOFRAN) injection 4 mg (4 mg Intravenous Given 4/9/22 1743)   lactated ringers infusion (has no administration in time range)   0.9% sodium chloride BOLUS (0 mLs Intravenous Stopped 4/9/22 1956)   lactated ringers BOLUS 2,000 mL (2,000 mLs Intravenous New Bag 4/9/22 2018)   acetaminophen (TYLENOL) tablet 1,000 mg (1,000 mg Oral Given 4/9/22 1821)   meropenem (MERREM) 1 g vial to attach to  mL bag (1 g Intravenous New Bag 4/9/22 1845)   vancomycin (VANCOCIN) 2,500 mg in sodium chloride 0.9 % 500 mL intermittent infusion (2,500 mg Intravenous New Bag 4/9/22 2019)   CT Scan Flush (65 mLs Intravenous Given 4/9/22 2002)   iopamidol (ISOVUE-370) solution 500 mL (100 mLs Intravenous Given 4/9/22 2002)     Drips infusing:  No  For the majority of the shift, the patient's behavior Green. Interventions performed were NA.    Sepsis treatment initiated: No     Patient tested for COVID 19 prior to  admission: YES    ED Nurse Name/Phone Number: Kayla Aguilar RN,   10:54 PM    RECEIVING UNIT ED HANDOFF REVIEW    Above ED Nurse Handoff Report was reviewed: Yes  Reviewed by: Ember Thomas RN on April 9, 2022 at 11:30 PM

## 2022-04-10 NOTE — PHARMACY-VANCOMYCIN DOSING SERVICE
Pharmacy Vancomycin Initial Note  Date of Service April 10, 2022  Patient's  1940  82 year old, female    Indication: Sepsis and Urinary Tract Infection    Current estimated CrCl = Estimated Creatinine Clearance: 31.8 mL/min (A) (based on SCr of 1.71 mg/dL (H)).    Creatinine for last 3 days  2022:  5:30 PM Creatinine 1.71 mg/dL    Recent Vancomycin Level(s) for last 3 days  No results found for requested labs within last 72 hours.      Vancomycin IV Administrations (past 72 hours)                   vancomycin (VANCOCIN) 2,500 mg in sodium chloride 0.9 % 500 mL intermittent infusion (mg) 2,500 mg New Bag 22                Nephrotoxins and other renal medications (From now, onward)    Start     Dose/Rate Route Frequency Ordered Stop    04/10/22 2000  vancomycin (VANCOCIN) 750 mg in sodium chloride 0.9 % 250 mL intermittent infusion         750 mg  over 60-90 Minutes Intravenous EVERY 24 HOURS 04/10/22 0032            Contrast Orders - past 72 hours (72h ago, onward)            None          [employer] Prediction of Planned Initial Vancomycin Regimen  Loading dose: 2500mg IV x 1 @ 2019 on 22  Regimen: 750 mg IV every 24 hours.  Start time: 08:19 on 04/10/2022  Exposure target: AUC24 (range)400-600 mg/L.hr   AUC24,ss: 587 mg/L.hr  Probability of AUC24 > 400: 79 %  Ctrough,ss: 19.2 mg/L  Probability of Ctrough,ss > 20: 47 %  Probability of nephrotoxicity (Lodise DAYA ): 16 %          Plan:  1. Start vancomycin  750 mg IV q24h with 2500mg IV load   2. Vancomycin monitoring method: AUC  3. Vancomycin therapeutic monitoring goal: 400-600 mg*h/L  4. Pharmacy will check vancomycin levels as appropriate in 1-3 Days.    5. Serum creatinine levels will be ordered daily for the first week of therapy and at least twice weekly for subsequent weeks.      [facility]

## 2022-04-10 NOTE — PLAN OF CARE
A/Ox4. VSS. Max A2 with repositions; lift to transfer. Tele Afib CVR. Reported no SOB, RA, LS clear. Patient prefers manual BP on Rt lower forearm. R PIV, infusing continuous fluids, **Please careful of iv access, pt hard stick and also have low pain tolerance and skin sensitivity to lab draws and BP checks. On IV fluids and abx. GI consult in place. Continue to monitor. Safety bed alarm on and call light within reach.     /48 (BP Location: Other (Comment), Patient Position: Semi-Jarrell's)   Pulse 79   Temp 99  F (37.2  C) (Oral)   Resp 18   Wt 109.5 kg (241 lb 6.4 oz)   SpO2 98%   BMI 40.17 kg/m

## 2022-04-10 NOTE — PROGRESS NOTES
[facility]  Hospitalist Progress Note  Dominic Garibay MD 04/10/22    Reason for Stay (Diagnosis): Sepsis secondary to UTI, choledocholithiasis         Assessment and Plan:      Summary of Stay: Raven Carlin is a 82 year old female with history of A. fib on Eliquis, asthma, chronic pain syndrome on opiates, morbid obesity, CKD stage IIIb, HLD, GERD, gastric ulcer, HTN, ESBL E. coli, and SAURABH on CPAP who was admitted on 4/9/2022 for malaise and nausea where she was found to have fever in the ER and leukocytosis to 19.  Initial urinalysis showing pyuria consistent with UTI.  She also had an elevated lactic acid and PAULINE consistent with severe sepsis.  She was started on vancomycin IV and meropenem given history of ESBL UTI and sepsis.  Her bilirubin was slightly elevated and a CT the abdomen pelvis did show 5 stones in the CBD although she did not have any abdominal pain to suggest ascending cholangitis.  [facility] was consulted.  Had planned on ERCP tomorrow, however she has received her Eliquis before coming in and this morning so ERCP will likely be deferred until close follow-up as an outpatient unless she is not improving from her urosepsis.  Consulting PT for weakness.    Problem List/Assessment and Plan:   Severe sepsis secondary to UTI: Presenting with diffuse pain, nausea, and generalized malaise.  Initial temperature 100.8  F.  Leukocytosis at 19.1.  Urinalysis shows >182 WBCs, large LE, and positive nitrite consistent with UTI as the source of infection although she does not report dysuria or flank pain, but has had frequency urinary.  Lactic acid elevated at 2.8 and PAULINE consistent with severe sepsis.  She does have a history of ESBL UTIs so she is on meropenem and vancomycin for sepsis.  She was found to have 5 CBD stones on CT, however she has not had any abdominal pain and her bilirubin is only slightly elevated at 1.4 otherwise LFTs normal.  Ascending cholangitis secondary to  choledocholithiasis is much less likely.  -Continue IV vancomycin and meropenem for sepsis, likely stop vancomycin tomorrow  -Urine culture growing GNR's  -Blood cultures NGTD  -Check lactic acid now to determine if further IV fluids needed  -Hold PTA Lasix    Choledocholithiasis: As above presenting with feeling unwell and fevers that is most likely secondary to UTI.  Her CT did showcholelithiasis along with 5 small stones in the common bile duct.  Bilirubin is 1.4 otherwise alk phos and transaminases are WNL.  She has not had any abdominal pain previously or now and she is nontender on exam so this seems less likely etiology for her presentation with sepsis.  -[facility] consulted, agree that urosepsis more likely, but had planned for ERCP as this does need to be addressed at some point.  Patient had her Eliquis last night before coming in and this morning so ERCP should be deferred now as she would also likely need sphincterotomy unless absolutely necessary for sepsis control.  Usually anticoagulation held for 48 to 72 hours before ERCP.  Again, this is not felt to be the source of infection.  I spoke with Dr. Meredith who agrees so I have discontinued the n.p.o. at midnight order.  I will hold her Eliquis for now just in case she is not improving, but ERCP likely can be completed in close follow-up as an outpatient  -Checking LFTs this afternoon and repeat CMP tomorrow  -Monitor for any abdominal pain  -Hold Eliquis for now    A. fib: Chronically anticoagulated with Eliquis.  Also on metoprolol tartrate 50 mg daily.  -Took Eliquis yesterday and received this morning.  Hold for now in case not improving and requires urgent ERCP    PAULINE on CKD stage IIIb: Variable recent creatinine, baseline likely 1.3.  Presenting with PAULINE with creatinine 1.7 secondary to severe sepsis.  -Hold PTA Lasix, already received IV fluid  -Check BMP in the morning    Chronic pain syndrome: Resume her morphine sulfate 15 mg every 12 hours  and Percocet 7.5-325 mg, but ordered as every 6 hours as needed instead of scheduled twice daily.    SAURABH: Continue CPAP.    Morbid obesity: BMI 40.1.    Overactive bladder: Continue tolterodine 2 mg daily.    HTN: Resume PTA metoprolol tartrate 25 mg twice daily and Lasix 20 mg daily.  Has trace bilateral edema on exam.  -Continue metoprolol, but hold Lasix    HLD: not currently on meds.    GERD, history gastric ulcer: Resume famotidine twice daily.    DVT Prophylaxis: eliquis held, but on board  Code Status: Full Code  FEN: regular diet  Discharge Dispo: tbd. Consult PT//OT/SW. Per daughter Rhonda the patient lives with a different daughter, but at baseline sits in her recliner that goes up and down all day long and maybe gets up once to use the bathroom.  There have been issues with incontinence in the chair.  Rhonda  believes she needs more motivation.  There are health aides coming into the home every other day or so to help with bathing.  Estimated Disch Date / # of Days until Disch:  2-3 days     Clinically Significant Risk Factors Present on Admission               # Coagulation Defect: home medication list includes an anticoagulant medication                    Interval History (Subjective):      Patient feels better this morning.  No longer feels nauseous.  Did not have any vomiting.  Denies any abdominal pain previously or now.  She feels hungry and would like to eat.  She used her CPAP last night and she does not feel short of breath.  Denies any recent dysuria or flank pain.                  Physical Exam:      Last Vital Signs:  /55 (BP Location: Right arm)   Pulse 79   Temp 97.5  F (36.4  C) (Oral)   Resp 18   Wt 109.5 kg (241 lb 6.4 oz)   SpO2 98%   BMI 40.17 kg/m        Intake/Output Summary (Last 24 hours) at 4/10/2022 1433  Last data filed at 4/10/2022 1342  Gross per 24 hour   Intake 175 ml   Output 700 ml   Net -525 ml       Constitutional: Awake, NAD   Eyes: sclera white   HEENT:  atraumatic, MMM  Respiratory: no respiratory distress, lungs cta bilaterally, no crackles or wheeze  Cardiovascular: RRR.  No murmur   GI: Obese, nontender to palpation, bowel sounds present, not distended  Skin: Posterior left knee chronic appearing skin changes    Musculoskeletal/extremities: Trace bilateral lower extremity edema  Neurologic: A&O, answers questions appropriately  Psychiatric: calm, cooperative, normal affect         Medications:      All current medications were reviewed with changes reflected in problem list.         Data:      All new lab and imaging data was reviewed.   Labs:  Recent Labs   Lab 04/09/22  1730   WBC 19.1*   HGB 13.0   HCT 41.0   MCV 85        Recent Labs   Lab 04/10/22  0808 04/09/22  1730   NA  --  137   POTASSIUM 4.5 4.5   CHLORIDE  --  103   CO2  --  27   ANIONGAP  --  7   GLC  --  155*   BUN  --  37*   CR  --  1.71*   GFRESTIMATED  --  30*   LAUREL  --  9.1   MAG 2.1  --    PROTTOTAL  --  6.9   ALBUMIN  --  3.4   BILITOTAL  --  1.4*   ALKPHOS  --  120   AST  --  14   ALT  --  13     Lactic acid 2.5  All cultures:  Recent Labs   Lab 04/09/22  1940 04/09/22  1836 04/09/22  1803   CULTURE >100,000 CFU/mL Gram negative bacilli* No growth after 12 hours No growth after 12 hours     Imaging:   Recent Results (from the past 24 hour(s))   CT Abdomen Pelvis w Contrast    Narrative    EXAM: CT ABDOMEN PELVIS W CONTRAST  LOCATION: [facility]  DATE/TIME: 4/9/2022 8:01 PM    INDICATION: Nausea. Diffuse abdominal pain and tenderness.  COMPARISON: 4/22/2009  TECHNIQUE: CT scan of the abdomen and pelvis was performed following injection of IV contrast. Multiplanar reformats were obtained. Dose reduction techniques were used.  CONTRAST: 100mL Isovue 370    FINDINGS:   LOWER CHEST: Normal.    HEPATOBILIARY: Diffuse fatty infiltration of the liver. Cholelithiasis. There are multiple stones within the common bile duct, at least 5. The common bile duct is normal  caliber measuring 5 mm.    PANCREAS: Normal.    SPLEEN: Normal    ADRENAL GLANDS: A right adrenal nodule is present measuring 1.5 cm in diameter, enlarged from 2009 when it measured 0.9 cm.    KIDNEYS/BLADDER: Multiple small benign renal cysts. No follow-up needed. No hydronephrosis. The bladder is decompressed.    BOWEL: Bowel loops are normal caliber. No wall thickening. Mildly increased stool in the colon. Normal appendix.    LYMPH NODES: Normal.    VASCULATURE: Mild calcified plaque in the abdominal aorta without aneurysm.    PELVIC ORGANS: No large adnexal cysts or masses.    MUSCULOSKELETAL: Degenerative disc disease and facet arthritis in the lumbar spine.      Impression    IMPRESSION:   1.  Choledocholithiasis with at least 5 stones in the common bile duct. No associated biliary dilatation. Unclear whether this is related to the patient's acute symptoms. Correlation with biliary markers is recommended. No evidence of pancreatitis.  2.  Cholelithiasis.  3.  Diffuse fatty infiltration of the liver.  4.  A 1.5 cm right adrenal nodule is present and is otherwise not well evaluated due to single phase contrast. In retrospect, it was likely present in 2009 when it measured 0.9 cm. This is likely a benign adenoma. If the patient has a history of   malignancy, a dedicated adrenal CT or a follow-up scan in one year could be considered. Otherwise, no specific imaging follow-up is recommended. Laboratory or clinical evaluation can be considered if the patient has signs or symptoms of a   hyperfunctioning adrenal nodule.   XR Chest 2 Views    Narrative    EXAM: XR CHEST 2 VW  LOCATION: [facility] [place]  DATE/TIME: 4/9/2022 8:18 PM    INDICATION: Fever.  COMPARISON: 06/21/2021.      Impression    IMPRESSION: Lungs are grossly clear. No consolidation. No pleural effusion. Stable borderline enlarged cardiac silhouette size. No pulmonary edema. Aortic atherosclerosis. Breast prosthetics.                Dominic Garibay MD

## 2022-04-10 NOTE — CONSULTS
GASTROENTEROLOGY CONSULTATION      Raven Carlin  82 year old female  Admission Date: 4/9/2022  Care Provider: Ena Peraza was asked to see this patient in consultation by Dr. Garibay for evaluation of CBD stones.    HPI:  Raven Carlin is a 82 year old female who started feeling poorly yesterday with diffuse aches and nausea.  She denies any vomiting, abdominal  Pain, heartburn, acid reflux, diarrhea, constipation, blood in her stools, or decreased appetite.  She had not fever at home, but did in the ER.    She has frequent UTIs, and was found to have pyuria.  She denies any history of gallstones in the past.     MEDICAL HISTORY:  Patient Active Problem List    Diagnosis Date Noted     Severe sepsis (H) 04/09/2022     Priority: Medium     Choledocholithiasis 04/09/2022     Priority: Medium     Chronic renal failure, stage 2 (mild) 04/06/2020     Priority: Medium     Permanent atrial fibrillation (H) 04/06/2020     Priority: Medium     Primary hyperparathyroidism (H) 10/09/2019     Priority: Medium     Added automatically from request for surgery [id number]       Urinary frequency 02/01/2016     Priority: Medium     Edema, unspecified edema 01/05/2016     Priority: Medium     Urinary tract infection without hematuria, site unspecified 01/05/2016     Priority: Medium     Primary osteoarthritis involving multiple joints 01/05/2016     Priority: Medium     Essential hypertension 01/05/2016     Priority: Medium     Abdominal mass, unspecified abdominal location 12/30/2015     Priority: Medium     Other seasonal allergic rhinitis 12/07/2015     Priority: Medium     Itching 11/17/2015     Priority: Medium     Osteomyelitis of other site 11/17/2015     Priority: Medium     Anxiety 11/17/2015     Priority: Medium     Lymphedema 10/22/2015     Priority: Medium     S/P revision of total knee, 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     Priority: Medium     Left knee pain 08/31/2015     Priority: Medium     Gastroesophageal reflux disease  without esophagitis 08/06/2015     Priority: Medium     Infection and inflammatory reaction due to internal orthopedic device, implant, and graft (H) 07/27/2015     Priority: Medium     CKD (chronic kidney disease) stage 3, GFR 30-59 ml/min (H) 07/27/2015     Priority: Medium     Constipation due to pain medication 07/27/2015     Priority: Medium     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 7/15/15 07/24/2015     Priority: Medium     MRSA infection: Left knee ( also in 2011, 2012) 07/24/2015     Priority: Medium     Physical deconditioning 07/24/2015     Priority: Medium     Morbid obesity (H) 07/18/2015     Priority: Medium     Chronic edema 07/18/2015     Priority: Medium     HTN (hypertension) 07/18/2015     Priority: Medium     SAURABH (obstructive sleep apnea) 07/18/2015     Priority: Medium     Advanced directives, counseling/discussion 08/10/2012     Priority: Medium     Pt said she knows she does not have an AD and has an appointment for a facilitator to come to her home next week to complete. Honoring Choices. Pt took another copy of honoring choices.       A comprehensive ten point review of systems was performed and was negative aside from left leg pain.       :   Prior to Admission Medications   Prescriptions Last Dose Informant Patient Reported? Taking?   BETA CAROTENE PO   Yes No   Sig: Take by mouth daily   BIOTIN FORTE PO   Yes No   Sig: Take by mouth daily   Cranberry (CRAN-MAX PO)   Yes No   Sig: Take by mouth daily   FOLIC ACID PO   Yes No   Sig: Take 1 mg by mouth daily   FUROSEMIDE PO   Yes No   Sig: Take 20 mg by mouth daily    Flaxseed, Linseed, (FLAX SEEDS PO)   Yes No   Sig: Take by mouth daily    Garlic 10 MG CAPS   Yes No   Sig: Take by mouth 2 times daily   Glucos-MSM-C-In-Ysinoq-Rrwapn (GLUCOSAMINE MSM COMPLEX) TABS tablet   Yes No   Sig: Take 2 tablets by mouth daily   Lutein 40 MG CAPS   Yes No   Sig: Take  by mouth daily   Magnesium Oxide 500 MG TABS   Yes No   Sig: Take 1,000 mg by mouth daily   Morphine Sulfate (MS CONTIN PO)   Yes No   Sig: Take 15 mg by mouth every 12 hours   NONFORMULARY   Yes No   Si mg daily Tumeric, takes 2 tabs at bedtime   OYSCO 500 + D 500-200 MG-UNIT tablet   No No   Sig: TAKE 2 TABLETS (1,000 MG) BY MOUTH EVERY 8 HOURS SEE DISCHARGE INSTRUCTION SHEET FOR TAPER.   Probiotic Product (PROBIOTIC DAILY PO)   Yes No   Sig: Take by mouth daily   RESVERATROL PO   Yes No   Sig: Take by mouth daily   VITAMIN E COMPLEX PO   Yes No   Sig: Take 400 Units by mouth daily   Zinc Gluconate 50 MG CAPS   Yes No   Sig: Take by mouth daily   apixaban ANTICOAGULANT (ELIQUIS ANTICOAGULANT) 5 MG tablet   No No   Sig: Take 1 tablet (5 mg) by mouth 2 times daily Appointment required for further refills   ascorbic acid (VITAMIN C) 500 MG CPCR   Yes No   Sig: Take 500 mg by mouth daily   cephALEXin (KEFLEX) 500 MG capsule   Yes No   Sig: Take 500 mg by mouth daily   cetirizine (ZYRTEC) 5 MG tablet   Yes No   Sig: Take 10 mg by mouth daily    fluticasone (FLOVENT DISKUS) 50 MCG/BLIST AEPB   Yes No   Sig: Spray 1 puff into both nostrils daily   hydrOXYzine (ATARAX) 50 MG tablet   Yes No   Sig: TAKE 1 TABLET BY MOUTH TWICE A DAY AS NEEDED FOR ITCHING   metoprolol tartrate (LOPRESSOR) 50 MG tablet   No No   Sig: Take 1 tablet (50 mg) by mouth 2 times daily Appointment required for further refills   multivitamin, therapeutic with minerals (MULTI-VITAMIN) TABS   Yes No   Sig: Take 1 tablet by mouth daily   niacin 500 MG tablet   Yes No   Sig: Take by mouth daily (with breakfast)   oxyCODONE-acetaminophen (PERCOCET) 7.5-325 MG per tablet   Yes No   Sig: Take 1 tablet by mouth 2 times daily Takes two in the morning and one before bedtime   potassium 99 MG TABS   Yes No   Sig: Take by mouth daily   tolterodine (DETROL) 2 MG tablet   Yes No   Sig: Take 2 mg by mouth 2 times daily      Facility-Administered Medications:  None      :   Allergies   Allergen Reactions     Bactrim [Sulfamethoxazole W-Trimethoprim] Nausea     Epinephrine Other (See Comments)     Heart races     Ketamine      Felt like she was dying     Lisinopril Cough     Simvastatin Itching      Social History:  Social History     Tobacco Use     Smoking status: Former Smoker     Packs/day: 0.50     Years: 25.00     Pack years: 12.50     Start date:      Quit date: 1985     Years since quittin.7     Smokeless tobacco: Never Used   Substance Use Topics     Alcohol use: Yes     Comment: 2 weekly     Drug use: No       Family History: Mother with colon cancer age 79.  No other first degree relative with colon cancer, gastric cancer, crohn's disease, ulcerative colitis, or liver disease.     EXAM:  General Appearance: Alert, oriented x3, no acute distress.  /48 (BP Location: Other (Comment), Patient Position: Semi-Jarrell's)   Pulse 79   Temp 99  F (37.2  C) (Oral)   Resp 17   Wt 109.5 kg (241 lb 6.4 oz)   SpO2 99%   BMI 40.17 kg/m    Eye: sclera anicteric.  ENT: oropharynx clear, no thrush.  CV: RRR.  Resp: CTA bilaterally.  GI: Soft, NABS, NT/ND.  Musculoskeletal: diffuse left leg swelling.  Neuro: no asterixis.      Labs and imaging reviewed    Recent Labs   Lab Test 22  1354 20  1459 07/27/15  0000 07/18/15  0830 07/17/15  0620   WBC 19.1* 12.8* 6.8   < > 7.1 6.4   HGB 13.0 6.6* 12.8   < > 8.5* 8.8*   MCV 85 88 94   < > 99 100    173 215   < > 157 137*   INR  --  1.78*  --   --  2.37* 1.78*    < > = values in this interval not displayed.     Recent Labs   Lab Test 220 21  1354 20  1459   POTASSIUM 4.5 4.6 4.4   CHLORIDE 103 103 104   CO2 27 25 29   BUN 37* 44* 35*   ANIONGAP 7 6 4     Recent Labs   Lab Test 22  1940 22  1730 21  1354 11/30/15  0000   ALBUMIN  --  3.4 2.3*  --    BILITOTAL  --  1.4* 1.2  --    ALT  --  13 12 33   AST  --  14 13 36   PROTEIN 30 *  --  10*   --    LIPASE  --  64*  --   --        ASSESSMENT AND PLAN:  82 year old female with sepsis, pyuria, UTI.  Also found to have CBD stones on CT scan.  No abdominal pain, minimal elevation in bili, no CBD dilation.  I suspect that her biliary stones are not related to her current issue, however they should be addressed at some point.  Discussed with Dr. Burris, plan for ERCP tomorrow.  Will make NPO after midnight.    Thank you for this interesting consult, please feel to call me if any questions arise.    Tong Meredith MD

## 2022-04-10 NOTE — PHARMACY-ADMISSION MEDICATION HISTORY
Admission medication history interview status for this patient is complete. See [facility] admission navigator for allergy information, prior to admission medications and immunization status.     Medication history interview done, indicate source(s): Patient  Medication history resources (including written lists, pill bottles, clinic record): patient, Chante Hines  Pharmacy:  [facility]    Changes made to PTA medication list:  Added: ferrous gluconate, nystatin cream, fluocinolone ophthalmic, zantac 360  Changed: fluticasone (flovent)--> fluticasone (flonase) 2 sprays into each nostril daily  Reported as Not Taking:   Removed:   cephalexin, garlic    Actions taken by pharmacist (provider contacted, etc): left sticky to MD     Additional medication history information:None    Medication reconciliation/reorder completed by provider prior to medication history?  Y      Prior to Admission medications    Medication Sig Last Dose Taking? Auth Provider   apixaban ANTICOAGULANT (ELIQUIS ANTICOAGULANT) 5 MG tablet Take 1 tablet (5 mg) by mouth 2 times daily Appointment required for further refills 4/8/2022 Yes Holland Marcelo MD   ascorbic acid (VITAMIN C) 500 MG CPCR Take 500 mg by mouth daily 4/8/2022 Yes Reported, Patient   BETA CAROTENE PO Take by mouth daily 4/8/2022 Yes Reported, Patient   BIOTIN FORTE PO Take by mouth daily 4/8/2022 Yes Reported, Patient   cetirizine (ZYRTEC) 5 MG tablet Take 10 mg by mouth daily  Past Week at Unknown time Yes Reported, Patient   Cranberry (CRAN-MAX PO) Take by mouth daily Past Week at Unknown time Yes Reported, Patient   Famotidine (ZANTAC 360 PO) Take 1 tablet by mouth 2 times daily 4/8/2022 Yes Unknown, Entered By History   Ferrous Gluconate 324 (37.5 Fe) MG TABS Take 1 tablet by mouth in the morning. 4/8/2022 Yes Unknown, Entered By History   Flaxseed, Linseed, (FLAX SEEDS PO) Take by mouth daily  Past Week at Unknown time Yes Reported, Patient   fluocinolone  (SYNALAR) 0.01 % solution Apply topically 2 times daily 5 drops into both ears More than a month at Unknown time Yes Unknown, Entered By History   fluticasone (FLONASE) 50 MCG/ACT nasal spray Spray 2 sprays into both nostrils daily 4/8/2022 Yes Unknown, Entered By History   folic acid (FOLVITE) 1 MG tablet Take 1 mg by mouth daily 4/8/2022 Yes Reported, Patient   furosemide (LASIX) 20 MG tablet Take 20 mg by mouth daily  4/8/2022 Yes Reported, Patient   Glucos-MSM-C-Me-Ocpqbf-Soahmm (GLUCOSAMINE MSM COMPLEX) TABS tablet Take 2 tablets by mouth daily 4/8/2022 Yes Reported, Patient   hydrOXYzine (VISTARIL) 50 MG capsule Take 50 mg by mouth as needed in the morning and 50 mg as needed in the evening for itching. 4/8/2022 Yes Unknown, Entered By History   Lutein 40 MG CAPS Take 1 capsule by mouth in the morning. 4/8/2022 Yes Reported, Patient   Magnesium Oxide 500 MG TABS Take 500 mg by mouth daily 4/8/2022 Yes Reported, Patient   metoprolol tartrate (LOPRESSOR) 50 MG tablet Take 1 tablet (50 mg) by mouth 2 times daily Appointment required for further refills 4/8/2022 Yes Holland Marcelo MD   Morphine Sulfate (MS CONTIN PO) Take 15 mg by mouth every 12 hours 4/8/2022 Yes Reported, Patient   multivitamin w/minerals (THERA-VIT-M) tablet Take 1 tablet by mouth daily 4/8/2022 Yes Reported, Patient   niacin 500 MG tablet Take 500 mg by mouth daily (with breakfast) 4/8/2022 Yes Reported, Patient   NONFORMULARY 720 mg daily Tumeric, takes 2 tabs at bedtime Past Week at Unknown time Yes Reported, Patient   nystatin (MYCOSTATIN) 798775 UNIT/GM external cream Apply topically 2 times daily Past Week at Unknown time Yes Unknown, Entered By History   oxyCODONE-acetaminophen (PERCOCET) 7.5-325 MG per tablet Take 1 tablet by mouth in the morning and 1 tablet in the evening. Takes two in the morning and one before bedtime 4/8/2022 Yes Reported, Patient   OYSCO 500 + D 500-200 MG-UNIT tablet TAKE 2 TABLETS (1,000 MG) BY MOUTH  EVERY 8 HOURS SEE DISCHARGE INSTRUCTION SHEET FOR TAPER. 4/8/2022 Yes Daniel Lackey PA-C   potassium 99 MG TABS Take 1 tablet by mouth daily 4/8/2022 Yes Reported, Patient   Probiotic Product (PROBIOTIC DAILY PO) Take 1 tablet by mouth daily 4/8/2022 Yes Reported, Patient   RESVERATROL PO Take by mouth daily Past Week at Unknown time Yes Reported, Patient   tolterodine (DETROL) 2 MG tablet Take 2 mg by mouth 2 times daily 4/8/2022 Yes Reported, Patient   vitamin E (TOCOPHEROL) 400 units (180 mg) capsule Take 400 Units by mouth daily 4/8/2022 Yes Reported, Patient   Zinc Gluconate 50 MG CAPS Take 1 capsule by mouth daily 4/8/2022 Yes Reported, Patient

## 2022-04-10 NOTE — H&P
[facility]    History and Physical - Hospitalist Service       Date of Admission:  4/9/2022    Assessment & Plan      Raven Carlin is a 82 year old female with history of atrial fibrillation, chronic anticoagulation with Eliquis, asthma, chronic pain managed with MS Contin and Percocet, morbid obesity with BMI of 40, E. coli ESBL UTI, chronic kidney disease stage III, dyslipidemia, eczema, pelvic fracture, gastric ulcer, GERD, hypertension, overactive bladder, obstructive sleep apnea for which he uses CPAP, and hyperparathyroidism.  She presented to the [facility] emergency department today (4/9/2022) for evaluation of feeling poorly.  She had diffuse pain.  She felt nauseated.  Her daughter thought that she looked ill.  She came to the emergency department for evaluation.  She denied chest pain, shortness of breath, diarrhea, and dysuria.  She did have diffuse pain.  She denied focal weakness, dysarthria, or dysphagia.  Emergency department evaluation showed temperature 100.8 with otherwise unremarkable vital signs.  Laboratory evaluation showed white blood cells 19.1, lactic acid 2.7, BUN 37, creatinine 1.71, total bilirubin 1.4, and abnormal urinalysis with greater than 182 white blood cells, large leukocyte esterase, positive nitrites, white blood cell clumps, and few bacteria.  Testing for COVID-19, influenza, and RSV was negative.  Chest x-ray was unremarkable.  CT of abdomen pelvis showed 5 or more stones in the common bile duct without common bile duct dilatation.  There is no signs of pancreatitis.  Cholelithiasis was noted.  There is a 1.5 cm right adrenal lesion.  On CT from 2009 it measured 0.9 cm.  In the absence of underlying malignancy it was thought that this was likely an adenoma.  Raven was given meropenem and vancomycin for sepsis suspected to be due to urinary tract infection but also with consideration of choledocholithiasis and possible ascending  cholangitis.  I was asked to admit her to the hospital for further cares.    Problem list:    Sepsis (fever, lactic acid, leukocytosis)  Urinary tract infection  Choledocholithiasis  Possible ascending cholangitis  Lactic acidosis  Leukocytosis  -I suspect sepsis is due to the UTI.  She has really no abdominal pain to suggest cholangitis or symptomatic cholelithiasis or choledocholithiasis.  -Continue meropenem and vancomycin  -Follow urine culture and blood cultures  -I will ask GI to see regarding cholelithiasis and choledocholithiasis in the setting of sepsis  -Lactic acid went from 2.7-2.8.  Will give 500 mL of lactated Ringer's and repeat lactic acid in 2-1/2 hours.  -AM basic metabolic panel and CBC  -With sepsis, hold prior to admission Lasix    Acute kidney injury, likely due to sepsis  Chronic kidney disease stage III  -Baseline creatinine seems to be 1.2-1.5  -Presenting creatinine was 1.7  -Repeat BMP tomorrow after IV fluid hydration  -Hold prior to admission Lasix    Chronic pain  -Resume PTA MS Contin 15 mg PO BID  -Resume PTA percocet but make prn    Atrial fibrillation  Chronic anticoagulation with Eliquis  Hypertension  Dyslipidemia  -Resume prior to admission Eliquis and metoprolol  -If GI procedure recommended Eliquis would likely need to be held    Asthma, without acute exacerbation  -Arnuity Ellipta inhaler as substitute for Flovent discus inhaler  -Albuterol nebs as needed    Obstructive sleep apnea  -CPAP with sleep    GERD  History of gastric ulcer  -Not currently on acid lowering medication    COVID-19 PCR testing was negative         Diet: Combination Diet Regular Diet Adult    DVT Prophylaxis: DOAC  Thornton Catheter: Not present  Central Lines: None  Cardiac Monitoring: ACTIVE order. Indication: sepsis  Code Status: Full Code      Clinically Significant Risk Factors Present on Admission               # Coagulation Defect: home medication list includes an anticoagulant medication         Disposition Plan   Expected Discharge: 3-5 days  Anticipated discharge location: Home         The patient's care was discussed with the Patient.    Kirill Cuevas MD  Hospitalist Service  [facility]  Securely message with the Vocera Web Console (learn more here)  Text page via [facility] Paging/Directory         ______________________________________________________________________    Chief Complaint   Felt ill with nausea and diffuse pain    History is obtained from the patient    History of Present Illness   Raven Carlin is a 82 year old female with history of atrial fibrillation, chronic anticoagulation with Eliquis, asthma, chronic pain managed with MS Contin and Percocet, morbid obesity with BMI of 40, E. coli ESBL UTI, chronic kidney disease stage III, dyslipidemia, eczema, pelvic fracture, gastric ulcer, GERD, hypertension, overactive bladder, obstructive sleep apnea for which he uses CPAP, and hyperparathyroidism.  She presented to the [facility] emergency department today (4/9/2022) for evaluation of feeling poorly.  She had diffuse pain.  She felt nauseated.  Her daughter thought that she looked ill.  She came to the emergency department for evaluation.  She denied chest pain, shortness of breath, diarrhea, and dysuria.  She did have diffuse pain.  She denied focal weakness, dysarthria, or dysphagia.  Emergency department evaluation showed temperature 100.8 with otherwise unremarkable vital signs.  Laboratory evaluation showed white blood cells 19.1, lactic acid 2.7, BUN 37, creatinine 1.71, total bilirubin 1.4, and abnormal urinalysis with greater than 182 white blood cells, large leukocyte esterase, positive nitrites, white blood cell clumps, and few bacteria.  Testing for COVID-19, influenza, and RSV was negative.  Chest x-ray was unremarkable.  CT of abdomen pelvis showed 5 or more stones in the common bile duct without common bile duct dilatation.  There is no  signs of pancreatitis.  Cholelithiasis was noted.  There is a 1.5 cm right adrenal lesion.  On CT from 2009 it measured 0.9 cm.  In the absence of underlying malignancy it was thought that this was likely an adenoma.  Raven was given meropenem and vancomycin for sepsis suspected to be due to urinary tract infection but also with consideration of choledocholithiasis and possible ascending cholangitis.  I was asked to admit her to the hospital for further cares.    Review of Systems    The 10 point Review of Systems is negative other than noted in the HPI or here.     Past Medical History    I have reviewed this patient's medical history and updated it with pertinent information if needed.   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  Past Surgical History:   Procedure Laterality Date     ARTHROPLASTY KNEE  4/2011    right     ARTHROPLASTY KNEE  11/8/2011    Left, Surgeon:SOO FITZPATRICK     ARTHROPLASTY KNEE  5/8/2012    Procedure:ARTHROPLASTY KNEE; LEFT KNEE REMOVAL OF ANTIBIOTIC SPACER , REPLACEMENT WITH LEFT HINGED KNEE; Surgeon:NE FERRARA; Location: OR     ARTHROPLASTY REVISION KNEE  11/15/2011    Procedure:ARTHROPLASTY REVISION KNEE; Left Knee Poly-Exchange and Femoral Component Revision   ; Surgeon:SOO FITZPATRICK; Location: OR     ASPIRATION NEEDLE KNEE Left  2015    Procedure: ASPIRATION NEEDLE KNEE;  Surgeon: Miguel Goodrich MD;  Location: [facility] OR     EXCHANGE POLY COMPONENT ARTHROPLASTY KNEE  2012    Procedure: EXCHANGE POLY COMPONENT ARTHROPLASTY KNEE;  left knee arthroplasty pole exchange;  Surgeon: Jey Smith MD;  Location: [facility] OR     HERNIA REPAIR       MAMMOPLASTY AUGMENTATION       PARATHYROIDECTOMY Left 2019    Procedure: Excision of left inferior parathyroid adenoma, Focused neck exploration.;  Surgeon: Latonia Anguiano MD;  Location: [facility] OR     REMOVE HARDWARE ARTHROPLASTY KNEE, IRRIG & JOE, PLACE ANTIBIOTIC CEMENT SPACER, COMBINED Left 7/15/2015    Procedure: COMBINED REMOVE HARDWARE ARTHROPLASTY KNEE, IRRIGATION AND DEBRIDEMENT, PLACE ANTIBIOTIC CEMENT BEADS / SPACER;  Surgeon: Miguel Goodrich MD;  Location: [facility] OR     RHINOPLASTY       TONSILLECTOMY         Social History   I have reviewed this patient's social history and updated it with pertinent information if needed.  Social History     Tobacco Use     Smoking status: Former Smoker     Packs/day: 0.50     Years: 25.00     Pack years: 12.50     Start date:      Quit date: 1985     Years since quittin.7     Smokeless tobacco: Never Used   Substance Use Topics     Alcohol use: Yes     Comment: 2 weekly     Drug use: No       Family History   I have reviewed this patient's family history and updated it with pertinent information if needed.  Family History   Problem Relation Age of Onset     Colon Cancer Father      Diabetes Daughter      Hypertension Mother      Colon Cancer Mother        Prior to Admission Medications   Prior to Admission Medications   Prescriptions Last Dose Informant Patient Reported? Taking?   BETA CAROTENE PO   Yes No   Sig: Take by mouth daily   BIOTIN FORTE PO   Yes No   Sig: Take by mouth daily   Cranberry (CRAN-MAX PO)   Yes No   Sig: Take by mouth daily   FOLIC ACID PO   Yes No   Sig: Take 1 mg by mouth daily   FUROSEMIDE PO   Yes No    Sig: Take 20 mg by mouth daily    Flaxseed, Linseed, (FLAX SEEDS PO)   Yes No   Sig: Take by mouth daily    Garlic 10 MG CAPS   Yes No   Sig: Take by mouth 2 times daily   Glucos-MSM-C-Uo-Ztihro-Prpshi (GLUCOSAMINE MSM COMPLEX) TABS tablet   Yes No   Sig: Take 2 tablets by mouth daily   Lutein 40 MG CAPS   Yes No   Sig: Take by mouth daily   Magnesium Oxide 500 MG TABS   Yes No   Sig: Take 1,000 mg by mouth daily   Morphine Sulfate (MS CONTIN PO)   Yes No   Sig: Take 15 mg by mouth every 12 hours   NONFORMULARY   Yes No   Si mg daily Tumeric, takes 2 tabs at bedtime   OYSCO 500 + D 500-200 MG-UNIT tablet   No No   Sig: TAKE 2 TABLETS (1,000 MG) BY MOUTH EVERY 8 HOURS SEE DISCHARGE INSTRUCTION SHEET FOR TAPER.   Probiotic Product (PROBIOTIC DAILY PO)   Yes No   Sig: Take by mouth daily   RESVERATROL PO   Yes No   Sig: Take by mouth daily   VITAMIN E COMPLEX PO   Yes No   Sig: Take 400 Units by mouth daily   Zinc Gluconate 50 MG CAPS   Yes No   Sig: Take by mouth daily   apixaban ANTICOAGULANT (ELIQUIS ANTICOAGULANT) 5 MG tablet   No No   Sig: Take 1 tablet (5 mg) by mouth 2 times daily Appointment required for further refills   ascorbic acid (VITAMIN C) 500 MG CPCR   Yes No   Sig: Take 500 mg by mouth daily   cephALEXin (KEFLEX) 500 MG capsule   Yes No   Sig: Take 500 mg by mouth daily   cetirizine (ZYRTEC) 5 MG tablet   Yes No   Sig: Take 10 mg by mouth daily    fluticasone (FLOVENT DISKUS) 50 MCG/BLIST AEPB   Yes No   Sig: Spray 1 puff into both nostrils daily   hydrOXYzine (ATARAX) 50 MG tablet   Yes No   Sig: TAKE 1 TABLET BY MOUTH TWICE A DAY AS NEEDED FOR ITCHING   metoprolol tartrate (LOPRESSOR) 50 MG tablet   No No   Sig: Take 1 tablet (50 mg) by mouth 2 times daily Appointment required for further refills   multivitamin, therapeutic with minerals (MULTI-VITAMIN) TABS   Yes No   Sig: Take 1 tablet by mouth daily   niacin 500 MG tablet   Yes No   Sig: Take by mouth daily (with breakfast)    oxyCODONE-acetaminophen (PERCOCET) 7.5-325 MG per tablet   Yes No   Sig: Take 1 tablet by mouth 2 times daily Takes two in the morning and one before bedtime   potassium 99 MG TABS   Yes No   Sig: Take by mouth daily   tolterodine (DETROL) 2 MG tablet   Yes No   Sig: Take 2 mg by mouth 2 times daily      Facility-Administered Medications: None     Allergies   Allergies   Allergen Reactions     Bactrim [Sulfamethoxazole W-Trimethoprim] Nausea     Epinephrine Other (See Comments)     Heart races     Ketamine      Felt like she was dying     Lisinopril Cough     Simvastatin Itching       Physical Exam   Vital Signs: Temp: 99  F (37.2  C) Temp src: Oral BP: 108/48 Pulse: 74   Resp: 20 SpO2: 97 % O2 Device: None (Room air)    Weight: 241 lbs 6.4 oz    GENERAL: Pleasant and cooperative. No acute distress.  EYES: Pupils equal and round. No scleral erythema or icterus.  ENT: External ears are normal without deformity. Posterior oropharynx is without erythem, swelling, or exudate.  NECK: Supple. No masses or swelling. No tenderness. Thyroid is normal without mass or tenderness.  CHEST: Clear to auscultation. Normal breath sounds. No retractions.   CV: Regular rate and rhythm. No JVD. Pulses normal.  ABDOMEN: Bowel sounds present. No tenderness. No masses or hernia.  EXTREMETIES: No clubbing, cyanosis, or ischemia.  SKIN: Warm and dry to touch. No wounds or rashes.  NEUROLOGIC: Strength and sensation are normal. Deep tendon reflexes are normal. Cranial nerves are normal.        Data   Data reviewed today: I reviewed all medications, new labs and imaging results over the last 24 hours.     Recent Labs   Lab 04/09/22  1730   WBC 19.1*   HGB 13.0   MCV 85         POTASSIUM 4.5   CHLORIDE 103   CO2 27   BUN 37*   CR 1.71*   ANIONGAP 7   LAUREL 9.1   *   ALBUMIN 3.4   PROTTOTAL 6.9   BILITOTAL 1.4*   ALKPHOS 120   ALT 13   AST 14   LIPASE 64*     Recent Results (from the past 24 hour(s))   CT Abdomen Pelvis w  Contrast    Narrative    EXAM: CT ABDOMEN PELVIS W CONTRAST  LOCATION: [facility]  DATE/TIME: 4/9/2022 8:01 PM    INDICATION: Nausea. Diffuse abdominal pain and tenderness.  COMPARISON: 4/22/2009  TECHNIQUE: CT scan of the abdomen and pelvis was performed following injection of IV contrast. Multiplanar reformats were obtained. Dose reduction techniques were used.  CONTRAST: 100mL Isovue 370    FINDINGS:   LOWER CHEST: Normal.    HEPATOBILIARY: Diffuse fatty infiltration of the liver. Cholelithiasis. There are multiple stones within the common bile duct, at least 5. The common bile duct is normal caliber measuring 5 mm.    PANCREAS: Normal.    SPLEEN: Normal    ADRENAL GLANDS: A right adrenal nodule is present measuring 1.5 cm in diameter, enlarged from 2009 when it measured 0.9 cm.    KIDNEYS/BLADDER: Multiple small benign renal cysts. No follow-up needed. No hydronephrosis. The bladder is decompressed.    BOWEL: Bowel loops are normal caliber. No wall thickening. Mildly increased stool in the colon. Normal appendix.    LYMPH NODES: Normal.    VASCULATURE: Mild calcified plaque in the abdominal aorta without aneurysm.    PELVIC ORGANS: No large adnexal cysts or masses.    MUSCULOSKELETAL: Degenerative disc disease and facet arthritis in the lumbar spine.      Impression    IMPRESSION:   1.  Choledocholithiasis with at least 5 stones in the common bile duct. No associated biliary dilatation. Unclear whether this is related to the patient's acute symptoms. Correlation with biliary markers is recommended. No evidence of pancreatitis.  2.  Cholelithiasis.  3.  Diffuse fatty infiltration of the liver.  4.  A 1.5 cm right adrenal nodule is present and is otherwise not well evaluated due to single phase contrast. In retrospect, it was likely present in 2009 when it measured 0.9 cm. This is likely a benign adenoma. If the patient has a history of   malignancy, a dedicated adrenal CT or a follow-up  scan in one year could be considered. Otherwise, no specific imaging follow-up is recommended. Laboratory or clinical evaluation can be considered if the patient has signs or symptoms of a   hyperfunctioning adrenal nodule.   XR Chest 2 Views    Narrative    EXAM: XR CHEST 2 VW  LOCATION: [facility]  DATE/TIME: 4/9/2022 8:18 PM    INDICATION: Fever.  COMPARISON: 06/21/2021.      Impression    IMPRESSION: Lungs are grossly clear. No consolidation. No pleural effusion. Stable borderline enlarged cardiac silhouette size. No pulmonary edema. Aortic atherosclerosis. Breast prosthetics.

## 2022-04-11 ENCOUNTER — APPOINTMENT (OUTPATIENT)
Dept: OCCUPATIONAL THERAPY | Facility: CLINIC | Age: 82
DRG: 872 | End: 2022-04-11
Attending: INTERNAL MEDICINE
Payer: COMMERCIAL

## 2022-04-11 ENCOUNTER — APPOINTMENT (OUTPATIENT)
Dept: PHYSICAL THERAPY | Facility: CLINIC | Age: 82
DRG: 872 | End: 2022-04-11
Attending: INTERNAL MEDICINE
Payer: COMMERCIAL

## 2022-04-11 LAB
ALBUMIN SERPL-MCNC: 2.8 G/DL (ref 3.4–5)
ALP SERPL-CCNC: 92 U/L (ref 40–150)
ALT SERPL W P-5'-P-CCNC: 12 U/L (ref 0–50)
ANION GAP SERPL CALCULATED.3IONS-SCNC: 4 MMOL/L (ref 3–14)
AST SERPL W P-5'-P-CCNC: 16 U/L (ref 0–45)
BACTERIA UR CULT: ABNORMAL
BILIRUB SERPL-MCNC: 0.9 MG/DL (ref 0.2–1.3)
BUN SERPL-MCNC: 32 MG/DL (ref 7–30)
CALCIUM SERPL-MCNC: 9 MG/DL (ref 8.5–10.1)
CHLORIDE BLD-SCNC: 105 MMOL/L (ref 94–109)
CO2 SERPL-SCNC: 27 MMOL/L (ref 20–32)
CREAT SERPL-MCNC: 1.32 MG/DL (ref 0.52–1.04)
ERYTHROCYTE [DISTWIDTH] IN BLOOD BY AUTOMATED COUNT: 15.2 % (ref 10–15)
GFR SERPL CREATININE-BSD FRML MDRD: 40 ML/MIN/1.73M2
GLUCOSE BLD-MCNC: 78 MG/DL (ref 70–99)
HCT VFR BLD AUTO: 35.6 % (ref 35–47)
HGB BLD-MCNC: 11 G/DL (ref 11.7–15.7)
MAGNESIUM SERPL-MCNC: 2.1 MG/DL (ref 1.6–2.3)
MCH RBC QN AUTO: 26.8 PG (ref 26.5–33)
MCHC RBC AUTO-ENTMCNC: 30.9 G/DL (ref 31.5–36.5)
MCV RBC AUTO: 87 FL (ref 78–100)
PLATELET # BLD AUTO: 169 10E3/UL (ref 150–450)
POTASSIUM BLD-SCNC: 4.1 MMOL/L (ref 3.4–5.3)
PROT SERPL-MCNC: 6.2 G/DL (ref 6.8–8.8)
RBC # BLD AUTO: 4.11 10E6/UL (ref 3.8–5.2)
SODIUM SERPL-SCNC: 136 MMOL/L (ref 133–144)
WBC # BLD AUTO: 11.2 10E3/UL (ref 4–11)

## 2022-04-11 PROCEDURE — 250N000011 HC RX IP 250 OP 636: Performed by: INTERNAL MEDICINE

## 2022-04-11 PROCEDURE — 120N000001 HC R&B MED SURG/OB

## 2022-04-11 PROCEDURE — 83735 ASSAY OF MAGNESIUM: CPT | Performed by: INTERNAL MEDICINE

## 2022-04-11 PROCEDURE — 97530 THERAPEUTIC ACTIVITIES: CPT | Mod: GO

## 2022-04-11 PROCEDURE — 36415 COLL VENOUS BLD VENIPUNCTURE: CPT | Performed by: INTERNAL MEDICINE

## 2022-04-11 PROCEDURE — 80053 COMPREHEN METABOLIC PANEL: CPT | Performed by: INTERNAL MEDICINE

## 2022-04-11 PROCEDURE — 99233 SBSQ HOSP IP/OBS HIGH 50: CPT | Performed by: INTERNAL MEDICINE

## 2022-04-11 PROCEDURE — 97161 PT EVAL LOW COMPLEX 20 MIN: CPT | Mod: GP | Performed by: PHYSICAL THERAPIST

## 2022-04-11 PROCEDURE — 97530 THERAPEUTIC ACTIVITIES: CPT | Mod: GP | Performed by: PHYSICAL THERAPIST

## 2022-04-11 PROCEDURE — G0463 HOSPITAL OUTPT CLINIC VISIT: HCPCS

## 2022-04-11 PROCEDURE — 250N000013 HC RX MED GY IP 250 OP 250 PS 637: Performed by: INTERNAL MEDICINE

## 2022-04-11 PROCEDURE — 97165 OT EVAL LOW COMPLEX 30 MIN: CPT | Mod: GO

## 2022-04-11 PROCEDURE — 85014 HEMATOCRIT: CPT | Performed by: INTERNAL MEDICINE

## 2022-04-11 PROCEDURE — 94660 CPAP INITIATION&MGMT: CPT

## 2022-04-11 PROCEDURE — 999N000157 HC STATISTIC RCP TIME EA 10 MIN

## 2022-04-11 RX ORDER — FUROSEMIDE 20 MG
20 TABLET ORAL DAILY
Status: DISCONTINUED | OUTPATIENT
Start: 2022-04-12 | End: 2022-04-18 | Stop reason: HOSPADM

## 2022-04-11 RX ADMIN — FAMOTIDINE 10 MG: 10 TABLET ORAL at 08:26

## 2022-04-11 RX ADMIN — OXYCODONE AND ACETAMINOPHEN 1 TABLET: 7.5; 325 TABLET ORAL at 08:26

## 2022-04-11 RX ADMIN — POLYETHYLENE GLYCOL 3350 17 G: 17 POWDER, FOR SOLUTION ORAL at 11:56

## 2022-04-11 RX ADMIN — FAMOTIDINE 10 MG: 10 TABLET ORAL at 20:31

## 2022-04-11 RX ADMIN — Medication 1 CAPSULE: at 08:26

## 2022-04-11 RX ADMIN — MEROPENEM 1 G: 1 INJECTION, POWDER, FOR SOLUTION INTRAVENOUS at 18:13

## 2022-04-11 RX ADMIN — CETIRIZINE HYDROCHLORIDE 10 MG: 10 TABLET, FILM COATED ORAL at 08:26

## 2022-04-11 RX ADMIN — MEROPENEM 1 G: 1 INJECTION, POWDER, FOR SOLUTION INTRAVENOUS at 07:56

## 2022-04-11 RX ADMIN — Medication 1 MG: at 20:31

## 2022-04-11 RX ADMIN — MORPHINE SULFATE 15 MG: 15 TABLET, EXTENDED RELEASE ORAL at 10:21

## 2022-04-11 RX ADMIN — SENNOSIDES AND DOCUSATE SODIUM 1 TABLET: 8.6; 5 TABLET ORAL at 11:56

## 2022-04-11 RX ADMIN — TOLTERODINE TARTRATE 2 MG: 1 TABLET, FILM COATED ORAL at 20:31

## 2022-04-11 RX ADMIN — FLUTICASONE PROPIONATE 2 SPRAY: 50 SPRAY, METERED NASAL at 07:58

## 2022-04-11 RX ADMIN — MORPHINE SULFATE 15 MG: 15 TABLET, EXTENDED RELEASE ORAL at 20:30

## 2022-04-11 RX ADMIN — OXYCODONE AND ACETAMINOPHEN 1 TABLET: 7.5; 325 TABLET ORAL at 16:19

## 2022-04-11 RX ADMIN — TOLTERODINE TARTRATE 2 MG: 1 TABLET, FILM COATED ORAL at 08:25

## 2022-04-11 ASSESSMENT — ACTIVITIES OF DAILY LIVING (ADL)
ADLS_ACUITY_SCORE: 21
ADLS_ACUITY_SCORE: 22
ADLS_ACUITY_SCORE: 21
ADLS_ACUITY_SCORE: 22
ADLS_ACUITY_SCORE: 21
ADLS_ACUITY_SCORE: 22
ADLS_ACUITY_SCORE: 21
ADLS_ACUITY_SCORE: 21
ADLS_ACUITY_SCORE: 22
ADLS_ACUITY_SCORE: 21
ADLS_ACUITY_SCORE: 21
ADLS_ACUITY_SCORE: 22
ADLS_ACUITY_SCORE: 22
ADLS_ACUITY_SCORE: 21
ADLS_ACUITY_SCORE: 22
ADLS_ACUITY_SCORE: 21
ADLS_ACUITY_SCORE: 21

## 2022-04-11 NOTE — PROVIDER NOTIFICATION
Dr. Girish farrell messaging: Can have 4-5 today , will titration tomorrow to 2-3 if she is back to baseline

## 2022-04-11 NOTE — PLAN OF CARE
A/Ox4. Max A2 with repositions; lift to transfer. PT consult. Tele SB. Reported no SOB, RA, LS clear. Patient refused BP checks on nights; pt reports family will bring blood pressure device that is use at home. Rest of VS stable. Lactic 1.1, no report of fever, chills, n/v. On Abx. GI following. Safety bed alarm on and call light within reach.     /58 (BP Location: Right arm)   Pulse 55   Temp 97.8  F (36.6  C) (Oral)   Resp 18   Wt 107.3 kg (236 lb 9.6 oz)   SpO2 94%   BMI 39.37 kg/m

## 2022-04-11 NOTE — PLAN OF CARE
Goal Outcome Evaluation:   Vss. Refused use of automatic b/p. Requested staff to wait for her home wrist b/p cuff to get here to use. Cuff did not work as pt states she spilled soda on it. Pt did allow us to use manual b/p cuff and we were able to obtain a wnl b/p. Refused up to chair. Allowed boosting. Did stand at eob with therapy. Calls approp. Good appetite. Percocet for generalized pain. Heels elevated.

## 2022-04-11 NOTE — PROGRESS NOTES
"   04/11/22 0943   Quick Adds   Type of Visit Initial Occupational Therapy Evaluation   Living Environment   Living Environment Comments pt reports she lives at home with her daughter. accessible shower with sliding bench. daughter at home all day with pt.   Self-Care   Equipment Currently Used at Home tub bench;walker, rolling  (lift chart. lift shoe om L)   Fall history within last six months no   Activity/Exercise/Self-Care Comment pt reports she spends most of her time in lift recliner. has 40 steps to bathroom. uses 4WW and lift shoe given LE limb length differences. reports she has assistance come in \"every other day or so\" to assist with bathing and ADL. pt reports otherwise get to the bathroom, toilets and dresses self. reports daughter an assist at home.   Instrumental Activities of Daily Living (IADL)   IADL Comments pt reports she does not drive, it is difficult for her to get in and out of the car. daughter and caregiver assist with IADL   General Information   Onset of Illness/Injury or Date of Surgery 04/09/22   Referring Physician Dominic Garibay MD   Patient/Family Therapy Goal Statement (OT) pt wants home with home therapy   Additional Occupational Profile Info/Pertinent History of Current Problem Raven Carlin is a 82 year old female with history of A. fib on Eliquis, asthma, chronic pain syndrome on opiates, morbid obesity, CKD stage IIIb, HLD, GERD, gastric ulcer, HTN, ESBL E. coli, and SAURABH on CPAP who was admitted on 4/9/2022 for malaise and nausea where she was found to have fever in the ER and leukocytosis to 19. found to have sepsis   Existing Precautions/Restrictions fall   General Observations and Info agreeable to OT   Cognitive Status Examination   Orientation Status orientation to person, place and time   Affect/Mental Status (Cognitive) WFL   Follows Commands WFL   Cognitive Status Comments questionable insight into abilities and deficits. daughter and pt have conflicting " information regarding functional status   Visual Perception   Visual Impairment/Limitations WFL   Sensory   Sensory Quick Adds No deficits were identified   Pain Assessment   Patient Currently in Pain Yes, see Vital Sign flowsheet   Integumentary/Edema   Integumentary/Edema Comments LE redness on L below knee   Posture   Posture forward head position;protracted shoulders;kyphosis   Range of Motion Comprehensive   Comment, General Range of Motion UE limited ROM at SH to about 50%. L LE no knee flexion, pt reports lakc of joint, always extended. defer LE to PT   Strength Comprehensive (MMT)   Comment, General Manual Muscle Testing (MMT) Assessment UE 3/5 with limited ROM at SH   Coordination   Upper Extremity Coordination No deficits were identified   Bed Mobility   Bed Mobility supine-sit   Supine-Sit [place] (Bed Mobility) moderate assist (50% patient effort)   Comment (Bed Mobility) scooting to EOB from long sitting   Activities of Daily Living   BADL Assessment/Intervention lower body dressing   Lower Body Dressing Assessment/Training   Comment, (Lower Body Dressing) donning shoes at EOB   [place] Level (Lower Body Dressing) dependent (less than 25% patient effort)   Clinical Impression   Criteria for Skilled Therapeutic Interventions Met (OT) Yes, treatment indicated   OT Diagnosis decreased function in ADL   OT Problem List-Impairments impacting ADL problems related to;activity tolerance impaired;flexibility;mobility;strength;range of motion (ROM);pain   Assessment of Occupational Performance 5 or more Performance Deficits   Identified Performance Deficits bathing, dressing, toileting, home mgmt, mobility, self care   Planned Therapy Interventions (OT) ADL retraining;transfer training;progressive activity/exercise;bed mobility training   Clinical Decision Making Complexity (OT) low complexity   Risk & Benefits of therapy have been explained evaluation/treatment results reviewed;care plan/treatment  goals reviewed;risks/benefits reviewed;current/potential barriers reviewed;participants voiced agreement with care plan;participants included;patient   Clinical Impression Comments decreased function in ADL warrants skilled IP OT tx   OT Discharge Planning   OT Discharge Recommendation (DC Rec) Transitional Care Facility;home with home care occupational therapy;home with assist   OT Rationale for DC Rec pt risk for readmission. below baseline function in ADL and grossly deconditioned. rec TCU at discharge to increase function for indp self care and ADL. currently needing Ax2 for transfer from bed to BSC or chair. if home need HH OT. pt wanting to discharge to home.   OT Brief overview of current status Ax2 stand pivot to BSC or chair. dep ADL. must wear shoe for OOB mobility   Total Evaluation Time (Minutes)   Total Evaluation Time (Minutes) 10   OT Goals   Therapy Frequency (OT) Daily   OT Predicated Duration/Target Date for Goal Attainment 04/18/22   OT Goals Hygiene/Grooming;Upper Body Dressing;Lower Body Dressing;Toilet Transfer/Toileting   OT: Hygiene/Grooming while standing;moderate assist   OT: Upper Body Dressing Minimal assist   OT: Lower Body Dressing Minimal assist;using adaptive equipment   OT: Toilet Transfer/Toileting Minimal assist;toilet transfer;cleaning and garment management;using adaptive equipment  (BSC stand pivot with walker)

## 2022-04-11 NOTE — PROGRESS NOTES
[facility]  Hospitalist Progress Note  Dominic Garibay MD 04/11/22    Reason for Stay (Diagnosis): Sepsis secondary to UTI, choledocholithiasis         Assessment and Plan:      Summary of Stay: Raven Carlin is a 82 year old female with history of A. fib on Eliquis, asthma, chronic pain syndrome on opiates, morbid obesity, CKD stage IIIb, HLD, GERD, gastric ulcer, HTN, ESBL E. coli, and SAURABH on CPAP who was admitted on 4/9/2022 for malaise and nausea where she was found to have fever in the ER and leukocytosis to 19.  Initial urinalysis showing pyuria consistent with UTI.  She also had an elevated lactic acid and PAULINE consistent with severe sepsis.  She was started on vancomycin IV and meropenem given history of ESBL UTI and sepsis.  Her bilirubin was slightly elevated and a CT the abdomen pelvis did show 5 stones in the CBD although she did not have any abdominal pain to suggest ascending cholangitis.  [facility] was consulted.  Had planned on ERCP day after admission, however she has received her Eliquis before coming in and that morning so ERCP will either be done later this week if still hospitalized or in close follow-up as an outpatient.  For now holding Eliquis.  Urine culture growing ESBL E. coli.  Vancomycin discontinued.  Consulted PT for weakness.    Problem List/Assessment and Plan:   Severe sepsis secondary to UTI: Presenting with diffuse pain, nausea, and generalized malaise.  Initial temperature 100.8  F.  Leukocytosis at 19.1.  Urinalysis shows >182 WBCs, large LE, and positive nitrite consistent with UTI as the source of infection although she does not report dysuria or flank pain, but has had frequency urinary.  Lactic acid elevated at 2.8 and PAULINE consistent with severe sepsis.  She does have a history of ESBL UTIs so she is on meropenem and vancomycin for sepsis.  She was found to have 5 CBD stones on CT, however she has not had any abdominal pain and her bilirubin is only slightly  elevated at 1.4 otherwise LFTs normal.  Ascending cholangitis secondary to choledocholithiasis is much less likely.  -Continue IV meropenem today, p.o. levofloxacin appears to be an option when better  -Stop vancomycin  -Urine culture growing ESBL E. coli that is sensitive to Carbapenem's, fluoroquinolones, and Bactrim  -Blood cultures NGTD    Choledocholithiasis: As above presenting with feeling unwell and fevers that is most likely secondary to UTI.  Her CT did showcholelithiasis along with 5 small stones in the common bile duct.  Bilirubin is 1.4 otherwise alk phos and transaminases are WNL.  She has not had any abdominal pain previously or now and she is nontender on exam so this seems less likely etiology for her presentation with sepsis.  -[facility] consulted, agree that urosepsis more likely, but had planned for ERCP as this does need to be addressed at some point.  Patient had her Eliquis last night before coming in and this morning so ERCP should be deferred now as she would also likely need sphincterotomy unless absolutely necessary for sepsis control.  Usually anticoagulation held for 48 to 72 hours before ERCP.  Again, this is not felt to be the source of infection.  If still here by Thursday I think inpatient EUS/ERCP would be good to get done as I doubt her likelihood of following up to do this based on my discussion with her.  For now hold her Eliquis.  -Checking LFTs this afternoon and repeat CMP tomorrow  -Monitor for any abdominal pain  -Hold Eliquis for now    A. fib: Chronically anticoagulated with Eliquis.  Also on metoprolol tartrate 50 mg daily.  -Hold Eliquis for now in case still in the hospital when ERCP is next available on Thursday.  Last dose morning 4/10    PAULINE on CKD stage IIIb: Variable recent creatinine, baseline likely 1.3.  Presenting with PAULINE with creatinine 1.7 secondary to severe sepsis.  Resolved.  -Repeat BMP tomorrow morning and restart 20 mg Lasix p.o. tomorrow    Chronic  pain syndrome  Physical deconditioning  Chronic leg wound: Resume her morphine sulfate 15 mg every 12 hours and Percocet 7.5-325 mg, but ordered as every 6 hours as needed instead of scheduled twice daily.  Chronic left leg pain following previous surgeries.  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  Has some chronic skin changes and wound to the back of the left knee.  -WOC RN consulted  -PT consulted    SAURABH: Continue CPAP.    Morbid obesity: BMI 40.1.    Overactive bladder: Continue tolterodine 2 mg daily.    HTN: Resume PTA metoprolol tartrate 25 mg twice daily and Lasix 20 mg daily.  Has trace bilateral edema on exam.  -Continue metoprolol  -Resume Lasix tomorrow    HLD: not currently on meds.    GERD, history gastric ulcer: Resume famotidine twice daily.    DVT Prophylaxis: eliquis held, but on board  Code Status: Full Code  FEN: regular diet  Discharge Dispo: tbd. Consult PT//OT/SW. Per daughter Rhonda the patient lives with a different daughter, but at baseline sits in her recliner that goes up and down all day long and maybe gets up once to use the bathroom.  There have been issues with incontinence in the chair although patient denies.  Rhonda believes she needs more motivation.  There are health aides coming into the home every other day or so to help with bathing.  Per patient she was getting home physical therapy twice a week until recently.  She is not interested in TCU, but will need to demonstrate that she is able to ambulate with a walker to the bathroom and back at bare minimum to return home with home care.  Estimated Disch Date / # of Days until Disch: TBD.  Clinically improved from infection.  Depending on her mobility may be ready for discharge in the next 1 to 2 days.  However, if still here by Thursday I think inpatient EUS/ERCP would be good to get done as I doubt her likelihood of following up to do this based on my discussion with her.  For now hold her Eliquis.    Clinically Significant  Risk Factors Present on Admission                              Interval History (Subjective):      No acute events overnight.  Afebrile.  She denies any abdominal pain.  Overall she is feeling much better, but does feel diffusely weak.  I discussed with her the possibility of needing TCU, however she refused and says she has people coming to the home to help with bathing and she was previously getting home therapy.                  Physical Exam:      Last Vital Signs:  /68 (BP Location: Left arm)   Pulse 52   Temp 97.9  F (36.6  C) (Oral)   Resp 18   Wt 107.3 kg (236 lb 9.6 oz)   SpO2 95%   BMI 39.37 kg/m      Intake/Output Summary (Last 24 hours) at 4/11/2022 1519  Last data filed at 4/11/2022 1432  Gross per 24 hour   Intake 240 ml   Output 1600 ml   Net -1360 ml       Constitutional: Awake, NAD   Eyes: sclera white   HEENT:   MMM  Respiratory:  anterior lungs cta bilaterally, no crackles or wheeze  Cardiovascular: RRR.  No murmur   GI: Obese, nontender to palpation, bowel sounds present, not distended  Skin: Posterior left knee chronic appearing skin changes    Musculoskeletal/extremities: Trace bilateral lower extremity edema  Neurologic: A&O, answers questions appropriately  Psychiatric: upset when discussing possible tcu         Medications:      All current medications were reviewed with changes reflected in problem list.         Data:      All new lab and imaging data was reviewed.   Labs:  Recent Labs   Lab 04/11/22  0624 04/09/22  1730   WBC 11.2* 19.1*   HGB 11.0* 13.0   HCT 35.6 41.0   MCV 87 85    200     Recent Labs   Lab 04/11/22  0624 04/10/22  1606 04/10/22  0808 04/09/22  1730     --   --  137   POTASSIUM 4.1  --  4.5 4.5   CHLORIDE 105  --   --  103   CO2 27  --   --  27   ANIONGAP 4  --   --  7   GLC 78  --   --  155*   BUN 32*  --   --  37*   CR 1.32*  --   --  1.71*   GFRESTIMATED 40*  --   --  30*   LAUREL 9.0  --   --  9.1   MAG 2.1  --  2.1  --    PROTTOTAL 6.2* 5.5*   --  6.9   ALBUMIN 2.8* 2.5*  --  3.4   BILITOTAL 0.9 0.9  --  1.4*   ALKPHOS 92 82  --  120   AST 16 12  --  14   ALT 12 10  --  13     Lactic acid 1.1  All cultures:  Recent Labs   Lab 04/09/22  1940 04/09/22  1836 04/09/22  1803   CULTURE >100,000 CFU/mL Escherichia coli ESBL* No growth after 1 day No growth after 1 day     Imaging:   None today      Dominic Garibay MD

## 2022-04-11 NOTE — PROGRESS NOTES
"   04/11/22 1400   Quick Adds   Type of Visit Initial PT Evaluation   Living Environment   People in Home child(robinson), adult   Current Living Arrangements house   Home Accessibility stairs within home   Number of Stairs, Within Home, Primary six   Stair Railings, Within Home, Primary railings on both sides of stairs   Transportation Anticipated family or friend will provide   Living Environment Comments Pt lives in the lower level of a split level home with her daughter (who lives upstairs).  Pt has 2 walkers, one at the top and the other at the bottom.  Pt stays in the lower level and only leaves for MD appts.  Dtr reports pt has difficulty getting out of the car due to lower seat height and lack of L knee flexion.   Self-Care   Usual Activity Tolerance moderate   Current Activity Tolerance fair   Equipment Currently Used at Home tub bench;walker, rolling   Fall history within last six months no   Activity/Exercise/Self-Care Comment Per OT note: pt reports she spends most of her time in lift recliner. has 40 steps to bathroom. uses 4WW and lift shoe given LE limb length differences. reports she has assistance come in \"every other day or so\" to assist with bathing and ADL. pt reports otherwise get to the bathroom, toilets and dresses self. reports daughter an assist at home. Pt states she uses a commode over her toilet at home.  Pt had HHPT; however, were only coming ~1x/mo   General Information   Onset of Illness/Injury or Date of Surgery 04/09/22   Referring Physician Dr. Dominic Garibay   Patient/Family Therapy Goals Statement (PT) Pt would like to return home   Pertinent History of Current Problem (include personal factors and/or comorbidities that impact the POC) Raven Carlin is a 82 year old female with history of A. fib on Eliquis, asthma, chronic pain syndrome on opiates, morbid obesity, CKD stage IIIb, HLD, GERD, gastric ulcer, HTN, ESBL E. coli, and SAURABH on CPAP who was admitted on 4/9/2022 for malaise and " nausea where she was found to have fever in the ER and leukocytosis to 19. Pt found to have severe sepsis due to UTI.   Existing Precautions/Restrictions fall   General Observations Pt lyingi in bed with dtr present   Cognition   Orientation Status (Cognition) oriented x 4   Pain Assessment   Patient Currently in Pain No   Posture    Posture Forward head position;Protracted shoulders   Range of Motion (ROM)   ROM Comment L knee rigid in extension due to past knee surgeries and now significant leg length discrepancy   Strength Comprehensive (MMT)   Comment, General Manual Muscle Testing (MMT) Assessment L hip flex ~ 3/5, R LE atleast 3/5.   Strength (Manual Muscle Testing)   Strength Comments Generalized mms weakness and deconditioning   Bed Mobility   Bed Mobility supine-sit   Supine-Sit [place] (Bed Mobility) moderate assist (50% patient effort)   Comment, (Bed Mobility) Sup > sit with HOB elevated and Mod A at trunk to come to long sitting.  Pt with limited use of B UEs due to reports of shld pain/weakness.   Transfers   Transfers sit-stand transfer   Sit-Stand Transfer   Sit-Stand [place] (Transfers) minimum assist (75% patient effort);verbal cues   Assistive Device (Sit-Stand Transfers) walker, front-wheeled   Comment, (Sit-Stand Transfer) sit <> stand to FWW from elevated bed with Min A with L knee locked in extension.  Pt wearing B shoes (L with modified sole for leg length discrepancy)   Gait/Stairs (Locomotion)   [place] Level (Gait) contact guard   Assistive Device (Gait) walker, front-wheeled   Distance in Feet (Required for LE Total Joints) 20   Comment, (Gait/Stairs) pt amb 20'with FWW and CGA with slow gait wearing shoes, L knee extended throughout gait cycle.   Balance   Balance Comments Pt requires B UE support on FWW and CGA at trunk for safe standing   Coordination   Coordination no deficits were identified   Muscle Tone   Muscle Tone no deficits were identified   Clinical Impression    Criteria for Skilled Therapeutic Intervention Yes, treatment indicated   PT Diagnosis (PT) Decreased functional mobility   Influenced by the following impairments L knee rigid in extension, decreased B UE and LE strength, Impaired gait and balance, decreased activity tolerance   Functional limitations due to impairments Unable to amb functional household or community distances, assisted mobility and ADLs, unable to negotiate stairs at time of eval, increased falls risk   Clinical Presentation (PT Evaluation Complexity) Stable/Uncomplicated   Clinical Presentation Rationale multiple comorbidities   Clinical Decision Making (Complexity) low complexity   Planned Therapy Interventions (PT) bed mobility training;gait training;home exercise program;patient/family education;ROM (range of motion);stair training;strengthening;transfer training   Risk & Benefits of therapy have been explained evaluation/treatment results reviewed;care plan/treatment goals reviewed;risks/benefits reviewed;current/potential barriers reviewed;participants voiced agreement with care plan;participants included;patient;daughter   PT Discharge Planning   PT Discharge Recommendation (DC Rec) Transitional Care Facility;home with assist;home with home care physical therapy;Leaving home requires significant assistance;Leaving home requires significant taxing effort   PT Rationale for DC Rec Pt below baseline with all mobility.  Pt currently requires Mod A for sup > sit and Min A with STS and ambulating 20' with FWW and CGA. Pt will need to be able to negotiate 6 steps with B rails to access her living environment and be able to transfer out of a car.  Continued skilled PT in the TCU setting to maximize safety and independence with all mobility.  If pt and dtr decline TCU then pt will need A for all transfers, gait on stairs and ADLs and HHPT.   PT Brief overview of current status A x 1 FWW, encourage amb to bathroom with commode over toilet   Total  Evaluation Time   Total Evaluation Time (Minutes) 10   Physical Therapy Goals   PT Frequency Daily   PT Predicated Duration/Target Date for Goal Attainment 04/15/22   PT Goals Bed Mobility;Transfers;Gait;Stairs   PT: Bed Mobility Supervision/stand-by assist;Supine to/from sit  (with HOB elevated - pt sleeps in lift chair at home)   PT: Transfers Supervision/stand-by assist;Sit to/from stand;Bed to/from chair;Assistive device;Minimal assist  (SBA from elevated surface (commode/bed), Min A from lower surface (chair))   PT: Gait Supervision/stand-by assist;Rolling walker;50 feet  (with adapted shoes on)   PT: Stairs Minimal assist;6 stairs;Rail on both sides

## 2022-04-11 NOTE — PLAN OF CARE
Temp: 98.3  F (36.8  C) Temp src: Oral BP: 138/58 Pulse: 79   Resp: 18 SpO2: 98 % O2 Device: None (Room air)       Orientation:  x4  Pain:  denied in rest, but pain for any movement, even taking BP. Percocet PRN given prior to lifting for installing the pulsate mattress.  Tele: SB with inverted T   Activity:  turned Q2, does not want to ambulated due to pain. Installed pulsate mattress   Resp:   WDL  GI:  constipation, Senna PRN given , regular diet   : Purewick  In place, 700 ml output,fortunato in color   Notable Labs:  K+ 4.5, Mag 2.1, recheck in the morning. Lactic acid from 2.5 to  1.1  Skin:  wound on left hip - pink, minor drainage, covered with Mepilex   Lines: right arm SL,  Merrem given per schedule, Vancomycin given per schedule   Other: ISO contact due to history of ESBL  CPAP for night time placed by RT  ERCP was scheduled for tomorrow but due to Eliquist taken today, its rescheduled, probably as an outpatient   Plan:   Consult of PT // OT// SW, Discharge TBD

## 2022-04-11 NOTE — PLAN OF CARE
Goal Outcome Evaluation:        Pt refused VS, but has agreed to have them checked this christopher @ 2000.  A&Ox4.  Increased pain after physical therapy.  Given 1 Percocet with some relief.  Pt refusing repositioning in bed, explained why she should be off her bottom, best we could do was to have roll back and forth with bed change.

## 2022-04-11 NOTE — PROGRESS NOTES
GASTROENTEROLOGY PROGRESS NOTE     CC:   Nausea     SUBJECTIVE:  Patient denies abdominal pain or nausea. She is tolerating clear liquid diet. Patient reports her last bowel movement was three days.      OBJECTIVE:  General Appearance:  Not in any distress.   /58 (BP Location: Right arm)   Pulse 52   Temp 97.9  F (36.6  C) (Oral)   Resp 18   Wt 107.3 kg (236 lb 9.6 oz)   SpO2 95%   BMI 39.37 kg/m    Temp (24hrs), Av.1  F (36.7  C), Min:97.8  F (36.6  C), Max:98.5  F (36.9  C)    Patient Vitals for the past 72 hrs:   Weight   22 0454 107.3 kg (236 lb 9.6 oz)   22 1820 109.5 kg (241 lb 6.4 oz)       Intake/Output Summary (Last 24 hours) at 2022 1110  Last data filed at 2022 0900  Gross per 24 hour   Intake 240 ml   Output 1350 ml   Net -1110 ml        PHYSICAL EXAM  General: alert, oriented, NAD  SKIN: no suspicious lesions, rashes, jaundice, or spider angiomas  EYES: No scleral icterus  RESPIRATORY: Non labored breathing  GASTROINTESTINAL:Abdomen soft and non tender on palpation.   NEURO:Grossly WNL.  PSYCH: no abnormal anxiety/depression     Additional Comments:  ROS, FH, SH: See initial GI consult for details.     I have reviewed the patient's new clinical lab results:     Recent Labs   Lab Test 22  0624 22  1730 21  1354 07/27/15  0000 07/18/15  0830 07/17/15  0620   WBC 11.2* 19.1* 12.8*   < > 7.1 6.4   HGB 11.0* 13.0 6.6*   < > 8.5* 8.8*   MCV 87 85 88   < > 99 100    200 173   < > 157 137*   INR  --   --  1.78*  --  2.37* 1.78*    < > = values in this interval not displayed.     Recent Labs   Lab Test 22  0624 04/10/22  0808 22  1730 21  1354   POTASSIUM 4.1 4.5 4.5 4.6   CHLORIDE 105  --  103 103   CO2 27  --  27 25   BUN 32*  --  37* 44*   ANIONGAP 4  --  7 6     Recent Labs   Lab Test 22  0624 04/10/22  1606 22  1940 22  1730 21  1354   ALBUMIN 2.8* 2.5*  --  3.4 2.3*   BILITOTAL 0.9 0.9  --  1.4* 1.2    ALT 12 10  --  13 12   AST 16 12  --  14 13   PROTEIN  --   --  30 *  --  10*   LIPASE  --   --   --  64*  --         Imaging and procedures:  CT ABDOMEN PELVIS W CONTRAST 4/9/2022  IMPRESSION:   1.  Choledocholithiasis with at least 5 stones in the common bile duct. No associated biliary dilatation. Unclear whether this is related to the patient's acute symptoms. Correlation with biliary markers is recommended. No evidence of pancreatitis.  2.  Cholelithiasis.  3.  Diffuse fatty infiltration of the liver.  4.  A 1.5 cm right adrenal nodule is present and is otherwise not well evaluated due to single phase contrast. In retrospect, it was likely present in 2009 when it measured 0.9 cm. This is likely a benign adenoma. If the patient has a history of   malignancy, a dedicated adrenal CT or a follow-up scan in one year could be considered. Otherwise, no specific imaging follow-up is recommended. Laboratory or clinical evaluation can be considered if the patient has signs or symptoms of a   hyperfunctioning adrenal nodule.    Problem list pertaining to GI:  Cholelithiasis  Choledocholithiasis  Fatty liver    Assessment: This is a 82 y-o female who was admitted on 4/9/22 with sepsis and pyuria. CT scan showed incidental CBD stone. Hence, GI was consulted. Dr. Meredith consulted her on 4/10. He thought that her current symptoms are unrelated to CBD stones.     I discussed her case with Dr. Jaimes. We thought of adding her to EUS/ERCP today. However, patient already ate this morning and had Eliquis yesterday. Hence, we recommended ERCP as an outpatient in the near future.     Plan:  - Advance diet as tolerated.   - EUS/ERCP in the near future. [facility] will call to schedule this.   - Return to ER if she develops worsening abdominal pain and/or nausea.   - Hold Eliquis three days before procedure.   - Miralax and senna for constipation.    I will discuss with Dr. Jaimes and Dr. Urena and Nursing team     Time spent: 30  minutes, greater than 50% of the visit was spent in counseling/coordination of care.     Keiko Ayala CNP  [facility] ([facility])  [phone]

## 2022-04-11 NOTE — CONSULTS
[facility]  WOC Nurse Inpatient Assessment     Today's Assessment: Left posterior knee    Patient History (according to provider note(s):    Raven Carlin is a 82 year old female with history of A. fib on Eliquis, asthma, chronic pain syndrome on opiates, morbid obesity, CKD stage IIIb, HLD, GERD, gastric ulcer, HTN, ESBL E. coli, and SAURABH on CPAP who was admitted on 4/9/2022 for malaise and nausea where she was found to have fever in the ER and leukocytosis to 19.    AREAS ASSESSED:    focused Left posterior knee    Wound Location: Left posterior knee    Last photo: NA  Wound due to: Surgical Wound  Wound history/plan of care:   Patient with history of significant knee and leg surgery.  Wound since mostly healed, uses Aquaphor to leg.  Wound base: Minimal pinpoint scattered areas of dry drainage over old scar tissue     Palpation of the wound bed: normal      Drainage: none     Description of drainage: none     Measurements (length x width x depth, in cm) see above      Tunneling N/A     Undermining N/A  Periwound skin: dry/scaly and scar tissue      Color: normal and consistent with surrounding tissue      Temperature: normal   Odor: none  Pain: denies , none  Pain interventions prior to dressing change: N/A  Treatment goal: Protection  STATUS: initial assessment  Supplies ordered: supplies stored on unit       TREATMENT PLAN:     Left posterior knee wound(s): BID   1. Apply Sween cream to moisturize skin     RECOMMEND PRIMARY TEAM ORDER: None, at this time  Education provided to: plan of care  Discussed plan of care with: Patient  WOC Nurse follow-up plan:signing off  Notify WOC if wound(s) deteriorate.  Nursing to notify the Provider(s) and re-consult the WOC Nurse if new skin concern.    DATA:     Current support surface: Standard  Atmos Air mattress  BMI: Body mass index is 39.37 kg/m .   Active Diet Order: Orders Placed This Encounter      Regular Diet Adult     Output: I/O last 3  completed shifts:  In: 120 [P.O.:120]  Out: 1800 [Urine:1800]   Labs: Recent Labs   Lab Test 04/11/22  0624 04/09/22  1730 06/21/21  1354 09/10/15  0000 08/27/15  0000   ALBUMIN 2.8*   < > 2.3*  --   --    HGB 11.0*   < > 6.6*   < >  --    INR  --   --  1.78*  --   --    WBC 11.2*   < > 12.8*   < >  --    CRP  --   --   --   --  13.6    < > = values in this interval not displayed.      Pressure Injury Risk Assessment: Bob Risk Assessment  Sensory Perception: 4-->no impairment    Moisture: 3-->occasionally moist   Activity: 3-->walks occasionally     Mobility: 3-->slightly limited   Nutrition: 3-->adequate   Friction and Shear: 2-->potential problem  Bob Score: 18     Gautam Hussein RN CWN  Dept. Pager: [phone]  Dept. Office Number: [phone]

## 2022-04-12 LAB
ALBUMIN SERPL-MCNC: 2.8 G/DL (ref 3.4–5)
ALP SERPL-CCNC: 92 U/L (ref 40–150)
ALT SERPL W P-5'-P-CCNC: 14 U/L (ref 0–50)
ANION GAP SERPL CALCULATED.3IONS-SCNC: 3 MMOL/L (ref 3–14)
AST SERPL W P-5'-P-CCNC: 13 U/L (ref 0–45)
BILIRUB SERPL-MCNC: 0.9 MG/DL (ref 0.2–1.3)
BUN SERPL-MCNC: 28 MG/DL (ref 7–30)
CALCIUM SERPL-MCNC: 8.8 MG/DL (ref 8.5–10.1)
CHLORIDE BLD-SCNC: 105 MMOL/L (ref 94–109)
CO2 SERPL-SCNC: 27 MMOL/L (ref 20–32)
CREAT SERPL-MCNC: 1.17 MG/DL (ref 0.52–1.04)
ERYTHROCYTE [DISTWIDTH] IN BLOOD BY AUTOMATED COUNT: 15.2 % (ref 10–15)
GFR SERPL CREATININE-BSD FRML MDRD: 47 ML/MIN/1.73M2
GLUCOSE BLD-MCNC: 88 MG/DL (ref 70–99)
HCT VFR BLD AUTO: 35.8 % (ref 35–47)
HGB BLD-MCNC: 11.1 G/DL (ref 11.7–15.7)
MAGNESIUM SERPL-MCNC: 2.1 MG/DL (ref 1.6–2.3)
MCH RBC QN AUTO: 26.6 PG (ref 26.5–33)
MCHC RBC AUTO-ENTMCNC: 31 G/DL (ref 31.5–36.5)
MCV RBC AUTO: 86 FL (ref 78–100)
PLATELET # BLD AUTO: 181 10E3/UL (ref 150–450)
POTASSIUM BLD-SCNC: 4.3 MMOL/L (ref 3.4–5.3)
PROT SERPL-MCNC: 6.1 G/DL (ref 6.8–8.8)
RBC # BLD AUTO: 4.18 10E6/UL (ref 3.8–5.2)
SODIUM SERPL-SCNC: 135 MMOL/L (ref 133–144)
WBC # BLD AUTO: 6.3 10E3/UL (ref 4–11)

## 2022-04-12 PROCEDURE — 99233 SBSQ HOSP IP/OBS HIGH 50: CPT | Performed by: INTERNAL MEDICINE

## 2022-04-12 PROCEDURE — 250N000013 HC RX MED GY IP 250 OP 250 PS 637: Performed by: INTERNAL MEDICINE

## 2022-04-12 PROCEDURE — 80053 COMPREHEN METABOLIC PANEL: CPT | Performed by: INTERNAL MEDICINE

## 2022-04-12 PROCEDURE — 83735 ASSAY OF MAGNESIUM: CPT | Performed by: INTERNAL MEDICINE

## 2022-04-12 PROCEDURE — 250N000011 HC RX IP 250 OP 636: Performed by: INTERNAL MEDICINE

## 2022-04-12 PROCEDURE — 99221 1ST HOSP IP/OBS SF/LOW 40: CPT | Performed by: SURGERY

## 2022-04-12 PROCEDURE — 36415 COLL VENOUS BLD VENIPUNCTURE: CPT | Performed by: INTERNAL MEDICINE

## 2022-04-12 PROCEDURE — 120N000001 HC R&B MED SURG/OB

## 2022-04-12 PROCEDURE — 85027 COMPLETE CBC AUTOMATED: CPT | Performed by: INTERNAL MEDICINE

## 2022-04-12 RX ORDER — LEVOFLOXACIN 5 MG/ML
750 INJECTION, SOLUTION INTRAVENOUS
Status: DISCONTINUED | OUTPATIENT
Start: 2022-04-12 | End: 2022-04-15

## 2022-04-12 RX ORDER — BISACODYL 10 MG
10 SUPPOSITORY, RECTAL RECTAL DAILY PRN
Status: DISCONTINUED | OUTPATIENT
Start: 2022-04-12 | End: 2022-04-18 | Stop reason: HOSPADM

## 2022-04-12 RX ADMIN — FAMOTIDINE 10 MG: 10 TABLET ORAL at 10:08

## 2022-04-12 RX ADMIN — TOLTERODINE TARTRATE 2 MG: 1 TABLET, FILM COATED ORAL at 20:26

## 2022-04-12 RX ADMIN — FAMOTIDINE 10 MG: 10 TABLET ORAL at 20:26

## 2022-04-12 RX ADMIN — FUROSEMIDE 20 MG: 20 TABLET ORAL at 10:07

## 2022-04-12 RX ADMIN — MORPHINE SULFATE 15 MG: 15 TABLET, EXTENDED RELEASE ORAL at 20:43

## 2022-04-12 RX ADMIN — CETIRIZINE HYDROCHLORIDE 10 MG: 10 TABLET, FILM COATED ORAL at 10:07

## 2022-04-12 RX ADMIN — MORPHINE SULFATE 15 MG: 15 TABLET, EXTENDED RELEASE ORAL at 10:07

## 2022-04-12 RX ADMIN — METOPROLOL TARTRATE 50 MG: 50 TABLET, FILM COATED ORAL at 10:07

## 2022-04-12 RX ADMIN — POLYETHYLENE GLYCOL 3350 17 G: 17 POWDER, FOR SOLUTION ORAL at 12:57

## 2022-04-12 RX ADMIN — OXYCODONE AND ACETAMINOPHEN 1 TABLET: 7.5; 325 TABLET ORAL at 12:57

## 2022-04-12 RX ADMIN — Medication 1 CAPSULE: at 10:09

## 2022-04-12 RX ADMIN — MEROPENEM 1 G: 1 INJECTION, POWDER, FOR SOLUTION INTRAVENOUS at 05:54

## 2022-04-12 RX ADMIN — OXYCODONE AND ACETAMINOPHEN 1 TABLET: 7.5; 325 TABLET ORAL at 20:44

## 2022-04-12 RX ADMIN — LEVOFLOXACIN 750 MG: 750 INJECTION, SOLUTION INTRAVENOUS at 18:35

## 2022-04-12 RX ADMIN — TOLTERODINE TARTRATE 2 MG: 1 TABLET, FILM COATED ORAL at 10:07

## 2022-04-12 RX ADMIN — SENNOSIDES AND DOCUSATE SODIUM 2 TABLET: 50; 8.6 TABLET ORAL at 12:57

## 2022-04-12 RX ADMIN — FLUTICASONE PROPIONATE 2 SPRAY: 50 SPRAY, METERED NASAL at 10:11

## 2022-04-12 ASSESSMENT — ACTIVITIES OF DAILY LIVING (ADL)
ADLS_ACUITY_SCORE: 21

## 2022-04-12 NOTE — CONSULTS
Care Management Initial Consult    General Information  Assessment completed with: Patient,    Type of CM/SW Visit: Initial Assessment    Primary Care Provider verified and updated as needed: Yes   Readmission within the last 30 days: no previous admission in last 30 days      Reason for Consult: care coordination/care conference, discharge planning    Communication Assessment  Patient's communication style: spoken language (English or Bilingual)    Hearing Difficulty or Deaf: no   Wear Glasses or Blind: yes    Cognitive  Cognitive/Neuro/Behavioral: WDL                      Living Environment:   People in home: child(robinson), adult     Current living Arrangements: house      Able to return to prior arrangements: yes     Family/Social Support:  Care provided by: self, homecare agency, child(robinson)  Provides care for: no one     Children          Description of Support System: Supportive, Involved    Support Assessment: Adequate family and caregiver support    Current Resources:   Patient receiving home care services: [facility]      Community Resources:  [employer] meals, life alert button  Equipment currently used at home: tub bench, walker, rolling, raised toilet seat        Lifestyle & Psychosocial Needs:  Social Determinants of Health     Tobacco Use: Not on file   Alcohol Use: Not on file   Financial Resource Strain: Not on file   Food Insecurity: Not on file   Transportation Needs: Not on file   Physical Activity: Not on file   Stress: Not on file   Social Connections: Not on file   Intimate Partner Violence: Not on file   Depression: Not on file   Housing Stability: Not on file      Additional Information:  CM consulted for discharge planning. Patient was admitted with sepsis UTI, cholelithiasis. She will be having an ERCP done on Thursday in hospital before she is discharge ready. Physical Therapy/OT have been following and recommend TCU vs home with HC, assist for transfers, stairs and ADLs.     Met  "with patient at bedside to discuss plan of care and discharge recommendations. Patient interrupted with \"I am not going to Transitional Care Unit\" before conversation began. Patient explains that she has good support from her children, home services that are excellent and her house is equipped for her needs. Patient verified that she lives at home with her daughter. Her daughter is there all day to help her. She gets home care services for Physical Therapy/HHA through [facility] and one of them is there almost everyday. She gets [employer] meals delivered once a month and has a life alert band that she wears at home. She gets assistance from A for showers and small household needs. Her bathroom is equipped with a raised toilet seat and sliding shower chair. She has stair rails and a walker at home for ambulating. Her daughter takes very good care of her and helps her with all of her appts and finances. Her son helps with other needs as well. She again adamantly declines going to Transitional Care Unit. Explained to patient that CM would continue to follow for discharge planning and assist in arranging discharge HC and any other needs.     Call placed to [facility] [phone] to verify services. Confirmed patient has Physical Therapy/HHA services and may add on Occupational Therapy/RN if needed on discharge. Updated them on anticipated discharge at end of week. Orders should be faxed to [facility] at fax: [phone].    We discussed transportation. Patient states she has a friend that can help her with transport on discharge and she is able to get into her care. She is unsure if she will be available. We also discussed [employer] wheelchair transport and costs associated. Reviewed out of pocket cost for [employer] transport, $81.80 for base rate and $5.26 per mile to the destination. Patient calculated and feels it is a little expensive but would be easier for her. She is agreeable to cost and thinks she wants " transportation arranged but would like to revisit this on day of discharge.     She verified her PCP as Ena Peraza at [facility]. Her daughter will schedule any follow up appts needed.     CM will cont to follow for discharge plan of care.               Chandrika Vallejo RN BSN CM  Inpatient Care Coordination  [facility]  [phone]

## 2022-04-12 NOTE — CONSULTS
[facility] Surgery Consultation    Raven Carlin MRN# [id number]   Age: 82 year old YOB: 1940     Date of Admission:  4/9/2022    Reason for consult: gallstones       Requesting physician: Doroteo       Level of consult: Consult, follow and place orders           Assessment and Plan:   Assessment:   Choledocholithiasis, cholelithiasis  Patient Active Problem List    Diagnosis Date Noted     Severe sepsis (H) 04/09/2022     Priority: Medium     Choledocholithiasis 04/09/2022     Priority: Medium     Chronic renal failure, stage 2 (mild) 04/06/2020     Priority: Medium     Permanent atrial fibrillation (H) 04/06/2020     Priority: Medium     Primary hyperparathyroidism (H) 10/09/2019     Priority: Medium     Added automatically from request for surgery [id number]       Urinary frequency 02/01/2016     Priority: Medium     Edema, unspecified edema 01/05/2016     Priority: Medium     Urinary tract infection without hematuria, site unspecified 01/05/2016     Priority: Medium     Primary osteoarthritis involving multiple joints 01/05/2016     Priority: Medium     Essential hypertension 01/05/2016     Priority: Medium     Abdominal mass, unspecified abdominal location 12/30/2015     Priority: Medium     Other seasonal allergic rhinitis 12/07/2015     Priority: Medium     Itching 11/17/2015     Priority: Medium     Osteomyelitis of other site 11/17/2015     Priority: Medium     Anxiety 11/17/2015     Priority: Medium     Lymphedema 10/22/2015     Priority: Medium     S/P revision of total knee, 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     Priority: Medium     Left knee pain 08/31/2015     Priority: Medium     Gastroesophageal reflux disease without esophagitis 08/06/2015     Priority: Medium     Infection and inflammatory reaction due to internal orthopedic device, implant, and graft (H) 07/27/2015     Priority: Medium     CKD (chronic kidney disease) stage 3, GFR 30-59 ml/min (H) 07/27/2015     Priority: Medium     Constipation  due to pain medication 07/27/2015     Priority: Medium     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 7/15/15 07/24/2015     Priority: Medium     MRSA infection: Left knee ( also in 2011, 2012) 07/24/2015     Priority: Medium     Physical deconditioning 07/24/2015     Priority: Medium     Morbid obesity (H) 07/18/2015     Priority: Medium     Chronic edema 07/18/2015     Priority: Medium     HTN (hypertension) 07/18/2015     Priority: Medium     SAURABH (obstructive sleep apnea) 07/18/2015     Priority: Medium     Advanced directives, counseling/discussion 08/10/2012     Priority: Medium     Pt said she knows she does not have an AD and has an appointment for a facilitator to come to her home next week to complete. Honoring Choices. Pt took another copy of honoring choices.             Plan:   Discussed cholecystectomy - incisions, conversion to open, duct/organ injury, post kirby Sx and RX, expected recovery.   Cholecystectomy to prevent further choledocholithiasis, we are asked to coordinate with GI, they are planning ERCP on Thursday (holding Eliquis). Time of ERCP is unknown. I do not have any availability Thursday or Friday. Will chock to see if any other partners are available. Will also need to see if OR is available.   Discussed the option of leaving GB in place but with risk of future CBD stones or acute cholecystitis episodes.             Chief Complaint:   Urosepsis with incidental cholelithiasis/choledocholithiasis     History is obtained from the patient and electronic health record         History of Present Illness:   This patient is a 82 year old  female with a significant past medical history of A fib on anticoagulation, asthma, chronic pain, morbid obesity, CKD, GERD, SAURABH who presents with the following condition requiring a hospital admission: urosepsis. CT scanning was done and incidental findings include  cholelithiasis and choledocholithiasis. These do not seem to be an acute problem. We are asked to consider cholecystectomy to prevent future choledocholithiasis (GI planning ERCP). She denies Sx that could be attributed to her cholelithiasis.           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 but states she is clear     Chronic pain      CKD (chronic kidney disease), stage III      Dyslipidemia      Dyspnea on exertion      Eczema      Fractured pelvis (H)      Gastric ulcer, unspecified as acute or chronic, without mention of hemorrhage or perforation      Gastro-oesophageal reflux disease     pt denies     Hypertension      OAB (overactive bladder)      Sleep apnea     CPAP             Past Surgical History:     Past Surgical History:   Procedure Laterality Date     ARTHROPLASTY KNEE  4/2011    right     ARTHROPLASTY KNEE  11/8/2011    Left, Surgeon:SOO GOODRICH     ARTHROPLASTY KNEE  5/8/2012    Procedure:ARTHROPLASTY KNEE; LEFT KNEE REMOVAL OF ANTIBIOTIC SPACER , REPLACEMENT WITH LEFT HINGED KNEE; Surgeon:NE FERRARA; Location: OR     ARTHROPLASTY REVISION KNEE  11/15/2011    Procedure:ARTHROPLASTY REVISION KNEE; Left Knee Poly-Exchange and Femoral Component Revision   ; Surgeon:SOO GOODRICH; Location:[facility] OR     ASPIRATION NEEDLE KNEE Left 7/14/2015    Procedure: ASPIRATION NEEDLE KNEE;  Surgeon: Soo Goodrich MD;  Location: [facility] OR     EXCHANGE POLY COMPONENT ARTHROPLASTY KNEE  8/8/2012    Procedure: EXCHANGE POLY COMPONENT ARTHROPLASTY KNEE;  left knee arthroplasty pole exchange;  Surgeon: Ne Ferrara MD;  Location:  OR     HERNIA REPAIR       MAMMOPLASTY AUGMENTATION       PARATHYROIDECTOMY Left 11/25/2019    Procedure: Excision of left inferior parathyroid adenoma, Focused neck exploration.;  Surgeon: Latonia Anguiano,  MD;  Location: [facility] OR     REMOVE HARDWARE ARTHROPLASTY KNEE, IRRIG & JOE, PLACE ANTIBIOTIC CEMENT SPACER, COMBINED Left 7/15/2015    Procedure: COMBINED REMOVE HARDWARE ARTHROPLASTY KNEE, IRRIGATION AND DEBRIDEMENT, PLACE ANTIBIOTIC CEMENT BEADS / SPACER;  Surgeon: Miguel Goodrich MD;  Location: [facility] OR     RHINOPLASTY       TONSILLECTOMY               Social History:     Social History     Tobacco Use     Smoking status: Former Smoker     Packs/day: 0.50     Years: 25.00     Pack years: 12.50     Start date:      Quit date: 1985     Years since quittin.7     Smokeless tobacco: Never Used   Substance Use Topics     Alcohol use: Yes     Comment: 2 weekly             Family History:     Family History   Problem Relation Age of Onset     Colon Cancer Father      Diabetes Daughter      Hypertension Mother      Colon Cancer Mother              Immunizations:     VACCINE/DOSE   Diptheria   DPT   DTAP   HBIG   Hepatitis A   Hepatitis B   HIB   Influenza   Measles   Meningococcal   MMR   Mumps   Pneumococcal   Polio   Rubella   Small Pox   TDAP   Varicella   Zoster             Allergies:     Allergies   Allergen Reactions     Bactrim [Sulfamethoxazole W-Trimethoprim] Nausea     Epinephrine Other (See Comments)     Heart races     Ketamine      Felt like she was dying     Lisinopril Cough     Simvastatin Itching             Medications:     Current Facility-Administered Medications   Medication     acetaminophen (TYLENOL) tablet 650 mg     albuterol (PROVENTIL) neb solution 2.5 mg     [Held by provider] apixaban ANTICOAGULANT (ELIQUIS) tablet 2.5 mg     bisacodyl (DULCOLAX) Suppository 10 mg     cetirizine (zyrTEC) tablet 10 mg     famotidine (PEPCID) tablet 10 mg     fluticasone (FLONASE) 50 MCG/ACT spray 2 spray     furosemide (LASIX) tablet 20 mg     lactobacillus rhamnosus (GG) (CULTURELL) capsule 1 capsule     levofloxacin (LEVAQUIN) infusion 750 mg     lidocaine (LMX4) cream     lidocaine 1 % 0.1-1 mL      melatonin tablet 1 mg     metoprolol tartrate (LOPRESSOR) tablet 50 mg     morphine (MS CONTIN) 12 hr tablet 15 mg     naloxone (NARCAN) injection 0.2 mg    Or     naloxone (NARCAN) injection 0.4 mg    Or     naloxone (NARCAN) injection 0.2 mg    Or     naloxone (NARCAN) injection 0.4 mg     ondansetron (ZOFRAN-ODT) ODT tab 4 mg    Or     ondansetron (ZOFRAN) injection 4 mg     oxyCODONE-acetaminophen (PERCOCET) 7.5-325 MG per tablet 1 tablet     Patient is already receiving anticoagulation with heparin, enoxaparin (LOVENOX), warfarin (COUMADIN)  or other anticoagulant medication     polyethylene glycol (MIRALAX) Packet 17 g     prochlorperazine (COMPAZINE) injection 5 mg    Or     prochlorperazine (COMPAZINE) tablet 5 mg    Or     prochlorperazine (COMPAZINE) suppository 12.5 mg     senna-docusate (SENOKOT-S/PERICOLACE) 8.6-50 MG per tablet 1 tablet    Or     senna-docusate (SENOKOT-S/PERICOLACE) 8.6-50 MG per tablet 2 tablet     sodium chloride (PF) 0.9% PF flush 3 mL     sodium chloride (PF) 0.9% PF flush 3 mL     tolterodine (DETROL) tablet 2 mg             Review of Systems:   CV: NEGATIVE for chest pain, palpitations  POS for peripheral edema  C: NEGATIVE for change in weight  E/M: NEGATIVE for ear, mouth and throat problems  R: NEGATIVE for significant cough or SOB          Physical Exam:   All vitals have been reviewed  Patient Vitals for the past 24 hrs:   BP Temp Temp src Pulse Resp SpO2 Weight   04/12/22 1257 -- -- -- -- 20 -- --   04/12/22 1013 120/83 97.9  F (36.6  C) Axillary 56 20 100 % --   04/12/22 0900 -- -- -- -- -- 92 % --   04/12/22 0557 -- -- -- -- -- -- (P) 109.6 kg (241 lb 11.2 oz)   04/12/22 0030 -- -- -- 53 -- 99 % --   04/11/22 2218 -- 98.1  F (36.7  C) Oral 58 20 99 % --   04/11/22 1619 -- -- -- -- 20 -- --       Intake/Output Summary (Last 24 hours) at 4/12/2022 1458  Last data filed at 4/12/2022 1259  Gross per 24 hour   Intake --   Output 1450 ml   Net -1450 ml     Sclera:  non-icteric    Neck:   skin normal and no stridor     Chest / Breast:   Nl resp effort     Abdomen:   soft, non-distended, non-tender and voluntary guarding absent, umbilicus absent from prior hernia repair             Data:   All laboratory data reviewed  Results for orders placed or performed during the hospital encounter of 04/09/22   XR Chest 2 Views     Status: None    Narrative    EXAM: XR CHEST 2 VW  LOCATION: [facility]  DATE/TIME: 4/9/2022 8:18 PM    INDICATION: Fever.  COMPARISON: 06/21/2021.      Impression    IMPRESSION: Lungs are grossly clear. No consolidation. No pleural effusion. Stable borderline enlarged cardiac silhouette size. No pulmonary edema. Aortic atherosclerosis. Breast prosthetics.       CT Abdomen Pelvis w Contrast     Status: None    Narrative    EXAM: CT ABDOMEN PELVIS W CONTRAST  LOCATION: [facility]  DATE/TIME: 4/9/2022 8:01 PM    INDICATION: Nausea. Diffuse abdominal pain and tenderness.  COMPARISON: 4/22/2009  TECHNIQUE: CT scan of the abdomen and pelvis was performed following injection of IV contrast. Multiplanar reformats were obtained. Dose reduction techniques were used.  CONTRAST: 100mL Isovue 370    FINDINGS:   LOWER CHEST: Normal.    HEPATOBILIARY: Diffuse fatty infiltration of the liver. Cholelithiasis. There are multiple stones within the common bile duct, at least 5. The common bile duct is normal caliber measuring 5 mm.    PANCREAS: Normal.    SPLEEN: Normal    ADRENAL GLANDS: A right adrenal nodule is present measuring 1.5 cm in diameter, enlarged from 2009 when it measured 0.9 cm.    KIDNEYS/BLADDER: Multiple small benign renal cysts. No follow-up needed. No hydronephrosis. The bladder is decompressed.    BOWEL: Bowel loops are normal caliber. No wall thickening. Mildly increased stool in the colon. Normal appendix.    LYMPH NODES: Normal.    VASCULATURE: Mild calcified plaque in the abdominal aorta without  aneurysm.    PELVIC ORGANS: No large adnexal cysts or masses.    MUSCULOSKELETAL: Degenerative disc disease and facet arthritis in the lumbar spine.      Impression    IMPRESSION:   1.  Choledocholithiasis with at least 5 stones in the common bile duct. No associated biliary dilatation. Unclear whether this is related to the patient's acute symptoms. Correlation with biliary markers is recommended. No evidence of pancreatitis.  2.  Cholelithiasis.  3.  Diffuse fatty infiltration of the liver.  4.  A 1.5 cm right adrenal nodule is present and is otherwise not well evaluated due to single phase contrast. In retrospect, it was likely present in 2009 when it measured 0.9 cm. This is likely a benign adenoma. If the patient has a history of   malignancy, a dedicated adrenal CT or a follow-up scan in one year could be considered. Otherwise, no specific imaging follow-up is recommended. Laboratory or clinical evaluation can be considered if the patient has signs or symptoms of a   hyperfunctioning adrenal nodule.   Basic metabolic panel     Status: Abnormal   Result Value Ref Range    Sodium 137 133 - 144 mmol/L    Potassium 4.5 3.4 - 5.3 mmol/L    Chloride 103 94 - 109 mmol/L    Carbon Dioxide (CO2) 27 20 - 32 mmol/L    Anion Gap 7 3 - 14 mmol/L    Urea Nitrogen 37 (H) 7 - 30 mg/dL    Creatinine 1.71 (H) 0.52 - 1.04 mg/dL    Calcium 9.1 8.5 - 10.1 mg/dL    Glucose 155 (H) 70 - 99 mg/dL    GFR Estimate 30 (L) >60 mL/min/1.73m2   Lactic acid whole blood     Status: Abnormal   Result Value Ref Range    Lactic Acid 2.7 (H) 0.7 - 2.0 mmol/L   UA with Microscopic reflex to Culture     Status: Abnormal    Specimen: Urine, Catheter   Result Value Ref Range    Color Urine Yellow Colorless, Straw, Light Yellow, Yellow    Appearance Urine Cloudy (A) Clear    Glucose Urine Negative Negative mg/dL    Bilirubin Urine Small (A) Negative    Ketones Urine 10  (A) Negative mg/dL    Specific Gravity Urine 1.016 1.003 - 1.035    Blood Urine  Small (A) Negative    pH Urine 6.5 5.0 - 7.0    Protein Albumin Urine 30  (A) Negative mg/dL    Urobilinogen Urine 6.0 (A) Normal, 2.0 mg/dL    Nitrite Urine Positive (A) Negative    Leukocyte Esterase Urine Large (A) Negative    Bacteria Urine Few (A) None Seen /HPF    WBC Clumps Urine Present (A) None Seen /HPF    Mucus Urine Present (A) None Seen /LPF    RBC Urine 18 (H) <=2 /HPF    WBC Urine >182 (H) <=5 /HPF    Narrative    Urine Culture ordered based on laboratory criteria   Symptomatic; Unknown Influenza A/B & SARS-CoV2 (COVID-19) Virus PCR Multiplex Nasopharyngeal     Status: Normal    Specimen: Nasopharyngeal; Swab   Result Value Ref Range    Influenza A PCR Negative Negative    Influenza B PCR Negative Negative    RSV PCR Negative Negative    SARS CoV2 PCR Negative Negative    Narrative    Testing was performed using the Xpert Xpress CoV2/Flu/RSV Assay on the [employer] GeneXpert Instrument. This test should be ordered for the detection of SARS-CoV-2 and influenza viruses in individuals who meet clinical and/or epidemiological criteria. Test performance is unknown in asymptomatic patients. This test is for in vitro diagnostic use under the FDA EUA for laboratories certified under CLIA to perform high or moderate complexity testing. This test has not been FDA cleared or approved. A negative result does not rule out the presence of PCR inhibitors in the specimen or target RNA in concentration below the limit of detection for the assay. If only one viral target is positive but coinfection with multiple targets is suspected, the sample should be re-tested with another FDA cleared, approved, or authorized test, if coinfection would change clinical management. This test was validated by the [facility] [employer]. These laboratories are certified under the Clinical  Laboratory Improvement Amendments of 1988 (CLIA-88) as qualified to perform high complexity laboratory testing.   CBC with platelets and  differential     Status: Abnormal   Result Value Ref Range    WBC Count 19.1 (H) 4.0 - 11.0 10e3/uL    RBC Count 4.85 3.80 - 5.20 10e6/uL    Hemoglobin 13.0 11.7 - 15.7 g/dL    Hematocrit 41.0 35.0 - 47.0 %    MCV 85 78 - 100 fL    MCH 26.8 26.5 - 33.0 pg    MCHC 31.7 31.5 - 36.5 g/dL    RDW 15.1 (H) 10.0 - 15.0 %    Platelet Count 200 150 - 450 10e3/uL    % Neutrophils 92 %    % Lymphocytes 3 %    % Monocytes 4 %    % Eosinophils 0 %    % Basophils 0 %    % Immature Granulocytes 1 %    NRBCs per 100 WBC 0 <1 /100    Absolute Neutrophils 17.8 (H) 1.6 - 8.3 10e3/uL    Absolute Lymphocytes 0.5 (L) 0.8 - 5.3 10e3/uL    Absolute Monocytes 0.7 0.0 - 1.3 10e3/uL    Absolute Eosinophils 0.0 0.0 - 0.7 10e3/uL    Absolute Basophils 0.0 0.0 - 0.2 10e3/uL    Absolute Immature Granulocytes 0.2 <=0.4 10e3/uL    Absolute NRBCs 0.0 10e3/uL   Hepatic panel     Status: Abnormal   Result Value Ref Range    Bilirubin Total 1.4 (H) 0.2 - 1.3 mg/dL    Bilirubin Direct 0.4 (H) 0.0 - 0.2 mg/dL    Protein Total 6.9 6.8 - 8.8 g/dL    Albumin 3.4 3.4 - 5.0 g/dL    Alkaline Phosphatase 120 40 - 150 U/L    AST 14 0 - 45 U/L    ALT 13 0 - 50 U/L   Lipase     Status: Abnormal   Result Value Ref Range    Lipase 64 (L) 73 - 393 U/L   Lactic acid whole blood     Status: Abnormal   Result Value Ref Range    Lactic Acid 2.8 (H) 0.7 - 2.0 mmol/L   Lactic acid whole blood     Status: Abnormal   Result Value Ref Range    Lactic Acid 2.5 (H) 0.7 - 2.0 mmol/L   Magnesium     Status: Normal   Result Value Ref Range    Magnesium 2.1 1.6 - 2.3 mg/dL   Potassium     Status: Normal   Result Value Ref Range    Potassium 4.5 3.4 - 5.3 mmol/L   Lactic acid whole blood     Status: Normal   Result Value Ref Range    Lactic Acid 1.1 0.7 - 2.0 mmol/L   Hepatic panel     Status: Abnormal   Result Value Ref Range    Bilirubin Total 0.9 0.2 - 1.3 mg/dL    Bilirubin Direct 0.3 (H) 0.0 - 0.2 mg/dL    Protein Total 5.5 (L) 6.8 - 8.8 g/dL    Albumin 2.5 (L) 3.4 - 5.0 g/dL     Alkaline Phosphatase 82 40 - 150 U/L    AST 12 0 - 45 U/L    ALT 10 0 - 50 U/L   CBC with platelets     Status: Abnormal   Result Value Ref Range    WBC Count 11.2 (H) 4.0 - 11.0 10e3/uL    RBC Count 4.11 3.80 - 5.20 10e6/uL    Hemoglobin 11.0 (L) 11.7 - 15.7 g/dL    Hematocrit 35.6 35.0 - 47.0 %    MCV 87 78 - 100 fL    MCH 26.8 26.5 - 33.0 pg    MCHC 30.9 (L) 31.5 - 36.5 g/dL    RDW 15.2 (H) 10.0 - 15.0 %    Platelet Count 169 150 - 450 10e3/uL   Magnesium     Status: Normal   Result Value Ref Range    Magnesium 2.1 1.6 - 2.3 mg/dL   Comprehensive metabolic panel     Status: Abnormal   Result Value Ref Range    Sodium 136 133 - 144 mmol/L    Potassium 4.1 3.4 - 5.3 mmol/L    Chloride 105 94 - 109 mmol/L    Carbon Dioxide (CO2) 27 20 - 32 mmol/L    Anion Gap 4 3 - 14 mmol/L    Urea Nitrogen 32 (H) 7 - 30 mg/dL    Creatinine 1.32 (H) 0.52 - 1.04 mg/dL    Calcium 9.0 8.5 - 10.1 mg/dL    Glucose 78 70 - 99 mg/dL    Alkaline Phosphatase 92 40 - 150 U/L    AST 16 0 - 45 U/L    ALT 12 0 - 50 U/L    Protein Total 6.2 (L) 6.8 - 8.8 g/dL    Albumin 2.8 (L) 3.4 - 5.0 g/dL    Bilirubin Total 0.9 0.2 - 1.3 mg/dL    GFR Estimate 40 (L) >60 mL/min/1.73m2   CBC with platelets     Status: Abnormal   Result Value Ref Range    WBC Count 6.3 4.0 - 11.0 10e3/uL    RBC Count 4.18 3.80 - 5.20 10e6/uL    Hemoglobin 11.1 (L) 11.7 - 15.7 g/dL    Hematocrit 35.8 35.0 - 47.0 %    MCV 86 78 - 100 fL    MCH 26.6 26.5 - 33.0 pg    MCHC 31.0 (L) 31.5 - 36.5 g/dL    RDW 15.2 (H) 10.0 - 15.0 %    Platelet Count 181 150 - 450 10e3/uL   Comprehensive metabolic panel     Status: Abnormal   Result Value Ref Range    Sodium 135 133 - 144 mmol/L    Potassium 4.3 3.4 - 5.3 mmol/L    Chloride 105 94 - 109 mmol/L    Carbon Dioxide (CO2) 27 20 - 32 mmol/L    Anion Gap 3 3 - 14 mmol/L    Urea Nitrogen 28 7 - 30 mg/dL    Creatinine 1.17 (H) 0.52 - 1.04 mg/dL    Calcium 8.8 8.5 - 10.1 mg/dL    Glucose 88 70 - 99 mg/dL    Alkaline Phosphatase 92 40 - 150 U/L     AST 13 0 - 45 U/L    ALT 14 0 - 50 U/L    Protein Total 6.1 (L) 6.8 - 8.8 g/dL    Albumin 2.8 (L) 3.4 - 5.0 g/dL    Bilirubin Total 0.9 0.2 - 1.3 mg/dL    GFR Estimate 47 (L) >60 mL/min/1.73m2   Magnesium     Status: Normal   Result Value Ref Range    Magnesium 2.1 1.6 - 2.3 mg/dL   Blood Culture Peripheral Blood     Status: Normal (Preliminary result)    Specimen: Peripheral Blood   Result Value Ref Range    Culture No growth after 2 days    Blood Culture Hand, Left     Status: Normal (Preliminary result)    Specimen: Hand, Left; Blood   Result Value Ref Range    Culture No growth after 2 days    Urine Culture     Status: Abnormal    Specimen: Urine, Catheter   Result Value Ref Range    Culture >100,000 CFU/mL Escherichia coli ESBL (A)        Susceptibility    Escherichia coli ESBL - SUE*     Ampicillin >=32.0 Resistant ug/mL     Piperacillin/Tazobactam 64.0 Intermediate ug/mL     Cefazolin* >=64.0 Resistant ug/mL      * Cefazolin SUE breakpoints are for the treatment of uncomplicated urinary tract infections. For the treatment of systemic infections, please contact the laboratory for additional testing.     Cefoxitin >=64.0 Resistant ug/mL     Ceftazidime  Resistant      Ceftriaxone  Resistant      Cefepime  Resistant      Meropenem* <=0.25 Susceptible ug/mL      * Enterobacterales that are susceptible to meropenem are usually susceptible to ertapenem.     Gentamicin <=1.0 Susceptible ug/mL     Tobramycin <=1.0 Susceptible ug/mL     Ciprofloxacin <=0.25 Susceptible ug/mL     Levofloxacin <=0.12 Susceptible ug/mL     Nitrofurantoin <=16.0 Susceptible ug/mL     Trimethoprim/Sulfamethoxazole <=1/19 Susceptible ug/mL     * ESBL (extended spectrum beta lactamase) producing organisms require contact precautions.   CBC with platelets differential     Status: Abnormal    Narrative    The following orders were created for panel order CBC with platelets differential.  Procedure                               Abnormality          Status                     ---------                               -----------         ------                     CBC with platelets and d...[[id number]]  Abnormal            Final result                 Please view results for these tests on the individual orders.     CT scan of the abdomen:   Gallbladder: cholelithiasis with choledocholithiasis but no CBD dilation, GB wall not thickened  CT scan interpreted by radiologist        Attestation:  I have reviewed today's vital signs, notes, medications, labs and imaging.  Amount of time performed on this consult: 60 minutes.    Lj Winchester MD

## 2022-04-12 NOTE — PLAN OF CARE
"  Pt A&Ox4, refused to have BP taken all night.denies pain, Cpap on at HS, LS diminished. Regular diet, a2 with a walker and gb. Purewick changed and in place. No tele. Getting IV abx, K and Mag protocols - labs are in for this morning. GI/WOC/PT/OT/SW following.  Discharge plan TBD. Continue plan of care      Goal Outcome Evaluation:    Plan of Care Reviewed With: patient     Overall Patient Progress: no change      Problem: Plan of Care - These are the overarching goals to be used throughout the patient stay.    Goal: Plan of Care Review/Shift Note  Description: The Plan of Care Review/Shift note should be completed every shift.  The Outcome Evaluation is a brief statement about your assessment that the patient is improving, declining, or no change.  This information will be displayed automatically on your shift note.  Outcome: Ongoing, Progressing  Flowsheets (Taken 4/12/2022 0650)  Plan of Care Reviewed With: patient  Overall Patient Progress: no change  Goal: Patient-Specific Goal (Individualized)  Description: You can add care plan individualizations to a care plan. Examples of Individualization might be:  \"Parent requests to be called daily at 9am for status\", \"I have a hard time hearing out of my right ear\", or \"Do not touch me to wake me up as it startles me\".  Outcome: Ongoing, Progressing  Goal: Absence of Hospital-Acquired Illness or Injury  Outcome: Ongoing, Progressing  Intervention: Identify and Manage Fall Risk  Recent Flowsheet Documentation  Taken 4/12/2022 0030 by Severson, Amy C, RN  Safety Promotion/Fall Prevention:   activity supervised   bed alarm on   nonskid shoes/slippers when out of bed  Intervention: Prevent Skin Injury  Recent Flowsheet Documentation  Taken 4/12/2022 0030 by Severson, Amy C, RN  Body Position: position changed independently  Intervention: Prevent and Manage VTE (Venous Thromboembolism) Risk  Recent Flowsheet Documentation  Taken 4/12/2022 0030 by Severson, Amy C, " RN  Activity Management: activity adjusted per tolerance  Goal: Optimal Comfort and Wellbeing  Outcome: Ongoing, Progressing  Goal: Readiness for Transition of Care  Outcome: Ongoing, Progressing

## 2022-04-12 NOTE — PLAN OF CARE
Goal Outcome Evaluation:     A&O x4. Pt refused use of hospital b/p cuff stating it really hurts her and she can not tolerate it. She has her home b/p cuff here but it was not working for this RN. Pt states her b/p is always wnl. And denies light headed or dizziness. HR runs in low 50's. Metoprolol was given per parameters. This RN was able to get a b/p on right lower wrist with a manual b/p cuff. Refused getting up to chair or standing at eob d/t pain in her shoulders and generalized. She states she has a chair lift at home and doesn't get up out of it much because of her pain. Prn oxycodone given with some decrease. Pain worse with movement. Pt prefers to lay supine d/t pain. Right posterior thigh dark purple/blanchable. Did agree to place a pillow under left thigh  this afternoon . Dressing CDI to left posterior thigh wound. Intra dry to abd skin folds. Pt I son air mattress. Sats stable on RA. afebrile

## 2022-04-12 NOTE — PROGRESS NOTES
[facility]  Hospitalist Progress Note  Dominic Garibay MD 04/12/22    Reason for Stay (Diagnosis): Sepsis secondary to UTI, choledocholithiasis         Assessment and Plan:      Summary of Stay: Raven Carlin is a 82 year old female with history of A. fib on Eliquis, asthma, chronic pain syndrome on opiates, morbid obesity, CKD stage IIIb, HLD, GERD, gastric ulcer, HTN, ESBL E. coli, and SAURABH on CPAP who was admitted on 4/9/2022 for malaise and nausea where she was found to have fever in the ER and leukocytosis to 19.  Initial urinalysis showing pyuria consistent with UTI.  She also had an elevated lactic acid and PAULINE consistent with severe sepsis.  She was started on vancomycin IV and meropenem given history of ESBL UTI and sepsis.  Her bilirubin was slightly elevated and a CT the abdomen pelvis did show 5 stones in the CBD although she did not have any abdominal pain to suggest ascending cholangitis.  [facility] was consulted.  Had planned on ERCP day after admission, however she has received her Eliquis before coming in and that morning so ERCP postponed.  Urine culture growing ESBL E. coli.  Vancomycin discontinued.  Consulted PT for weakness.  IV meropenem changed to IV levofloxacin based on sensitivities.  Clinically much improved from UTI.  Holding anticoagulation for ERCP on Thursday.  Consult general surgery to see if laparoscopic cholecystectomy would be considered an option at the same time.    Problem List/Assessment and Plan:   Severe sepsis secondary to UTI, much improved: Presenting with diffuse pain, nausea, and generalized malaise.  Initial temperature 100.8  F.  Leukocytosis at 19.1.  Urinalysis shows >182 WBCs, large LE, and positive nitrite consistent with UTI as the source of infection although she does not report dysuria or flank pain, but has had frequency urinary.  Lactic acid elevated at 2.8 and PAULINE consistent with severe sepsis.  She does have a history of ESBL UTIs so she  is on meropenem and vancomycin for sepsis.  She was found to have 5 CBD stones on CT, however she has not had any abdominal pain and her bilirubin is only slightly elevated at 1.4 otherwise LFTs normal.  Ascending cholangitis secondary to choledocholithiasis is much less likely.  -Clinically back to baseline, still needs antibiotics to fully treat infection  -Vancomycin discontinued 4/11  -IV meropenem changed to IV levofloxacin 750 mg every 48 hours based on creatinine clearance on 4/12, moved to p.o. soon if tolerating diet  -Urine culture growing ESBL E. coli that is sensitive to Carbapenem's, fluoroquinolones, and Bactrim.  Has Bactrim allergy  -Blood cultures NGTD    Choledocholithiasis, cholelithiasis: As above presenting with feeling unwell and fevers that is most likely secondary to UTI.  Her CT did showcholelithiasis along with 5 small stones in the common bile duct.  Bilirubin is 1.4 otherwise alk phos and transaminases are WNL.  She has not had any abdominal pain previously or now and she is nontender on exam so this seems less likely etiology for her presentation with sepsis.  -[facility] consulted, agree that urosepsis more likely, but had planned for ERCP as this does need to be addressed at some point.  Patient had her Eliquis night before admission and morning afterwards so ERCP deferred until anticoagulation held for 72 hours.  It is unlikely that patient will follow-up for this per her daughter in speaking with the patient she is quite anxious regarding the procedures and I agree she is unlikely to return for follow-up.    -[facility] can perform ERCP on Thursday, I recommend continued hospitalization until then.  N.p.o. midnight Wednesday night.  Holding anticoagulation.  -As her anticoagulation will be held for this procedure and her likely to following up for laparoscopic cholecystectomy electively seems unlikely I have consulted general surgery today to see if they would consider doing  laparoscopic cholecystectomy at the same time as ERCP so this can all be take care of the patient we will run into future issues with choledocholithiasis    A. fib: Chronically anticoagulated with Eliquis.  Also on metoprolol tartrate 50 mg daily.  -Hold Eliquis with plans for ERCP on Thursday 4/14.  Needs to be held 72 hours prior to procedure.  Last dose morning 4/10    PAULINE on CKD stage IIIb: Variable recent creatinine, baseline likely 1.3.  Presenting with PAULINE with creatinine 1.7 secondary to severe sepsis.  Resolved.  -Restart p.o. Lasix 20 mg daily    Chronic pain syndrome  Physical deconditioning  Chronic leg wound: Resume her morphine sulfate 15 mg every 12 hours and Percocet 7.5-325 mg, but ordered as every 6 hours as needed instead of scheduled twice daily.  Chronic left leg pain following previous surgeries.  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  Has some chronic skin changes and wound to the back of the left knee.  -WOC RN consulted  -PT consulted    SAURABH: Continue CPAP.    Morbid obesity: BMI 40.1.    Overactive bladder: Continue tolterodine 2 mg daily.    HTN: Resume PTA metoprolol tartrate 25 mg twice daily and Lasix 20 mg daily.  Has trace bilateral edema on exam.  -Continue metoprolol  -Resume Lasix 20 mg daily    HLD: not currently on meds.    GERD, history gastric ulcer: Resume famotidine twice daily.    DVT Prophylaxis: Holding Eliquis for procedure, PCD's  Code Status: Full Code  FEN: regular diet  Discharge Dispo: tbd. Consult PT//OT/SW. Per daughter Rhonda the patient lives with a different daughter, but at baseline sits in her recliner that goes up and down all day long and maybe gets up once to use the bathroom.  There have been issues with incontinence in the chair although patient denies.  Rhonda believes she needs more motivation.  There are health aides coming into the home every other day or so to help with bathing.  Per patient she was getting home physical therapy twice a week  until recently.  She is not interested in TCU, but will need to demonstrate that she is able to ambulate with a walker to the bathroom and back at bare minimum to return home with home care.  Estimated Disch Date / # of Days until Disch: Recommend hospitalization until ERCP can be done safely on Thursday 4/14.  Likely able to discharge home on 4/15 if mobile with PT.    I discussed plan of care with patient's daughter Rhonda by phone today    Clinically Significant Risk Factors Present on Admission                              Interval History (Subjective):      Used her home CPAP last night which she tolerated much better.  Denies any cough or shortness of breath.  No abdominal pain.  Feeling much better overall.  No fevers overnight.  Discussed with her plan to do ERCP on Thursday which she is agreeable to.  Holding anticoagulation.  I also discussed with her if laparoscopic cholecystectomy could be considered at the same time which be open to this.  She is concerned about anesthesia and any surgery, but understands that she is at risk for choledocholithiasis again at any time so it was recommended to she would prefer to get both done at the same time.  She is aware that this is just a preliminary conversation and that General surgery will be consulted, but surgery is not necessarily going to be offered at this time.                  Physical Exam:      Last Vital Signs:  /83 (BP Location: Right arm)   Pulse 56   Temp 97.9  F (36.6  C) (Axillary)   Resp 20   Wt (P) 109.6 kg (241 lb 11.2 oz)   SpO2 100%   BMI (P) 40.22 kg/m      Intake/Output Summary (Last 24 hours) at 4/12/2022 1258  Last data filed at 4/12/2022 0639  Gross per 24 hour   Intake --   Output 1250 ml   Net -1250 ml       Constitutional: Awake, NAD   Eyes: sclera white   HEENT:   MMM  Respiratory: On her home CPAP.  Anterior lungs cta bilaterally, no focal crackles or wheeze  Cardiovascular: RRR.  No murmur   GI: Obese, nontender to  palpation, bowel sounds present, not distended  Skin: Posterior left knee chronic appearing skin changes, no change  Musculoskeletal/extremities: Trace bilateral lower extremity edema  Neurologic: A&O, answers questions appropriately  Psychiatric: Anxious when talking about ERCP and possible surgery         Medications:      All current medications were reviewed with changes reflected in problem list.         Data:      All new lab and imaging data was reviewed.   Labs:  Recent Labs   Lab 04/12/22  0745 04/11/22  0624 04/09/22  1730   WBC 6.3 11.2* 19.1*   HGB 11.1* 11.0* 13.0   HCT 35.8 35.6 41.0   MCV 86 87 85    169 200     Recent Labs   Lab 04/12/22  0745 04/11/22  0624 04/10/22  1606 04/10/22  0808 04/09/22  1730    136  --   --  137   POTASSIUM 4.3 4.1  --  4.5 4.5   CHLORIDE 105 105  --   --  103   CO2 27 27  --   --  27   ANIONGAP 3 4  --   --  7   GLC 88 78  --   --  155*   BUN 28 32*  --   --  37*   CR 1.17* 1.32*  --   --  1.71*   GFRESTIMATED 47* 40*  --   --  30*   LAUREL 8.8 9.0  --   --  9.1   MAG 2.1 2.1  --  2.1  --    PROTTOTAL 6.1* 6.2* 5.5*  --  6.9   ALBUMIN 2.8* 2.8* 2.5*  --  3.4   BILITOTAL 0.9 0.9 0.9  --  1.4*   ALKPHOS 92 92 82  --  120   AST 13 16 12  --  14   ALT 14 12 10  --  13     All cultures:  Recent Labs   Lab 04/09/22  1940 04/09/22  1836 04/09/22  1803   CULTURE >100,000 CFU/mL Escherichia coli ESBL* No growth after 2 days No growth after 2 days     Imaging:   None today      Dominic Garibay MD

## 2022-04-12 NOTE — PROGRESS NOTES
GASTROENTEROLOGY PROGRESS NOTE     CC:   Nausea     SUBJECTIVE:  Patient denies abdominal pain or nausea. She is tolerating clear liquid diet. Patient reports her last bowel movement was four days ago. She took miralax and stool senna but no results yet.      OBJECTIVE:  General Appearance:  Not in any distress.   /83 (BP Location: Right arm)   Pulse 56   Temp 97.9  F (36.6  C) (Axillary)   Resp 20   Wt (P) 109.6 kg (241 lb 11.2 oz)   SpO2 100%   BMI (P) 40.22 kg/m    Temp (24hrs), Av.1  F (36.7  C), Min:97.8  F (36.6  C), Max:98.5  F (36.9  C)    Patient Vitals for the past 72 hrs:   Weight   22 0557 (P) 109.6 kg (241 lb 11.2 oz)   22 0454 107.3 kg (236 lb 9.6 oz)   22 1820 109.5 kg (241 lb 6.4 oz)       Intake/Output Summary (Last 24 hours) at 2022 1110  Last data filed at 2022 0900  Gross per 24 hour   Intake 240 ml   Output 1350 ml   Net -1110 ml        PHYSICAL EXAM  General: alert, oriented, NAD  SKIN: no suspicious lesions, rashes, jaundice, or spider angiomas  EYES: No scleral icterus  RESPIRATORY: Non labored breathing  GASTROINTESTINAL:Abdomen soft and non tender on palpation.   NEURO:Grossly WNL.  PSYCH: no abnormal anxiety/depression     Additional Comments:  ROS, FH, SH: See initial GI consult for details.     I have reviewed the patient's new clinical lab results:     Recent Labs   Lab Test 22  0745 22  0624 22  1730 21  1354 07/27/15  0000 07/18/15  0830 07/17/15  0620   WBC 6.3 11.2* 19.1* 12.8*   < > 7.1 6.4   HGB 11.1* 11.0* 13.0 6.6*   < > 8.5* 8.8*   MCV 86 87 85 88   < > 99 100    169 200 173   < > 157 137*   INR  --   --   --  1.78*  --  2.37* 1.78*    < > = values in this interval not displayed.     Recent Labs   Lab Test 22  0745 22  0624 04/10/22  0808 22  1730   POTASSIUM 4.3 4.1 4.5 4.5   CHLORIDE 105 105  --  103   CO2 27 27  --  27   BUN 28 32*  --  37*   ANIONGAP 3 4  --  7     Recent Labs   Lab  Test 04/12/22  0745 04/11/22  0624 04/10/22  1606 04/09/22  1940 04/09/22  1730 04/09/22  1730 06/21/21  1354   ALBUMIN 2.8* 2.8* 2.5*  --    < > 3.4 2.3*   BILITOTAL 0.9 0.9 0.9  --    < > 1.4* 1.2   ALT 14 12 10  --    < > 13 12   AST 13 16 12  --    < > 14 13   PROTEIN  --   --   --  30 *  --   --  10*   LIPASE  --   --   --   --   --  64*  --     < > = values in this interval not displayed.        Imaging and procedures:  CT ABDOMEN PELVIS W CONTRAST 4/9/2022  IMPRESSION:   1.  Choledocholithiasis with at least 5 stones in the common bile duct. No associated biliary dilatation. Unclear whether this is related to the patient's acute symptoms. Correlation with biliary markers is recommended. No evidence of pancreatitis.  2.  Cholelithiasis.  3.  Diffuse fatty infiltration of the liver.  4.  A 1.5 cm right adrenal nodule is present and is otherwise not well evaluated due to single phase contrast. In retrospect, it was likely present in 2009 when it measured 0.9 cm. This is likely a benign adenoma. If the patient has a history of   malignancy, a dedicated adrenal CT or a follow-up scan in one year could be considered. Otherwise, no specific imaging follow-up is recommended. Laboratory or clinical evaluation can be considered if the patient has signs or symptoms of a   hyperfunctioning adrenal nodule.    Problem list pertaining to GI:  Cholelithiasis  Choledocholithiasis  Fatty liver    Assessment: This is a 82 y-o female who was admitted on 4/9/22 with sepsis and pyuria. CT scan showed incidental CBD stone. Hence, GI was consulted. Dr. Meredith consulted her on 4/10. He thought that her current symptoms are unrelated to CBD stones.     ERCP on Thursday is she is still in the hospital or as an outpatient. No abdominal pain, nausea or vomiting. She is constipated.     Plan:  - Advance diet as tolerated.   - NPO after midnight on Wednesday   - ERCP on Thursday if she is still here in hospital otherwise as an outpatient.     - Hold Eliquis three days before procedure.   - Miralax and senna for constipation. Repeat bowel bed and try dulcolax suppository today.   - Aggressive bowel regimen to help with constipation.     I will discuss with Dr. Moore.     Time spent: 20 minutes, greater than 50% of the visit was spent in counseling/coordination of care.     Keiko Ayala CNP  [facility] ([facility])  [phone]

## 2022-04-13 LAB
ANION GAP SERPL CALCULATED.3IONS-SCNC: 1 MMOL/L (ref 3–14)
BUN SERPL-MCNC: 23 MG/DL (ref 7–30)
CALCIUM SERPL-MCNC: 8.8 MG/DL (ref 8.5–10.1)
CHLORIDE BLD-SCNC: 105 MMOL/L (ref 94–109)
CO2 SERPL-SCNC: 31 MMOL/L (ref 20–32)
CREAT SERPL-MCNC: 1.15 MG/DL (ref 0.52–1.04)
GFR SERPL CREATININE-BSD FRML MDRD: 48 ML/MIN/1.73M2
GLUCOSE BLD-MCNC: 90 MG/DL (ref 70–99)
MAGNESIUM SERPL-MCNC: 2.1 MG/DL (ref 1.6–2.3)
POTASSIUM BLD-SCNC: 4.6 MMOL/L (ref 3.4–5.3)
SODIUM SERPL-SCNC: 137 MMOL/L (ref 133–144)

## 2022-04-13 PROCEDURE — 80048 BASIC METABOLIC PNL TOTAL CA: CPT | Performed by: INTERNAL MEDICINE

## 2022-04-13 PROCEDURE — 120N000001 HC R&B MED SURG/OB

## 2022-04-13 PROCEDURE — 36415 COLL VENOUS BLD VENIPUNCTURE: CPT | Performed by: INTERNAL MEDICINE

## 2022-04-13 PROCEDURE — 250N000013 HC RX MED GY IP 250 OP 250 PS 637: Performed by: INTERNAL MEDICINE

## 2022-04-13 PROCEDURE — 99233 SBSQ HOSP IP/OBS HIGH 50: CPT | Performed by: INTERNAL MEDICINE

## 2022-04-13 PROCEDURE — 83735 ASSAY OF MAGNESIUM: CPT | Performed by: INTERNAL MEDICINE

## 2022-04-13 RX ADMIN — FAMOTIDINE 10 MG: 10 TABLET ORAL at 20:53

## 2022-04-13 RX ADMIN — TOLTERODINE TARTRATE 2 MG: 1 TABLET, FILM COATED ORAL at 20:52

## 2022-04-13 RX ADMIN — MORPHINE SULFATE 15 MG: 15 TABLET, EXTENDED RELEASE ORAL at 20:52

## 2022-04-13 RX ADMIN — TOLTERODINE TARTRATE 2 MG: 1 TABLET, FILM COATED ORAL at 10:23

## 2022-04-13 RX ADMIN — FUROSEMIDE 20 MG: 20 TABLET ORAL at 10:22

## 2022-04-13 RX ADMIN — POLYETHYLENE GLYCOL 3350 17 G: 17 POWDER, FOR SOLUTION ORAL at 13:30

## 2022-04-13 RX ADMIN — Medication 1 CAPSULE: at 10:23

## 2022-04-13 RX ADMIN — CETIRIZINE HYDROCHLORIDE 10 MG: 10 TABLET, FILM COATED ORAL at 10:23

## 2022-04-13 RX ADMIN — FLUTICASONE PROPIONATE 2 SPRAY: 50 SPRAY, METERED NASAL at 10:48

## 2022-04-13 RX ADMIN — METOPROLOL TARTRATE 50 MG: 50 TABLET, FILM COATED ORAL at 20:53

## 2022-04-13 RX ADMIN — MORPHINE SULFATE 15 MG: 15 TABLET, EXTENDED RELEASE ORAL at 10:23

## 2022-04-13 RX ADMIN — METOPROLOL TARTRATE 50 MG: 50 TABLET, FILM COATED ORAL at 10:23

## 2022-04-13 RX ADMIN — FAMOTIDINE 10 MG: 10 TABLET ORAL at 10:23

## 2022-04-13 ASSESSMENT — ACTIVITIES OF DAILY LIVING (ADL)
ADLS_ACUITY_SCORE: 21
ADLS_ACUITY_SCORE: 21
ADLS_ACUITY_SCORE: 19
ADLS_ACUITY_SCORE: 21
ADLS_ACUITY_SCORE: 19
ADLS_ACUITY_SCORE: 21
ADLS_ACUITY_SCORE: 19
ADLS_ACUITY_SCORE: 21
ADLS_ACUITY_SCORE: 19
ADLS_ACUITY_SCORE: 21

## 2022-04-13 NOTE — PLAN OF CARE
"Pt refused blood pressure check and assessment despite multiple attempts to attain. \"I want to sleep\" \" I don't understand why are you doing this right now\" Wore cpap all night. No c/o pain. /52 (BP Location: Right arm)   Pulse 52   Temp 98.4  F (36.9  C) (Oral)   Resp 18   Wt (P) 109.6 kg (241 lb 11.2 oz)   SpO2 96%   BMI (P) 40.22 kg/m                          "

## 2022-04-13 NOTE — PLAN OF CARE
Goal Outcome Evaluation:        Admitting Diagnosis: Severe sepsis/UTI  Pertinent History: hx of Afib  asthma, chronic pain syndrome on opiates, obesity, CKD,  obesity,   Living Situation:Home   Pain plan: c/o generalized pain prn po oxy & morphine x 1 given.  Mobility: assistance, 2 people, lift team assistance  Baseline activity: with assistive equipment  Alarms/Safety: BA   LDA's: PIV.   Pertinent test results: K+ 4.3, Mg+ 2.1.   Consults:GI/PT/OT following.   Abnormals/Pending: hgb: 11.1  Other Cares/Comments:A & O x4, VSS, LS diminished, o2 95% RA, CIPAP @ bed time.   Discharge Disposition: TBD.  Discharge Time:TBD.

## 2022-04-13 NOTE — PROGRESS NOTES
"Resumed cares for Raven Carlin.[phone]    End of Shift Summary Note:   82-y/o female who presented to ED on 04/09/222 for evaluation of nausea, diffused pain and overall feeling ill. She was found to have elevated white count, fever and abnormal urinalysis with severe sepsis.     Pertinent Assessment   Alert and oriented x 4, chronic generalized pain on pain management. Vital signs stable, afebrile, on room air. Denies chest pain, chest pressure, no nausea. Tolerating diet. Up in the chair this evening. Pt had large bowel movement today.     Plan: NPO after midnight. Cont with Levofloxacin, plan for ERCP tomorrow 04/14/22. Eliquis on hold. General surgery and [facility] following. Plan of care reviewed with patient, all questions answered.     Discharge: Pending further workup. PT//OT/SW.     Vital signs:  Temp: 97.3  F (36.3  C) Temp src: Oral BP: 126/69 Pulse: 52   Resp: 16 SpO2: 96 % O2 Device: None (Room air)     Weight: (P) 109.6 kg (241 lb 11.2 oz)  Estimated body mass index is 40.22 kg/m  (pended) as calculated from the following:    Height as of 6/21/21: 1.651 m (5' 5\").    Weight as of this encounter: (P) 109.6 kg (241 lb 11.2 oz).    Sasha Francisco RN on 4/13/2022 at 10:27 PM    "

## 2022-04-13 NOTE — PLAN OF CARE
Temp: 97.3  F (36.3  C) Temp src: Oral BP: 126/69 Pulse: 52   Resp: 16 SpO2: 96 % O2 Device: None (Room air)       Orientation:  x4   VS: stable   Pain: non in rest. Pain while moving or turning. NO PRN meds needed this shift, scheduled pain meds only   Activity:  ambulated with lift into chair, and later onto bedside commode with lift and back to chair.   Resp:  WDL    GI:  had a large formed BM after getting Miralax PRN   : Purewick in while,   Notable Labs:  K+ and mag within limits   Skin:  wound on left thigh, Mepilex in place, skin folds clean,   Lines: SL right arm   Other: pt is very grateful for care when not in pain.    Plan:   ERCP planned for Thursday, time still tbd.  NPO after midnight

## 2022-04-13 NOTE — PROGRESS NOTES
GASTROENTEROLOGY PROGRESS NOTE     CC:   Nausea     SUBJECTIVE:  Patient continues to deny any abdominal pain or nausea. Great sense of humor.  Wanted to know if the gallbladder will be removed at the same time as the ERDP.     OBJECTIVE:  General Appearance:  Not in any distress.   /58 (BP Location: Left arm)   Pulse 53   Temp 98.2  F (36.8  C) (Oral)   Resp 18   Wt (P) 109.6 kg (241 lb 11.2 oz)   SpO2 97%   BMI (P) 40.22 kg/m    Temp (24hrs), Av.1  F (36.7  C), Min:97.8  F (36.6  C), Max:98.5  F (36.9  C)    Patient Vitals for the past 72 hrs:   Weight   22 0557 (P) 109.6 kg (241 lb 11.2 oz)   22 0454 107.3 kg (236 lb 9.6 oz)       Intake/Output Summary (Last 24 hours) at 2022 1110  Last data filed at 2022 0900  Gross per 24 hour   Intake 240 ml   Output 1350 ml   Net -1110 ml       PHYSICAL EXAM  General: alert, oriented, NAD  SKIN: no suspicious lesions, rashes, jaundice, or spider angiomas  EYES: No scleral icterus  RESPIRATORY: Non labored breathing  GASTROINTESTINAL:Abdomen soft and non tender on palpation.   PSYCH: no abnormal anxiety/depression     Additional Comments:  ROS, FH, SH: See initial GI consult for details.     I have reviewed the patient's new clinical lab results:     Recent Labs   Lab Test 22  0745 22  0624 22  1730 21  1354 07/27/15  0000 07/18/15  0830 07/17/15  0620   WBC 6.3 11.2* 19.1* 12.8*   < > 7.1 6.4   HGB 11.1* 11.0* 13.0 6.6*   < > 8.5* 8.8*   MCV 86 87 85 88   < > 99 100    169 200 173   < > 157 137*   INR  --   --   --  1.78*  --  2.37* 1.78*    < > = values in this interval not displayed.     Recent Labs   Lab Test 22  0744 22  0745 22  0624   POTASSIUM 4.6 4.3 4.1   CHLORIDE 105 105 105   CO2 31 27 27   BUN 23 28 32*   ANIONGAP 1* 3 4     Recent Labs   Lab Test 22  0745 22  0624 04/10/22  1606 22  1940 22  1730 22  1730 21  1354   ALBUMIN 2.8* 2.8* 2.5*  --     < > 3.4 2.3*   BILITOTAL 0.9 0.9 0.9  --    < > 1.4* 1.2   ALT 14 12 10  --    < > 13 12   AST 13 16 12  --    < > 14 13   PROTEIN  --   --   --  30 *  --   --  10*   LIPASE  --   --   --   --   --  64*  --     < > = values in this interval not displayed.        Imaging and procedures:  CT ABDOMEN PELVIS W CONTRAST 4/9/2022  IMPRESSION:   1.  Choledocholithiasis with at least 5 stones in the common bile duct. No associated biliary dilatation. Unclear whether this is related to the patient's acute symptoms. Correlation with biliary markers is recommended. No evidence of pancreatitis.  2.  Cholelithiasis.  3.  Diffuse fatty infiltration of the liver.  4.  A 1.5 cm right adrenal nodule is present and is otherwise not well evaluated due to single phase contrast. In retrospect, it was likely present in 2009 when it measured 0.9 cm. This is likely a benign adenoma. If the patient has a history of   malignancy, a dedicated adrenal CT or a follow-up scan in one year could be considered. Otherwise, no specific imaging follow-up is recommended. Laboratory or clinical evaluation can be considered if the patient has signs or symptoms of a   hyperfunctioning adrenal nodule.    Problem list pertaining to GI:  Cholelithiasis  Choledocholithiasis  Fatty liver    Assessment: This is a 82 y-o female who was admitted on 4/9/22 with sepsis and pyuria.  PMH of A fib and on Eliquis, asthma, chronic pain and morbid obesity, CKD, GERD, SAURABH.  CT scan showed incidental CBD stone. Hence, GI was consulted. Dr. Meredith consulted her on 4/10. He thought that her current symptoms are unrelated to CBD stones.     ERCP on Thursday. No abdominal pain, nausea or vomiting. She is constipated. Surgery consulted and trying to do cholecystectomy after ERCP, however patient is denying symptoms related to cholelithiasis.    Plan:  - NPO after midnight on Wednesday   - ERCP on Thursday  - Eliquis on hold since 4/11  - Miralax and senna for constipation.  Repeat bowel bed and try dulcolax suppository today.   - Aggressive bowel regimen to help with constipation.     I will discuss with Dr. Waters.     Time spent: 30 minutes, greater than 50% of the visit was spent in counseling/coordination of care.     Adia Benton PA-C  [facility] ([facility])  [phone]

## 2022-04-13 NOTE — PROGRESS NOTES
[facility]  Hospitalist Progress Note  Salena Ball MD 04/13/2022    Reason for Stay (Diagnosis): Sepsis secondary to UTI, choledocholithiasis         Assessment and Plan:      Summary of Stay: Raven Carlin is a 82 year old female with history of A. fib on Eliquis, asthma, chronic pain syndrome on opiates, morbid obesity, CKD stage IIIb, HLD, GERD, gastric ulcer, HTN, ESBL E. coli, and SAURABH on CPAP who was admitted on 4/9/2022 for malaise and nausea where she was found to have fever in the ER and leukocytosis to 19.  Initial urinalysis showing pyuria consistent with UTI.  She also had an elevated lactic acid and PAULINE consistent with severe sepsis.  She was started on vancomycin IV and meropenem given history of ESBL UTI and sepsis.  Her bilirubin was slightly elevated and a CT the abdomen pelvis did show 5 stones in the CBD although she did not have any abdominal pain to suggest ascending cholangitis.  [facility] was consulted.  Had planned on ERCP day after admission, however she has received her Eliquis before coming in and that morning so ERCP postponed.  Urine culture growing ESBL E. coli.  Vancomycin discontinued.  Consulted PT for weakness.  IV meropenem changed to IV levofloxacin based on sensitivities.  Clinically much improved from UTI.  Holding anticoagulation for ERCP on Thursday.     Problem List/Assessment and Plan:   Severe sepsis secondary to UTI, much improved: Presenting with diffuse pain, nausea, and generalized malaise.  Initial temperature 100.8  F.  Leukocytosis at 19.1.  Urinalysis shows >182 WBCs, large LE, and positive nitrite consistent with UTI as the source of infection although she does not report dysuria or flank pain, but has had frequency urinary.  Lactic acid elevated at 2.8 and PAULINE consistent with severe sepsis.  She does have a history of ESBL UTIs so she is on meropenem and vancomycin for sepsis.  She was found to have 5 CBD stones on CT, however she has not  had any abdominal pain and her bilirubin is only slightly elevated at 1.4 otherwise LFTs normal.  Ascending cholangitis secondary to choledocholithiasis is much less likely.  -Clinically back to baseline, still needs antibiotics to fully treat infection  -Vancomycin discontinued 4/11  -IV meropenem changed to IV levofloxacin 750 mg every 48 hours based on creatinine clearance on 4/12, moved to p.o. soon if tolerating diet  -Urine culture growing ESBL E. coli that is sensitive to Carbapenem's, fluoroquinolones, and Bactrim.  Has Bactrim allergy  -Blood cultures NGTD    Choledocholithiasis, cholelithiasis: As above presenting with feeling unwell and fevers that is most likely secondary to UTI.  Her CT did showcholelithiasis along with 5 small stones in the common bile duct.  Bilirubin is 1.4 otherwise alk phos and transaminases are WNL.  She has not had any abdominal pain previously or now and she is nontender on exam so this seems less likely etiology for her presentation with sepsis.  -[facility] consulted, agree that urosepsis more likely, but had planned for ERCP as this does need to be addressed at some point.  Patient had her Eliquis night before admission and morning afterwards so ERCP deferred until anticoagulation held for 72 hours.  It is unlikely that patient will follow-up for this per her daughter in speaking with the patient she is quite anxious regarding the procedures and I agree she is unlikely to return for follow-up.    -[facility] can perform ERCP on Thursday, I recommend continued hospitalization until then.  N.p.o. midnight Wednesday night.  Holding anticoagulation.  -As her anticoagulation will be held for this procedure and her likely to following up for laparoscopic cholecystectomy -no surgery consulted to coordinate possible laparoscopic cholecystectomy at the same time.    A. fib: Chronically anticoagulated with Eliquis.  Also on metoprolol tartrate 50 mg daily.  -Hold Eliquis with plans  for ERCP on Thursday 4/14.  Needs to be held 72 hours prior to procedure.  Last dose morning 4/10    PAULINE on CKD stage IIIb: Variable recent creatinine, baseline likely 1.3.  Presenting with PAULINE with creatinine 1.7 secondary to severe sepsis.  Resolved.  -Restart p.o. Lasix 20 mg daily    Chronic pain syndrome  Physical deconditioning  Chronic leg wound: Resume her morphine sulfate 15 mg every 12 hours and Percocet 7.5-325 mg, but ordered as every 6 hours as needed instead of scheduled twice daily.  Chronic left leg pain following previous surgeries.  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  Has some chronic skin changes and wound to the back of the left knee.  -WOC RN consulted  -PT consulted    SAURABH: Continue CPAP.    Morbid obesity: BMI 40.1.    Overactive bladder: Continue tolterodine 2 mg daily.    HTN: Resume PTA metoprolol tartrate 25 mg twice daily and Lasix 20 mg daily.  Has trace bilateral edema on exam.  -Continue metoprolol  -Resume Lasix 20 mg daily    HLD: not currently on meds.    GERD, history gastric ulcer: Resume famotidine twice daily.    DVT Prophylaxis: Holding Eliquis for procedure, PCD's  Code Status: Full Code  FEN: regular diet  Discharge Dispo: tbd. Consult PT//OT/SW. Per daughter Rhonda the patient lives with a different daughter, but at baseline sits in her recliner that goes up and down all day long and maybe gets up once to use the bathroom.  There have been issues with incontinence in the chair although patient denies.  Rhonda believes she needs more motivation.  There are health aides coming into the home every other day or so to help with bathing.  Per patient she was getting home physical therapy twice a week until recently.  She is not interested in TCU, but will need to demonstrate that she is able to ambulate with a walker to the bathroom and back at bare minimum to return home with home care.  Estimated Disch Date / # of Days until Disch: Recommend hospitalization until ERCP can  be done safely on Thursday 4/14.  Likely able to discharge home on 4/15 if mobile with PT.    I discussed plan of care with patient's daughter Rhonda by phone today    Clinically Significant Risk Factors Present on Admission                              Interval History (Subjective):      Assumed care reviewed chart.  Complains of generalized pain and discomfort in sitting position.  Requiring help to change her position.  Denies any chest pain or shortness of breath no abdominal pain.  Had some nausea which has  improved.  More than 10 point review of systems was carried out was otherwise negative                  Physical Exam:      Last Vital Signs:  /58 (BP Location: Left arm)   Pulse 53   Temp 98.2  F (36.8  C) (Oral)   Resp 18   Wt (P) 109.6 kg (241 lb 11.2 oz)   SpO2 97%   BMI (P) 40.22 kg/m      Intake/Output Summary (Last 24 hours) at 4/12/2022 1258  Last data filed at 4/12/2022 0639  Gross per 24 hour   Intake --   Output 1250 ml   Net -1250 ml       Constitutional: Awake, NAD   Eyes: sclera white   HEENT:   MMM  Respiratory: On her home CPAP.  Anterior lungs cta bilaterally, no focal crackles or wheeze  Cardiovascular: RRR.  No murmur   GI: Obese, nontender to palpation, bowel sounds present, not distended  Skin: Posterior left knee chronic appearing skin changes, no change  Musculoskeletal/extremities: Trace bilateral lower extremity edema  Neurologic: A&O, answers questions appropriately  Psychiatric: Anxious when talking about ERCP and possible surgery         Medications:      All current medications were reviewed with changes reflected in problem list.         Data:      All new lab and imaging data was reviewed.   Labs:  Recent Labs   Lab 04/12/22  0745 04/11/22  0624 04/09/22  1730   WBC 6.3 11.2* 19.1*   HGB 11.1* 11.0* 13.0   HCT 35.8 35.6 41.0   MCV 86 87 85    169 200     Recent Labs   Lab 04/13/22  0744 04/12/22  0745 04/11/22  0624 04/10/22  1606    135 136  --     POTASSIUM 4.6 4.3 4.1  --    CHLORIDE 105 105 105  --    CO2 31 27 27  --    ANIONGAP 1* 3 4  --    GLC 90 88 78  --    BUN 23 28 32*  --    CR 1.15* 1.17* 1.32*  --    GFRESTIMATED 48* 47* 40*  --    LAUREL 8.8 8.8 9.0  --    MAG 2.1 2.1 2.1  --    PROTTOTAL  --  6.1* 6.2* 5.5*   ALBUMIN  --  2.8* 2.8* 2.5*   BILITOTAL  --  0.9 0.9 0.9   ALKPHOS  --  92 92 82   AST  --  13 16 12   ALT  --  14 12 10     All cultures:  Recent Labs   Lab 04/09/22  1940 04/09/22  1836 04/09/22  1803   CULTURE >100,000 CFU/mL Escherichia coli ESBL* No growth after 3 days No growth after 3 days     Imaging:   None today      Salena Ball MD

## 2022-04-13 NOTE — PROGRESS NOTES
[facility]   General Surgery Progress Note          Assessment and Plan:   Assessment:   Choledocholithiasis and cholelithiasis      Plan:   -GI planning for ERCP tomorrow  -IV ABX: Levaquin for UTI  -Discussed cholecystectomy to prevent further choledocholithiasis  -We are asked to coordinate surgery with GI as they are planning ERCP on Thursday (holding Eliquis). Time of ERCP is unknown.      Agree with above, lap kirby scheduled for Friday afternoon with Dr. Winchester.   Charleen Dooley MD        Interval History:   Resting in bed, denies RUQ or epigastric pain. Has been up to restroom, voiding independently. No flatus or BM.         Physical Exam:   Blood pressure 137/58, pulse 53, temperature 98.2  F (36.8  C), temperature source Oral, resp. rate 18, weight (P) 109.6 kg (241 lb 11.2 oz), SpO2 97 %, not currently breastfeeding.    I/O last 3 completed shifts:  In: 240 [P.O.:240]  Out: 1350 [Urine:1350]    General: alert and no acute distress  Abdomen: soft, ND, NT, +BS          Data:     Recent Labs   Lab Test 04/12/22  0745 04/11/22  0624 04/09/22  1730   HGB 11.1* 11.0* 13.0   WBC 6.3 11.2* 19.1*     Recent Labs   Lab 04/12/22  0745 04/11/22  0624 04/10/22  1606   AST 13 16 12   ALT 14 12 10   ALKPHOS 92 92 82   BILITOTAL 0.9 0.9 0.9     Recent Labs   Lab 04/09/22  1730   LIPASE 64*            Daniel Lackey PA-C

## 2022-04-14 ENCOUNTER — ANESTHESIA (OUTPATIENT)
Dept: SURGERY | Facility: CLINIC | Age: 82
DRG: 872 | End: 2022-04-14
Payer: COMMERCIAL

## 2022-04-14 ENCOUNTER — APPOINTMENT (OUTPATIENT)
Dept: GENERAL RADIOLOGY | Facility: CLINIC | Age: 82
DRG: 872 | End: 2022-04-14
Attending: INTERNAL MEDICINE
Payer: COMMERCIAL

## 2022-04-14 ENCOUNTER — ANESTHESIA EVENT (OUTPATIENT)
Dept: SURGERY | Facility: CLINIC | Age: 82
DRG: 872 | End: 2022-04-14
Payer: COMMERCIAL

## 2022-04-14 LAB
ANION GAP SERPL CALCULATED.3IONS-SCNC: 3 MMOL/L (ref 3–14)
BACTERIA BLD CULT: NO GROWTH
BACTERIA BLD CULT: NO GROWTH
BUN SERPL-MCNC: 21 MG/DL (ref 7–30)
CALCIUM SERPL-MCNC: 9 MG/DL (ref 8.5–10.1)
CHLORIDE BLD-SCNC: 104 MMOL/L (ref 94–109)
CO2 SERPL-SCNC: 29 MMOL/L (ref 20–32)
CREAT SERPL-MCNC: 1.19 MG/DL (ref 0.52–1.04)
ERCP: NORMAL
GFR SERPL CREATININE-BSD FRML MDRD: 46 ML/MIN/1.73M2
GLUCOSE BLD-MCNC: 83 MG/DL (ref 70–99)
GLUCOSE BLDC GLUCOMTR-MCNC: 87 MG/DL (ref 70–99)
GLUCOSE BLDC GLUCOMTR-MCNC: 95 MG/DL (ref 70–99)
MAGNESIUM SERPL-MCNC: 2.2 MG/DL (ref 1.6–2.3)
POTASSIUM BLD-SCNC: 4.3 MMOL/L (ref 3.4–5.3)
SODIUM SERPL-SCNC: 136 MMOL/L (ref 133–144)

## 2022-04-14 PROCEDURE — 250N000011 HC RX IP 250 OP 636: Performed by: NURSE ANESTHETIST, CERTIFIED REGISTERED

## 2022-04-14 PROCEDURE — 80048 BASIC METABOLIC PNL TOTAL CA: CPT | Performed by: INTERNAL MEDICINE

## 2022-04-14 PROCEDURE — 250N000011 HC RX IP 250 OP 636: Performed by: INTERNAL MEDICINE

## 2022-04-14 PROCEDURE — 0FC98ZZ EXTIRPATION OF MATTER FROM COMMON BILE DUCT, VIA NATURAL OR ARTIFICIAL OPENING ENDOSCOPIC: ICD-10-PCS | Performed by: INTERNAL MEDICINE

## 2022-04-14 PROCEDURE — C1769 GUIDE WIRE: HCPCS | Performed by: INTERNAL MEDICINE

## 2022-04-14 PROCEDURE — 360N000082 HC SURGERY LEVEL 2 W/ FLUORO, PER MIN: Performed by: INTERNAL MEDICINE

## 2022-04-14 PROCEDURE — 999N000141 HC STATISTIC PRE-PROCEDURE NURSING ASSESSMENT: Performed by: INTERNAL MEDICINE

## 2022-04-14 PROCEDURE — 83735 ASSAY OF MAGNESIUM: CPT | Performed by: INTERNAL MEDICINE

## 2022-04-14 PROCEDURE — 99231 SBSQ HOSP IP/OBS SF/LOW 25: CPT | Performed by: SURGERY

## 2022-04-14 PROCEDURE — 250N000013 HC RX MED GY IP 250 OP 250 PS 637: Performed by: INTERNAL MEDICINE

## 2022-04-14 PROCEDURE — 250N000011 HC RX IP 250 OP 636: Performed by: ANESTHESIOLOGY

## 2022-04-14 PROCEDURE — C1726 CATH, BAL DIL, NON-VASCULAR: HCPCS | Performed by: INTERNAL MEDICINE

## 2022-04-14 PROCEDURE — 258N000003 HC RX IP 258 OP 636: Performed by: NURSE ANESTHETIST, CERTIFIED REGISTERED

## 2022-04-14 PROCEDURE — 99233 SBSQ HOSP IP/OBS HIGH 50: CPT | Performed by: INTERNAL MEDICINE

## 2022-04-14 PROCEDURE — 255N000002 HC RX 255 OP 636: Performed by: INTERNAL MEDICINE

## 2022-04-14 PROCEDURE — 250N000009 HC RX 250: Performed by: INTERNAL MEDICINE

## 2022-04-14 PROCEDURE — 36415 COLL VENOUS BLD VENIPUNCTURE: CPT | Performed by: INTERNAL MEDICINE

## 2022-04-14 PROCEDURE — 272N000001 HC OR GENERAL SUPPLY STERILE: Performed by: INTERNAL MEDICINE

## 2022-04-14 PROCEDURE — 999N000179 XR SURGERY CARM FLUORO LESS THAN 5 MIN W STILLS: Mod: TC

## 2022-04-14 PROCEDURE — 250N000009 HC RX 250: Performed by: NURSE ANESTHETIST, CERTIFIED REGISTERED

## 2022-04-14 PROCEDURE — 710N000009 HC RECOVERY PHASE 1, LEVEL 1, PER MIN: Performed by: INTERNAL MEDICINE

## 2022-04-14 PROCEDURE — 370N000017 HC ANESTHESIA TECHNICAL FEE, PER MIN: Performed by: INTERNAL MEDICINE

## 2022-04-14 PROCEDURE — 120N000001 HC R&B MED SURG/OB

## 2022-04-14 RX ORDER — INDOMETHACIN 50 MG/1
SUPPOSITORY RECTAL PRN
Status: DISCONTINUED | OUTPATIENT
Start: 2022-04-14 | End: 2022-04-14 | Stop reason: HOSPADM

## 2022-04-14 RX ORDER — ONDANSETRON 2 MG/ML
4 INJECTION INTRAMUSCULAR; INTRAVENOUS EVERY 30 MIN PRN
Status: DISCONTINUED | OUTPATIENT
Start: 2022-04-14 | End: 2022-04-14 | Stop reason: HOSPADM

## 2022-04-14 RX ORDER — ONDANSETRON 2 MG/ML
INJECTION INTRAMUSCULAR; INTRAVENOUS PRN
Status: DISCONTINUED | OUTPATIENT
Start: 2022-04-14 | End: 2022-04-14

## 2022-04-14 RX ORDER — ONDANSETRON 4 MG/1
4 TABLET, ORALLY DISINTEGRATING ORAL EVERY 6 HOURS PRN
Status: DISCONTINUED | OUTPATIENT
Start: 2022-04-14 | End: 2022-04-18 | Stop reason: HOSPADM

## 2022-04-14 RX ORDER — FENTANYL CITRATE 50 UG/ML
INJECTION, SOLUTION INTRAMUSCULAR; INTRAVENOUS PRN
Status: DISCONTINUED | OUTPATIENT
Start: 2022-04-14 | End: 2022-04-14

## 2022-04-14 RX ORDER — FLUMAZENIL 0.1 MG/ML
0.2 INJECTION, SOLUTION INTRAVENOUS
Status: ACTIVE | OUTPATIENT
Start: 2022-04-14 | End: 2022-04-15

## 2022-04-14 RX ORDER — LIDOCAINE HYDROCHLORIDE 10 MG/ML
INJECTION, SOLUTION INFILTRATION; PERINEURAL PRN
Status: DISCONTINUED | OUTPATIENT
Start: 2022-04-14 | End: 2022-04-14

## 2022-04-14 RX ORDER — ONDANSETRON 2 MG/ML
4 INJECTION INTRAMUSCULAR; INTRAVENOUS EVERY 6 HOURS PRN
Status: DISCONTINUED | OUTPATIENT
Start: 2022-04-14 | End: 2022-04-18 | Stop reason: HOSPADM

## 2022-04-14 RX ORDER — KETOROLAC TROMETHAMINE 30 MG/ML
15 INJECTION, SOLUTION INTRAMUSCULAR; INTRAVENOUS
Status: DISCONTINUED | OUTPATIENT
Start: 2022-04-14 | End: 2022-04-14 | Stop reason: HOSPADM

## 2022-04-14 RX ORDER — HYDROMORPHONE HCL IN WATER/PF 6 MG/30 ML
0.2 PATIENT CONTROLLED ANALGESIA SYRINGE INTRAVENOUS EVERY 5 MIN PRN
Status: DISCONTINUED | OUTPATIENT
Start: 2022-04-14 | End: 2022-04-14 | Stop reason: HOSPADM

## 2022-04-14 RX ORDER — METOPROLOL TARTRATE 1 MG/ML
1-2 INJECTION, SOLUTION INTRAVENOUS EVERY 5 MIN PRN
Status: DISCONTINUED | OUTPATIENT
Start: 2022-04-14 | End: 2022-04-14 | Stop reason: HOSPADM

## 2022-04-14 RX ORDER — GLYCOPYRROLATE 0.2 MG/ML
INJECTION, SOLUTION INTRAMUSCULAR; INTRAVENOUS PRN
Status: DISCONTINUED | OUTPATIENT
Start: 2022-04-14 | End: 2022-04-14

## 2022-04-14 RX ORDER — FENTANYL CITRATE 50 UG/ML
25 INJECTION, SOLUTION INTRAMUSCULAR; INTRAVENOUS EVERY 5 MIN PRN
Status: DISCONTINUED | OUTPATIENT
Start: 2022-04-14 | End: 2022-04-14 | Stop reason: HOSPADM

## 2022-04-14 RX ORDER — CEFAZOLIN SODIUM 2 G/100ML
2 INJECTION, SOLUTION INTRAVENOUS
Status: DISCONTINUED | OUTPATIENT
Start: 2022-04-15 | End: 2022-04-16

## 2022-04-14 RX ORDER — PROPOFOL 10 MG/ML
INJECTION, EMULSION INTRAVENOUS PRN
Status: DISCONTINUED | OUTPATIENT
Start: 2022-04-14 | End: 2022-04-14

## 2022-04-14 RX ORDER — LIDOCAINE 40 MG/G
CREAM TOPICAL
Status: DISCONTINUED | OUTPATIENT
Start: 2022-04-14 | End: 2022-04-14 | Stop reason: HOSPADM

## 2022-04-14 RX ORDER — OXYCODONE HYDROCHLORIDE 5 MG/1
5 TABLET ORAL EVERY 4 HOURS PRN
Status: DISCONTINUED | OUTPATIENT
Start: 2022-04-14 | End: 2022-04-14 | Stop reason: HOSPADM

## 2022-04-14 RX ORDER — CEFAZOLIN SODIUM 2 G/100ML
2 INJECTION, SOLUTION INTRAVENOUS
Status: DISCONTINUED | OUTPATIENT
Start: 2022-04-14 | End: 2022-04-14

## 2022-04-14 RX ORDER — ONDANSETRON 4 MG/1
4 TABLET, ORALLY DISINTEGRATING ORAL EVERY 30 MIN PRN
Status: DISCONTINUED | OUTPATIENT
Start: 2022-04-14 | End: 2022-04-14 | Stop reason: HOSPADM

## 2022-04-14 RX ORDER — HYDRALAZINE HYDROCHLORIDE 20 MG/ML
2.5-5 INJECTION INTRAMUSCULAR; INTRAVENOUS EVERY 10 MIN PRN
Status: DISCONTINUED | OUTPATIENT
Start: 2022-04-14 | End: 2022-04-14 | Stop reason: HOSPADM

## 2022-04-14 RX ORDER — SODIUM CHLORIDE, SODIUM LACTATE, POTASSIUM CHLORIDE, CALCIUM CHLORIDE 600; 310; 30; 20 MG/100ML; MG/100ML; MG/100ML; MG/100ML
INJECTION, SOLUTION INTRAVENOUS CONTINUOUS
Status: DISCONTINUED | OUTPATIENT
Start: 2022-04-14 | End: 2022-04-14 | Stop reason: HOSPADM

## 2022-04-14 RX ORDER — DEXAMETHASONE SODIUM PHOSPHATE 4 MG/ML
INJECTION, SOLUTION INTRA-ARTICULAR; INTRALESIONAL; INTRAMUSCULAR; INTRAVENOUS; SOFT TISSUE PRN
Status: DISCONTINUED | OUTPATIENT
Start: 2022-04-14 | End: 2022-04-14

## 2022-04-14 RX ORDER — CEFAZOLIN SODIUM 2 G/100ML
2 INJECTION, SOLUTION INTRAVENOUS SEE ADMIN INSTRUCTIONS
Status: DISCONTINUED | OUTPATIENT
Start: 2022-04-14 | End: 2022-04-14

## 2022-04-14 RX ORDER — NEOSTIGMINE METHYLSULFATE 1 MG/ML
VIAL (ML) INJECTION PRN
Status: DISCONTINUED | OUTPATIENT
Start: 2022-04-14 | End: 2022-04-14

## 2022-04-14 RX ORDER — SODIUM CHLORIDE, SODIUM LACTATE, POTASSIUM CHLORIDE, CALCIUM CHLORIDE 600; 310; 30; 20 MG/100ML; MG/100ML; MG/100ML; MG/100ML
INJECTION, SOLUTION INTRAVENOUS CONTINUOUS PRN
Status: DISCONTINUED | OUTPATIENT
Start: 2022-04-14 | End: 2022-04-14

## 2022-04-14 RX ORDER — HYDRALAZINE HYDROCHLORIDE 20 MG/ML
10 INJECTION INTRAMUSCULAR; INTRAVENOUS EVERY 4 HOURS PRN
Status: DISCONTINUED | OUTPATIENT
Start: 2022-04-14 | End: 2022-04-18 | Stop reason: HOSPADM

## 2022-04-14 RX ADMIN — GLYCOPYRROLATE 0.8 MG: 0.2 INJECTION, SOLUTION INTRAMUSCULAR; INTRAVENOUS at 13:10

## 2022-04-14 RX ADMIN — SODIUM CHLORIDE, POTASSIUM CHLORIDE, SODIUM LACTATE AND CALCIUM CHLORIDE: 600; 310; 30; 20 INJECTION, SOLUTION INTRAVENOUS at 11:55

## 2022-04-14 RX ADMIN — GLYCOPYRROLATE 0.2 MG: 0.2 INJECTION, SOLUTION INTRAMUSCULAR; INTRAVENOUS at 12:32

## 2022-04-14 RX ADMIN — POLYETHYLENE GLYCOL 3350 17 G: 17 POWDER, FOR SOLUTION ORAL at 20:39

## 2022-04-14 RX ADMIN — FENTANYL CITRATE 100 MCG: 50 INJECTION, SOLUTION INTRAMUSCULAR; INTRAVENOUS at 12:26

## 2022-04-14 RX ADMIN — TOLTERODINE TARTRATE 2 MG: 1 TABLET, FILM COATED ORAL at 20:41

## 2022-04-14 RX ADMIN — ONDANSETRON 4 MG: 2 INJECTION INTRAMUSCULAR; INTRAVENOUS at 13:49

## 2022-04-14 RX ADMIN — FAMOTIDINE 10 MG: 10 TABLET ORAL at 09:13

## 2022-04-14 RX ADMIN — FUROSEMIDE 20 MG: 20 TABLET ORAL at 09:13

## 2022-04-14 RX ADMIN — LIDOCAINE HYDROCHLORIDE 50 MG: 10 INJECTION, SOLUTION INFILTRATION; PERINEURAL at 12:26

## 2022-04-14 RX ADMIN — LEVOFLOXACIN 750 MG: 750 INJECTION, SOLUTION INTRAVENOUS at 20:42

## 2022-04-14 RX ADMIN — OXYCODONE AND ACETAMINOPHEN 1 TABLET: 7.5; 325 TABLET ORAL at 22:33

## 2022-04-14 RX ADMIN — ONDANSETRON HYDROCHLORIDE 4 MG: 2 INJECTION, SOLUTION INTRAVENOUS at 12:26

## 2022-04-14 RX ADMIN — PROPOFOL 150 MG: 10 INJECTION, EMULSION INTRAVENOUS at 12:26

## 2022-04-14 RX ADMIN — MORPHINE SULFATE 15 MG: 15 TABLET, EXTENDED RELEASE ORAL at 09:13

## 2022-04-14 RX ADMIN — Medication 1 CAPSULE: at 09:13

## 2022-04-14 RX ADMIN — DEXAMETHASONE SODIUM PHOSPHATE 4 MG: 4 INJECTION, SOLUTION INTRA-ARTICULAR; INTRALESIONAL; INTRAMUSCULAR; INTRAVENOUS; SOFT TISSUE at 12:26

## 2022-04-14 RX ADMIN — Medication 100 MG: at 12:26

## 2022-04-14 RX ADMIN — NEOSTIGMINE METHYLSULFATE 5 MG: 1 INJECTION, SOLUTION INTRAVENOUS at 13:10

## 2022-04-14 RX ADMIN — FAMOTIDINE 10 MG: 10 TABLET ORAL at 20:42

## 2022-04-14 RX ADMIN — ROCURONIUM BROMIDE 30 MG: 50 INJECTION, SOLUTION INTRAVENOUS at 12:31

## 2022-04-14 RX ADMIN — CETIRIZINE HYDROCHLORIDE 10 MG: 10 TABLET, FILM COATED ORAL at 09:13

## 2022-04-14 RX ADMIN — METOPROLOL TARTRATE 50 MG: 50 TABLET, FILM COATED ORAL at 20:41

## 2022-04-14 RX ADMIN — MORPHINE SULFATE 15 MG: 15 TABLET, EXTENDED RELEASE ORAL at 20:41

## 2022-04-14 RX ADMIN — GLYCOPYRROLATE 0.2 MG: 0.2 INJECTION, SOLUTION INTRAMUSCULAR; INTRAVENOUS at 12:26

## 2022-04-14 RX ADMIN — TOLTERODINE TARTRATE 2 MG: 1 TABLET, FILM COATED ORAL at 09:13

## 2022-04-14 ASSESSMENT — ACTIVITIES OF DAILY LIVING (ADL)
ADLS_ACUITY_SCORE: 27
ADLS_ACUITY_SCORE: 27
ADLS_ACUITY_SCORE: 23
ADLS_ACUITY_SCORE: 27
ADLS_ACUITY_SCORE: 23
ADLS_ACUITY_SCORE: 27
ADLS_ACUITY_SCORE: 23
ADLS_ACUITY_SCORE: 23
ADLS_ACUITY_SCORE: 27
ADLS_ACUITY_SCORE: 27
ADLS_ACUITY_SCORE: 19
ADLS_ACUITY_SCORE: 27
ADLS_ACUITY_SCORE: 27
ADLS_ACUITY_SCORE: 23
ADLS_ACUITY_SCORE: 23
ADLS_ACUITY_SCORE: 27
ADLS_ACUITY_SCORE: 23
ADLS_ACUITY_SCORE: 27
ADLS_ACUITY_SCORE: 23

## 2022-04-14 NOTE — PROGRESS NOTES
[facility]  Hospitalist Progress Note  Salena Ball MD 04/14/2022    Reason for Stay (Diagnosis): Sepsis secondary to UTI, choledocholithiasis         Assessment and Plan:      Summary of Stay: Raven Carlin is a 82 year old female with history of A. fib on Eliquis, asthma, chronic pain syndrome on opiates, morbid obesity, CKD stage IIIb, HLD, GERD, gastric ulcer, HTN, ESBL E. coli, and SAURABH on CPAP who was admitted on 4/9/2022 for malaise and nausea where she was found to have fever in the ER and leukocytosis to 19.  Initial urinalysis showing pyuria consistent with UTI.  She also had an elevated lactic acid and PAULINE consistent with severe sepsis.  She was started on vancomycin IV and meropenem given history of ESBL UTI and sepsis.  Her bilirubin was slightly elevated and a CT the abdomen pelvis did show 5 stones in the CBD although she did not have any abdominal pain to suggest ascending cholangitis.  [facility] was consulted.  Had planned on ERCP day after admission, however she has received her Eliquis before coming in and that morning so ERCP postponed.  Urine culture growing ESBL E. coli.  Vancomycin discontinued.  Consulted PT for weakness.  IV meropenem changed to IV levofloxacin based on sensitivities.  Clinically much improved from UTI.  Holding anticoagulation for ERCP today.  Cholecystectomy scheduled for 4/15/2022    Problem List/Assessment and Plan:   Severe sepsis secondary to UTI, much improved: Presenting with diffuse pain, nausea, and generalized malaise.  Initial temperature 100.8  F.  Leukocytosis at 19.1.  Urinalysis shows >182 WBCs, large LE, and positive nitrite consistent with UTI as the source of infection although she does not report dysuria or flank pain, but has had frequency urinary.  Lactic acid elevated at 2.8 and PAULINE consistent with severe sepsis.  She does have a history of ESBL UTIs so she is on meropenem and vancomycin for sepsis.  She was found to have 5 CBD  stones on CT, however she has not had any abdominal pain and her bilirubin is only slightly elevated at 1.4 otherwise LFTs normal.  Ascending cholangitis secondary to choledocholithiasis is much less likely.  -Clinically back to baseline, still needs antibiotics to fully treat infection  -Vancomycin discontinued 4/11  -IV meropenem changed to IV levofloxacin 750 mg every 48 hours based on creatinine clearance on 4/12, moved to p.o. soon if tolerating diet  -Urine culture growing ESBL E. coli that is sensitive to Carbapenem's, fluoroquinolones, and Bactrim.  Has Bactrim allergy  -Blood cultures NGTD    Choledocholithiasis, cholelithiasis: As above presenting with feeling unwell and fevers that is most likely secondary to UTI.  Her CT did showcholelithiasis along with 5 small stones in the common bile duct.  Bilirubin is 1.4 otherwise alk phos and transaminases are WNL.  She has not had any abdominal pain previously or now and she is nontender on exam so this seems less likely etiology for her presentation with sepsis.  -[facility] consulted, agree that urosepsis more likely, but had planned for ERCP as this does need to be addressed at some point.  Patient had her Eliquis night before admission and morning afterwards so ERCP deferred until anticoagulation held for 72 hours.  It is unlikely that patient will follow-up for this per her daughter in speaking with the patient she is quite anxious regarding the procedures and I agree she is unlikely to return for follow-up.    -[facility] can perform ERCP on 4/14,   -  N.p.o. midnight Wednesday night.  Holding anticoagulation.  -As her anticoagulation will be held for this procedure and her likely to following up for laparoscopic cholecystectomy -cholecystectomy scheduled for 4/15/2022    A. fib: Chronically anticoagulated with Eliquis.  Also on metoprolol tartrate 50 mg daily.  -Hold Eliquis with plans for ERCP on Thursday 4/14.  Needs to be held 72 hours prior to  procedure.  Last dose morning 4/10    PAULINE on CKD stage IIIb: Variable recent creatinine, baseline likely 1.3.  Presenting with PAULINE with creatinine 1.7 secondary to severe sepsis.  Resolved.  -Restart p.o. Lasix 20 mg daily    Chronic pain syndrome  Physical deconditioning  Chronic leg wound: Resume her morphine sulfate 15 mg every 12 hours and Percocet 7.5-325 mg, but ordered as every 6 hours as needed instead of scheduled twice daily.  Chronic left leg pain following previous surgeries.  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  Has some chronic skin changes and wound to the back of the left knee.  -WOC RN consulted  -PT consulted    SAURABH: Continue CPAP.    Morbid obesity: BMI 40.1.    Overactive bladder: Continue tolterodine 2 mg daily.    HTN: Resume PTA metoprolol tartrate 25 mg twice daily and Lasix 20 mg daily.  Has trace bilateral edema on exam.  -Continue metoprolol  -Resume Lasix 20 mg daily    HLD: not currently on meds.    GERD, history gastric ulcer: Resume famotidine twice daily.    DVT Prophylaxis: Holding Eliquis for procedure, PCD's  Code Status: Full Code  FEN: regular diet  Discharge Dispo: tbd. Consult PT//OT/SW. Per daughter Rhonda the patient lives with a different daughter, but at baseline sits in her recliner that goes up and down all day long and maybe gets up once to use the bathroom.  There have been issues with incontinence in the chair although patient denies.  Rhonda believes she needs more motivation.  There are health aides coming into the home every other day or so to help with bathing.  Per patient she was getting home physical therapy twice a week until recently.  She is not interested in TCU, but will need to demonstrate that she is able to ambulate with a walker to the bathroom and back at bare minimum to return home with home care.  Estimated Disch Date / # of Days until Disch: planned cholecystectomy tomorrow pending PT eval post op can  discontinue 4/16/22      Clinically  Significant Risk Factors Present on Admission                                Interval History (Subjective):      Reviewed chart underwent ERCP today. C/o discomfort and wanting to eat, refusing vitals  More than 10 point review of systems was carried out was otherwise negative                  Physical Exam:      Last Vital Signs:  BP (!) 165/74 (BP Location: Left arm, Patient Position: Semi-Jarrell's, Cuff Size: Adult Regular)   Pulse 51   Temp 98.2  F (36.8  C) (Oral)   Resp 20   Wt 109.6 kg (241 lb 9.6 oz)   SpO2 96%   BMI 40.20 kg/m      Intake/Output Summary (Last 24 hours) at 4/12/2022 1258  Last data filed at 4/12/2022 0639  Gross per 24 hour   Intake --   Output 1250 ml   Net -1250 ml       Constitutional: Awake, NAD   Eyes: sclera white   HEENT:   MMM  Respiratory: On her home CPAP.  Anterior lungs cta bilaterally, no focal crackles or wheeze  Cardiovascular: RRR.  No murmur   GI: Obese, nontender to palpation, bowel sounds present, not distended  Skin: Posterior left knee chronic appearing skin changes, no change  Musculoskeletal/extremities: Trace bilateral lower extremity edema  Neurologic: A&O, answers questions appropriately  Psychiatric: Anxious when talking about ERCP and possible surgery         Medications:      All current medications were reviewed with changes reflected in problem list.         Data:      All new lab and imaging data was reviewed.   Labs:  Recent Labs   Lab 04/12/22  0745 04/11/22  0624 04/09/22  1730   WBC 6.3 11.2* 19.1*   HGB 11.1* 11.0* 13.0   HCT 35.8 35.6 41.0   MCV 86 87 85    169 200     Recent Labs   Lab 04/14/22  0820 04/14/22  0608 04/14/22  0206 04/13/22  0744 04/12/22  0745 04/11/22  0624 04/10/22  1606     --   --  137 135 136  --    POTASSIUM 4.3  --   --  4.6 4.3 4.1  --    CHLORIDE 104  --   --  105 105 105  --    CO2 29  --   --  31 27 27  --    ANIONGAP 3  --   --  1* 3 4  --    GLC 83 87 95 90 88 78  --    BUN 21  --   --  23 28 32*  --    CR  1.19*  --   --  1.15* 1.17* 1.32*  --    GFRESTIMATED 46*  --   --  48* 47* 40*  --    LAUREL 9.0  --   --  8.8 8.8 9.0  --    MAG 2.2  --   --  2.1 2.1 2.1  --    PROTTOTAL  --   --   --   --  6.1* 6.2* 5.5*   ALBUMIN  --   --   --   --  2.8* 2.8* 2.5*   BILITOTAL  --   --   --   --  0.9 0.9 0.9   ALKPHOS  --   --   --   --  92 92 82   AST  --   --   --   --  13 16 12   ALT  --   --   --   --  14 12 10     All cultures:  Recent Labs   Lab 04/09/22  1940 04/09/22  1836 04/09/22  1803   CULTURE >100,000 CFU/mL Escherichia coli ESBL* No growth after 4 days No growth after 4 days     Imaging:   None today      Salena Ball MD

## 2022-04-14 NOTE — ANESTHESIA PREPROCEDURE EVALUATION
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 but states she is clear     Chronic pain      CKD (chronic kidney disease), stage III      Dyslipidemia      Dyspnea on exertion      Eczema      Fractured pelvis (H)      Gastric ulcer, unspecified as acute or chronic, without mention of hemorrhage or perforation      Gastro-oesophageal reflux disease     pt denies     Hypertension      OAB (overactive bladder)      Sleep apnea     CPAP      Past Surgical History:   Procedure Laterality Date     ARTHROPLASTY KNEE  2011    right     ARTHROPLASTY KNEE  2011    Left, Surgeon:SOO GOODRICH     ARTHROPLASTY KNEE  2012    Procedure:ARTHROPLASTY KNEE; LEFT KNEE REMOVAL OF ANTIBIOTIC SPACER , REPLACEMENT WITH LEFT HINGED KNEE; Surgeon:NE FERRARA; Location:[facility]     ARTHROPLASTY REVISION KNEE  11/15/2011    Procedure:ARTHROPLASTY REVISION KNEE; Left Knee Poly-Exchange and Femoral Component Revision   ; Surgeon:SOO GOODRICH; Location:[facility] OR     ASPIRATION NEEDLE KNEE Left 2015    Procedure: ASPIRATION NEEDLE KNEE;  Surgeon: Soo Goodrich MD;  Location: [facility]     EXCHANGE POLY COMPONENT ARTHROPLASTY KNEE  2012    Procedure: EXCHANGE POLY COMPONENT ARTHROPLASTY KNEE;  left knee arthroplasty pole exchange;  Surgeon: Ne Ferrara MD;  Location: [facility] OR     HERNIA REPAIR       MAMMOPLASTY AUGMENTATION       PARATHYROIDECTOMY Left 2019    Procedure: Excision of left inferior parathyroid adenoma, Focused neck exploration.;  Surgeon: Latonia Anguiano MD;  Location: [facility]     REMOVE HARDWARE ARTHROPLASTY KNEE, IRRIG & JOE, PLACE ANTIBIOTIC CEMENT SPACER, COMBINED Left 7/15/2015     Procedure: COMBINED REMOVE HARDWARE ARTHROPLASTY KNEE, IRRIGATION AND DEBRIDEMENT, PLACE ANTIBIOTIC CEMENT BEADS / SPACER;  Surgeon: Miguel Goodrich MD;  Location: [facility]     RHINOPLASTY       TONSILLECTOMY        Allergies   Allergen Reactions     Bactrim [Sulfamethoxazole W-Trimethoprim] Nausea     Epinephrine Other (See Comments)     Heart races     Ketamine      Felt like she was dying     Lisinopril Cough     Simvastatin Itching      Social History     Tobacco Use     Smoking status: Former Smoker     Packs/day: 0.50     Years: 25.00     Pack years: 12.50     Start date:      Quit date: 1985     Years since quittin.7     Smokeless tobacco: Never Used   Substance Use Topics     Alcohol use: Yes     Comment: 2 weekly      Wt Readings from Last 1 Encounters:   22 109.6 kg (241 lb 9.6 oz)        Anesthesia Evaluation            ROS/MED HX  ENT/Pulmonary:     (+) sleep apnea, moderate, uses CPAP, Intermittent, asthma Treatment: Inhaler prn,      Neurologic:  - neg neurologic ROS     Cardiovascular:     (+) hypertension-----PRITCHETT.     METS/Exercise Tolerance:     Hematologic:       Musculoskeletal:   (+) arthritis,     GI/Hepatic:     (+) GERD,     Renal/Genitourinary:     (+) renal disease, type: CRI,     Endo:     (+) Obesity,     Psychiatric/Substance Use:  - neg psychiatric ROS     Infectious Disease:  - neg infectious disease ROS     Malignancy:  - neg malignancy ROS     Other:            Physical Exam    Airway        Mallampati: II   TM distance: > 3 FB   Neck ROM: full   Mouth opening: > 3 cm    Respiratory Devices and Support         Dental  no notable dental history         Cardiovascular   cardiovascular exam normal          Pulmonary   pulmonary exam normal                OUTSIDE LABS:  CBC:   Lab Results   Component Value Date    WBC 6.3 2022    WBC 11.2 (H) 2022    HGB 11.1 (L) 2022    HGB 11.0 (L) 2022    HCT 35.8 2022    HCT 35.6 2022    PLT  181 04/12/2022     04/11/2022     BMP:   Lab Results   Component Value Date     04/14/2022     04/13/2022    POTASSIUM 4.3 04/14/2022    POTASSIUM 4.6 04/13/2022    CHLORIDE 104 04/14/2022    CHLORIDE 105 04/13/2022    CO2 29 04/14/2022    CO2 31 04/13/2022    BUN 21 04/14/2022    BUN 23 04/13/2022    CR 1.19 (H) 04/14/2022    CR 1.15 (H) 04/13/2022    GLC 83 04/14/2022    GLC 87 04/14/2022     COAGS:   Lab Results   Component Value Date    PTT 45 (H) 02/02/2012    INR 1.78 (H) 06/21/2021     POC:   Lab Results   Component Value Date     (H) 07/15/2015    HCG Negative 03/14/2011     HEPATIC:   Lab Results   Component Value Date    ALBUMIN 2.8 (L) 04/12/2022    PROTTOTAL 6.1 (L) 04/12/2022    ALT 14 04/12/2022    AST 13 04/12/2022    ALKPHOS 92 04/12/2022    BILITOTAL 0.9 04/12/2022     OTHER:   Lab Results   Component Value Date    PH 7.50 (H) 04/23/2009    LACT 1.1 04/10/2022    LAUREL 9.0 04/14/2022    PHOS 4.9 (H) 02/03/2012    MAG 2.2 04/14/2022    LIPASE 64 (L) 04/09/2022    AMYLASE 37 04/22/2009    TSH 1.01 06/08/2020    CRP 13.6 08/27/2015    SED 89 08/27/2015       Anesthesia Plan    ASA Status:  3      Anesthesia Type: General.     - Airway: ETT   Induction: Intravenous, Propofol.   Maintenance: Balanced.        Consents    Anesthesia Plan(s) and associated risks, benefits, and realistic alternatives discussed. Questions answered and patient/representative(s) expressed understanding.    - Discussed:     - Discussed with:  Patient      - Extended Intubation/Ventilatory Support Discussed: No.      - Patient is DNR/DNI Status: No    Use of blood products discussed: Yes.     - Discussed with: Patient.     - Consented: consented to blood products            Reason for refusal: other.     Postoperative Care    Pain management: IV analgesics.   PONV prophylaxis: Ondansetron (or other 5HT-3), Dexamethasone or Solumedrol     Comments:                Frankie Pineda MD

## 2022-04-14 NOTE — ANESTHESIA PROCEDURE NOTES
Airway       Patient location during procedure: OR  Staff -        CRNA: Malissa Livingston APRN CRNA       Performed By: CRNA  Consent for Airway        Urgency: elective  Indications and Patient Condition       Indications for airway management: shyam-procedural       Induction type:intravenous       Mask difficulty assessment: 2 - vent by mask + OA or adjuvant +/- NMBA    Final Airway Details       Final airway type: endotracheal airway       Successful airway: ETT - single and Oral  Endotracheal Airway Details        ETT size (mm): 7.0       Successful intubation technique: video laryngoscopy       VL Blade Size: Glidescope 3       Grade View of Cords: 1       Adjucts: stylet       Position: Right       Measured from: gums/teeth       Secured at (cm): 22       Bite block used: Soft    Post intubation assessment        Placement verified by: capnometry, equal breath sounds and chest rise        Number of attempts at approach: 1       Number of other approaches attempted: 0       Secured with: plastic tape       Ease of procedure: easy       Dentition: Lips/oral mucosa injury, Intact and Unchanged (upper lip knick while masking with OA on dry cracked lips)

## 2022-04-14 NOTE — ANESTHESIA CARE TRANSFER NOTE
Patient: Raven Carlin    Procedure: Procedure(s):  ENDOSCOPIC RETROGRADE CHOLANGIOPANCREATOGRAPHY, , sphincterotomy, stone extraction       Diagnosis: Cholelithiases [K80.20]  Diagnosis Additional Information: No value filed.    Anesthesia Type:   General     Note:    Oropharynx: oropharynx clear of all foreign objects  Level of Consciousness: awake  Oxygen Supplementation: face mask  Level of Supplemental Oxygen (L/min / FiO2): 4  Independent Airway: airway patency satisfactory and stable  Dentition: dentition unchanged  Vital Signs Stable: post-procedure vital signs reviewed and stable  Report to RN Given: handoff report given  Patient transferred to: PACU    Handoff Report: Identifed the Patient, Identified the Reponsible Provider, Reviewed the pertinent medical history, Discussed the surgical course, Reviewed Intra-OP anesthesia mangement and issues during anesthesia, Set expectations for post-procedure period and Allowed opportunity for questions and acknowledgement of understanding      Vitals:  Vitals Value Taken Time   BP     Temp     Pulse     Resp     SpO2         Electronically Signed By: KATIA Jimenez CRNA  April 14, 2022  1:31 PM

## 2022-04-14 NOTE — PROGRESS NOTES
Patient is refusing morning vitals, will reassess when patient is more awake.     Sasha Francisco RN on 4/14/2022 at 7:47 AM

## 2022-04-14 NOTE — ANESTHESIA POSTPROCEDURE EVALUATION
Patient: Raven Carlin    Procedure: Procedure(s):  ENDOSCOPIC RETROGRADE CHOLANGIOPANCREATOGRAPHY, , sphincterotomy, stone extraction       Anesthesia Type:  General    Note:     Postop Pain Control: Uneventful            Sign Out: Well controlled pain   PONV: No   Neuro/Psych: Uneventful            Sign Out: Acceptable/Baseline neuro status   Airway/Respiratory: Uneventful            Sign Out: Acceptable/Baseline resp. status   CV/Hemodynamics: Uneventful            Sign Out: Acceptable CV status; No obvious hypovolemia; No obvious fluid overload   Other NRE: NONE   DID A NON-ROUTINE EVENT OCCUR? No           Last vitals:  Vitals Value Taken Time   /97 04/14/22 1345   Temp     Pulse 57 04/14/22 1410   Resp 65 04/14/22 1410   SpO2 99 % 04/14/22 1408   Vitals shown include unvalidated device data.    Electronically Signed By: Frankie Pineda MD  April 14, 2022  2:16 PM

## 2022-04-14 NOTE — PROGRESS NOTES
"End of Shift Summary Note: 0700-1930  82-y/o female who presented to ED on 04/09/222 for evaluation of nausea, diffused pain and overall feeling ill. She was found to have elevated white count, fever and abnormal urinalysis with severe sepsis.      Pertinent Assessment   Alert and oriented x 4, chronic generalized pain on pain management. Vital signs stable, afebrile, on room air. Denies chest pain, chest pressure, no nausea.      Unable to complete Post-Op vital signs, patient refused blood pressure monitoring. Dr. Ball updated. Elevated SBP, unsure if truly elevated vs when obtaining BP patient is yelling and shaking arm and vehemently refusing.     Plan: [facility] following, S/p ERCP today 04/14. Anticipated laparoscopic cholecystectomy tomorrow 04/15 although patient is unsure if she will have the procedure tomorrow. NPO after midnight. Cont with Levofloxacin.      Discharge: Pending further workup. PT/OT/SW.     Vital signs:  Temp: 97.8  F (36.6  C) Temp src: Oral BP: (!) 168/77 Pulse: 52   Resp: 16 SpO2: 100 % O2 Device: Nasal cannula Oxygen Delivery: 2 LPM   Weight: 109.6 kg (241 lb 9.6 oz)  Estimated body mass index is 40.2 kg/m  as calculated from the following:    Height as of 6/21/21: 1.651 m (5' 5\").    Weight as of this encounter: 109.6 kg (241 lb 9.6 oz).    Sasha Francisco RN on 4/14/2022 at 6:19 PM    "

## 2022-04-14 NOTE — PROGRESS NOTES
"[facility]   General Surgery Progress Note          Assessment and Plan:   Assessment:   Choledocholithiasis and cholelithiasis      Plan:   -GI planning ERCP today  -NPO for procedure  -IV ABX: Levaquin for UTI  -Discussed cholecystectomy to prevent further choledocholithiasis  -Surgery scheduled for Friday afternoon with Dr. Winchester    ERCP completed, cannot see cystic duct or GB on films  Repeat labs in AM  Discussed surgery again with patient  If not ready for surgery tomorrow, consider discharge and elective cholecystectomy  Lj Winchester MD  4/14/2022 5:00 PM           Interval History:   Resting in bed, denies pain. Is NPO except for some meds. She had a BM and stated \"it was a 12 pound brick.\" She is voiding independently.         Physical Exam:   Blood pressure (!) 165/74, pulse 51, temperature 98.2  F (36.8  C), temperature source Oral, resp. rate 20, weight 109.6 kg (241 lb 9.6 oz), SpO2 96 %, not currently breastfeeding.    I/O last 3 completed shifts:  In: 240 [P.O.:240]  Out: 1100 [Urine:1100]    General: alert and no acute distress  Abdomen: obese, soft, ND, NT, +BS          Data:     Recent Labs   Lab Test 04/12/22  0745 04/11/22  0624 04/09/22  1730   HGB 11.1* 11.0* 13.0   WBC 6.3 11.2* 19.1*     Recent Labs   Lab 04/12/22  0745 04/11/22  0624 04/10/22  1606   AST 13 16 12   ALT 14 12 10   ALKPHOS 92 92 82   BILITOTAL 0.9 0.9 0.9     Recent Labs   Lab 04/09/22  1730   LIPASE 64*            Daniel Lackey PA-C    "

## 2022-04-14 NOTE — PLAN OF CARE
"Pt oriented, requested staff \"let me sleep without interruption.\" Pt informed refusal for complete head toe assessment, BP, PCDs, purewick change, pre-procedure quyen bath, bladder scan, etc (see flowsheets for details). Pt verbalized understanding of NPO @ 0000, BG 95, 87. Purewick in place. Slept overnight w/ home CPAP. Maintained contact iso precautions. Anticipatory ERCP 4/14. Will continue POC.     Goal Outcome Evaluation:        Overall Patient Progress: no change           "

## 2022-04-14 NOTE — PROGRESS NOTES
Patient returned from surgery. Per report patient tolerated procedures. Slightly hypertensive. Patient is not complaint with blood pressure monitoring. Will cont to encourage patient.     Sasha Francisco RN on 4/14/2022 at 2:28 PM

## 2022-04-15 ENCOUNTER — APPOINTMENT (OUTPATIENT)
Dept: PHYSICAL THERAPY | Facility: CLINIC | Age: 82
DRG: 872 | End: 2022-04-15
Payer: COMMERCIAL

## 2022-04-15 ENCOUNTER — APPOINTMENT (OUTPATIENT)
Dept: SURGERY | Facility: PHYSICIAN GROUP | Age: 82
End: 2022-04-15
Payer: COMMERCIAL

## 2022-04-15 LAB
ALBUMIN SERPL-MCNC: 2.7 G/DL (ref 3.4–5)
ALP SERPL-CCNC: 92 U/L (ref 40–150)
ALT SERPL W P-5'-P-CCNC: 14 U/L (ref 0–50)
ANION GAP SERPL CALCULATED.3IONS-SCNC: 6 MMOL/L (ref 3–14)
AST SERPL W P-5'-P-CCNC: 17 U/L (ref 0–45)
BASOPHILS # BLD AUTO: 0 10E3/UL (ref 0–0.2)
BASOPHILS NFR BLD AUTO: 0 %
BILIRUB DIRECT SERPL-MCNC: 0.3 MG/DL (ref 0–0.2)
BILIRUB SERPL-MCNC: 1.2 MG/DL (ref 0.2–1.3)
BUN SERPL-MCNC: 31 MG/DL (ref 7–30)
CALCIUM SERPL-MCNC: 9.1 MG/DL (ref 8.5–10.1)
CHLORIDE BLD-SCNC: 102 MMOL/L (ref 94–109)
CO2 SERPL-SCNC: 28 MMOL/L (ref 20–32)
CREAT SERPL-MCNC: 1.41 MG/DL (ref 0.52–1.04)
EOSINOPHIL # BLD AUTO: 0.1 10E3/UL (ref 0–0.7)
EOSINOPHIL NFR BLD AUTO: 1 %
ERYTHROCYTE [DISTWIDTH] IN BLOOD BY AUTOMATED COUNT: 14.5 % (ref 10–15)
GFR SERPL CREATININE-BSD FRML MDRD: 37 ML/MIN/1.73M2
GLUCOSE BLD-MCNC: 118 MG/DL (ref 70–99)
HCT VFR BLD AUTO: 34.9 % (ref 35–47)
HGB BLD-MCNC: 11.1 G/DL (ref 11.7–15.7)
IMM GRANULOCYTES # BLD: 0.1 10E3/UL
IMM GRANULOCYTES NFR BLD: 1 %
LIPASE SERPL-CCNC: 60 U/L (ref 73–393)
LYMPHOCYTES # BLD AUTO: 1.2 10E3/UL (ref 0.8–5.3)
LYMPHOCYTES NFR BLD AUTO: 10 %
MAGNESIUM SERPL-MCNC: 2.1 MG/DL (ref 1.6–2.3)
MCH RBC QN AUTO: 26.9 PG (ref 26.5–33)
MCHC RBC AUTO-ENTMCNC: 31.8 G/DL (ref 31.5–36.5)
MCV RBC AUTO: 85 FL (ref 78–100)
MONOCYTES # BLD AUTO: 1.1 10E3/UL (ref 0–1.3)
MONOCYTES NFR BLD AUTO: 8 %
NEUTROPHILS # BLD AUTO: 10.2 10E3/UL (ref 1.6–8.3)
NEUTROPHILS NFR BLD AUTO: 80 %
NRBC # BLD AUTO: 0 10E3/UL
NRBC BLD AUTO-RTO: 0 /100
PLATELET # BLD AUTO: 192 10E3/UL (ref 150–450)
POTASSIUM BLD-SCNC: 4.4 MMOL/L (ref 3.4–5.3)
PROT SERPL-MCNC: 6 G/DL (ref 6.8–8.8)
RBC # BLD AUTO: 4.13 10E6/UL (ref 3.8–5.2)
SODIUM SERPL-SCNC: 136 MMOL/L (ref 133–144)
WBC # BLD AUTO: 12.8 10E3/UL (ref 4–11)

## 2022-04-15 PROCEDURE — 97530 THERAPEUTIC ACTIVITIES: CPT | Mod: GP | Performed by: PHYSICAL THERAPIST

## 2022-04-15 PROCEDURE — 99231 SBSQ HOSP IP/OBS SF/LOW 25: CPT | Performed by: SURGERY

## 2022-04-15 PROCEDURE — 83690 ASSAY OF LIPASE: CPT | Performed by: INTERNAL MEDICINE

## 2022-04-15 PROCEDURE — 120N000001 HC R&B MED SURG/OB

## 2022-04-15 PROCEDURE — 36415 COLL VENOUS BLD VENIPUNCTURE: CPT | Performed by: SURGERY

## 2022-04-15 PROCEDURE — 250N000013 HC RX MED GY IP 250 OP 250 PS 637: Performed by: INTERNAL MEDICINE

## 2022-04-15 PROCEDURE — 83735 ASSAY OF MAGNESIUM: CPT | Performed by: INTERNAL MEDICINE

## 2022-04-15 PROCEDURE — 99233 SBSQ HOSP IP/OBS HIGH 50: CPT | Performed by: INTERNAL MEDICINE

## 2022-04-15 PROCEDURE — 82248 BILIRUBIN DIRECT: CPT | Performed by: SURGERY

## 2022-04-15 PROCEDURE — 85025 COMPLETE CBC W/AUTO DIFF WBC: CPT | Performed by: SURGERY

## 2022-04-15 PROCEDURE — 80053 COMPREHEN METABOLIC PANEL: CPT | Performed by: INTERNAL MEDICINE

## 2022-04-15 PROCEDURE — 250N000013 HC RX MED GY IP 250 OP 250 PS 637: Performed by: PHYSICIAN ASSISTANT

## 2022-04-15 RX ORDER — CALCIUM CARBONATE 500 MG/1
500 TABLET, CHEWABLE ORAL DAILY PRN
Status: DISCONTINUED | OUTPATIENT
Start: 2022-04-15 | End: 2022-04-18 | Stop reason: HOSPADM

## 2022-04-15 RX ADMIN — OXYCODONE AND ACETAMINOPHEN 1 TABLET: 7.5; 325 TABLET ORAL at 10:07

## 2022-04-15 RX ADMIN — FAMOTIDINE 10 MG: 10 TABLET ORAL at 22:12

## 2022-04-15 RX ADMIN — METOPROLOL TARTRATE 50 MG: 50 TABLET, FILM COATED ORAL at 22:33

## 2022-04-15 RX ADMIN — POLYETHYLENE GLYCOL 3350 17 G: 17 POWDER, FOR SOLUTION ORAL at 14:24

## 2022-04-15 RX ADMIN — FAMOTIDINE 10 MG: 10 TABLET ORAL at 08:38

## 2022-04-15 RX ADMIN — FLUTICASONE PROPIONATE 2 SPRAY: 50 SPRAY, METERED NASAL at 08:41

## 2022-04-15 RX ADMIN — MORPHINE SULFATE 15 MG: 15 TABLET, EXTENDED RELEASE ORAL at 08:38

## 2022-04-15 RX ADMIN — FUROSEMIDE 20 MG: 20 TABLET ORAL at 08:38

## 2022-04-15 RX ADMIN — MORPHINE SULFATE 15 MG: 15 TABLET, EXTENDED RELEASE ORAL at 22:12

## 2022-04-15 RX ADMIN — TOLTERODINE TARTRATE 2 MG: 1 TABLET, FILM COATED ORAL at 08:38

## 2022-04-15 RX ADMIN — CALCIUM CARBONATE (ANTACID) CHEW TAB 500 MG 500 MG: 500 CHEW TAB at 10:07

## 2022-04-15 RX ADMIN — METOPROLOL TARTRATE 50 MG: 50 TABLET, FILM COATED ORAL at 08:37

## 2022-04-15 RX ADMIN — APIXABAN 5 MG: 5 TABLET, FILM COATED ORAL at 22:12

## 2022-04-15 RX ADMIN — Medication 1 CAPSULE: at 08:38

## 2022-04-15 RX ADMIN — CETIRIZINE HYDROCHLORIDE 10 MG: 10 TABLET, FILM COATED ORAL at 08:37

## 2022-04-15 RX ADMIN — TOLTERODINE TARTRATE 2 MG: 1 TABLET, FILM COATED ORAL at 22:12

## 2022-04-15 RX ADMIN — OXYCODONE AND ACETAMINOPHEN 1 TABLET: 7.5; 325 TABLET ORAL at 22:39

## 2022-04-15 ASSESSMENT — ACTIVITIES OF DAILY LIVING (ADL)
ADLS_ACUITY_SCORE: 23

## 2022-04-15 NOTE — PROGRESS NOTES
"1500-1930: End of Shift Summary Note:   82-y/o female who presented to ED on 04/09/222 for evaluation of nausea, diffused pain and overall feeling ill. She was found to have elevated white count, fever and abnormal urinalysis with severe sepsis.      Pertinent Assessment   Alert and oriented x 4, chronic generalized pain on pain management. Refusing blood pressure monitoring. Otherwise bradycardia and afebrile. On room air, sats in the mid 90's. Tolerating diet.     Plan: S/p ERCP on 04/14. Laparoscopic cholecystectomy recommended but patient declined. Discussed with Dr. Ball this evening if anticoagulation can be resumed?, pharmacy consult added. Cont with Levofloxacin. Plan of care reviewed with patient, all questions answered.      Discharge: PT/OT/SW.     Vital signs:  Temp: 98.5  F (36.9  C) Temp src: Oral BP: 137/61 Pulse: 55   Resp: 18 SpO2: 100 % O2 Device: None (Room air) Oxygen Delivery: 2 LPM   Weight: 108.6 kg (239 lb 6.4 oz)  Estimated body mass index is 39.84 kg/m  as calculated from the following:    Height as of 6/21/21: 1.651 m (5' 5\").    Weight as of this encounter: 108.6 kg (239 lb 6.4 oz).    Sasha Francisco RN on 4/15/2022 at 6:55 PM    "

## 2022-04-15 NOTE — PROGRESS NOTES
Pt refuses surgery today, feels she needs more time to recover from yesterday's procedure  Discussed surgery with her  Will cancel surgery time for today  OK to resume diet  She can call our office to schedule when ready    From surgical standpoint - OK for discharge  Will sign off    Lj Winchester MD  4/15/2022 6:32 AM

## 2022-04-15 NOTE — PLAN OF CARE
Pt A/O x 4, pt refuses to have her vital signs taken. Pt denies headache, dizziness, N/V & SOB. Pt reports chronic leg (L) pain, shoulder pain and new throat pain. Administered scheduled meds - see MAR. Writer provided mouth cares which patients reports helped. Pt up Asst: 1-2 w/lift. Lung sounds diminished/clear, RA. Generalized bruising. GI following. IV Levaquin. Scheduled MS Contin, PRN Percocet. Pt refused to have AM labs drawn. Will continue with plan of care.

## 2022-04-15 NOTE — CONSULTS
"CLINICAL NUTRITION SERVICES  -  ASSESSMENT NOTE      Recommendations:   Continue diet as ordered.      Declined oral supplement, food focus.       MALNUTRITION:  % Weight Loss:  Weight loss does not meet criteria for malnutrition   % Intake:  Decreased intake does not meet criteria for malnutrition   Subcutaneous Fat Loss:  Upper arm region moderate depletion --> not using as indicator with only 1 region present   Muscle Loss:  Temporal region mild depletion, Clavicle bone region mild depletion and Acromion bone region mild depletion  Fluid Retention: Trace, LEs --> not using as an indicator given do not suspect masking true wt trends and may be above UBW slightly     Malnutrition Diagnosis: Patient does not meet two of the above criteria necessary for diagnosing malnutrition          REASON FOR ASSESSMENT  Raven Carlin is a 82 year old female seen by Registered Dietitian for LOS.    PMH of: Asthma, chronic pain, CKD stage 3, GERD, HTN, UTI.    Admit 2/2: Sepsis, UTI, choledocholithiasis.    NUTRITION HISTORY  - Information obtained from patient and chart.  - Diet at home: Regular verbalized.  - Usual intakes: Meals BID.  States she gets Mom's meals.  - Barriers to PO intakes: Denies decreased appetite or skipping of meals PTA.  - Use of oral supplements: None.  - Chewing/swallowing issues: Denied.  - Allergies: NKFA.      CURRENT NUTRITION ORDERS  Diet Order:     Regular    Current Intake/Tolerance:  % intakes since admission, many interruptions to diet while admitted given procedures.  Patient herself reports she's eating great, \"when they let me\".  She doesn't feel like she's skipping meals when compared to baseline.  She has been ordering meals consistently when has a diet order.      NUTRITION FOCUSED PHYSICAL ASSESSMENT FOR DIAGNOSING MALNUTRITION)  Yes    Obtained from Chart/Interdisciplinary Team:  - GI and surgery teams following, declined surgical intervention while inpatient  - No documentation of " "PI, WODONELL following for surgical wound  - Stooling patterns reviewed    ANTHROPOMETRICS  Height: 5' 5\"  Weight: 239 lbs 6.4 oz  Body mass index is 39.84 kg/m .  Weight Status:  Obesity Grade II BMI 35-39.9  Weight History:  Wt Readings from Last 10 Encounters:   04/15/22 108.6 kg (239 lb 6.4 oz)   06/21/21 122.5 kg (270 lb)   04/06/20 110.2 kg (243 lb)   01/21/20 116.6 kg (257 lb)   10/09/19 110.2 kg (243 lb)   06/12/19 110.2 kg (243 lb)   02/05/16 115.3 kg (254 lb 3.2 oz)   02/01/16 117.1 kg (258 lb 3.2 oz)   01/29/16 115.3 kg (254 lb 3.2 oz)   01/22/16 118.9 kg (262 lb 3.2 oz)     - Denies wt loss PTA.  ?Component of fluid shifts, some outliers?  Currently with trace, LE edema.      LABS  Labs reviewed:  Electrolytes  Potassium (mmol/L)   Date Value   04/15/2022 4.4   04/14/2022 4.3   04/13/2022 4.6   06/21/2021 4.6   06/17/2020 4.4   03/12/2020 4.2     Phosphorus (mg/dL)   Date Value   02/03/2012 4.9 (H)    Blood Glucose  Glucose (mg/dL)   Date Value   04/15/2022 118 (H)   04/14/2022 83   04/13/2022 90   04/12/2022 88   04/11/2022 78   06/21/2021 132 (H)   06/17/2020 115 (H)   03/12/2020 128 (H)   01/18/2016 79   01/05/2016 90     GLUCOSE BY METER POCT (mg/dL)   Date Value   04/14/2022 87   04/14/2022 95    Inflammatory Markers  CRP Inflammation   Date Value   08/27/2015 13.6   07/15/2015 30.6 mg/L (H)   01/30/2012 51.0 mg/L (H)     WBC (10e9/L)   Date Value   06/21/2021 12.8 (H)   06/17/2020 6.8   03/12/2020 12.0 (H)     WBC Count (10e3/uL)   Date Value   04/15/2022 12.8 (H)   04/12/2022 6.3   04/11/2022 11.2 (H)     Albumin (g/dL)   Date Value   04/15/2022 2.7 (L)   04/12/2022 2.8 (L)   04/11/2022 2.8 (L)   06/21/2021 2.3 (L)   02/01/2012 3.5   04/25/2009 3.2 (L)      Magnesium (mg/dL)   Date Value   04/15/2022 2.1   04/14/2022 2.2   04/13/2022 2.1   02/03/2012 1.9     Sodium (mmol/L)   Date Value   04/15/2022 136   04/14/2022 136   04/13/2022 137   06/21/2021 134   06/17/2020 137   03/12/2020 136    Renal  Urea " Nitrogen (mg/dL)   Date Value   04/15/2022 31 (H)   04/14/2022 21   04/13/2022 23   06/21/2021 44 (H)   06/17/2020 35 (H)   03/12/2020 36 (H)     Creatinine (mg/dL)   Date Value   04/15/2022 1.41 (H)   04/14/2022 1.19 (H)   04/13/2022 1.15 (H)   06/21/2021 1.96 (H)   06/17/2020 1.15 (H)   03/12/2020 1.23 (H)     Additional  Triglycerides (mg/dL)   Date Value   04/23/2009 213 (H)     Ketones Urine (mg/dL)   Date Value   04/09/2022 10  (A)   06/21/2021 10 (A)        B/P: 137/61, T: 97.1, P: 51, R: 18      MEDICATIONS  Medications reviewed:    ceFAZolin  2 g Intravenous Pre-Op/Pre-procedure x 1 dose     cetirizine  10 mg Oral Daily     famotidine  10 mg Oral BID     fluticasone  2 spray Both Nostrils Daily     furosemide  20 mg Oral Daily     lactobacillus rhamnosus (GG)  1 capsule Oral Daily     [START ON 4/16/2022] levofloxacin  750 mg Oral Q48H     metoprolol tartrate  50 mg Oral BID     morphine  15 mg Oral Q12H     sodium chloride (PF)  3 mL Intracatheter Q8H     tolterodine  2 mg Oral BID             ASSESSED NUTRITION NEEDS PER APPROVED PRACTICE GUIDELINES:    Dosing Weight 109 kg   Estimated Energy Needs: 15-20 Kcal/Kg  Justification: maintenance  Estimated Protein Needs: 1-1.2 g pro/Kg  Justification: preservation of lean body mass  Estimated Fluid Needs: per MD      NUTRITION DIAGNOSIS:  Inadequate oral intake related to interruptions to diet for procedures as evidenced by potential to meet <100% needs on average during admit.     NUTRITION INTERVENTIONS  Recommendations / Nutrition Prescription  Continue diet as ordered.      Declined oral supplement, food focus.        Implementation  Nutrition education: Provided education on above.    Collaboration and Referral of Nutrition care: An RD discussed POC with team during rounds. This writer completed assessment and discussed POC with RN.    Nutrition Goals  Patient to consume at least 50-75% of meals BID-TID to show improvement in PO trends.      MONITORING  AND EVALUATION:  Progress towards goals will be monitored and evaluated per protocol and Practice Guidelines          Lin Flores RDN, LD  Clinical Dietitian  3rd floor/ICU: [phone]  All other floors: [phone]  Weekend/holiday: [phone]  Office: [phone]

## 2022-04-15 NOTE — PROGRESS NOTES
"GASTROENTEROLOGY PROGRESS NOTE     CC:   Nausea     SUBJECTIVE:  Pt is reporting epigastric pain and thinks it is related to her typical \"stomach issues.\"  She denies that it feels any different than usual.  She doesn't want to go home if she feels like this.  Requesting TUMs and already received famotidine.     OBJECTIVE:  General Appearance:  .   /61 (BP Location: Left arm)   Pulse 51   Temp 97.1  F (36.2  C) (Axillary)   Resp 18   Wt 108.6 kg (239 lb 6.4 oz)   SpO2 100%   BMI 39.84 kg/m    Temp (24hrs), Av.1  F (36.7  C), Min:97.8  F (36.6  C), Max:98.5  F (36.9  C)    Patient Vitals for the past 72 hrs:   Weight   04/15/22 0523 108.6 kg (239 lb 6.4 oz)   22 0609 109.6 kg (241 lb 9.6 oz)       Intake/Output Summary (Last 24 hours) at 2022 1110  Last data filed at 2022 0900  Gross per 24 hour   Intake 240 ml   Output 1350 ml   Net -1110 ml       PHYSICAL EXAM  General: alert, oriented, stomach discomfort  SKIN: no suspicious lesions, rashes, jaundice, or spider angiomas, multiple bruises from lab draws and IV's  EYES\MOUTH: No scleral icterus, dry lips with flaking skin, complaints of mouth sores but non visualized  RESPIRATORY: Non labored breathing  GASTROINTESTINAL:Abdomen soft and LUQ slight tender on palpation, + BS x 4   PSYCH: no abnormal anxiety/depression     Additional Comments:  ROS, FH, SH: See initial GI consult for details.     I have reviewed the patient's new clinical lab results:     Recent Labs   Lab Test 22  0745 22  0624 22  1730 21  1354 07/27/15  0000 07/18/15  0830 07/17/15  0620   WBC 6.3 11.2* 19.1* 12.8*   < > 7.1 6.4   HGB 11.1* 11.0* 13.0 6.6*   < > 8.5* 8.8*   MCV 86 87 85 88   < > 99 100    169 200 173   < > 157 137*   INR  --   --   --  1.78*  --  2.37* 1.78*    < > = values in this interval not displayed.     Recent Labs   Lab Test 22  0820 22  0744 22  0745   POTASSIUM 4.3 4.6 4.3   CHLORIDE 104 105 " 105   CO2 29 31 27   BUN 21 23 28   ANIONGAP 3 1* 3     Recent Labs   Lab Test 04/12/22  0745 04/11/22  0624 04/10/22  1606 04/09/22  1940 04/09/22  1730 04/09/22  1730 06/21/21  1354   ALBUMIN 2.8* 2.8* 2.5*  --    < > 3.4 2.3*   BILITOTAL 0.9 0.9 0.9  --    < > 1.4* 1.2   ALT 14 12 10  --    < > 13 12   AST 13 16 12  --    < > 14 13   PROTEIN  --   --   --  30 *  --   --  10*   LIPASE  --   --   --   --   --  64*  --     < > = values in this interval not displayed.        Imaging and procedures:  4/14/22EGD                                                                                    Impression:           - Choledocholithiasis was found. Complete removal                             was accomplished by biliary sphincterotomy and                             balloon extraction.   CT ABDOMEN PELVIS W CONTRAST 4/9/2022  IMPRESSION:   1.  Choledocholithiasis with at least 5 stones in the common bile duct. No associated biliary dilatation. Unclear whether this is related to the patient's acute symptoms. Correlation with biliary markers is recommended. No evidence of pancreatitis.  2.  Cholelithiasis.  3.  Diffuse fatty infiltration of the liver.  4.  A 1.5 cm right adrenal nodule is present and is otherwise not well evaluated due to single phase contrast. In retrospect, it was likely present in 2009 when it measured 0.9 cm. This is likely a benign adenoma. If the patient has a history of   malignancy, a dedicated adrenal CT or a follow-up scan in one year could be considered. Otherwise, no specific imaging follow-up is recommended. Laboratory or clinical evaluation can be considered if the patient has signs or symptoms of a   hyperfunctioning adrenal nodule.    Problem list pertaining to GI:  Cholelithiasis  Choledocholithiasis  Fatty liver    Assessment: This is a 82 y-o female who was admitted on 4/9/22 with sepsis and pyuria.  PMH of A fib and on Eliquis, asthma, chronic pain and morbid obesity, CKD, GERD, SAURABH.   "CT scan showed incidental CBD stone. Hence, GI was consulted. Dr. Meredith consulted her on 4/10. He thought that her current symptoms are unrelated to CBD stones.     Surgery cancelled per pt request.  Surgery signed off and planning to do lap kirby as outpatient.  LFT's\lipase are ordered and need to be drawn; pt was declining. Afebrile.  New abdominal discomfort possible pancreatitis from ERCP.  Mostly like dyspepsia as pt thinks this feels the same as her usual \"stomach problems.\"    Plan:  - regular diet  - likely discharge today if she starts to feel better after antiacids  - lipase and hepatic panel wnl  - willl sign off    I will discuss with Dr. Hood.     Time spent: 30 minutes, greater than 50% of the visit was spent in counseling/coordination of care.     Adia Benton PA-C  [facility] ([facility])  [phone]      "

## 2022-04-15 NOTE — PROGRESS NOTES
[facility]  Hospitalist Progress Note  Salena Ball MD 04/15/2022    Reason for Stay (Diagnosis): Sepsis secondary to UTI, choledocholithiasis         Assessment and Plan:      Summary of Stay: Raven Carlin is a 82 year old female with history of A. fib on Eliquis, asthma, chronic pain syndrome on opiates, morbid obesity, CKD stage IIIb, HLD, GERD, gastric ulcer, HTN, ESBL E. coli, and SAURABH on CPAP who was admitted on 4/9/2022 for malaise and nausea where she was found to have fever in the ER and leukocytosis to 19.  Initial urinalysis showing pyuria consistent with UTI.  She also had an elevated lactic acid and PAULINE consistent with severe sepsis.  She was started on vancomycin IV and meropenem given history of ESBL UTI and sepsis.  Her bilirubin was slightly elevated and a CT the abdomen pelvis did show 5 stones in the CBD although she did not have any abdominal pain to suggest ascending cholangitis.  [facility] was consulted.  Had planned on ERCP day after admission, however she has received her Eliquis before coming in and that morning so ERCP postponed.  Urine culture growing ESBL E. coli.  Vancomycin discontinued.  Consulted PT for weakness.  IV meropenem changed to IV levofloxacin based on sensitivities.  Clinically much improved from UTI.  Holding anticoagulation for ERCP today.      Cholecystectomy was scheduled for 4/15/2022 but patient declined.  Complains of abdominal discomfort.      Problem List/Assessment and Plan:   Severe sepsis secondary to UTI, much improved: Presenting with diffuse pain, nausea, and generalized malaise.  Initial temperature 100.8  F.  Leukocytosis at 19.1.  Urinalysis shows >182 WBCs, large LE, and positive nitrite consistent with UTI as the source of infection although she does not report dysuria or flank pain, but has had frequency urinary.  Lactic acid elevated at 2.8 and PAULINE consistent with severe sepsis.  She does have a history of ESBL UTIs so she is  on meropenem and vancomycin for sepsis.  She was found to have 5 CBD stones on CT, however she has not had any abdominal pain and her bilirubin is only slightly elevated at 1.4 otherwise LFTs normal.  Ascending cholangitis secondary to choledocholithiasis is much less likely.  -Clinically back to baseline, still needs antibiotics to fully treat infection  -Vancomycin discontinued 4/11  -IV meropenem changed to IV levofloxacin 750 mg every 48 hours based on creatinine clearance on 4/12, moved to p.o. soon if tolerating diet  -Urine culture growing ESBL E. coli that is sensitive to Carbapenem's, fluoroquinolones, and Bactrim.  Has Bactrim allergy  -Blood cultures NGTD    Choledocholithiasis, cholelithiasis: As above presenting with feeling unwell and fevers that is most likely secondary to UTI.  Her CT did showcholelithiasis along with 5 small stones in the common bile duct.  Bilirubin is 1.4 otherwise alk phos and transaminases are WNL.  She has not had any abdominal pain previously or now and she is nontender on exam so this seems less likely etiology for her presentation with sepsis.  -[facility] consulted, agree that urosepsis more likely, but had planned for ERCP as this does need to be addressed at some point.  Patient had her Eliquis night before admission and morning afterwards so ERCP deferred until anticoagulation held for 72 hours.  It is unlikely that patient will follow-up for this per her daughter in speaking with the patient she is quite anxious regarding the procedures and I agree she is unlikely to return for follow-up.    -[facility] can perform ERCP on 4/14,   -  N.p.o. midnight Wednesday night.  Holding anticoagulation.  -As her anticoagulation will be held for this procedure and her likely to following up for laparoscopic cholecystectomy -cholecystectomy scheduled for 4/15/2022 but patient declined    A. fib: Chronically anticoagulated with Eliquis.  Also on metoprolol tartrate 50 mg  daily.  -Hold Eliquis with plans for ERCP on Thursday 4/14.  Needs to be held 72 hours prior to procedure.  Last dose morning 4/10    PAULINE on CKD stage IIIb: Variable recent creatinine, baseline likely 1.3.  Presenting with PAULINE with creatinine 1.7 secondary to severe sepsis.  Resolved.  -Restart p.o. Lasix 20 mg daily  -Creatinine trending up today    Chronic pain syndrome  Physical deconditioning  Chronic leg wound: Resume her morphine sulfate 15 mg every 12 hours and Percocet 7.5-325 mg, but ordered as every 6 hours as needed instead of scheduled twice daily.  Chronic left leg pain following previous surgeries.  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  Has some chronic skin changes and wound to the back of the left knee.  -WOC RN consulted  -PT consulted    SAURABH: Continue CPAP.    Morbid obesity: BMI 40.1.    Overactive bladder: Continue tolterodine 2 mg daily.    HTN: Resume PTA metoprolol tartrate 25 mg twice daily and Lasix 20 mg daily.  Has trace bilateral edema on exam.  -Continue metoprolol  -Resume Lasix 20 mg daily    HLD: not currently on meds.    GERD, history gastric ulcer: Resume famotidine twice daily.    DVT Prophylaxis: Holding Eliquis for procedure, PCD's  Code Status: Full Code  FEN: regular diet  Discharge Dispo: In 1 to 2 days if able to ambulate .  She is not interested in TCU, but will need to demonstrate that she is able to ambulate with a walker to the bathroom and back at bare minimum to return home with home care.  Estimated Disch Date / # of Days until Disch: Possibly 4/16/2022 if patient is cleared by physical therapy    Clinically Significant Risk Factors Present on Admission                                Interval History (Subjective):      Reviewed chart underwent ERCP toda complains of abdominal discomfort.  Feels epigastric discomfort 7 out of 10 feels that she cannot get up from the bed because of the pain.  She would like to go home if able to ambulate.  But not today as she has  significant pain and cannot even get out of bed.  Is what she says.  She has refused to participate in physical therapy due to abdominal discomfort.  No fever no chills no nausea no vomiting.  Patient declined cholecystectomy earlier today.  She is now ready for the procedure.  She would like to think about it and will do it as an outpatient.  More than 10 point review systems was carried out was otherwise negative               Physical Exam:      Last Vital Signs:  /61 (BP Location: Left arm)   Pulse 51   Temp 97.1  F (36.2  C) (Axillary)   Resp 18   Wt 108.6 kg (239 lb 6.4 oz)   SpO2 100%   BMI 39.84 kg/m      Intake/Output Summary (Last 24 hours) at 4/12/2022 1258  Last data filed at 4/12/2022 0639  Gross per 24 hour   Intake --   Output 1250 ml   Net -1250 ml       Constitutional: Awake, NAD   Eyes: sclera white   HEENT:   MMM  Respiratory: On her home CPAP.  Anterior lungs cta bilaterally, no focal crackles or wheeze  Cardiovascular: RRR.  No murmur   GI: Obese, nontender to palpation, bowel sounds present, not distended  Skin: Posterior left knee chronic appearing skin changes, no change  Musculoskeletal/extremities: Trace bilateral lower extremity edema  Neurologic: A&O, answers questions appropriately  Psychiatric: Anxious when talking about ERCP and possible surgery         Medications:      All current medications were reviewed with changes reflected in problem list.         Data:      All new lab and imaging data was reviewed.   Labs:  Recent Labs   Lab 04/15/22  1105 04/12/22  0745 04/11/22  0624   WBC 12.8* 6.3 11.2*   HGB 11.1* 11.1* 11.0*   HCT 34.9* 35.8 35.6   MCV 85 86 87    181 169     Recent Labs   Lab 04/15/22  1106 04/14/22  0820 04/14/22  0608 04/14/22  0206 04/13/22  0744 04/12/22  0745 04/11/22  0624    136  --   --  137 135 136   POTASSIUM 4.4 4.3  --   --  4.6 4.3 4.1   CHLORIDE 102 104  --   --  105 105 105   CO2 28 29  --   --  31 27 27   ANIONGAP 6 3  --   --   1* 3 4   * 83 87   < > 90 88 78   BUN 31* 21  --   --  23 28 32*   CR 1.41* 1.19*  --   --  1.15* 1.17* 1.32*   GFRESTIMATED 37* 46*  --   --  48* 47* 40*   LAUREL 9.1 9.0  --   --  8.8 8.8 9.0   MAG 2.1 2.2  --   --  2.1 2.1 2.1   PROTTOTAL 6.0*  --   --   --   --  6.1* 6.2*   ALBUMIN 2.7*  --   --   --   --  2.8* 2.8*   BILITOTAL 1.2  --   --   --   --  0.9 0.9   ALKPHOS 92  --   --   --   --  92 92   AST 17  --   --   --   --  13 16   ALT 14  --   --   --   --  14 12    < > = values in this interval not displayed.     All cultures:  Recent Labs   Lab 04/09/22  1940 04/09/22  1836 04/09/22  1803   CULTURE >100,000 CFU/mL Escherichia coli ESBL* No Growth No Growth     Imaging:   None today      Salena Ball MD

## 2022-04-16 LAB
ANION GAP SERPL CALCULATED.3IONS-SCNC: 5 MMOL/L (ref 3–14)
BUN SERPL-MCNC: 31 MG/DL (ref 7–30)
CALCIUM SERPL-MCNC: 9 MG/DL (ref 8.5–10.1)
CHLORIDE BLD-SCNC: 103 MMOL/L (ref 94–109)
CO2 SERPL-SCNC: 28 MMOL/L (ref 20–32)
CREAT SERPL-MCNC: 1.47 MG/DL (ref 0.52–1.04)
ERYTHROCYTE [DISTWIDTH] IN BLOOD BY AUTOMATED COUNT: 14.8 % (ref 10–15)
GFR SERPL CREATININE-BSD FRML MDRD: 35 ML/MIN/1.73M2
GLUCOSE BLD-MCNC: 123 MG/DL (ref 70–99)
HCT VFR BLD AUTO: 35.9 % (ref 35–47)
HGB BLD-MCNC: 11.2 G/DL (ref 11.7–15.7)
MAGNESIUM SERPL-MCNC: 2.1 MG/DL (ref 1.6–2.3)
MCH RBC QN AUTO: 27.1 PG (ref 26.5–33)
MCHC RBC AUTO-ENTMCNC: 31.2 G/DL (ref 31.5–36.5)
MCV RBC AUTO: 87 FL (ref 78–100)
PLATELET # BLD AUTO: 203 10E3/UL (ref 150–450)
POTASSIUM BLD-SCNC: 4.5 MMOL/L (ref 3.4–5.3)
RBC # BLD AUTO: 4.14 10E6/UL (ref 3.8–5.2)
SODIUM SERPL-SCNC: 136 MMOL/L (ref 133–144)
WBC # BLD AUTO: 10.5 10E3/UL (ref 4–11)

## 2022-04-16 PROCEDURE — 36415 COLL VENOUS BLD VENIPUNCTURE: CPT | Performed by: INTERNAL MEDICINE

## 2022-04-16 PROCEDURE — 250N000013 HC RX MED GY IP 250 OP 250 PS 637: Performed by: INTERNAL MEDICINE

## 2022-04-16 PROCEDURE — 120N000001 HC R&B MED SURG/OB

## 2022-04-16 PROCEDURE — 83735 ASSAY OF MAGNESIUM: CPT | Performed by: INTERNAL MEDICINE

## 2022-04-16 PROCEDURE — 80048 BASIC METABOLIC PNL TOTAL CA: CPT | Performed by: INTERNAL MEDICINE

## 2022-04-16 PROCEDURE — 99233 SBSQ HOSP IP/OBS HIGH 50: CPT | Performed by: INTERNAL MEDICINE

## 2022-04-16 PROCEDURE — 85014 HEMATOCRIT: CPT | Performed by: INTERNAL MEDICINE

## 2022-04-16 RX ADMIN — METOPROLOL TARTRATE 50 MG: 50 TABLET, FILM COATED ORAL at 10:30

## 2022-04-16 RX ADMIN — MORPHINE SULFATE 15 MG: 15 TABLET, EXTENDED RELEASE ORAL at 21:01

## 2022-04-16 RX ADMIN — APIXABAN 5 MG: 5 TABLET, FILM COATED ORAL at 10:30

## 2022-04-16 RX ADMIN — POLYETHYLENE GLYCOL 3350 17 G: 17 POWDER, FOR SOLUTION ORAL at 12:41

## 2022-04-16 RX ADMIN — CETIRIZINE HYDROCHLORIDE 10 MG: 10 TABLET, FILM COATED ORAL at 10:30

## 2022-04-16 RX ADMIN — METOPROLOL TARTRATE 50 MG: 50 TABLET, FILM COATED ORAL at 21:01

## 2022-04-16 RX ADMIN — TOLTERODINE TARTRATE 2 MG: 1 TABLET, FILM COATED ORAL at 21:02

## 2022-04-16 RX ADMIN — TOLTERODINE TARTRATE 2 MG: 1 TABLET, FILM COATED ORAL at 10:31

## 2022-04-16 RX ADMIN — FAMOTIDINE 10 MG: 10 TABLET ORAL at 21:02

## 2022-04-16 RX ADMIN — OXYCODONE AND ACETAMINOPHEN 1 TABLET: 7.5; 325 TABLET ORAL at 21:41

## 2022-04-16 RX ADMIN — APIXABAN 5 MG: 5 TABLET, FILM COATED ORAL at 21:03

## 2022-04-16 RX ADMIN — LEVOFLOXACIN 750 MG: 250 TABLET, FILM COATED ORAL at 14:24

## 2022-04-16 RX ADMIN — FAMOTIDINE 10 MG: 10 TABLET ORAL at 10:31

## 2022-04-16 RX ADMIN — OXYCODONE AND ACETAMINOPHEN 1 TABLET: 7.5; 325 TABLET ORAL at 12:41

## 2022-04-16 RX ADMIN — FLUTICASONE PROPIONATE 2 SPRAY: 50 SPRAY, METERED NASAL at 10:30

## 2022-04-16 RX ADMIN — MORPHINE SULFATE 15 MG: 15 TABLET, EXTENDED RELEASE ORAL at 10:30

## 2022-04-16 RX ADMIN — FUROSEMIDE 20 MG: 20 TABLET ORAL at 10:31

## 2022-04-16 RX ADMIN — Medication 1 CAPSULE: at 10:31

## 2022-04-16 ASSESSMENT — ACTIVITIES OF DAILY LIVING (ADL)
ADLS_ACUITY_SCORE: 23
ADLS_ACUITY_SCORE: 23
ADLS_ACUITY_SCORE: 27
ADLS_ACUITY_SCORE: 23
ADLS_ACUITY_SCORE: 24
ADLS_ACUITY_SCORE: 23
ADLS_ACUITY_SCORE: 23
ADLS_ACUITY_SCORE: 24
ADLS_ACUITY_SCORE: 24
ADLS_ACUITY_SCORE: 28
ADLS_ACUITY_SCORE: 24
ADLS_ACUITY_SCORE: 24
ADLS_ACUITY_SCORE: 28
ADLS_ACUITY_SCORE: 24
ADLS_ACUITY_SCORE: 23

## 2022-04-16 NOTE — PLAN OF CARE
Assumed Cares: [id number]  DX: UTI,   Tele: na  A&O x4  Activity: A-2 lift  Diet: Reg  VSS: Q8  O2: RA  BG: na  PIV: SL RA  Pain: intermittent   GI/: purewick in place  Labs: refuses lab draws unless experienced , Creatine slowing increasing 1.41, 1.47  Plan: PT, OT, SW pt is refusing pt at times  Discharge: TCU recommended pt says she has cares at home and doesn't need TCU.     Redness in abd folds

## 2022-04-16 NOTE — PROGRESS NOTES
Patient alert and oriented. Assist 2 w/lift. Pain managed with prn percocet. Refusing BP at times. Lung sounds dim/clear. Foam dressing to posterior thigh, flaky skin surrounding area. Sween cream applied to intact areas of BLE. Assistance provided with weight/shift and positioning, patient able to make independent movements as well in bed. Air mattress in place. Will continue to monitor per plan of care.    /52 (BP Location: Other (Comment))   Pulse 62   Temp 97.9  F (36.6  C) (Oral)   Resp 21   Wt 108.6 kg (239 lb 6.4 oz)   SpO2 98%   BMI 39.84 kg/m

## 2022-04-16 NOTE — PROGRESS NOTES
Cross cover note    Patient not letting the staff to check blood pressure.  Okay to continue metoprolol.    Geovani Everett MD  Internal Medicine Hospitalist  Pager: [phone]

## 2022-04-16 NOTE — PROGRESS NOTES
Provider paged: Patient refusing BP checks, has not been checked since last christopher. OK to give without BP check? Unable to check for parameters. Please advise. Thanks!

## 2022-04-16 NOTE — PLAN OF CARE
Pt A/O x 4, VSS (pt has home BP wrist monitor for BPs). Pt denies headache, dizziness, N/V & SOB. Pt reports chronic leg (L) pain, shoulder pain, throat pain and new ear pain. Administered scheduled meds - see MAR. Writer provided mouth cares which patients reports helped. Pt up Asst: 1-2 w/lift. Lung sounds diminished/clear, RA. Generalized bruising. GI & PT following. PO Levaquin. Scheduled MS Contin, PRN Percocet. Will continue with plan of care.    Pt agrees to only experienced Lab techs for lab draws (Qi or Kasie).    Pt reports new ear pain (pt requesting ear drops - Fluocinolone) - MD aware.

## 2022-04-16 NOTE — PROGRESS NOTES
[facility]  Hospitalist Progress Note  Salena Ball MD 04/16/2022    Reason for Stay (Diagnosis): Sepsis secondary to UTI, choledocholithiasis         Assessment and Plan:      Summary of Stay: Raven Carlin is a 82 year old female with history of A. fib on Eliquis, asthma, chronic pain syndrome on opiates, morbid obesity, CKD stage IIIb, HLD, GERD, gastric ulcer, HTN, ESBL E. coli, and SAURABH on CPAP who was admitted on 4/9/2022 for malaise and nausea where she was found to have fever in the ER and leukocytosis to 19.  Initial urinalysis showing pyuria consistent with UTI.  She also had an elevated lactic acid and PAULINE consistent with severe sepsis.  She was started on vancomycin IV and meropenem given history of ESBL UTI and sepsis.  Her bilirubin was slightly elevated and a CT the abdomen pelvis did show 5 stones in the CBD although she did not have any abdominal pain to suggest ascending cholangitis.  [facility] was consulted.  Had planned on ERCP day after admission, however she has received her Eliquis before coming in and that morning so ERCP postponed.  Urine culture growing ESBL E. coli.  Vancomycin discontinued.  Consulted PT for weakness.  IV meropenem changed to IV levofloxacin based on sensitivities.  Clinically much improved from UTI.  Holding anticoagulation for ERCP today.      4/15/2022: cholecystectomy was scheduled for 4/15/2022 but patient declined.  Complains of abdominal discomfort.    4/16/2022: Complains of severe left ear pain.  She says she has left ear pain because of dryness she has this at home before which got treated with Synalar eardrops.  She is now willing to participate in physical therapy.  She feels she is not going to stand up today.  Discussed that patient needs to be ambulating and transferring before we can safely discharge her to home      Problem List/Assessment and Plan:   Severe sepsis secondary to UTI, much improved: Presenting with diffuse pain,  nausea, and generalized malaise.  Initial temperature 100.8  F.  Leukocytosis at 19.1.  Urinalysis shows >182 WBCs, large LE, and positive nitrite consistent with UTI as the source of infection although she does not report dysuria or flank pain, but has had frequency urinary.  Lactic acid elevated at 2.8 and PAULINE consistent with severe sepsis.  She does have a history of ESBL UTIs so she is on meropenem and vancomycin for sepsis.  She was found to have 5 CBD stones on CT, however she has not had any abdominal pain and her bilirubin is only slightly elevated at 1.4 otherwise LFTs normal.  Ascending cholangitis secondary to choledocholithiasis is much less likely.  -Clinically back to baseline, still needs antibiotics to fully treat infection  -Vancomycin discontinued 4/11  -IV meropenem changed to IV levofloxacin 750 mg every 48 hours based on creatinine clearance on 4/12, moved to p.o. soon if tolerating diet  -Urine culture growing ESBL E. coli that is sensitive to Carbapenem's, fluoroquinolones, and Bactrim.  Has Bactrim allergy  -Blood cultures NGTD    Choledocholithiasis, cholelithiasis: As above presenting with feeling unwell and fevers that is most likely secondary to UTI.  Her CT did showcholelithiasis along with 5 small stones in the common bile duct.  Bilirubin is 1.4 otherwise alk phos and transaminases are WNL.  She has not had any abdominal pain previously or now and she is nontender on exam so this seems less likely etiology for her presentation with sepsis.  -[facility] consulted, agree that urosepsis more likely, but had planned for ERCP as this does need to be addressed at some point.  Patient had her Eliquis night before admission and morning afterwards so ERCP deferred until anticoagulation held for 72 hours.  It is unlikely that patient will follow-up for this per her daughter in speaking with the patient she is quite anxious regarding the procedures and I agree she is unlikely to return for  follow-up.    -[facility] can perform ERCP on 4/14,   -  N.p.o. midnight Wednesday night.  Holding anticoagulation.  -As her anticoagulation will be held for this procedure and her likely to following up for laparoscopic cholecystectomy -cholecystectomy scheduled for 4/15/2022 but patient declined    A. fib: Chronically anticoagulated with Eliquis.  Also on metoprolol tartrate 50 mg daily.  -Hold Eliquis with plans for ERCP on Thursday 4/14.  Needs to be held 72 hours prior to procedure.  Last dose morning 4/10    PAULINE on CKD stage IIIb: Variable recent creatinine, baseline likely 1.3.  Presenting with PAULINE with creatinine 1.7 secondary to severe sepsis.  Resolved.  -Restart p.o. Lasix 20 mg daily  -Creatinine trending  Up  Labs pending    Chronic pain syndrome  Physical deconditioning  Chronic leg wound: Resume her morphine sulfate 15 mg every 12 hours and Percocet 7.5-325 mg, but ordered as every 6 hours as needed instead of scheduled twice daily.  Chronic left leg pain following previous surgeries.  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  Has some chronic skin changes and wound to the back of the left knee.  -WOC RN consulted  -PT consulted    SAURABH: Continue CPAP.    Morbid obesity: BMI 40.1.    Overactive bladder: Continue tolterodine 2 mg daily.    HTN: Resume PTA metoprolol tartrate 25 mg twice daily and Lasix 20 mg daily.  Has trace bilateral edema on exam.  -Continue metoprolol  -Resume Lasix 20 mg daily    HLD: not currently on meds.    GERD, history gastric ulcer: Resume famotidine twice daily.    DVT Prophylaxis: Holding Eliquis for procedure, PCD's  Code Status: Full Code  FEN: regular diet  Discharge Dispo: In 1 to 2 days if able to ambulate .  She is not interested in TCU, but will need to demonstrate that she is able to ambulate with a walker to the bathroom and back at bare minimum to return home with home care.  Patient is not cooperating and refusing physical therapy to demonstrate that she could  safely transfer at home.  Estimated Disch Date / # of Days until Disch: Medically stable unclear if she is physically strong enough to transfer safely at home    Clinically Significant Risk Factors Present on Admission                                Interval History (Subjective):      Reviewed chart, complains of ear ache.  Every time she swallows.  She says she has similar symptoms at home and uses some eardrops at home and requesting the eardrops to be renewed here.  Patient says that she has been able to transfer out of bed and can go home.  But will not get up today and does not feel like she could go home today because of severe ear ache and would not want to stand up today at all to try physical therapy to safely demonstrate if she could transfer not.  She is upset that she has been asked to try physical therapy when she does not feel like it.  More than 10 point review systems was carried out was otherwise negative               Physical Exam:      Last Vital Signs:  /52 (BP Location: Other (Comment))   Pulse 62   Temp 97.9  F (36.6  C) (Oral)   Resp 21   Wt 109.2 kg (240 lb 11.2 oz)   SpO2 98%   BMI 40.05 kg/m      Intake/Output Summary (Last 24 hours) at 4/12/2022 1258  Last data filed at 4/12/2022 0639  Gross per 24 hour   Intake --   Output 1250 ml   Net -1250 ml       Constitutional: Awake, NAD   Eyes: sclera white   HEENT:   MMM.  Left TM with evidence of minimal congestion no erythema intact light reflex no evidence of external otitis or otitis media  Respiratory: On her home CPAP.  Anterior lungs cta bilaterally, no focal crackles or wheeze  Cardiovascular: RRR.  No murmur   GI: Obese, nontender to palpation, bowel sounds present, not distended  Skin: Posterior left knee chronic appearing skin changes, no change  Musculoskeletal/extremities: Trace bilateral lower extremity edema  Neurologic: A&O, answers questions appropriately  Psychiatric: Anxious when talking about ERCP and possible  surgery         Medications:      All current medications were reviewed with changes reflected in problem list.         Data:      All new lab and imaging data was reviewed.   Labs:  Recent Labs   Lab 04/15/22  1105 04/12/22  0745 04/11/22  0624   WBC 12.8* 6.3 11.2*   HGB 11.1* 11.1* 11.0*   HCT 34.9* 35.8 35.6   MCV 85 86 87    181 169     Recent Labs   Lab 04/15/22  1106 04/14/22  0820 04/14/22  0608 04/14/22  0206 04/13/22  0744 04/12/22  0745 04/11/22  0624    136  --   --  137 135 136   POTASSIUM 4.4 4.3  --   --  4.6 4.3 4.1   CHLORIDE 102 104  --   --  105 105 105   CO2 28 29  --   --  31 27 27   ANIONGAP 6 3  --   --  1* 3 4   * 83 87   < > 90 88 78   BUN 31* 21  --   --  23 28 32*   CR 1.41* 1.19*  --   --  1.15* 1.17* 1.32*   GFRESTIMATED 37* 46*  --   --  48* 47* 40*   LAUREL 9.1 9.0  --   --  8.8 8.8 9.0   MAG 2.1 2.2  --   --  2.1 2.1 2.1   PROTTOTAL 6.0*  --   --   --   --  6.1* 6.2*   ALBUMIN 2.7*  --   --   --   --  2.8* 2.8*   BILITOTAL 1.2  --   --   --   --  0.9 0.9   ALKPHOS 92  --   --   --   --  92 92   AST 17  --   --   --   --  13 16   ALT 14  --   --   --   --  14 12    < > = values in this interval not displayed.     All cultures:  Recent Labs   Lab 04/09/22  1940 04/09/22  1836 04/09/22  1803   CULTURE >100,000 CFU/mL Escherichia coli ESBL* No Growth No Growth     Imaging:   None today      Salena Ball MD

## 2022-04-17 ENCOUNTER — APPOINTMENT (OUTPATIENT)
Dept: PHYSICAL THERAPY | Facility: CLINIC | Age: 82
DRG: 872 | End: 2022-04-17
Payer: COMMERCIAL

## 2022-04-17 LAB
ANION GAP SERPL CALCULATED.3IONS-SCNC: 2 MMOL/L (ref 3–14)
BUN SERPL-MCNC: 33 MG/DL (ref 7–30)
CALCIUM SERPL-MCNC: 9.3 MG/DL (ref 8.5–10.1)
CHLORIDE BLD-SCNC: 105 MMOL/L (ref 94–109)
CO2 SERPL-SCNC: 28 MMOL/L (ref 20–32)
CREAT SERPL-MCNC: 1.41 MG/DL (ref 0.52–1.04)
GFR SERPL CREATININE-BSD FRML MDRD: 37 ML/MIN/1.73M2
GLUCOSE BLD-MCNC: 84 MG/DL (ref 70–99)
MAGNESIUM SERPL-MCNC: 2.1 MG/DL (ref 1.6–2.3)
POTASSIUM BLD-SCNC: 4.2 MMOL/L (ref 3.4–5.3)
SARS-COV-2 RNA RESP QL NAA+PROBE: NEGATIVE
SODIUM SERPL-SCNC: 135 MMOL/L (ref 133–144)

## 2022-04-17 PROCEDURE — 97530 THERAPEUTIC ACTIVITIES: CPT | Mod: GP

## 2022-04-17 PROCEDURE — 82947 ASSAY GLUCOSE BLOOD QUANT: CPT | Performed by: INTERNAL MEDICINE

## 2022-04-17 PROCEDURE — 250N000013 HC RX MED GY IP 250 OP 250 PS 637: Performed by: INTERNAL MEDICINE

## 2022-04-17 PROCEDURE — 120N000001 HC R&B MED SURG/OB

## 2022-04-17 PROCEDURE — U0005 INFEC AGEN DETEC AMPLI PROBE: HCPCS | Performed by: INTERNAL MEDICINE

## 2022-04-17 PROCEDURE — 83735 ASSAY OF MAGNESIUM: CPT | Performed by: INTERNAL MEDICINE

## 2022-04-17 PROCEDURE — 99232 SBSQ HOSP IP/OBS MODERATE 35: CPT | Performed by: INTERNAL MEDICINE

## 2022-04-17 PROCEDURE — 36415 COLL VENOUS BLD VENIPUNCTURE: CPT | Performed by: INTERNAL MEDICINE

## 2022-04-17 RX ADMIN — TOLTERODINE TARTRATE 2 MG: 1 TABLET, FILM COATED ORAL at 22:13

## 2022-04-17 RX ADMIN — MORPHINE SULFATE 15 MG: 15 TABLET, EXTENDED RELEASE ORAL at 22:13

## 2022-04-17 RX ADMIN — APIXABAN 5 MG: 5 TABLET, FILM COATED ORAL at 09:48

## 2022-04-17 RX ADMIN — OXYCODONE AND ACETAMINOPHEN 1 TABLET: 7.5; 325 TABLET ORAL at 23:36

## 2022-04-17 RX ADMIN — Medication 1 CAPSULE: at 09:47

## 2022-04-17 RX ADMIN — APIXABAN 5 MG: 5 TABLET, FILM COATED ORAL at 22:13

## 2022-04-17 RX ADMIN — METOPROLOL TARTRATE 50 MG: 50 TABLET, FILM COATED ORAL at 09:48

## 2022-04-17 RX ADMIN — CETIRIZINE HYDROCHLORIDE 10 MG: 10 TABLET, FILM COATED ORAL at 09:45

## 2022-04-17 RX ADMIN — OXYCODONE AND ACETAMINOPHEN 1 TABLET: 7.5; 325 TABLET ORAL at 12:39

## 2022-04-17 RX ADMIN — FLUTICASONE PROPIONATE 2 SPRAY: 50 SPRAY, METERED NASAL at 09:51

## 2022-04-17 RX ADMIN — POLYETHYLENE GLYCOL 3350 17 G: 17 POWDER, FOR SOLUTION ORAL at 12:39

## 2022-04-17 RX ADMIN — FAMOTIDINE 10 MG: 10 TABLET ORAL at 22:12

## 2022-04-17 RX ADMIN — MORPHINE SULFATE 15 MG: 15 TABLET, EXTENDED RELEASE ORAL at 09:48

## 2022-04-17 RX ADMIN — OXYCODONE AND ACETAMINOPHEN 1 TABLET: 7.5; 325 TABLET ORAL at 17:57

## 2022-04-17 RX ADMIN — FAMOTIDINE 10 MG: 10 TABLET ORAL at 09:48

## 2022-04-17 RX ADMIN — METOPROLOL TARTRATE 50 MG: 50 TABLET, FILM COATED ORAL at 22:12

## 2022-04-17 RX ADMIN — TOLTERODINE TARTRATE 2 MG: 1 TABLET, FILM COATED ORAL at 09:48

## 2022-04-17 RX ADMIN — FUROSEMIDE 20 MG: 20 TABLET ORAL at 09:48

## 2022-04-17 ASSESSMENT — ACTIVITIES OF DAILY LIVING (ADL)
ADLS_ACUITY_SCORE: 22
ADLS_ACUITY_SCORE: 22
ADLS_ACUITY_SCORE: 24
ADLS_ACUITY_SCORE: 22
ADLS_ACUITY_SCORE: 22
ADLS_ACUITY_SCORE: 24
ADLS_ACUITY_SCORE: 24
ADLS_ACUITY_SCORE: 22
ADLS_ACUITY_SCORE: 24
ADLS_ACUITY_SCORE: 24
ADLS_ACUITY_SCORE: 22
ADLS_ACUITY_SCORE: 24
ADLS_ACUITY_SCORE: 22
ADLS_ACUITY_SCORE: 24
ADLS_ACUITY_SCORE: 22
ADLS_ACUITY_SCORE: 24

## 2022-04-17 NOTE — PROGRESS NOTES
[facility]  Hospitalist Progress Note  Salena Ball MD 04/17/2022    Reason for Stay (Diagnosis): Sepsis secondary to UTI, choledocholithiasis         Assessment and Plan:      Summary of Stay: Raven Carlin is a 82 year old female with history of A. fib on Eliquis, asthma, chronic pain syndrome on opiates, morbid obesity, CKD stage IIIb, HLD, GERD, gastric ulcer, HTN, ESBL E. coli, and SAURABH on CPAP who was admitted on 4/9/2022 for malaise and nausea where she was found to have fever in the ER and leukocytosis to 19.  Initial urinalysis showing pyuria consistent with UTI.  She also had an elevated lactic acid and PAULINE consistent with severe sepsis.  She was started on vancomycin IV and meropenem given history of ESBL UTI and sepsis.  Her bilirubin was slightly elevated and a CT the abdomen pelvis did show 5 stones in the CBD although she did not have any abdominal pain to suggest ascending cholangitis.  [facility] was consulted.  Had planned on ERCP day after admission, however she has received her Eliquis before coming in and that morning so ERCP postponed.  Urine culture growing ESBL E. coli.  Vancomycin discontinued.  Consulted PT for weakness.  IV meropenem changed to IV levofloxacin based on sensitivities.  Clinically much improved from UTI.  Holding anticoagulation for ERCP today.      4/15/2022: cholecystectomy was scheduled for 4/15/2022 but patient declined.  Complains of abdominal discomfort.    4/16/2022: Complains of severe left ear pain.  She says she has left ear pain because of dryness she has this at home before which got treated with Synalar eardrops.  She is now willing to participate in physical therapy.  She feels she is not going to stand up today.  Discussed that patient needs to be ambulating and transferring before we can safely discharge her to home  4/17/2022: She feels earache and swallowing has improved.  Is willing to try physical therapy today in the afternoon.   Awaiting safe discharge      Problem List/Assessment and Plan:   Severe sepsis secondary to UTI, much improved: Presenting with diffuse pain, nausea, and generalized malaise.  Initial temperature 100.8  F.  Leukocytosis at 19.1.  Urinalysis shows >182 WBCs, large LE, and positive nitrite consistent with UTI as the source of infection although she does not report dysuria or flank pain, but has had frequency urinary.  Lactic acid elevated at 2.8 and PAULINE consistent with severe sepsis.  She does have a history of ESBL UTIs so she is on meropenem and vancomycin for sepsis.  She was found to have 5 CBD stones on CT, however she has not had any abdominal pain and her bilirubin is only slightly elevated at 1.4 otherwise LFTs normal.  Ascending cholangitis secondary to choledocholithiasis is much less likely.  -Clinically back to baseline, still needs antibiotics to fully treat infection  -Vancomycin discontinued 4/11  -IV meropenem changed to IV levofloxacin 750 mg every 48 hours based on creatinine clearance on 4/12, moved to p.o. soon if tolerating diet  -Urine culture growing ESBL E. coli that is sensitive to Carbapenem's, fluoroquinolones, and Bactrim.  Has Bactrim allergy  -Blood cultures NGTD    Choledocholithiasis, cholelithiasis: As above presenting with feeling unwell and fevers that is most likely secondary to UTI.  Her CT did showcholelithiasis along with 5 small stones in the common bile duct.  Bilirubin is 1.4 otherwise alk phos and transaminases are WNL.  She has not had any abdominal pain previously or now and she is nontender on exam so this seems less likely etiology for her presentation with sepsis.  -[facility] consulted, agree that urosepsis more likely, but had planned for ERCP as this does need to be addressed at some point.  Patient had her Eliquis night before admission and morning afterwards so ERCP deferred until anticoagulation held for 72 hours.  It is unlikely that patient will follow-up for  this per her daughter in speaking with the patient she is quite anxious regarding the procedures and I agree she is unlikely to return for follow-up.    -[facility] can perform ERCP on 4/14,   -  N.p.o. midnight Wednesday night.  Holding anticoagulation.  -As her anticoagulation will be held for this procedure and her likely to following up for laparoscopic cholecystectomy -cholecystectomy scheduled for 4/15/2022 but patient declined    A. fib: Chronically anticoagulated with Eliquis.  Also on metoprolol tartrate 50 mg daily.  -Hold Eliquis with plans for ERCP on Thursday 4/14.  Needs to be held 72 hours prior to procedure.  Last dose morning 4/10    PAULINE on CKD stage IIIb: Variable recent creatinine, baseline likely 1.3.  Presenting with PAULINE with creatinine 1.7 secondary to severe sepsis.  Resolved.  -Restart p.o. Lasix 20 mg daily  -Creatinine stable at around 1.4.    Chronic pain syndrome  Physical deconditioning  Chronic leg wound: Resume her morphine sulfate 15 mg every 12 hours and Percocet 7.5-325 mg, but ordered as every 6 hours as needed instead of scheduled twice daily.  Chronic left leg pain following previous surgeries.  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  Has some chronic skin changes and wound to the back of the left knee.  -WOC RN consulted  -PT consulted    SAURABH: Continue CPAP.    Morbid obesity: BMI 40.1.    Overactive bladder: Continue tolterodine 2 mg daily.    HTN: Resume PTA metoprolol tartrate 25 mg twice daily and Lasix 20 mg daily.  Has trace bilateral edema on exam.  -Continue metoprolol  -Resume Lasix 20 mg daily    HLD: not currently on meds.    GERD, history gastric ulcer: Resume famotidine twice daily.    DVT Prophylaxis: Holding Eliquis for procedure, PCD's  Code Status: Full Code  FEN: regular diet  Discharge Dispo: In 1 to 2 days if able to ambulate .  She is not interested in TCU, but will need to demonstrate that she is able to ambulate with a walker to the bathroom and back at  bare minimum to return home with home care.  Patient is not cooperating and refusing physical therapy to demonstrate that she could safely transfer at home.  Estimated Disch Date / # of Days until Disch: Medically stable unclear if she is physically strong enough to transfer safely at home    Clinically Significant Risk Factors Present on Admission                                Interval History (Subjective):      Reviewed chart, but patient ear ache is somewhat better.  Her daughter is going to bring some eardrops.  Which she could use.  She will work with physical therapist later today.  She feels that she can manage at home and would like to be discharged home.  More than 10 point review systems was carried out was otherwise negative               Physical Exam:      Last Vital Signs:  /89   Pulse 77   Temp 97.3  F (36.3  C) (Oral)   Resp 16   Wt 109.2 kg (240 lb 11.2 oz)   SpO2 100%   BMI 40.05 kg/m      Intake/Output Summary (Last 24 hours) at 4/12/2022 1258  Last data filed at 4/12/2022 0639  Gross per 24 hour   Intake --   Output 1250 ml   Net -1250 ml       Constitutional: Awake, NAD   Eyes: sclera white   HEENT:   MMM.  Left TM with evidence of minimal congestion no erythema intact light reflex no evidence of external otitis or otitis media  Respiratory: On her home CPAP.  Anterior lungs cta bilaterally, no focal crackles or wheeze  Cardiovascular: RRR.  No murmur   GI: Obese, nontender to palpation, bowel sounds present, not distended  Skin: Posterior left knee chronic appearing skin changes, no change  Musculoskeletal/extremities: Trace bilateral lower extremity edema  Neurologic: A&O, answers questions appropriately  Psychiatric: Anxious when talking about ERCP and possible surgery         Medications:      All current medications were reviewed with changes reflected in problem list.         Data:      All new lab and imaging data was reviewed.   Labs:  Recent Labs   Lab 04/16/22  8514  04/15/22  1105 04/12/22  0745   WBC 10.5 12.8* 6.3   HGB 11.2* 11.1* 11.1*   HCT 35.9 34.9* 35.8   MCV 87 85 86    192 181     Recent Labs   Lab 04/16/22  1217 04/15/22  1106 04/14/22  0820 04/13/22  0744 04/12/22  0745 04/11/22  0624    136 136   < > 135 136   POTASSIUM 4.5 4.4 4.3   < > 4.3 4.1   CHLORIDE 103 102 104   < > 105 105   CO2 28 28 29   < > 27 27   ANIONGAP 5 6 3   < > 3 4   * 118* 83   < > 88 78   BUN 31* 31* 21   < > 28 32*   CR 1.47* 1.41* 1.19*   < > 1.17* 1.32*   GFRESTIMATED 35* 37* 46*   < > 47* 40*   LAUREL 9.0 9.1 9.0   < > 8.8 9.0   MAG 2.1 2.1 2.2   < > 2.1 2.1   PROTTOTAL  --  6.0*  --   --  6.1* 6.2*   ALBUMIN  --  2.7*  --   --  2.8* 2.8*   BILITOTAL  --  1.2  --   --  0.9 0.9   ALKPHOS  --  92  --   --  92 92   AST  --  17  --   --  13 16   ALT  --  14  --   --  14 12    < > = values in this interval not displayed.     All cultures:  No results for input(s): CULTURE in the last 168 hours.  Imaging:   None today      Salena Ball MD

## 2022-04-17 NOTE — PROGRESS NOTES
Patient alert and oriented. Assist 2 w/lift. Pain managed with prn percocet. Lung sounds dim/clear. Foam dressing to posterior thigh. Assistance provided with weight/shift and positioning, patient able to make independent movements as well in bed. Air mattress in place. Will continue to monitor per plan of care.    /67   Pulse 62   Temp 97.8  F (36.6  C) (Oral)   Resp 8   Wt 109.2 kg (240 lb 11.2 oz)   SpO2 97%   BMI 40.05 kg/m

## 2022-04-17 NOTE — PLAN OF CARE
VSS on RA, A/OX4, 3-9/10 pain, percocet given twice, LS dim, +2 BLE edema, discharge possible in 1-2 days.

## 2022-04-17 NOTE — PLAN OF CARE
Physical Therapy Discharge Summary    Reason for therapy discharge:    All goals and outcomes met, no further needs identified.  Per patient and daughter, patient has demonstrated all functional mobility in order to return to home.    Progress towards therapy goal(s). See goals on Care Plan in [facility] electronic health record for goal details.  Goals met as determined by therapist, patient and family.  Patient demonstrated transfers between sitting and standing with 2WW and SBA, amb 20 feet within room with 2WW and SBA, required min A to return to supine in hospital bed.  Patient and daughter indicate that patient has demonstrated close to her baseline for functional mobility (does not sleep in a bed at home, uses a power recliner).  Patient and daughter declined stair negotiation trial as patient anxious at trial of therapy stairs (vs stairs at home).  Both patient and daughter indicated after session that because of the level of mobility patient demonstrated with PT, they are comfortable with patient discharging to home with previous level of assist (do not need to see patient trial stairs).      Therapy recommendation(s):    No further therapy is recommended.  Recommend return to home with previous level of assist.  Per daughter, patient will require wheelchair transport to home.  Daughter plans to assist patient with stair negotiation into home.

## 2022-04-18 VITALS
DIASTOLIC BLOOD PRESSURE: 66 MMHG | SYSTOLIC BLOOD PRESSURE: 144 MMHG | BODY MASS INDEX: 39.02 KG/M2 | HEART RATE: 55 BPM | TEMPERATURE: 97.2 F | OXYGEN SATURATION: 94 % | RESPIRATION RATE: 18 BRPM | WEIGHT: 234.5 LBS

## 2022-04-18 PROCEDURE — 250N000013 HC RX MED GY IP 250 OP 250 PS 637: Performed by: INTERNAL MEDICINE

## 2022-04-18 PROCEDURE — 99239 HOSP IP/OBS DSCHRG MGMT >30: CPT | Performed by: INTERNAL MEDICINE

## 2022-04-18 RX ORDER — LACTOBACILLUS RHAMNOSUS GG 10B CELL
1 CAPSULE ORAL DAILY
Status: CANCELLED | OUTPATIENT
Start: 2022-04-19

## 2022-04-18 RX ADMIN — APIXABAN 5 MG: 5 TABLET, FILM COATED ORAL at 08:47

## 2022-04-18 RX ADMIN — TOLTERODINE TARTRATE 2 MG: 1 TABLET, FILM COATED ORAL at 08:47

## 2022-04-18 RX ADMIN — FAMOTIDINE 10 MG: 10 TABLET ORAL at 08:47

## 2022-04-18 RX ADMIN — FUROSEMIDE 20 MG: 20 TABLET ORAL at 08:46

## 2022-04-18 RX ADMIN — LEVOFLOXACIN 750 MG: 250 TABLET, FILM COATED ORAL at 13:37

## 2022-04-18 RX ADMIN — FLUTICASONE PROPIONATE 2 SPRAY: 50 SPRAY, METERED NASAL at 08:47

## 2022-04-18 RX ADMIN — MORPHINE SULFATE 15 MG: 15 TABLET, EXTENDED RELEASE ORAL at 08:47

## 2022-04-18 RX ADMIN — Medication 1 CAPSULE: at 08:47

## 2022-04-18 RX ADMIN — CETIRIZINE HYDROCHLORIDE 10 MG: 10 TABLET, FILM COATED ORAL at 08:47

## 2022-04-18 RX ADMIN — METOPROLOL TARTRATE 50 MG: 50 TABLET, FILM COATED ORAL at 08:45

## 2022-04-18 ASSESSMENT — ACTIVITIES OF DAILY LIVING (ADL)
ADLS_ACUITY_SCORE: 26
ADLS_ACUITY_SCORE: 26
ADLS_ACUITY_SCORE: 22
ADLS_ACUITY_SCORE: 26
ADLS_ACUITY_SCORE: 22
ADLS_ACUITY_SCORE: 26
ADLS_ACUITY_SCORE: 22
ADLS_ACUITY_SCORE: 22
ADLS_ACUITY_SCORE: 26
ADLS_ACUITY_SCORE: 22

## 2022-04-18 NOTE — DISCHARGE SUMMARY
[facility]  Discharge Summary  Hospitalist      Date of Admission:  4/9/2022  Date of Discharge:  4/18/2022  Provider:  Salena Ball MD  Date of Service (when I last saw the patient): 04/18/22      Primary Provider: Ena Peraza          Discharge Diagnosis:   Discharge Diagnoses   Sepsis secondary to UTI  Choledocholithiasis status post ERCP  Cholelithiasis: Recommended cholecystectomy as outpatient  Acute kidney injury on chronic kidney disease: Creatinine at baseline on discharge around 1.4  Chronic pain physical deconditioning chronic leg wound  Hypertension    Other medical issues:  Past Medical History:   Diagnosis Date     Arthritis      Asthma     pt denies, cold weather asthma per patient     Chronic infection     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    Hospital Course     Raven Carlin was admitted on 4/9/2022. She is a 82 year old female with history of A. fib on Eliquis, asthma, chronic pain syndrome on opiates, morbid obesity, CKD stage IIIb, HLD, GERD, gastric ulcer, HTN, ESBL E. coli, and SAURABH on CPAP who was admitted on 4/9/2022 for malaise and nausea where she was found to have fever in the ER and leukocytosis to 19.  Initial urinalysis showing pyuria consistent with UTI.  She also had an elevated lactic acid and PAULINE consistent with severe sepsis.  She was started on vancomycin IV and meropenem given  history of ESBL UTI and sepsis.  Her bilirubin was slightly elevated and a CT the abdomen pelvis did show 5 stones in the CBD although she did not have any abdominal pain to suggest ascending cholangitis.  [facility] was consulted.  Had planned on ERCP day after admission, however she has received her Eliquis before coming in and that morning so ERCP postponed.  Urine culture growing ESBL E. coli.  Vancomycin discontinued.  She underwent ERCP and removal of choledocholithiasis.  She was scheduled for cholecystectomy today after but patient refused. Consulted PT for weakness.  She was treated initially with meropenem then switched to levofloxacin.  Completed antibiotic course during hospitalization.    The following problems were addressed during her hospitalization:    Severe sepsis secondary to UTI, much improved: Presenting with diffuse pain, nausea, and generalized malaise.  Initial temperature 100.8  F.  Leukocytosis at 19.1.  Urinalysis shows >182 WBCs, large LE, and positive nitrite consistent with UTI as the source of infection although she does not report dysuria or flank pain, but has had frequency urinary.  Lactic acid elevated at 2.8 and PAULINE consistent with severe sepsis.  She does have a history of ESBL UTIs so she is on meropenem and vancomycin for sepsis.  She was found to have 5 CBD stones on CT, however she has not had any abdominal pain and her bilirubin is only slightly elevated at 1.4 otherwise LFTs normal.  Ascending cholangitis secondary to choledocholithiasis is much less likely.  -Clinically back to baseline, still needs antibiotics to fully treat infection  -Vancomycin discontinued 4/11  -IV meropenem changed to IV levofloxacin 750 mg every 48 hours.  Completed antibiotic course during hospitalization  -Urine culture growing ESBL E. coli that is sensitive to Carbapenem's, fluoroquinolones, and Bactrim.  Has Bactrim allergy  -Blood cultures NGTD     Choledocholithiasis, cholelithiasis: As  above presenting with feeling unwell and fevers that is most likely secondary to UTI.  Her CT did showcholelithiasis along with 5 small stones in the common bile duct.  Bilirubin is 1.4 otherwise alk phos and transaminases are WNL.  She has not had any abdominal pain previously or now and she is nontender on exam so this seems less likely etiology for her presentation with sepsis.  -[facility] consulted, agree that urosepsis more likely, but had planned for ERCP and completed ERCP and removal of choledocholithiasis on 4/14/2022-    -cholecystectomy was scheduled for 4/15/2022 but patient declined.  Patient is encouraged to follow-up as outpatient.  When she is ready.     A. fib: Chronically anticoagulated with Eliquis.  Also on metoprolol tartrate 50 mg daily.  -Resumed Eliquis after procedures    PAULINE on CKD stage IIIb: Variable recent creatinine, baseline likely 1.3.  Presenting with PAULINE with creatinine 1.7 secondary to severe sepsis.  Resolved.  -Restart p.o. Lasix 20 mg daily  -Creatinine stable at around 1.4.  -Follow-up creatinine check in 1 week by PCP     Chronic pain syndrome  Physical deconditioning  Chronic leg wound: Resume her morphine sulfate 15 mg every 12 hours and Percocet 7.5-325 mg, but ordered as every 6 hours as needed instead of scheduled twice daily.  Chronic left leg pain following previous surgeries.  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  Has some chronic skin changes and wound to the back of the left knee.  -WOC RN consulted  -PT consulted  -Discharged with home PT orders  -Continue prior to admission home pain meds     SAURABH: Continue CPAP.     Morbid obesity: BMI 40.1.     Overactive bladder: Continue tolterodine 2 mg daily.     HTN: Resume PTA metoprolol tartrate 25 mg twice daily and Lasix 20 mg daily.  Has trace bilateral edema on exam.  -Continue metoprolol  -Resume Lasix 20 mg daily     HLD: not currently on meds.     GERD, history gastric ulcer: Resumed famotidine twice  daily.    Significant Results and Procedures   As noted above    Pending Results   Unresulted Labs Ordered in the Past 30 Days of this Admission     No orders found from 3/10/2022 to 4/10/2022.          Code Status   Full Code       Primary Care Physician   Ena Peraza    Physical Exam   Temp: 97.3  F (36.3  C) Temp src: Oral BP: (!) 142/89 Pulse: 55   Resp: 18 SpO2: 95 % O2 Device: BiPAP/CPAP    Vitals:    04/15/22 0523 04/16/22 0618 04/18/22 0552   Weight: 108.6 kg (239 lb 6.4 oz) 109.2 kg (240 lb 11.2 oz) 106.4 kg (234 lb 8 oz)     Vital Signs with Ranges  Temp:  [97.3  F (36.3  C)-97.4  F (36.3  C)] 97.3  F (36.3  C)  Pulse:  [55-72] 55  Resp:  [18] 18  BP: (121-142)/(71-89) 142/89  SpO2:  [95 %-100 %] 95 %  I/O last 3 completed shifts:  In: 6 [I.V.:6]  Out: 900 [Urine:900]    Constitutional: Awake, alert, cooperative, no apparent distress, and appears stated age.  Respiratory: Poor inspiratory effort, no increased work of breathing, good air exchange, clear to auscultation bilaterally, no crackles or wheezing.  Cardiovascular: Normal apical impulse,normal S1 and S2,   GI:normal bowel sounds, soft, non-distended, non-tender    Discharge Disposition   Discharged to home    Consultations This Hospital Stay   PHARMACY TO DOSE VANCO  PHARMACY TO DOSE VANCO  GASTROENTEROLOGY IP CONSULT  WOUND OSTOMY CONTINENCE NURSE  IP CONSULT  PHYSICAL THERAPY ADULT IP CONSULT  CARE MANAGEMENT / SOCIAL WORK IP CONSULT  OCCUPATIONAL THERAPY ADULT IP CONSULT  SURGERY GENERAL IP CONSULT  PHARMACY IP CONSULT    Time Spent on this Encounter   I, Salena Ball MD, personally saw the patient today and spent greater than 30 minutes discharging this patient.    Discharge Orders      Home Care Referral      Reason for your hospital stay     Follow-up and recommended labs and tests     Follow up with primary care provider, Ena Peraza, within 7 days for hospital follow- up.  The following labs/tests are recommended:  Basic metabolic panel  Follow-up with general surgery when considering cholecystectomy for cholelithiasis and choledocholithiasis.    Please call or come if there are any new or worsening symptoms  Please note that we are discharging home with reservation about safety given limitations in transfers.  However you were able to demonstrate transfer from bed to chair with the help of a walker.  We do recommend transitional care unit but are discharging you home at your request with consent of your daughter.  We are sending you home as we have been reassured by you that this is your baseline and have all modalities at home to safely take care of yourself.     Activity    Your activity upon discharge: activity as tolerated     Diet    Follow this diet upon discharge: Orders Placed This Encounter      Regular Diet Adult     Discharge Medications   Current Discharge Medication List      CONTINUE these medications which have NOT CHANGED    Details   apixaban ANTICOAGULANT (ELIQUIS ANTICOAGULANT) 5 MG tablet Take 1 tablet (5 mg) by mouth 2 times daily Appointment required for further refills  Qty: 180 tablet, Refills: 0    Associated Diagnoses: Permanent atrial fibrillation (H)      ascorbic acid (VITAMIN C) 500 MG CPCR Take 500 mg by mouth daily      BETA CAROTENE PO Take by mouth daily      BIOTIN FORTE PO Take by mouth daily      cetirizine (ZYRTEC) 5 MG tablet Take 10 mg by mouth daily       Cranberry (CRAN-MAX PO) Take by mouth daily      Famotidine (ZANTAC 360 PO) Take 1 tablet by mouth 2 times daily      Ferrous Gluconate 324 (37.5 Fe) MG TABS Take 1 tablet by mouth in the morning.      Flaxseed, Linseed, (FLAX SEEDS PO) Take by mouth daily       fluocinolone (SYNALAR) 0.01 % solution Apply topically 2 times daily 5 drops into both ears      fluticasone (FLONASE) 50 MCG/ACT nasal spray Spray 2 sprays into both nostrils daily      folic acid (FOLVITE) 1 MG tablet Take 1 mg by mouth daily      furosemide (LASIX) 20 MG  tablet Take 20 mg by mouth daily       Glucos-MSM-C-Py-Sbhvzl-Ctidci (GLUCOSAMINE MSM COMPLEX) TABS tablet Take 2 tablets by mouth daily      hydrOXYzine (VISTARIL) 50 MG capsule Take 50 mg by mouth as needed in the morning and 50 mg as needed in the evening for itching.      Lutein 40 MG CAPS Take 1 capsule by mouth in the morning.      Magnesium Oxide 500 MG TABS Take 500 mg by mouth daily      metoprolol tartrate (LOPRESSOR) 50 MG tablet Take 1 tablet (50 mg) by mouth 2 times daily Appointment required for further refills  Qty: 180 tablet, Refills: 0    Associated Diagnoses: Permanent atrial fibrillation (H)      Morphine Sulfate (MS CONTIN PO) Take 15 mg by mouth every 12 hours      multivitamin w/minerals (THERA-VIT-M) tablet Take 1 tablet by mouth daily      niacin 500 MG tablet Take 500 mg by mouth daily (with breakfast)      NONFORMULARY 720 mg daily Tumeric, takes 2 tabs at bedtime      nystatin (MYCOSTATIN) 575475 UNIT/GM external cream Apply topically 2 times daily      oxyCODONE-acetaminophen (PERCOCET) 7.5-325 MG per tablet Take 1 tablet by mouth in the morning and 1 tablet in the evening. Takes two in the morning and one before bedtime      OYSCO 500 + D 500-200 MG-UNIT tablet TAKE 2 TABLETS (1,000 MG) BY MOUTH EVERY 8 HOURS SEE DISCHARGE INSTRUCTION SHEET FOR TAPER.  Qty: 72 tablet, Refills: 1    Comments: DX Code Needed  .  Associated Diagnoses: Primary hyperparathyroidism (H)      potassium 99 MG TABS Take 1 tablet by mouth daily      Probiotic Product (PROBIOTIC DAILY PO) Take 1 tablet by mouth daily      RESVERATROL PO Take by mouth daily      tolterodine (DETROL) 2 MG tablet Take 2 mg by mouth 2 times daily      vitamin E (TOCOPHEROL) 400 units (180 mg) capsule Take 400 Units by mouth daily      Zinc Gluconate 50 MG CAPS Take 1 capsule by mouth daily           Allergies   Allergies   Allergen Reactions     Bactrim [Sulfamethoxazole W-Trimethoprim] Nausea     Epinephrine Other (See Comments)      Heart races     Ketamine      Felt like she was dying     Lisinopril Cough     Simvastatin Itching     Data   Most Recent 3 CBC's:Recent Labs   Lab Test 04/16/22  1217 04/15/22  1105 04/12/22  0745   WBC 10.5 12.8* 6.3   HGB 11.2* 11.1* 11.1*   MCV 87 85 86    192 181      Most Recent 3 BMP's:  Recent Labs   Lab Test 04/17/22  0848 04/16/22  1217 04/15/22  1106    136 136   POTASSIUM 4.2 4.5 4.4   CHLORIDE 105 103 102   CO2 28 28 28   BUN 33* 31* 31*   CR 1.41* 1.47* 1.41*   ANIONGAP 2* 5 6   LAUREL 9.3 9.0 9.1   GLC 84 123* 118*     Most Recent 2 LFT's:  Recent Labs   Lab Test 04/15/22  1106 04/12/22  0745   AST 17 13   ALT 14 14   ALKPHOS 92 92   BILITOTAL 1.2 0.9     Most Recent INR's and Anticoagulation Dosing History:  Anticoagulation Dose History     Recent Dosing and Labs Latest Ref Rng & Units 8/10/2012 8/11/2012 7/15/2015 7/16/2015 7/17/2015 7/18/2015 6/21/2021    Warfarin 5 mg - - - - 5 mg 5 mg - -    Warfarin 7.5 mg - 7.5 mg - - - - - -    Warfarin 10 mg - - - 10 mg - - - -    INR 0.86 - 1.14 1.14 1.22(H) 1.03 1.24(H) 1.78(H) 2.37(H) 1.78(H)        Most Recent 3 Troponin's:  Recent Labs   Lab Test 03/12/20  1630   TROPI <0.015     Most Recent Cholesterol Panel:No lab results found.  Most Recent 6 Bacteria Isolates From Any Culture (See EPIC Reports for Culture Details):  Recent Labs   Lab Test 06/21/21  1524 06/21/21  1512 06/21/21  1354 07/14/15  0930   CULT No growth No growth >100,000 colonies/mL  Escherichia coli  This isolate does not meet the criteria of an ESBL . A different resistance   mechanism may be present.  *  >100,000 colonies/mL  Strain 2  Escherichia coli ESBL  ESBL (extended beta lactamase) producing organisms require contact precautions.  * No anaerobes isolated  No growth     Most Recent TSH, T4 and A1c Labs:  Recent Labs   Lab Test 06/08/20  1504   TSH 1.01     Results for orders placed or performed during the hospital encounter of 04/09/22   XR Chest 2 Views     Narrative    EXAM: XR CHEST 2 VW  LOCATION: [facility]  DATE/TIME: 4/9/2022 8:18 PM    INDICATION: Fever.  COMPARISON: 06/21/2021.      Impression    IMPRESSION: Lungs are grossly clear. No consolidation. No pleural effusion. Stable borderline enlarged cardiac silhouette size. No pulmonary edema. Aortic atherosclerosis. Breast prosthetics.       CT Abdomen Pelvis w Contrast    Narrative    EXAM: CT ABDOMEN PELVIS W CONTRAST  LOCATION: [facility]  DATE/TIME: 4/9/2022 8:01 PM    INDICATION: Nausea. Diffuse abdominal pain and tenderness.  COMPARISON: 4/22/2009  TECHNIQUE: CT scan of the abdomen and pelvis was performed following injection of IV contrast. Multiplanar reformats were obtained. Dose reduction techniques were used.  CONTRAST: 100mL Isovue 370    FINDINGS:   LOWER CHEST: Normal.    HEPATOBILIARY: Diffuse fatty infiltration of the liver. Cholelithiasis. There are multiple stones within the common bile duct, at least 5. The common bile duct is normal caliber measuring 5 mm.    PANCREAS: Normal.    SPLEEN: Normal    ADRENAL GLANDS: A right adrenal nodule is present measuring 1.5 cm in diameter, enlarged from 2009 when it measured 0.9 cm.    KIDNEYS/BLADDER: Multiple small benign renal cysts. No follow-up needed. No hydronephrosis. The bladder is decompressed.    BOWEL: Bowel loops are normal caliber. No wall thickening. Mildly increased stool in the colon. Normal appendix.    LYMPH NODES: Normal.    VASCULATURE: Mild calcified plaque in the abdominal aorta without aneurysm.    PELVIC ORGANS: No large adnexal cysts or masses.    MUSCULOSKELETAL: Degenerative disc disease and facet arthritis in the lumbar spine.      Impression    IMPRESSION:   1.  Choledocholithiasis with at least 5 stones in the common bile duct. No associated biliary dilatation. Unclear whether this is related to the patient's acute symptoms. Correlation with biliary markers is recommended. No  evidence of pancreatitis.  2.  Cholelithiasis.  3.  Diffuse fatty infiltration of the liver.  4.  A 1.5 cm right adrenal nodule is present and is otherwise not well evaluated due to single phase contrast. In retrospect, it was likely present in 2009 when it measured 0.9 cm. This is likely a benign adenoma. If the patient has a history of   malignancy, a dedicated adrenal CT or a follow-up scan in one year could be considered. Otherwise, no specific imaging follow-up is recommended. Laboratory or clinical evaluation can be considered if the patient has signs or symptoms of a   hyperfunctioning adrenal nodule.   XR Surgery DORA L/T 5 Min Fluoro w Stills    Narrative    This exam was marked as non-reportable because it will not be read by a   radiologist or a [facility] non-radiologist provider.                   Disclaimer: This note consists of symbols derived from keyboarding, dictation and/or voice recognition software. As a result, there may be errors in the script that have gone undetected. Please consider this when interpreting information found in this chart.

## 2022-04-18 NOTE — PLAN OF CARE
Patient discharge instructions reviewed with patient who verbalized understanding. Belongings packed and ready to send with patient including cpap machine.

## 2022-04-18 NOTE — PROGRESS NOTES
Care Management Discharge Note    Discharge Date: 04/18/2022       Discharge Disposition: Home, [facility]    Discharge Services: Transportation Services    Discharge Transportation: [facility] wheelchair transportation 1530, agency    Private pay costs discussed: transportation costs    Education Provided on the Discharge Plan:  yes  Persons Notified of Discharge Plans: bedside RN, charge RN  Patient/Family in Agreement with the Plan: yes      Additional Information:  CM following for discharge plan of care. Patient has orders to discharge home today with her daughter and resumption of Home Care services. Per bedside RN, patient would like [facility] wheelchair transport arranged.    Met with patient at bedside to discuss plan of care. Patient is happy to be going home today. She verified her address as [address] [place] [address] and verified that she would like transportation arranged and understands the private pay costs associated. She verified that she has a ramp to the house and that her daughter and son would be there when she arrived. We discussed that [facility]  would continue to follow her for HC services and that CM would contact them and get the orders to them.     Call placed to [facility] and wheelchair transport was arranged for 1530.    Call placed and message left with [facility] [phone] intake to update them of patient discharge today. MD discharge orders were faxed to them at fax: [phone].    Bedside RN and Charge Rn updated.                Chandrika Vallejo RN BSN CM  Inpatient Care Coordination  [facility]  [phone]

## 2022-04-18 NOTE — PROGRESS NOTES
Patient alert and oriented. Assist 2 w/lift. Pain managed with prn percocet. Lung sounds dim/clear. Home CPAP on overnight. Foam dressing to posterior thigh changed. Sween cream applied to BLE. Assistance provided with weight/shift and positioning, patient able to make independent movements as well in bed. Air mattress in place. Will continue to monitor per plan of care. Plan is for possible discharge today when cleared for safe mobility.    /72   Pulse 58   Temp 97.3  F (36.3  C) (Oral)   Resp 18   Wt 106.4 kg (234 lb 8 oz)   SpO2 99%   BMI 39.02 kg/m

## 2022-04-19 ENCOUNTER — PATIENT OUTREACH (OUTPATIENT)
Dept: CARE COORDINATION | Facility: CLINIC | Age: 82
End: 2022-04-19
Payer: COMMERCIAL

## 2022-04-19 DIAGNOSIS — Z71.89 OTHER SPECIFIED COUNSELING: ICD-10-CM

## 2022-04-19 NOTE — PLAN OF CARE
"Occupational Therapy Discharge Summary    Reason for therapy discharge:    Discharged to home.    Progress towards therapy goal(s). See goals on Care Plan in [facility] electronic health record for goal details.  Patient declined multiple therapy session due to pain.     Therapy recommendation(s):    Per treating OT from last OT session dated 4/11/22 \"pt risk for readmission. below baseline function in ADL and grossly deconditioned. rec TCU at discharge to increase function for indp self care and ADL. currently needing Ax2 for transfer from bed to BSC or chair. if home need  OT. pt wanting to discharge to home\". However per PT, dtr observed PT session prior to discharge and felt patient was functioning close to baseline for her mobility.    **This patient was not seen by writing OT, information for discharge summary taken from treating OT's notes.**        "

## 2022-04-19 NOTE — PROGRESS NOTES
Clinic Care Coordination Contact  [facility]/Voicemail       Clinical Data: Care Coordinator Outreach  Outreach attempted x 1.  Left message on patient's voicemail with call back information and requested return call.  Plan: Care Coordinator will try to reach patient again in 1-2 business days.    .Ladonna NORMAN Community Health Worker  Clinic Care Coordination  [facility]  Phone: [phone]

## 2022-04-20 ENCOUNTER — HOSPITAL ENCOUNTER (OUTPATIENT)
Facility: CLINIC | Age: 82
Setting detail: OBSERVATION
Discharge: NURSING, REHAB OR LONG-TERM CARE | End: 2022-04-28
Attending: INTERNAL MEDICINE | Admitting: NURSE PRACTITIONER
Payer: COMMERCIAL

## 2022-04-20 DIAGNOSIS — I48.21 PERMANENT ATRIAL FIBRILLATION (H): ICD-10-CM

## 2022-04-20 DIAGNOSIS — Z96.652 S/P REVISION OF TOTAL KNEE, LEFT: ICD-10-CM

## 2022-04-20 DIAGNOSIS — N39.0 URINARY TRACT INFECTION WITHOUT HEMATURIA, SITE UNSPECIFIED: ICD-10-CM

## 2022-04-20 DIAGNOSIS — N18.2 CHRONIC RENAL FAILURE, STAGE 2 (MILD): ICD-10-CM

## 2022-04-20 DIAGNOSIS — J30.2 SEASONAL ALLERGIC RHINITIS, UNSPECIFIED TRIGGER: ICD-10-CM

## 2022-04-20 DIAGNOSIS — M62.81 GENERALIZED MUSCLE WEAKNESS: ICD-10-CM

## 2022-04-20 DIAGNOSIS — K21.9 GASTROESOPHAGEAL REFLUX DISEASE WITHOUT ESOPHAGITIS: Primary | ICD-10-CM

## 2022-04-20 DIAGNOSIS — R60.9 CHRONIC EDEMA: ICD-10-CM

## 2022-04-20 LAB
ALBUMIN SERPL-MCNC: 3 G/DL (ref 3.4–5)
ALBUMIN UR-MCNC: NEGATIVE MG/DL
ALP SERPL-CCNC: 129 U/L (ref 40–150)
ALT SERPL W P-5'-P-CCNC: 21 U/L (ref 0–50)
ANION GAP SERPL CALCULATED.3IONS-SCNC: 5 MMOL/L (ref 3–14)
APPEARANCE UR: CLEAR
AST SERPL W P-5'-P-CCNC: 22 U/L (ref 0–45)
BASOPHILS # BLD AUTO: 0.1 10E3/UL (ref 0–0.2)
BASOPHILS NFR BLD AUTO: 1 %
BILIRUB SERPL-MCNC: 1 MG/DL (ref 0.2–1.3)
BILIRUB UR QL STRIP: NEGATIVE
BUN SERPL-MCNC: 28 MG/DL (ref 7–30)
CALCIUM SERPL-MCNC: 9.5 MG/DL (ref 8.5–10.1)
CHLORIDE BLD-SCNC: 105 MMOL/L (ref 94–109)
CO2 SERPL-SCNC: 26 MMOL/L (ref 20–32)
COLOR UR AUTO: ABNORMAL
CREAT SERPL-MCNC: 1.58 MG/DL (ref 0.52–1.04)
EOSINOPHIL # BLD AUTO: 0.2 10E3/UL (ref 0–0.7)
EOSINOPHIL NFR BLD AUTO: 2 %
ERYTHROCYTE [DISTWIDTH] IN BLOOD BY AUTOMATED COUNT: 14.6 % (ref 10–15)
GFR SERPL CREATININE-BSD FRML MDRD: 33 ML/MIN/1.73M2
GLUCOSE BLD-MCNC: 106 MG/DL (ref 70–99)
GLUCOSE UR STRIP-MCNC: NEGATIVE MG/DL
HCO3 BLDV-SCNC: 26 MMOL/L (ref 21–28)
HCT VFR BLD AUTO: 35.1 % (ref 35–47)
HGB BLD-MCNC: 11.1 G/DL (ref 11.7–15.7)
HGB UR QL STRIP: ABNORMAL
HYALINE CASTS: 4 /LPF
IMM GRANULOCYTES # BLD: 0.1 10E3/UL
IMM GRANULOCYTES NFR BLD: 1 %
KETONES UR STRIP-MCNC: NEGATIVE MG/DL
LACTATE BLD-SCNC: 1.6 MMOL/L
LEUKOCYTE ESTERASE UR QL STRIP: ABNORMAL
LYMPHOCYTES # BLD AUTO: 1.4 10E3/UL (ref 0.8–5.3)
LYMPHOCYTES NFR BLD AUTO: 13 %
MCH RBC QN AUTO: 27.1 PG (ref 26.5–33)
MCHC RBC AUTO-ENTMCNC: 31.6 G/DL (ref 31.5–36.5)
MCV RBC AUTO: 86 FL (ref 78–100)
MONOCYTES # BLD AUTO: 0.6 10E3/UL (ref 0–1.3)
MONOCYTES NFR BLD AUTO: 6 %
NEUTROPHILS # BLD AUTO: 8.4 10E3/UL (ref 1.6–8.3)
NEUTROPHILS NFR BLD AUTO: 77 %
NITRATE UR QL: NEGATIVE
NRBC # BLD AUTO: 0 10E3/UL
NRBC BLD AUTO-RTO: 0 /100
PCO2 BLDV: 37 MM HG (ref 40–50)
PH BLDV: 7.46 [PH] (ref 7.32–7.43)
PH UR STRIP: 6 [PH] (ref 5–7)
PLATELET # BLD AUTO: 267 10E3/UL (ref 150–450)
PO2 BLDV: 20 MM HG (ref 25–47)
POTASSIUM BLD-SCNC: 4 MMOL/L (ref 3.4–5.3)
PROT SERPL-MCNC: 6.2 G/DL (ref 6.8–8.8)
RBC # BLD AUTO: 4.1 10E6/UL (ref 3.8–5.2)
RBC URINE: 3 /HPF
SAO2 % BLDV: 34 % (ref 94–100)
SARS-COV-2 RNA RESP QL NAA+PROBE: NEGATIVE
SODIUM SERPL-SCNC: 136 MMOL/L (ref 133–144)
SP GR UR STRIP: 1.01 (ref 1–1.03)
UROBILINOGEN UR STRIP-MCNC: NORMAL MG/DL
WBC # BLD AUTO: 10.7 10E3/UL (ref 4–11)
WBC URINE: 7 /HPF

## 2022-04-20 PROCEDURE — C9803 HOPD COVID-19 SPEC COLLECT: HCPCS

## 2022-04-20 PROCEDURE — 36415 COLL VENOUS BLD VENIPUNCTURE: CPT | Performed by: INTERNAL MEDICINE

## 2022-04-20 PROCEDURE — 99219 PR INITIAL OBSERVATION CARE,LEVEL II: CPT | Performed by: PHYSICIAN ASSISTANT

## 2022-04-20 PROCEDURE — G0378 HOSPITAL OBSERVATION PER HR: HCPCS

## 2022-04-20 PROCEDURE — 85025 COMPLETE CBC W/AUTO DIFF WBC: CPT | Performed by: INTERNAL MEDICINE

## 2022-04-20 PROCEDURE — 250N000013 HC RX MED GY IP 250 OP 250 PS 637: Performed by: PHYSICIAN ASSISTANT

## 2022-04-20 PROCEDURE — U0003 INFECTIOUS AGENT DETECTION BY NUCLEIC ACID (DNA OR RNA); SEVERE ACUTE RESPIRATORY SYNDROME CORONAVIRUS 2 (SARS-COV-2) (CORONAVIRUS DISEASE [COVID-19]), AMPLIFIED PROBE TECHNIQUE, MAKING USE OF HIGH THROUGHPUT TECHNOLOGIES AS DESCRIBED BY CMS-2020-01-R: HCPCS | Performed by: INTERNAL MEDICINE

## 2022-04-20 PROCEDURE — 99285 EMERGENCY DEPT VISIT HI MDM: CPT | Mod: 25

## 2022-04-20 PROCEDURE — 82803 BLOOD GASES ANY COMBINATION: CPT

## 2022-04-20 PROCEDURE — 81001 URINALYSIS AUTO W/SCOPE: CPT | Performed by: INTERNAL MEDICINE

## 2022-04-20 PROCEDURE — 87086 URINE CULTURE/COLONY COUNT: CPT | Performed by: PHYSICIAN ASSISTANT

## 2022-04-20 PROCEDURE — 80053 COMPREHEN METABOLIC PANEL: CPT | Performed by: INTERNAL MEDICINE

## 2022-04-20 RX ORDER — SODIUM CHLORIDE 9 MG/ML
INJECTION, SOLUTION INTRAVENOUS CONTINUOUS
Status: ACTIVE | OUTPATIENT
Start: 2022-04-20 | End: 2022-04-20

## 2022-04-20 RX ORDER — LIDOCAINE 40 MG/G
CREAM TOPICAL
Status: DISCONTINUED | OUTPATIENT
Start: 2022-04-20 | End: 2022-04-28 | Stop reason: HOSPADM

## 2022-04-20 RX ORDER — NALOXONE HYDROCHLORIDE 0.4 MG/ML
0.4 INJECTION, SOLUTION INTRAMUSCULAR; INTRAVENOUS; SUBCUTANEOUS
Status: DISCONTINUED | OUTPATIENT
Start: 2022-04-20 | End: 2022-04-28 | Stop reason: HOSPADM

## 2022-04-20 RX ORDER — NIACIN 500 MG
500 TABLET ORAL
Status: DISCONTINUED | OUTPATIENT
Start: 2022-04-21 | End: 2022-04-28 | Stop reason: HOSPADM

## 2022-04-20 RX ORDER — FAMOTIDINE 20 MG/1
20 TABLET, FILM COATED ORAL EVERY EVENING
Status: DISCONTINUED | OUTPATIENT
Start: 2022-04-20 | End: 2022-04-28 | Stop reason: HOSPADM

## 2022-04-20 RX ORDER — MORPHINE SULFATE 15 MG/1
15 TABLET, FILM COATED, EXTENDED RELEASE ORAL EVERY 12 HOURS
Status: DISCONTINUED | OUTPATIENT
Start: 2022-04-20 | End: 2022-04-28 | Stop reason: HOSPADM

## 2022-04-20 RX ORDER — TOLTERODINE TARTRATE 1 MG/1
2 TABLET, EXTENDED RELEASE ORAL 2 TIMES DAILY
Status: DISCONTINUED | OUTPATIENT
Start: 2022-04-20 | End: 2022-04-28 | Stop reason: HOSPADM

## 2022-04-20 RX ORDER — ACETAMINOPHEN 650 MG/1
650 SUPPOSITORY RECTAL EVERY 6 HOURS PRN
Status: DISCONTINUED | OUTPATIENT
Start: 2022-04-20 | End: 2022-04-28 | Stop reason: HOSPADM

## 2022-04-20 RX ORDER — AMOXICILLIN 250 MG
1 CAPSULE ORAL 2 TIMES DAILY PRN
Status: DISCONTINUED | OUTPATIENT
Start: 2022-04-20 | End: 2022-04-28 | Stop reason: HOSPADM

## 2022-04-20 RX ORDER — CEPHALEXIN 500 MG/1
500 CAPSULE ORAL DAILY
Status: ON HOLD | COMMUNITY
End: 2022-04-28

## 2022-04-20 RX ORDER — ACETAMINOPHEN 325 MG/1
650 TABLET ORAL EVERY 6 HOURS PRN
Status: DISCONTINUED | OUTPATIENT
Start: 2022-04-20 | End: 2022-04-28 | Stop reason: HOSPADM

## 2022-04-20 RX ORDER — NALOXONE HYDROCHLORIDE 0.4 MG/ML
0.2 INJECTION, SOLUTION INTRAMUSCULAR; INTRAVENOUS; SUBCUTANEOUS
Status: DISCONTINUED | OUTPATIENT
Start: 2022-04-20 | End: 2022-04-28 | Stop reason: HOSPADM

## 2022-04-20 RX ORDER — ONDANSETRON 4 MG/1
4 TABLET, ORALLY DISINTEGRATING ORAL EVERY 6 HOURS PRN
Status: DISCONTINUED | OUTPATIENT
Start: 2022-04-20 | End: 2022-04-28 | Stop reason: HOSPADM

## 2022-04-20 RX ORDER — OXYCODONE AND ACETAMINOPHEN 7.5; 325 MG/1; MG/1
1 TABLET ORAL 2 TIMES DAILY PRN
Status: DISCONTINUED | OUTPATIENT
Start: 2022-04-20 | End: 2022-04-23

## 2022-04-20 RX ORDER — METOPROLOL TARTRATE 50 MG
50 TABLET ORAL 2 TIMES DAILY
Status: DISCONTINUED | OUTPATIENT
Start: 2022-04-20 | End: 2022-04-28 | Stop reason: HOSPADM

## 2022-04-20 RX ORDER — FERROUS GLUCONATE 324(38)MG
324 TABLET ORAL DAILY
Status: DISCONTINUED | OUTPATIENT
Start: 2022-04-21 | End: 2022-04-28 | Stop reason: HOSPADM

## 2022-04-20 RX ORDER — ONDANSETRON 2 MG/ML
4 INJECTION INTRAMUSCULAR; INTRAVENOUS EVERY 6 HOURS PRN
Status: DISCONTINUED | OUTPATIENT
Start: 2022-04-20 | End: 2022-04-28 | Stop reason: HOSPADM

## 2022-04-20 RX ORDER — AMOXICILLIN 250 MG
2 CAPSULE ORAL 2 TIMES DAILY PRN
Status: DISCONTINUED | OUTPATIENT
Start: 2022-04-20 | End: 2022-04-28 | Stop reason: HOSPADM

## 2022-04-20 RX ADMIN — APIXABAN 5 MG: 5 TABLET, FILM COATED ORAL at 22:07

## 2022-04-20 RX ADMIN — FAMOTIDINE 20 MG: 20 TABLET ORAL at 22:07

## 2022-04-20 RX ADMIN — OXYCODONE AND ACETAMINOPHEN 1 TABLET: 7.5; 325 TABLET ORAL at 23:55

## 2022-04-20 RX ADMIN — MORPHINE SULFATE 15 MG: 15 TABLET, EXTENDED RELEASE ORAL at 22:07

## 2022-04-20 RX ADMIN — TOLTERODINE TARTRATE 2 MG: 1 TABLET, FILM COATED ORAL at 23:55

## 2022-04-20 ASSESSMENT — ENCOUNTER SYMPTOMS
ARTHRALGIAS: 0
WEAKNESS: 1

## 2022-04-20 NOTE — PROGRESS NOTES
Clinic Care Coordination Contact    Background: Care Coordination referral placed from [facility] discharge report for reason of patient meeting criteria for a TCM outreach call by [facility] team.    Assessment: Upon chart review, [facility] Team member will cancel/close the referral for TCM outreach due to reason below:    Patient has presented to Emergency Department or has been readmitted to hospital    Plan: Care Coordination referral for TCM outreach canceled.    Ladonna Elliott MA  [facility] Care Resource [place], [facility]

## 2022-04-20 NOTE — H&P
[facility]  Internal Medicine  H & P      Patient Name: Raven Carlin MRN# [id number]   Age: 82 year old YOB: 1940     Date of Admission:4/20/2022    Primary care provider: Ena Peraza  Date of Service: 4/20/2022         Assessment and Plan:   Raven Carlin is a 82 year old female with a history of A. fib on Eliquis, asthma, chronic pain syndrome on opiates, morbid obesity, CKD stage IIIb, HLD, GERD, gastric ulcer, HTN, ESBL E. coli, and SAURABH on CPAP who was admitted on 4/9/2022-4/18/22 due to sepsis 2/2 ESBL E.Coli UTI, Cholelithiasis and Choledocholithiasis.  She underwent ERCP on 4/14.  Cholecystectomy was scheduled for 4/15, but patient declined.  She was discharged home with home PT.  Now returning to the ED with generalized weakness.       Generalized Weakness - pt presents with generalized weakness after returning to her home after a prolonged hospital stay 4/9-4/18 due to sepsis.  She had a fall onto her buttocks on 4/18 with no new acute pain.  No focal neurologic deficits.  No current signs of infection.  Likely deconditioned from recent hospital stay.  Rehab was recommended during her recent hospital stay and patient declined.  She is now interested in TCU.  ED work up revealed patient hemodynamically stable, afebrile, 99% on room air.  Laboratory work up revealed creatinine 1.58 slightly up from baseline, hgb 11.1 at baseline with otherwise normal BMP, LFTs, Lactic Acid, WBC.  - PT consult  - SW consult  - check UA     Recent Sepsis 2/2 UTI - hospitalized 4/9-4/18 due to UTI and urine culture grew ESBL E.Coli.  She completed the antibiotic course during that hospital stay.  Currently, afebrile with normal wbc.  - check UA/UCx    Recent Choledocholithiasis, Cholelithiasis - s/p ERCP with removal of choledocholithiasis on 4/14/2022.  Pt is planning for outpatient follow up for cholecystectomy.  Currently, afebrile with normal LFTs     Chronic Atrial Fibrillation -  continue Lopressor and Eliquis    CKD - creatinine 1.58, slightly up from baseline of 1.4  - hold pta Lasix for now     Chronic pain syndrome  Physical deconditioning  Chronic leg wound - pt with chronic left leg pain following previous surgeries.  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  Has some chronic skin changes and wound to the back of the left knee.  No signs of acute infection.  - continue prior to admission home pain meds     HTN - continue Lopressor.  Hold Lasix for now.     HLD - continue Niacin     GERD, Hx of Gastric Ulcer - continue Famotidine    Chronic Anemia - hgb 11.1 at baseline.  Continue Ferrous Gluconate.    Overactive Bladder - continue Tolterodine    SAURABH - continue cpap    Morbid Obesity, BMI 41       CODE: full  Diet/IVF: regular  GI ppx:  famotidine  DVT ppx: Eliquis  Disposition: TCU    Tyesha Richar BUTTS PA-C  Physician Assistant   Hospitalist Service  Pager: [phone]           Chief Complaint:   Generalized Weakness         HPI:   82 year old female with a history of A. fib on Eliquis, asthma, chronic pain syndrome on opiates, morbid obesity, CKD stage IIIb, HLD, GERD, gastric ulcer, HTN, ESBL E. coli, and SAURABH on CPAP who was admitted on 4/9/2022-4/18/22 due to sepsis 2/2 ESBL E.Coli UTI, Cholelithiasis and Choledocholithiasis.  She underwent ERCP on 4/14.  Cholecystectomy was scheduled for 4/15, but patient declined.  She was discharged home with home PT.  Now returning to the ED with generalized weakness.     Patient reports she has chronic LLE pain and it is shortened from prior surgeries.  While entering her home after she was discharged and using the stairs, she fell onto her buttocks.  Her family was present during her fall.  She did not hit her head.  Family was unable to assist her up and EMS was called and she was placed into a chair.  Patient states she has been in her motorized chair since discharge and has been unable to ambulate due to generalized weakness.  She denies  any new pain, chest pain, shortness of breath, cough, abdominal pain, nausea, emesis, diarrhea, dysuria, fevers.  She has been tolerating po.  She has a home RN and has not yet been visited by home PT.  She is interested in rehab.          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 but states she is clear     Chronic pain      CKD (chronic kidney disease), stage III (H)      Dyslipidemia      Dyspnea on exertion      Eczema      Fractured pelvis (H)      Gastric ulcer, unspecified as acute or chronic, without mention of hemorrhage or perforation      Gastro-oesophageal reflux disease     pt denies     Hypertension      OAB (overactive bladder)      Sleep apnea     CPAP          Past Surgical History:     Past Surgical History:   Procedure Laterality Date     ARTHROPLASTY KNEE  4/2011    right     ARTHROPLASTY KNEE  11/8/2011    Left, Surgeon:SOO GOODRICH     ARTHROPLASTY KNEE  5/8/2012    Procedure:ARTHROPLASTY KNEE; LEFT KNEE REMOVAL OF ANTIBIOTIC SPACER , REPLACEMENT WITH LEFT HINGED KNEE; Surgeon:NE FERRARA; Location: OR     ARTHROPLASTY REVISION KNEE  11/15/2011    Procedure:ARTHROPLASTY REVISION KNEE; Left Knee Poly-Exchange and Femoral Component Revision   ; Surgeon:SOO GOODRICH; Location:[facility] OR     ASPIRATION NEEDLE KNEE Left 7/14/2015    Procedure: ASPIRATION NEEDLE KNEE;  Surgeon: Soo Goodrich MD;  Location:  OR     ENDOSCOPIC RETROGRADE CHOLANGIOPANCREATOGRAM N/A 4/14/2022    Procedure: ENDOSCOPIC RETROGRADE CHOLANGIOPANCREATOGRAPHY, , sphincterotomy, stone extraction;  Surgeon: Han Reddy MD;  Location:  OR     EXCHANGE POLY COMPONENT ARTHROPLASTY KNEE  8/8/2012    Procedure: EXCHANGE POLY COMPONENT ARTHROPLASTY KNEE;  left knee arthroplasty pole exchange;  Surgeon: Ne Ferrara MD;  Location:  OR      HERNIA REPAIR       MAMMOPLASTY AUGMENTATION       PARATHYROIDECTOMY Left 2019    Procedure: Excision of left inferior parathyroid adenoma, Focused neck exploration.;  Surgeon: Latonia Anguiano MD;  Location: [facility] OR     REMOVE HARDWARE ARTHROPLASTY KNEE, IRRIG & JOE, PLACE ANTIBIOTIC CEMENT SPACER, COMBINED Left 7/15/2015    Procedure: COMBINED REMOVE HARDWARE ARTHROPLASTY KNEE, IRRIGATION AND DEBRIDEMENT, PLACE ANTIBIOTIC CEMENT BEADS / SPACER;  Surgeon: Miguel Goodrich MD;  Location: [facility] OR     RHINOPLASTY       TONSILLECTOMY            Social History:     Social History     Socioeconomic History     Marital status:      Spouse name: Not on file     Number of children: Not on file     Years of education: Not on file     Highest education level: Not on file   Occupational History     Not on file   Tobacco Use     Smoking status: Former Smoker     Packs/day: 0.50     Years: 25.00     Pack years: 12.50     Start date:      Quit date: 1985     Years since quittin.7     Smokeless tobacco: Never Used   Substance and Sexual Activity     Alcohol use: Yes     Comment: 2 weekly     Drug use: No     Sexual activity: Not on file   Other Topics Concern     Parent/sibling w/ CABG, MI or angioplasty before 65F 55M? Not Asked   Social History Narrative     Not on file     Social Determinants of Health     Financial Resource Strain: Not on file   Food Insecurity: Not on file   Transportation Needs: Not on file   Physical Activity: Not on file   Stress: Not on file   Social Connections: Not on file   Intimate Partner Violence: Not on file   Housing Stability: Not on file          Family History:     Family History   Problem Relation Age of Onset     Colon Cancer Father      Diabetes Daughter      Hypertension Mother      Colon Cancer Mother           Allergies:      Allergies   Allergen Reactions     Bactrim [Sulfamethoxazole W-Trimethoprim] Nausea     Epinephrine Other (See Comments)     Heart  races     Ketamine      Felt like she was dying     Lisinopril Cough     Simvastatin Itching          Medications:     Prior to Admission medications    Medication Sig Last Dose Taking? Auth Provider   apixaban ANTICOAGULANT (ELIQUIS ANTICOAGULANT) 5 MG tablet Take 1 tablet (5 mg) by mouth 2 times daily Appointment required for further refills   Holland Marcelo MD   ascorbic acid (VITAMIN C) 500 MG CPCR Take 500 mg by mouth daily   Reported, Patient   BETA CAROTENE PO Take by mouth daily   Reported, Patient   BIOTIN FORTE PO Take by mouth daily   Reported, Patient   cetirizine (ZYRTEC) 5 MG tablet Take 10 mg by mouth daily    Reported, Patient   Cranberry (CRAN-MAX PO) Take by mouth daily   Reported, Patient   Famotidine (ZANTAC 360 PO) Take 1 tablet by mouth 2 times daily   Unknown, Entered By History   Ferrous Gluconate 324 (37.5 Fe) MG TABS Take 1 tablet by mouth in the morning.   Unknown, Entered By History   Flaxseed, Linseed, (FLAX SEEDS PO) Take by mouth daily    Reported, Patient   fluocinolone (SYNALAR) 0.01 % solution Apply topically 2 times daily 5 drops into both ears   Unknown, Entered By History   fluticasone (FLONASE) 50 MCG/ACT nasal spray Spray 2 sprays into both nostrils daily   Unknown, Entered By History   folic acid (FOLVITE) 1 MG tablet Take 1 mg by mouth daily   Reported, Patient   furosemide (LASIX) 20 MG tablet Take 20 mg by mouth daily    Reported, Patient   Glucos-MSM-C-Qg-Mzxpej-Rkmfei (GLUCOSAMINE MSM COMPLEX) TABS tablet Take 2 tablets by mouth daily   Reported, Patient   hydrOXYzine (VISTARIL) 50 MG capsule Take 50 mg by mouth as needed in the morning and 50 mg as needed in the evening for itching.   Unknown, Entered By History   Lutein 40 MG CAPS Take 1 capsule by mouth in the morning.   Reported, Patient   Magnesium Oxide 500 MG TABS Take 500 mg by mouth daily   Reported, Patient   metoprolol tartrate (LOPRESSOR) 50 MG tablet Take 1 tablet (50 mg) by mouth 2 times daily  Appointment required for further refills   Holland Marcelo MD   Morphine Sulfate (MS CONTIN PO) Take 15 mg by mouth every 12 hours   Reported, Patient   multivitamin w/minerals (THERA-VIT-M) tablet Take 1 tablet by mouth daily   Reported, Patient   niacin 500 MG tablet Take 500 mg by mouth daily (with breakfast)   Reported, Patient   NONFORMULARY 720 mg daily Tumeric, takes 2 tabs at bedtime   Reported, Patient   nystatin (MYCOSTATIN) 745719 UNIT/GM external cream Apply topically 2 times daily   Unknown, Entered By History   oxyCODONE-acetaminophen (PERCOCET) 7.5-325 MG per tablet Take 1 tablet by mouth in the morning and 1 tablet in the evening. Takes two in the morning and one before bedtime   Reported, Patient   OYSCO 500 + D 500-200 MG-UNIT tablet TAKE 2 TABLETS (1,000 MG) BY MOUTH EVERY 8 HOURS SEE DISCHARGE INSTRUCTION SHEET FOR TAPER.   Daniel Lackey PA-C   potassium 99 MG TABS Take 1 tablet by mouth daily   Reported, Patient   Probiotic Product (PROBIOTIC DAILY PO) Take 1 tablet by mouth daily   Reported, Patient   RESVERATROL PO Take by mouth daily   Reported, Patient   tolterodine (DETROL) 2 MG tablet Take 2 mg by mouth 2 times daily   Reported, Patient   vitamin E (TOCOPHEROL) 400 units (180 mg) capsule Take 400 Units by mouth daily   Reported, Patient   Zinc Gluconate 50 MG CAPS Take 1 capsule by mouth daily   Reported, Patient          Review of Systems:   A complete ROS was performed and is negative other than what is stated in the HPI.       Physical Exam:   Blood pressure 121/68, temperature 97.7  F (36.5  C), temperature source Temporal, SpO2 99 %, not currently breastfeeding.  General: Alert, interactive, NAD, lying in bed pleasant and cooperative.  HEENT: AT/NC  Chest/Resp: clear to auscultation bilaterally, no crackles or wheezes  Heart/CV: regular rate and rhythm, no murmur  Abdomen/GI: Soft, nontender, nondistended. +BS.  No rebound or guarding.  Extremities/MSK: 1+ BLE  edema.  Posterior left knee with excoriations, no warmth.  Able to move all extremities without pain.  Left foot in a shoe  Skin: Warm and dry, scattered areas of ecchymosis.  Neuro: Alert & oriented x 3, no focal deficits, moves all extremities equally         Labs:     CMP  Recent Labs   Lab 04/20/22  1329 04/17/22  0848 04/16/22  1217 04/15/22  1106 04/14/22  0820    135 136 136 136   POTASSIUM 4.0 4.2 4.5 4.4 4.3   CHLORIDE 105 105 103 102 104   CO2 26 28 28 28 29   ANIONGAP 5 2* 5 6 3   * 84 123* 118* 83   BUN 28 33* 31* 31* 21   CR 1.58* 1.41* 1.47* 1.41* 1.19*   GFRESTIMATED 33* 37* 35* 37* 46*   LAUREL 9.5 9.3 9.0 9.1 9.0   MAG  --  2.1 2.1 2.1 2.2   PROTTOTAL 6.2*  --   --  6.0*  --    ALBUMIN 3.0*  --   --  2.7*  --    BILITOTAL 1.0  --   --  1.2  --    ALKPHOS 129  --   --  92  --    AST 22  --   --  17  --    ALT 21  --   --  14  --      CBC  Recent Labs   Lab 04/20/22  1329 04/16/22  1217 04/15/22  1105   WBC 10.7 10.5 12.8*   RBC 4.10 4.14 4.13   HGB 11.1* 11.2* 11.1*   HCT 35.1 35.9 34.9*   MCV 86 87 85   MCH 27.1 27.1 26.9   MCHC 31.6 31.2* 31.8   RDW 14.6 14.8 14.5    203 192     Recent Labs   Lab 04/20/22  1351   PH 7.46*

## 2022-04-20 NOTE — ED NOTES
[facility]  ED Nurse Handoff Report    Raven Carlin is a 82 year old female   ED Chief complaint: Generalized Weakness and Constipation  . ED Diagnosis:   Final diagnoses:   Generalized muscle weakness     Allergies:   Allergies   Allergen Reactions     Bactrim [Sulfamethoxazole W-Trimethoprim] Nausea     Epinephrine Other (See Comments)     Heart races     Ketamine      Felt like she was dying     Lisinopril Cough     Simvastatin Itching       Code Status: Full Code  Activity level - Baseline/Home:  Independent. Activity Level - Current:   Assist X 2. Lift room needed: Yes. Bariatric: No   Needed: No   Isolation: Yes. Infection: Not Applicable  MRSA  VRE.     Vital Signs:   Vitals:    04/20/22 1329 04/20/22 1513   BP: 121/68    Pulse:  55   Resp:  24   Temp: 97.7  F (36.5  C)    TempSrc: Temporal    SpO2: 99%        Cardiac Rhythm:  ,      Pain level:    Patient confused: No. Patient Falls Risk: Yes.   Elimination Status: Has voided   Patient Report - Initial Complaint: Gen weakness. Focused Assessment: Placement, PT   Tests Performed:   Labs Ordered and Resulted from Time of ED Arrival to Time of ED Departure   COMPREHENSIVE METABOLIC PANEL - Abnormal       Result Value    Sodium 136      Potassium 4.0      Chloride 105      Carbon Dioxide (CO2) 26      Anion Gap 5      Urea Nitrogen 28      Creatinine 1.58 (*)     Calcium 9.5      Glucose 106 (*)     Alkaline Phosphatase 129      AST 22      ALT 21      Protein Total 6.2 (*)     Albumin 3.0 (*)     Bilirubin Total 1.0      GFR Estimate 33 (*)    CBC WITH PLATELETS AND DIFFERENTIAL - Abnormal    WBC Count 10.7      RBC Count 4.10      Hemoglobin 11.1 (*)     Hematocrit 35.1      MCV 86      MCH 27.1      MCHC 31.6      RDW 14.6      Platelet Count 267      % Neutrophils 77      % Lymphocytes 13      % Monocytes 6      % Eosinophils 2      % Basophils 1      % Immature Granulocytes 1      NRBCs per 100 WBC 0      Absolute Neutrophils 8.4  (*)     Absolute Lymphocytes 1.4      Absolute Monocytes 0.6      Absolute Eosinophils 0.2      Absolute Basophils 0.1      Absolute Immature Granulocytes 0.1      Absolute NRBCs 0.0     ISTAT GASES LACTATE VENOUS POCT - Abnormal    Lactic Acid POCT 1.6      Bicarbonate Venous POCT 26      O2 Sat, Venous POCT 34 (*)     pCO2V Venous POCT 37 (*)     pH Venous POCT 7.46 (*)     pO2 Venous POCT 20 (*)    ROUTINE UA WITH MICROSCOPIC   COVID-19 VIRUS (CORONAVIRUS) BY PCR     . Abnormal Results: See above.   Treatments provided: See mar  Family Comments: NA  OBS brochure/video discussed/provided to patient:  Yes  ED Medications:   Medications   sodium chloride (PF) 0.9% PF flush 3 mL (has no administration in time range)   sodium chloride (PF) 0.9% PF flush 3 mL (has no administration in time range)     Drips infusing:  No  For the majority of the shift, the patient's behavior Green. Interventions performed were NA.    Sepsis treatment initiated: No     Patient tested for COVID 19 prior to admission: YES    ED Nurse Name/Phone Number: Nory Jorge RN,   3:13 PM    RECEIVING UNIT ED HANDOFF REVIEW    Above ED Nurse Handoff Report was reviewed: Yes  Reviewed by: Blessing Love RN on April 20, 2022 at 5:30 PM

## 2022-04-20 NOTE — ED PROVIDER NOTES
"  History   Chief Complaint:  Generalized Weakness       The history is provided by the patient and medical records.      Raven Carlin is a 82 year old female with history of atrial fibrillation, anxiety, chronic pain syndrome, depression, hypertension, morbid obesity, osteoarthritis, physical deconditioning, and severe sepsis who presents with generalized weakness. The patient reports that she was discharged from the hospital 2 days ago.  Her son and a friend helped her get home, but  while walking up the stairs her left leg \"gave out,\" after which she fell on her buttocks. She states that her son helped her into a chair, from which she has been unable to move until EMS arrived to help earlier today. Per chart review, the patient was recently admitted to [facility] from 04/09/22 to 04/18/22 for sepsis. She also notes that she had her left knee fused in the past. At this time, the patient denies left leg pain.    Review of Systems   Musculoskeletal: Negative for arthralgias (L leg).   Neurological: Positive for weakness.   All other systems reviewed and are negative.      Allergies:  Bactrim [Sulfamethoxazole W-Trimethoprim]  Epinephrine  Ketamine  Lisinopril  Simvastatin    Medications:  Eliquis  Ascorbic Acid  Beta-Carotene  Biotin Forte  Zyrtec  Famotidine  Ferrous Gluconate  Synalar  Folic acid  Lasix  Glucosamine MSM Complex  Vistaril  Lutein  Metoprolol tartrate  Morphine Sulfate  Niacin   Percocet  Oysco  Resveratrol  Detrol  Tocopherol    Past Medical History:     Abdominal mass  Acute kidney injury   Anemia   Anxiety  Arthritis  Asthma  Cellulitis   Choledocholithiasis   Chronic edema  Chronic infection  Chronic pain syndrome  Chronic kidney disease  Chronic renal failure  Depression   DJD  Dyslipidemia  Dyspnea on exertion  Gastric ulcer  GERD  Hypertension  Hyperparathyroidism   Morbid obesity  MRSA infection  Neuropathy   SAURABH  Osteoarthritis   Osteomyelitis   Overactive bladder   Permanent atrial " fibrillation   Peptic ulcer  Physical deconditioning  Severe sepsis  Tinea capitis    Past Surgical History:    Bilateral knee arthroplasty  Endoscopic retrograde cholangiopancreatogram  Hernia repair  Left knee arthroplasty  Left knee poly-component exchange  Left knee revision  Left knee needle aspiration  Left knee hardware removal  Left parathyroidectomy   Mammoplasty augmentation  Rhinoplasty  Tonsillectomy     Family History:    Father: Colon cancer  Mother: Hypertension, colon cancer  Brother: Osteoarthritis   Sister: Osteoarthritis     Social History:  Presents to the emergency department alone   Arrives via ambulance  Patient has a son    Physical Exam     Patient Vitals for the past 24 hrs:   BP Temp Temp src Pulse Resp SpO2   04/20/22 1513 -- -- -- 55 24 --   04/20/22 1329 121/68 97.7  F (36.5  C) Temporal -- -- 99 %       Physical Exam  Constitutional:       Comments: Pleasant and cooperative   HENT:      Mouth/Throat:      Pharynx: No posterior oropharyngeal erythema.   Eyes:      Conjunctiva/sclera: Conjunctivae normal.   Cardiovascular:      Rate and Rhythm: Normal rate and regular rhythm.      Heart sounds: Normal heart sounds.   Pulmonary:      Effort: Pulmonary effort is normal.      Breath sounds: Normal breath sounds.   Abdominal:      General: Bowel sounds are normal. There is no distension.      Palpations: Abdomen is soft.      Tenderness: There is no abdominal tenderness. There is no guarding or rebound.   Musculoskeletal:      Cervical back: Neck supple.      Comments: Left knee fusion, left shoe lift   Skin:     General: Skin is warm and dry.   Neurological:      Mental Status: She is alert.         Emergency Department Course     Laboratory:  Labs Ordered and Resulted from Time of ED Arrival to Time of ED Departure   COMPREHENSIVE METABOLIC PANEL - Abnormal       Result Value    Sodium 136      Potassium 4.0      Chloride 105      Carbon Dioxide (CO2) 26      Anion Gap 5      Urea Nitrogen  28      Creatinine 1.58 (*)     Calcium 9.5      Glucose 106 (*)     Alkaline Phosphatase 129      AST 22      ALT 21      Protein Total 6.2 (*)     Albumin 3.0 (*)     Bilirubin Total 1.0      GFR Estimate 33 (*)    ROUTINE UA WITH MICROSCOPIC - Abnormal    Color Urine Light Yellow      Appearance Urine Clear      Glucose Urine Negative      Bilirubin Urine Negative      Ketones Urine Negative      Specific Gravity Urine 1.011      Blood Urine Trace (*)     pH Urine 6.0      Protein Albumin Urine Negative      Urobilinogen Urine Normal      Nitrite Urine Negative      Leukocyte Esterase Urine Moderate (*)     RBC Urine 3 (*)     WBC Urine 7 (*)     Hyaline Casts Urine 4 (*)    CBC WITH PLATELETS AND DIFFERENTIAL - Abnormal    WBC Count 10.7      RBC Count 4.10      Hemoglobin 11.1 (*)     Hematocrit 35.1      MCV 86      MCH 27.1      MCHC 31.6      RDW 14.6      Platelet Count 267      % Neutrophils 77      % Lymphocytes 13      % Monocytes 6      % Eosinophils 2      % Basophils 1      % Immature Granulocytes 1      NRBCs per 100 WBC 0      Absolute Neutrophils 8.4 (*)     Absolute Lymphocytes 1.4      Absolute Monocytes 0.6      Absolute Eosinophils 0.2      Absolute Basophils 0.1      Absolute Immature Granulocytes 0.1      Absolute NRBCs 0.0     ISTAT GASES LACTATE VENOUS POCT - Abnormal    Lactic Acid POCT 1.6      Bicarbonate Venous POCT 26      O2 Sat, Venous POCT 34 (*)     pCO2V Venous POCT 37 (*)     pH Venous POCT 7.46 (*)     pO2 Venous POCT 20 (*)    COVID-19 VIRUS (CORONAVIRUS) BY PCR        Emergency Department Course:     Reviewed:  I reviewed nursing notes, vitals, past medical history and Care Everywhere    Assessments:  1327 I obtained history and examined the patient as noted above.   1416 I spoke with Magali, ED care coordinator, regarding the patient.  1444 I rechecked the patient and explained findings.     Consults:  1441 I spoke with Tyesha Mcqueen PA-C of the hospitalist service,  regarding the patient. She agreed to admit the patient for Dr. Daniel Fuller.    Disposition:  The patient was admitted to the hospital under the care of Tyesha Mcqueen PA-C, with Dr. Fuller.     Impression & Plan     CMS Diagnoses: None    Medical Decision Making:    Raven Carlin is a 82 year old female who arrives in the emergency department by ambulance 2 days after discharge.  She is too weak to ambulate at home and was on her chair in stool and urine from being unable to arise.  I see no evidence of recurrence of sepsis or acute kidney injury or other acute medical condition.  I suspect this is due to deconditioning.  I spoke to the care coordinator who indicated there would be no possibility of arranging transitional care admission.  We will admit the patient to the observation unit to arrange disposition.    Diagnosis:    ICD-10-CM    1. Generalized muscle weakness  M62.81        Scribe Disclosure:  I, Yan Huang, am serving as a scribe at 1:26 PM on 4/20/2022 to document services personally performed by Georgia Resendez MD based on my observations and the provider's statements to me.            Georgia Resendez MD  04/20/22 1544

## 2022-04-20 NOTE — ED TRIAGE NOTES
BIBA from home  Recently admitted for sepsis, discharge on 4/18  Recommended to do outpatient PT, but pt refused  Now has gen weakness and unable to get up and move  Was in urine and stool  Home nurse came to do therapy and recommended ER

## 2022-04-20 NOTE — ED NOTES
Care Management Follow Up    Length of Stay (days): 0    Expected Discharge Date:       Concerns to be Addressed: discharge planning     Patient plan of care discussed at interdisciplinary rounds: Yes    Anticipated Discharge Disposition: Transitional Care     Anticipated Discharge Services: Transportation Services  Anticipated Discharge DME:      Patient/family educated on Medicare website which has current facility and service quality ratings: yes  Education Provided on the Discharge Plan:    Patient/Family in Agreement with the Plan: yes    Referrals Placed by CM/SW:    Private pay costs discussed: transportation costs    Additional Information:  Requested to speak with pt and pt's daughter regarding discharge planning. Pt was recently seen at [facility] and discharged home with resumption of home care. Pt in the ED again and needing TCU. Brought pt/daughter a [employer] list to review. Pt agreeable to [facility] TCU. Referral sent. Will need to send again with therapies notes and MD notes. They will review the list for more choices.     Confirmed with pt nothing has changed on pt's last full assessment (see 4/12/22).     Reviewed out of pocket cost for [facility] transport, $81.80 for base rate and $5.26 per mile to the destination. Spoke with daughter Rhonda, they expressed understanding and are agreeable to this.

## 2022-04-20 NOTE — PLAN OF CARE
ROOM # 220    Living Situation (if not independent, order SW consult): home with son/daughter  Facility name:  : see chart    Activity level at baseline: SBA w/ walker  Activity level on admit: x2-lift assist    Who will be transporting you at discharge: depending on ambulation status, may need WC or stretcher transport    Patient registered to observation; given Patient Bill of Rights; given the opportunity to ask questions about observation status and their plan of care.  Patient has been oriented to the observation room, bathroom and call light is in place.    Discussed discharge goals and expectations with patient/family.     OBSERVATION patient IN TIME: 1805

## 2022-04-21 ENCOUNTER — APPOINTMENT (OUTPATIENT)
Dept: PHYSICAL THERAPY | Facility: CLINIC | Age: 82
End: 2022-04-21
Attending: PHYSICIAN ASSISTANT
Payer: COMMERCIAL

## 2022-04-21 ENCOUNTER — APPOINTMENT (OUTPATIENT)
Dept: OCCUPATIONAL THERAPY | Facility: CLINIC | Age: 82
End: 2022-04-21
Attending: NURSE PRACTITIONER
Payer: COMMERCIAL

## 2022-04-21 LAB
ANION GAP SERPL CALCULATED.3IONS-SCNC: 5 MMOL/L (ref 3–14)
BUN SERPL-MCNC: 27 MG/DL (ref 7–30)
CALCIUM SERPL-MCNC: 8.6 MG/DL (ref 8.5–10.1)
CHLORIDE BLD-SCNC: 107 MMOL/L (ref 94–109)
CO2 SERPL-SCNC: 25 MMOL/L (ref 20–32)
CREAT SERPL-MCNC: 1.56 MG/DL (ref 0.52–1.04)
GFR SERPL CREATININE-BSD FRML MDRD: 33 ML/MIN/1.73M2
GLUCOSE BLD-MCNC: 96 MG/DL (ref 70–99)
POTASSIUM BLD-SCNC: 4.7 MMOL/L (ref 3.4–5.3)
SODIUM SERPL-SCNC: 137 MMOL/L (ref 133–144)

## 2022-04-21 PROCEDURE — 250N000013 HC RX MED GY IP 250 OP 250 PS 637: Performed by: NURSE PRACTITIONER

## 2022-04-21 PROCEDURE — 97165 OT EVAL LOW COMPLEX 30 MIN: CPT | Mod: GO

## 2022-04-21 PROCEDURE — G0378 HOSPITAL OBSERVATION PER HR: HCPCS

## 2022-04-21 PROCEDURE — G0463 HOSPITAL OUTPT CLINIC VISIT: HCPCS | Mod: 25

## 2022-04-21 PROCEDURE — 97530 THERAPEUTIC ACTIVITIES: CPT | Mod: GP | Performed by: PHYSICAL THERAPIST

## 2022-04-21 PROCEDURE — 80048 BASIC METABOLIC PNL TOTAL CA: CPT | Performed by: PHYSICIAN ASSISTANT

## 2022-04-21 PROCEDURE — 97162 PT EVAL MOD COMPLEX 30 MIN: CPT | Mod: GP | Performed by: PHYSICAL THERAPIST

## 2022-04-21 PROCEDURE — 250N000013 HC RX MED GY IP 250 OP 250 PS 637: Performed by: HOSPITALIST

## 2022-04-21 PROCEDURE — 250N000013 HC RX MED GY IP 250 OP 250 PS 637: Performed by: PHYSICIAN ASSISTANT

## 2022-04-21 PROCEDURE — 97535 SELF CARE MNGMENT TRAINING: CPT | Mod: GO

## 2022-04-21 PROCEDURE — 99226 PR SUBSEQUENT OBSERVATION CARE,LEVEL III: CPT | Performed by: NURSE PRACTITIONER

## 2022-04-21 PROCEDURE — 36416 COLLJ CAPILLARY BLOOD SPEC: CPT | Performed by: PHYSICIAN ASSISTANT

## 2022-04-21 RX ORDER — CEPHALEXIN 500 MG/1
500 CAPSULE ORAL DAILY
Status: DISCONTINUED | OUTPATIENT
Start: 2022-04-21 | End: 2022-04-28 | Stop reason: HOSPADM

## 2022-04-21 RX ORDER — FLUTICASONE PROPIONATE 50 MCG
2 SPRAY, SUSPENSION (ML) NASAL DAILY
Status: DISCONTINUED | OUTPATIENT
Start: 2022-04-21 | End: 2022-04-28 | Stop reason: HOSPADM

## 2022-04-21 RX ORDER — CETIRIZINE HYDROCHLORIDE 10 MG/1
10 TABLET ORAL DAILY
Status: DISCONTINUED | OUTPATIENT
Start: 2022-04-21 | End: 2022-04-28 | Stop reason: HOSPADM

## 2022-04-21 RX ORDER — POLYETHYLENE GLYCOL 3350 17 G/17G
17 POWDER, FOR SOLUTION ORAL DAILY
Status: DISCONTINUED | OUTPATIENT
Start: 2022-04-21 | End: 2022-04-23

## 2022-04-21 RX ADMIN — CEPHALEXIN 500 MG: 500 CAPSULE ORAL at 16:39

## 2022-04-21 RX ADMIN — METOPROLOL TARTRATE 50 MG: 50 TABLET, FILM COATED ORAL at 08:13

## 2022-04-21 RX ADMIN — CETIRIZINE HYDROCHLORIDE 10 MG: 10 TABLET, FILM COATED ORAL at 16:39

## 2022-04-21 RX ADMIN — FERROUS GLUCONATE 324 MG: 324 TABLET ORAL at 08:13

## 2022-04-21 RX ADMIN — Medication 500 MG: at 08:13

## 2022-04-21 RX ADMIN — APIXABAN 5 MG: 5 TABLET, FILM COATED ORAL at 08:13

## 2022-04-21 RX ADMIN — MORPHINE SULFATE 15 MG: 15 TABLET, EXTENDED RELEASE ORAL at 09:46

## 2022-04-21 RX ADMIN — MORPHINE SULFATE 15 MG: 15 TABLET, EXTENDED RELEASE ORAL at 21:23

## 2022-04-21 RX ADMIN — OXYCODONE AND ACETAMINOPHEN 1 TABLET: 7.5; 325 TABLET ORAL at 13:16

## 2022-04-21 RX ADMIN — TOLTERODINE TARTRATE 2 MG: 1 TABLET, FILM COATED ORAL at 21:23

## 2022-04-21 RX ADMIN — APIXABAN 2.5 MG: 2.5 TABLET, FILM COATED ORAL at 21:23

## 2022-04-21 RX ADMIN — FAMOTIDINE 20 MG: 20 TABLET ORAL at 21:23

## 2022-04-21 RX ADMIN — METOPROLOL TARTRATE 50 MG: 50 TABLET, FILM COATED ORAL at 21:23

## 2022-04-21 RX ADMIN — TOLTERODINE TARTRATE 2 MG: 1 TABLET, FILM COATED ORAL at 08:12

## 2022-04-21 RX ADMIN — OXYCODONE AND ACETAMINOPHEN 1 TABLET: 7.5; 325 TABLET ORAL at 21:23

## 2022-04-21 NOTE — PLAN OF CARE
"PRIMARY DIAGNOSIS: GENERALIZED WEAKNESS    OUTPATIENT/OBSERVATION GOALS TO BE MET BEFORE DISCHARGE  1. Orthostatic performed: No    2. Tolerating PO medications: Yes    3. Return to near baseline physical activity: No    4. Cleared for discharge by consultants (if involved): No      Blood pressure 124/63, pulse 55, temperature 98.8  F (37.1  C), temperature source Oral, resp. rate 18, height 1.651 m (5' 5\"), weight 101.9 kg (224 lb 11.2 oz), SpO2 98 %, not currently breastfeeding.      Patient is alert and oriented x 4. Pleasant and able to make needs known. He has sat up on the side of the bed twice and has ambulated in his room once post surgery. He has been having some complaints of pain that have improved with oral pain medication. He is still on capno, has a mcgee catheter. Has redness at the end of his surgical site on abdomen, it is a little tender but there is no hematoma.  Surgery is having us monitor site to see if a hematoma forms or if the redness gets bigger, a skin marker was used to marcelino where the site is at currently.         Discharge Planner Nurse   Safe discharge environment identified: No  Barriers to discharge: Yes - waiting for TCU, referrals sent, needs PT/OT consult for insurance auth       Entered by: Raquel Thompson 04/21/2022 5:37 PM     Please review provider order for any additional goals.   Nurse to notify provider when observation goals have been met and patient is ready for discharge.                      "

## 2022-04-21 NOTE — PLAN OF CARE
PRIMARY DIAGNOSIS: GENERALIZED WEAKNESS    OUTPATIENT/OBSERVATION GOALS TO BE MET BEFORE DISCHARGE  Temp: 97.9  F (36.6  C) Temp src: Oral BP: (!) 150/88 Pulse: 72   Resp: 20 SpO2: 100 % O2 Device: None (Room air)     1. Orthostatic performed: JAE pt refused.    2. Tolerating PO medications: Yes    3. Return to near baseline physical activity: No    4. Cleared for discharge by consultants (if involved): No    Discharge Planner Nurse   Safe discharge environment identified: Yes  Barriers to discharge: Yes       Entered by: Laureen Day 04/21/2022 5:16 AM    Pt alert and oriented x4 and is able to make needs known. Pt is now calm, approachable, and allowed writer to start IVF. Pt reports feeling comfortable. Plan of care for the day is to consult PT, SW and and WOC.      Please review provider order for any additional goals.   Nurse to notify provider when observation goals have been met and patient is ready for discharge.

## 2022-04-21 NOTE — PROGRESS NOTES
"   04/21/22 1337   Quick Adds   Type of Visit Initial Occupational Therapy Evaluation   Living Environment   People in Home child(robinson), adult   Current Living Arrangements house   Home Accessibility stairs within home   Number of Stairs, Within Home, Primary seven;eight   Stair Railings, Within Home, Primary railings on both sides of stairs   Transportation Anticipated agency   Living Environment Comments Pt lives at home with her daughter, 7-8 stairs down to lower level, accessible shower w/ sliding bench, RTS/portable BSC   Self-Care   Usual Activity Tolerance moderate   Current Activity Tolerance poor   Equipment Currently Used at Home tub bench;walker, rolling;raised toilet seat   Fall history within last six months yes   Activity/Exercise/Self-Care Comment pt reports she spends most of her time in lift recliner. has 40 steps to bathroom. uses 4WW and lift shoe given LE limb length differences. reports she has assistance come in \"every other day or so\" to assist with bathing and ADL. pt reports otherwise get to the bathroom, toilets and dresses self. reports daughter an assist at home   General Information   Onset of Illness/Injury or Date of Surgery 04/20/22   Referring Physician Lj Rios, KATIA NIXON   Patient/Family Therapy Goal Statement (OT) Pt open to rehab options   Additional Occupational Profile Info/Pertinent History of Current Problem Per chart: Pt is an 82 year old female admitted with generalized weakness after returning to her home after a prolonged hospital stay 4/9-4/18 due to sepsis.  She had a fall onto her buttocks on 4/18 with no new acute pain   Existing Precautions/Restrictions fall   Visual Perception   Visual Impairment/Limitations corrective lenses for reading   Pain Assessment   Patient Currently in Pain Yes, see Vital Sign flowsheet  (upset stomach)   Range of Motion Comprehensive   Comment, General Range of Motion Limited B shoulder ROM due to arthritis, LLE no knee flexion due " to fusion   Strength Comprehensive (MMT)   Comment, General Manual Muscle Testing (MMT) Assessment Generalized weakness BUEs   Bed Mobility   Bed Mobility rolling left;rolling right   Rolling Left Peach (Bed Mobility) maximum assist (25% patient effort)   Rolling Right Peach (Bed Mobility) maximum assist (25% patient effort)   Transfers   Transfer Comments Declined EOB/OOB trial   Activities of Daily Living   BADL Assessment/Intervention grooming;lower body dressing   Lower Body Dressing Assessment/Training   Peach Level (Lower Body Dressing) dependent (less than 25% patient effort)   Grooming Assessment/Training   Peach Level (Grooming) set up   Clinical Impression   Criteria for Skilled Therapeutic Interventions Met (OT) Yes, treatment indicated   OT Diagnosis Impaired ADLs, mobility tasks   OT Problem List-Impairments impacting ADL problems related to;activity tolerance impaired;balance;strength;pain   ADL comments/analysis Pt below baseline level of functioning in daily tasks   Assessment of Occupational Performance 5 or more Performance Deficits   Identified Performance Deficits bathing, dressing, toileting, home mgmt, mobility, self care   Planned Therapy Interventions (OT) ADL retraining;IADL retraining;strengthening;transfer training   Clinical Decision Making Complexity (OT) low complexity   Risk & Benefits of therapy have been explained evaluation/treatment results reviewed;care plan/treatment goals reviewed;risks/benefits reviewed;current/potential barriers reviewed;participants voiced agreement with care plan;participants included;patient   OT Discharge Planning   OT Discharge Recommendation (DC Rec) Transitional Care Facility   OT Rationale for DC Rec Pt is below baseline level of functioning in daily tasks, remains significantly deconditioned. Had a fall on the stairs and inability to get up from lift chair prompting readmission to this hospital. Pt is a high readmission and  fall risk if returns home. Recommend ongoing skilled OT while IP and in TCU setting to improve strength, functional activity tolerance, balance and safety needed for daily tasks.   Therapy Certification   Start of Care Date 04/21/22   Certification date from 04/21/22   Certification date to 04/27/22   Medical Diagnosis weakness   Total Evaluation Time (Minutes)   Total Evaluation Time (Minutes) 8   OT Goals   Therapy Frequency (OT) 4 times/wk   OT Predicted Duration/Target Date for Goal Attainment 04/27/22   OT: Hygiene/Grooming supervision/stand-by assist;from wheelchair  (seated EOB)   OT: Upper Body Dressing Minimal assist   OT: Lower Body Dressing Minimal assist;using adaptive equipment   OT: Toilet Transfer/Toileting Minimal assist;toilet transfer;cleaning and garment management;using adaptive equipment

## 2022-04-21 NOTE — PROGRESS NOTES
"[facility]    Medicine Progress Note - Hospitalist Service       Date of Admission:  4/20/2022    Summary:   Raven Carlin is a 82 year old female with a history of A. fib on Eliquis, asthma, chronic pain syndrome on opiates, morbid obesity, CKD stage IIIb, HLD, GERD, gastric ulcer, HTN, ESBL E. coli, and SAURABH on CPAP who was admitted on 4/9/2022-4/18/22 due to sepsis 2/2 ESBL E.Coli UTI, Cholelithiasis and Choledocholithiasis.  She underwent ERCP on 4/14.  Cholecystectomy was scheduled for 4/15, but patient declined.  She was discharged home with home PT but now returned to the ED with generalized weakness.   Awaiting placement.     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  Reports chronic pain as baseline.  As noted above, initially went home, returned seeking TCU/rehab placement.   Per EMR she takes Keflex prophylactically by her infectious disease provider given her chronic posterior thigh wound.  -PT/OT following.   -Wound care following.   -SW following.   -Continues PTA MS Contin BID.   -Continues PTA Percocet at 1 tab BID PRN.   -Has PRN Acetaminophen on profile.   --Resumed PTA Cephalexin q daily.       Recent Sepsis  Recent Choledocholithiasis and Cholelithiasis  S/p ERCP on 4/14  Currently denies any abdominal pain, no N/V, eating/drinking.   Afebrile, no leukocytosis.   -No changes, continue to clinically monitor.     PAULINE  CKD IIIB  Creatine baseline is hard to determine, but 1.3-1.5 with GFR around 35-40.  Creatine today was 1.56.  Diuretics were held due to concern for PAULINE, but not sure if it's PAULINE vs. CKD.   -Will continue to HOLD PTA Furosemide, KCl, Mg Oxide.     Chronic A-fib  Essential HTN  HLD  Morbid obesity  Review of VS's show VSS and within acceptable range. "   Does have minimal LE edema, no crackles, no other s/s of clinical CHF.   -No changes, continue to clinically monitor.   -Note diuretics being held above.   -PTA Eliquis reduced down to 2.5 mg's BID due to CKD.     SAURABH  -Using home CPAP at night.     Asthma  Doing well, denies any respiratory complaints.   -Resumed PTA Fluticasone nasal spray.   -Resumed home Cetirizine.      Overactive bladder  Pyruia  Denies any dysuria or  issues.   Urine did show mild pyuria, but so far culture is NGTD.   -Continue to monitor culture.       Diet: Regular Diet Adult    DVT Prophylaxis: DOAC  Thornton Catheter: Not present  Central Lines: None  Code Status: Full Code      Disposition Plan   Expected Discharge: As soon as TCU/placement can be arranged.   Anticipated discharge location: New TCU/rehab.       The patient's care was discussed with the Attending Physician, Dr. Fuller.    Signed:     KATIA Retana [facility]  Hospitalist Service  [facility]  Securely message with the Vocera Web Console (learn more here)  Text page via [employer] Paging/Directory       Interval History   Met with Mrs. Carlin today for follow up.  She is mildly cantankerous but reports she is doing well.  She endorses her chronic LLE pain as unchanged/baseline, is vague on baseline LLE mobility/ability.  She endorses mild constipation, improved today with prune juice, no N/V or cramps.  She has no other complaints.      Review of Systems:  10 point ROS done including, light headedness/dizziness, fever/chills, pain, Resp, CV, GI, and  and is negative other than noted in HPI.     Objective Data:   Vital Signs: Temp: 98.8  F (37.1  C) Temp src: Oral BP: 124/63 Pulse: 55   Resp: 18 SpO2: 98 % O2 Device: None (Room air)    Weight: 224 lbs 11.2 oz    Physical Exam:  Vital signs reviewed:    Constitutional:     General: NAD     Appearance: Normal appearance.  Non-toxic.   HENT:     Head: Atraumatic.      ENT: Mouth an oral pharynx are normal,  moist, no exudate.   EYES:      General: No scleral icterus.   Cardiovascular:     Rate and Rhythm: Regular.      Heart sounds: S1/S2, no murmur or rub's heard.   Pulmonary:      Effort: Regular relaxed effort.  No cough.      Auscultation: CTA.   Extremities:     Findings: Warm and well perfused, no calf tenderness.      Bilateral leg's: Trace bilateral lower leg edema.   Abdominal:      General: Abdomen is obese with large pannus but soft, non-tender with active bowel sounds.      Findings: No guarding, rigidity or rebound tenderness.   Skin:      General: Skin is warm/dry.      Findings: No rash or wounds seen on exposed skin.  Multiple bruising on arms/leg's in various stages of healing.   Neurological:     General: No focal deficit's seen on general exam.      Mental Status: Alert.      Cranial Nerves: No obvious CN deficit's on gross exam.   Psychiatric:      Mood and Affect: Mildly cantankerous but cooperative.      Behavior: Cooperative.      Data reviewed today: I reviewed all medications, new labs and imaging results over the last 24 hours.     End:

## 2022-04-21 NOTE — PROGRESS NOTES
[facility]      OUTPATIENT OCCUPATIONAL THERAPY  EVALUATION  PLAN OF TREATMENT FOR OUTPATIENT REHABILITATION  (COMPLETE FOR INITIAL CLAIMS ONLY)  Patient's Last Name, First Name, M.I.  YOB: 1940  Raven Carlin  JAKOB                          Provider's Name  [facility] Medical Record No.  [id number]                               Onset Date:  04/20/22   Start of Care Date:  04/21/22     Type:     ___PT   _X_OT   ___SLP Medical Diagnosis:  weakness                        OT Diagnosis:  Impaired ADLs, mobility tasks   Visits from SOC:  1   _________________________________________________________________________________  Plan of Treatment/Functional Goals    Planned Interventions: ADL retraining, IADL retraining, strengthening, transfer training   Goals: See Occupational Therapy Goals on Care Plan in [facility] electronic health record.    Therapy Frequency: 4 times/wk  Predicted Duration of Therapy Intervention: 04/27/22  _________________________________________________________________________________    I CERTIFY THE NEED FOR THESE SERVICES FURNISHED UNDER        THIS PLAN OF TREATMENT AND WHILE UNDER MY CARE     (Physician co-signature of this document indicates review and certification of the therapy plan).              Certification date from: 04/21/22, Certification date to: 04/27/22    Referring Physician: Lj Rios APRN CNP            Initial Assessment        See Occupational Therapy evaluation dated 04/21/22 in Epic electronic health record.

## 2022-04-21 NOTE — PLAN OF CARE
"PRIMARY DIAGNOSIS: GENERALIZED WEAKNESS    OUTPATIENT/OBSERVATION GOALS TO BE MET BEFORE DISCHARGE  1. Orthostatic performed: No    2. Tolerating PO medications: Yes    3. Return to near baseline physical activity: No    4. Cleared for discharge by consultants (if involved): No      Blood pressure (!) 166/85, pulse 60, temperature 97.9  F (36.6  C), temperature source Oral, resp. rate 20, height 1.651 m (5' 5\"), weight 101.9 kg (224 lb 11.2 oz), SpO2 100 %, not currently breastfeeding.      Patient alert and oriented x4. Pt pleasant and cooperative during cares. Pt is able to roll and move in bed with assist of one. Up for commode at night.       Discharge Planner Nurse   Safe discharge environment identified: No  Barriers to discharge: Yes - waiting for TCU, referrals sent, needs PT/OT consult for insurance auth       Entered by: Rafia Smith 04/21/2022 10:51 AM     Please review provider order for any additional goals.   Nurse to notify provider when observation goals have been met and patient is ready for discharge.                      "

## 2022-04-21 NOTE — CONSULTS
[facility] Nurse Inpatient Assessment     Today's Assessment: Buttocks, posterior knee, abdominal folds    Patient History (according to provider note(s):    Raven Carlin is a 82 year old female with a history of A. fib on Eliquis, asthma, chronic pain syndrome on opiates, morbid obesity, CKD stage IIIb, HLD, GERD, gastric ulcer, HTN, ESBL E. coli, and SAURABH on CPAP who was admitted on 4/9/2022-4/18/22 due to sepsis 2/2 ESBL E.Coli UTI, Cholelithiasis and Choledocholithiasis.  She underwent ERCP on 4/14.  Cholecystectomy was scheduled for 4/15, but patient declined.  She was discharged home with home PT.  Now returning to the ED with generalized weakness.     AREAS ASSESSED:      Wound Location: Coccyx, buttocks and perianal area    Last photo: 4/21/22  Wound due to: Pressure Injury, Shear, Intertrigo and Moisture Associated Skin Damage (MASD)  Wound history/plan of care:   Patient with morbid obesity who reports she often scratches due to itching.    Wound base: Coccyx is 1x0.4cm, 100% agranular, left buttock with 0.5x1cm and 1x1cm areas of purple discoloration.  Left buttock also with 3.5x0.1cm agranular excoriation.  Perianal area with 3x3cm area of extensive pinpoint agranular areas.     Palpation of the wound bed: normal      Drainage: small     Description of drainage: serosanguinous     Measurements (length x width x depth, in cm) see above      Tunneling N/A     Undermining N/A  Periwound skin: intact      Color: normal and consistent with surrounding tissue      Temperature: normal   Odor: none  Pain: moderate-generalized pain  Treatment goal: Heal   STATUS: initial assessment  Supplies ordered: at bedside     Wound Location: Abdominal folds    Last photo: none  Wound due to: Moisture Associated Skin Damage (MASD)  Wound history/plan of care:   Patient with morbid obesity and moisture in folds.  Did have a large blister rupture on right side.  Wound base: 100 % agranular      Palpation of the wound bed: normal      Drainage: moderate     Description of drainage: serous     Measurements (length x width x depth, in cm) approximately 6 areas all ranging from 0.5x1cm to 1x3cm expect ruptured blister which is 5x4cm     Tunneling N/A     Undermining N/A  Periwound skin: erythema- blanchable      Color: pink      Temperature: normal   Odor: none  Treatment goal: Heal , Drainage control and Infection control/prevention  STATUS: initial assessment  Supplies ordered: supplies stored on unit and discussed with RN    Left posterior knee with multiple excoriations from scratching up to 4x0.2cm each.  Approximately 8 areas.         TREATMENT PLAN:     Coccyx, buttocks and perianal wound(s): BID   1. Cleanse with Yanni spray and soft dry wipe  2. Apply a thin layer of Critic Aid paste     Abdominal fold wound(s):  Daily:  Interdry(order#[id number]):   1.  Wash skin gently. Pat, do not rub.  2.  With clean scissors, cut enough fabric to cover the affected area, allowing for a minimum of 2 inches to extend beyond the skin fold for moisture evaporation.  3.  Lay a single layer of fabric in the skin fold, placing one edge into the base of the fold. Gently smooth the rest of the fabric over the skin, keeping it flat.  4.  Leave at least 2 inches of fabric exposed outside the skin fold.  5.  Secure the fabric in one of several ways: with the skin fold, with a small amount of skin-friendly tape, or tucked under clothing.  6.  Remove the fabric before bathing and reuse it when finished. When removing, gently separate the skin fold and lift away.  Helpful Hints  1. InterDry  can be written on with a ballpoint pen. It may be helpful to label each piece of InterDry  with the date you started using it.  2.  Each piece of InterDry  may be used up to 5 days, depending on fabric soiling, odor, amount of moisture and general skin condition. Replace InterDry  if it becomes soiled with blood, urine or stool.  3.  Do not use  creams, ointments, or powders with InterDry  as it may interfere with product efficacy.    Left posterior knee wound(s): BID  1. Cleanse with wound cleanser and dry  2. Apply Sween cream and leave open to air     RECOMMEND PRIMARY TEAM ORDER: None, at this time  Education provided to: plan of care  Discussed plan of care with: Patient and Nurse  WOC Nurse follow-up plan:weekly  Notify WOC if wound(s) deteriorate.  Nursing to notify the Provider(s) and re-consult the WOC Nurse if new skin concern.    DATA:     Current support surface: Standard  Atmos Air mattress-reconsider low air loss if not discharging soon  BMI: Body mass index is 37.39 kg/m .   Active Diet Order: Orders Placed This Encounter      Regular Diet Adult     Output: No intake/output data recorded.   Labs: Recent Labs   Lab 04/20/22  1329   ALBUMIN 3.0*   HGB 11.1*   WBC 10.7     Pressure Injury Risk Assessment:   Bob Risk Assessment  Sensory Perception: 3-->slightly limited  Moisture: 3-->occasionally moist  Activity: 2-->chairfast  Mobility: 2-->very limited  Nutrition: 3-->adequate  Friction and Shear: 2-->potential problem  Bob Score: 15    Gautam Hussein RN CWOCN  Dept. Pager: [phone]  Dept. Office Number: [phone]

## 2022-04-21 NOTE — PLAN OF CARE
"PRIMARY DIAGNOSIS: GENERALIZED WEAKNESS    OUTPATIENT/OBSERVATION GOALS TO BE MET BEFORE DISCHARGE  1. Orthostatic performed: No    2. Tolerating PO medications: Yes    3. Return to near baseline physical activity: No    4. Cleared for discharge by consultants (if involved): No    /63 (BP Location: Left arm)   Pulse 55   Temp 98.8  F (37.1  C) (Oral)   Resp 18   Ht 1.651 m (5' 5\")   Wt 101.9 kg (224 lb 11.2 oz)   SpO2 98%   BMI 37.39 kg/m        Patient has been resting in bed comfortably, is AOx4, and has IV saline locked. Patient is currently denying pain. She was pleasant throughout the assessment and while giving medication.           Discharge Planner Nurse   Safe discharge environment identified: No  Barriers to discharge: Yes - waiting for TCU, referrals sent, needs PT/OT consult for insurance auth       Entered by: Raquel Thompson 04/21/2022 12:46pm.     Please review provider order for any additional goals.   Nurse to notify provider when observation goals have been met and patient is ready for discharge.                      "

## 2022-04-21 NOTE — PLAN OF CARE
Patient's After Visit Summary was reviewed with patient and/or family.   Patient verbalized understanding of After Visit Summary, recommended follow up and was given an opportunity to ask questions.   Discharge medications sent home with patient/family: Not applicable   Discharged with spouse and son    Goal Outcome Evaluation:    OBSERVATION patient END time: 1130.

## 2022-04-21 NOTE — PLAN OF CARE
"PRIMARY DIAGNOSIS: GENERALIZED WEAKNESS    OUTPATIENT/OBSERVATION GOALS TO BE MET BEFORE DISCHARGE  Temp: 97.9  F (36.6  C) Temp src: Oral BP: (!) 150/88 Pulse: 72   Resp: 20 SpO2: 100 % O2 Device: None (Room air)     1. Orthostatic performed: JAE pt refused.    2. Tolerating PO medications: Yes    3. Return to near baseline physical activity: No    4. Cleared for discharge by consultants (if involved): No    Discharge Planner Nurse   Safe discharge environment identified: Yes  Barriers to discharge: Yes       Entered by: Laureen Day 04/21/2022 2:53 AM    Pt alert and oriented x4 and is able to make needs known. Pt is verbally abusive to staff and is inappropriate with behaviors, yelling at staff and is poorly motivated to complete cares. Pt stated she is \"unable to stand and pivot\" and insisted to be transferred using the lift device. 4 staff including the charge nurse entered the room to assist the pt to the commode. Pt continuously yelled, stating \"I cannot do it! I told you I need the lift because I cannot walk! I will not do it and you need to use the lift to get me there even with all of you! Or get me a man to transfer me because you women will just drop me!\" Staff nurse said, \"that is insulting to us there is 4 of us in here and we are more than capable to get you on the commode. We are not asking you to walk we are asking you to stand and pivot, meaning to bear weight on 1 leg and pivot. We will not drop you. There is no lift available as I checked already on 3rd floor.\" Pt transferred with utmost encouragement and was able to have a large BM on the commode. VSS. Scheduled metoprolol tartrate not given per parameters. PRN percocet administered.    Please review provider order for any additional goals.   Nurse to notify provider when observation goals have been met and patient is ready for discharge.      "

## 2022-04-21 NOTE — PROGRESS NOTES
Worked with WOC nurse. Pt cooperative and appreciative of WOC RN input and treatments. Interdry in place, pt provided sween cream for dry skin, maykel spray and excoriation open to air. Mepilex pads removed.

## 2022-04-21 NOTE — PHARMACY-ADMISSION MEDICATION HISTORY
Admission medication history interview status for this patient is complete. See [facility] admission navigator for allergy information, prior to admission medications and immunization status.     Medication history interview done, indicate source(s): Patient  Medication history resources (including written lists, pill bottles, clinic record): Recent discharge 4/18/22  Pharmacy: [facility], [place]    Changes made to PTA medication list:  Added: Keflex (once daily prophylaxis)  Changed: n/a  Reported as Not Taking: n/a  Removed: n/a    Actions taken by pharmacist (provider contacted, etc):None     Additional medication history information:None    Medication reconciliation/reorder completed by provider prior to medication history?  N   (Y/N)     Prior to Admission medications    Medication Sig Last Dose Taking? Auth Provider   apixaban ANTICOAGULANT (ELIQUIS ANTICOAGULANT) 5 MG tablet Take 1 tablet (5 mg) by mouth 2 times daily Appointment required for further refills 4/20/2022 at am Yes Holland Marcelo MD   ascorbic acid (VITAMIN C) 500 MG CPCR Take 500 mg by mouth daily 4/19/2022 at Unknown time Yes Reported, Patient   BETA CAROTENE PO Take by mouth daily 4/19/2022 at Unknown time Yes Reported, Patient   BIOTIN FORTE PO Take by mouth daily 4/19/2022 at Unknown time Yes Reported, Patient   cephALEXin (KEFLEX) 500 MG capsule Take 500 mg by mouth daily For prophylaxis 4/20/2022 at Unknown time Yes Unknown, Entered By History   cetirizine (ZYRTEC) 5 MG tablet Take 10 mg by mouth daily  4/19/2022 at Unknown time Yes Reported, Patient   Cranberry (CRAN-MAX PO) Take by mouth daily 4/19/2022 at Unknown time Yes Reported, Patient   Famotidine (ZANTAC 360 PO) Take 1 tablet by mouth 2 times daily 4/19/2022 at Unknown time Yes Unknown, Entered By History   Ferrous Gluconate 324 (37.5 Fe) MG TABS Take 1 tablet by mouth in the morning. 4/19/2022 at Unknown time Yes Unknown, Entered By History   Flaxseed, Linseed, (FLAX SEEDS PO) Take  by mouth daily  4/19/2022 at Unknown time Yes Reported, Patient   fluocinolone (SYNALAR) 0.01 % solution Apply topically 2 times daily 5 drops into both ears 4/19/2022 at Unknown time Yes Unknown, Entered By History   fluticasone (FLONASE) 50 MCG/ACT nasal spray Spray 2 sprays into both nostrils daily 4/19/2022 at Unknown time Yes Unknown, Entered By History   folic acid (FOLVITE) 1 MG tablet Take 1 mg by mouth daily 4/19/2022 at Unknown time Yes Reported, Patient   furosemide (LASIX) 20 MG tablet Take 20 mg by mouth daily  4/20/2022 at am Yes Reported, Patient   Glucos-MSM-C-El-Ynggqx-Ahbsll (GLUCOSAMINE MSM COMPLEX) TABS tablet Take 2 tablets by mouth daily 4/19/2022 at Unknown time Yes Reported, Patient   hydrOXYzine (VISTARIL) 50 MG capsule Take 50 mg by mouth as needed in the morning and 50 mg as needed in the evening for itching. 4/19/2022 at Unknown time Yes Unknown, Entered By History   Lutein 40 MG CAPS Take 1 capsule by mouth in the morning. 4/19/2022 at Unknown time Yes Reported, Patient   Magnesium Oxide 500 MG TABS Take 500 mg by mouth daily 4/19/2022 at Unknown time Yes Reported, Patient   metoprolol tartrate (LOPRESSOR) 50 MG tablet Take 1 tablet (50 mg) by mouth 2 times daily Appointment required for further refills 4/20/2022 at am Yes Holland Marcelo MD   Morphine Sulfate (MS CONTIN PO) Take 15 mg by mouth every 12 hours 4/20/2022 at am Yes Reported, Patient   multivitamin w/minerals (THERA-VIT-M) tablet Take 1 tablet by mouth daily 4/19/2022 at Unknown time Yes Reported, Patient   niacin 500 MG tablet Take 500 mg by mouth daily (with breakfast) 4/19/2022 at Unknown time Yes Reported, Patient   NONFORMULARY 720 mg daily Tumeric, takes 2 tabs at bedtime 4/19/2022 at Unknown time Yes Reported, Patient   nystatin (MYCOSTATIN) 016002 UNIT/GM external cream Apply topically 2 times daily 4/19/2022 at Unknown time Yes Unknown, Entered By History   oxyCODONE-acetaminophen (PERCOCET) 7.5-325 MG per  tablet Take 1 tablet by mouth in the morning and 1 tablet in the evening. Takes two in the morning and one before bedtime 4/20/2022 at x1 Yes Reported, Patient   OYSCO 500 + D 500-200 MG-UNIT tablet TAKE 2 TABLETS (1,000 MG) BY MOUTH EVERY 8 HOURS SEE DISCHARGE INSTRUCTION SHEET FOR TAPER. 4/19/2022 at Unknown time Yes Daniel Lackey PA-C   potassium 99 MG TABS Take 1 tablet by mouth daily 4/19/2022 at Unknown time Yes Reported, Patient   Probiotic Product (PROBIOTIC DAILY PO) Take 1 tablet by mouth daily 4/19/2022 at Unknown time Yes Reported, Patient   RESVERATROL PO Take by mouth daily 4/19/2022 at Unknown time Yes Reported, Patient   tolterodine (DETROL) 2 MG tablet Take 2 mg by mouth 2 times daily 4/20/2022 at am Yes Reported, Patient   vitamin E (TOCOPHEROL) 400 units (180 mg) capsule Take 400 Units by mouth daily 4/19/2022 at Unknown time Yes Reported, Patient   Zinc Gluconate 50 MG CAPS Take 1 capsule by mouth daily 4/19/2022 at Unknown time Yes Reported, Patient

## 2022-04-21 NOTE — PROGRESS NOTES
"Pt refused 0600 VS and stated \"no, you are not getting vitals from me. You can clearly see I am fine.\"  "

## 2022-04-21 NOTE — PROGRESS NOTES
Skin Assessment    Pt admitted to floor, full skin assessment completed with WOC consult placed. Bruising, blanchable redness, start of buttock pressure injury present on admission (PTA injury), excoriation and wounds present. Open sores covered with mepilex (4 placed, 2 at abdominal folds, 2 on buttock folds) to keep pt from itching open sores with dirty nails - increased risk for infection. Barrier cream applied, awaiting interdry for skin folds. Purewick in use. See flowsheet for complete assessment.

## 2022-04-21 NOTE — PLAN OF CARE
"PRIMARY DIAGNOSIS: GENERALIZED WEAKNESS    OUTPATIENT/OBSERVATION GOALS TO BE MET BEFORE DISCHARGE  Temp: 97.9  F (36.6  C) Temp src: Oral BP: (!) 150/88 Pulse: 72   Resp: 20 SpO2: 100 % O2 Device: None (Room air)     1. Orthostatic performed: JAE pt refused.    2. Tolerating PO medications: Yes    3. Return to near baseline physical activity: No    4. Cleared for discharge by consultants (if involved): No    Discharge Planner Nurse   Safe discharge environment identified: Yes  Barriers to discharge: Yes       Entered by: Laureen Day 04/21/2022 3:18 AM    Pt alert and oriented x4 and is able to make needs known. Pt is verbally abusive to staff and is inappropriate with behaviors, yelling at staff and is poorly motivated to complete cares. Pt was yelling at the writer stating \"do not put that BP cuff on my arm it hurts too much! If you actually cared about patients you would not put that cuff on anyone! Your machines suck! They're not supposed to hurt!\" Pt continuously complained about the cares. Writer flushed saline lock and pt stated \"that hurts don't do it! You people are horrible.\" Writer educated that vital signs are done to make sure she's within parameters and is meeting goals, and that flushes are necessary to keep PIV open. Pt did not say anything and stated, \"whatever. Just turn out the lights and close the door when you leave.\"     Please review provider order for any additional goals.   Nurse to notify provider when observation goals have been met and patient is ready for discharge.      "

## 2022-04-21 NOTE — PROGRESS NOTES
Care Management Follow Up    Length of Stay (days): 0    Expected Discharge Date: 04/22/2022     Concerns to be Addressed: discharge planning     Patient plan of care discussed at interdisciplinary rounds: Yes    Anticipated Discharge Disposition: Transitional Care     Anticipated Discharge Services: Transportation Services  Anticipated Discharge DME:      Patient/family educated on Medicare website which has current facility and service quality ratings: yes    Additional Information:  CM following for discharge planning, PT evaluated and is recommending discharge to TCU. Referral was already sent to [facility]'s TCU on 4/20 called to follow-up. Spoke with Farzana in admissions who will review for possible TCU admission and call back to CM. Awaiting response.     Lisa Yepez RN BSN   Inpatient Care Coordination  [facility]   Phone [phone]

## 2022-04-21 NOTE — PLAN OF CARE
PRIMARY DIAGNOSIS: GENERALIZED WEAKNESS    OUTPATIENT/OBSERVATION GOALS TO BE MET BEFORE DISCHARGE  1. Orthostatic performed: No    2. Tolerating PO medications: Yes    3. Return to near baseline physical activity: No    4. Cleared for discharge by consultants (if involved): No        Patient resting in bed.  Pt appears to be comfortable. Pain rates 4-7 at  baseline.   Pt pleasant and cooperative during cares. Plan of care with PT around 2PM. Percocet given before PT.       Discharge Planner Nurse   Safe discharge environment identified: No  Barriers to discharge: Yes - waiting for TCU, referrals sent, needs PT/OT consult for insurance auth       Entered by: Rafia Smith 04/21/2022 12:46pm.     Please review provider order for any additional goals.   Nurse to notify provider when observation goals have been met and patient is ready for discharge.

## 2022-04-22 ENCOUNTER — APPOINTMENT (OUTPATIENT)
Dept: PHYSICAL THERAPY | Facility: CLINIC | Age: 82
End: 2022-04-22
Payer: COMMERCIAL

## 2022-04-22 LAB — BACTERIA UR CULT: NORMAL

## 2022-04-22 PROCEDURE — 250N000013 HC RX MED GY IP 250 OP 250 PS 637: Performed by: HOSPITALIST

## 2022-04-22 PROCEDURE — 250N000013 HC RX MED GY IP 250 OP 250 PS 637: Performed by: NURSE PRACTITIONER

## 2022-04-22 PROCEDURE — 250N000013 HC RX MED GY IP 250 OP 250 PS 637: Performed by: PHYSICIAN ASSISTANT

## 2022-04-22 PROCEDURE — G0378 HOSPITAL OBSERVATION PER HR: HCPCS

## 2022-04-22 PROCEDURE — 99226 PR SUBSEQUENT OBSERVATION CARE,LEVEL III: CPT | Performed by: NURSE PRACTITIONER

## 2022-04-22 PROCEDURE — 97530 THERAPEUTIC ACTIVITIES: CPT | Mod: GP | Performed by: PHYSICAL THERAPIST

## 2022-04-22 RX ORDER — AMMONIUM LACTATE 12 G/100G
LOTION TOPICAL
Status: DISCONTINUED | OUTPATIENT
Start: 2022-04-22 | End: 2022-04-28 | Stop reason: HOSPADM

## 2022-04-22 RX ADMIN — APIXABAN 2.5 MG: 2.5 TABLET, FILM COATED ORAL at 20:10

## 2022-04-22 RX ADMIN — Medication: at 14:52

## 2022-04-22 RX ADMIN — CETIRIZINE HYDROCHLORIDE 10 MG: 10 TABLET, FILM COATED ORAL at 09:53

## 2022-04-22 RX ADMIN — MORPHINE SULFATE 15 MG: 15 TABLET, EXTENDED RELEASE ORAL at 09:53

## 2022-04-22 RX ADMIN — TOLTERODINE TARTRATE 2 MG: 1 TABLET, FILM COATED ORAL at 20:10

## 2022-04-22 RX ADMIN — FERROUS GLUCONATE 324 MG: 324 TABLET ORAL at 09:53

## 2022-04-22 RX ADMIN — Medication 500 MG: at 09:52

## 2022-04-22 RX ADMIN — APIXABAN 2.5 MG: 2.5 TABLET, FILM COATED ORAL at 09:53

## 2022-04-22 RX ADMIN — MORPHINE SULFATE 15 MG: 15 TABLET, EXTENDED RELEASE ORAL at 21:59

## 2022-04-22 RX ADMIN — FAMOTIDINE 20 MG: 20 TABLET ORAL at 20:10

## 2022-04-22 RX ADMIN — OXYCODONE AND ACETAMINOPHEN 1 TABLET: 7.5; 325 TABLET ORAL at 09:52

## 2022-04-22 RX ADMIN — Medication: at 17:45

## 2022-04-22 RX ADMIN — Medication: at 11:56

## 2022-04-22 RX ADMIN — TOLTERODINE TARTRATE 2 MG: 1 TABLET, FILM COATED ORAL at 09:52

## 2022-04-22 RX ADMIN — CEPHALEXIN 500 MG: 500 CAPSULE ORAL at 09:53

## 2022-04-22 RX ADMIN — OXYCODONE AND ACETAMINOPHEN 1 TABLET: 7.5; 325 TABLET ORAL at 20:09

## 2022-04-22 NOTE — PROGRESS NOTES
"[facility]  Hospitalist Progress Note  Refugio Macdonald, APRN CNP 04/22/2022    Reason for Stay (Diagnosis): Multiple         Assessment and Plan:      Summary:   Raven Carlin is a 82 year old female with a history of A. fib on Eliquis, asthma, chronic pain syndrome on opiates, morbid obesity, CKD stage IIIb, HLD, GERD, gastric ulcer, HTN, ESBL E. coli, and SAURABH on CPAP who was admitted on 4/9/2022-4/18/22 due to sepsis 2/2 ESBL E.Coli UTI, Cholelithiasis and Choledocholithiasis.  She underwent ERCP on 4/14.  Cholecystectomy was scheduled for 4/15, but patient declined.  She was discharged home with home PT but now returned to the ED with generalized weakness.   Awaiting placement.         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  Reports chronic pain as baseline.  As noted above, initially went home, returned seeking TCU/rehab placement.   Per EMR she takes Keflex prophylactically by her infectious disease provider given her chronic posterior thigh wound.  -PT/OT following.   -Wound care following.   -SW following.   -Continues PTA MS Contin BID.   -Continues PTA Percocet at 1 tab BID PRN.   -Has PRN Acetaminophen on profile.   --Resumed PTA Cephalexin q daily.        Recent Sepsis  Recent Choledocholithiasis and Cholelithiasis  S/p ERCP on 4/14  Currently denies any abdominal pain, no N/V, eating/drinking.   Afebrile, no leukocytosis.   -No changes, continue to clinically monitor.      PAULINE  CKD IIIB  Scr 1.3-1.5.  Creatine 4/21/2022 was 1.56.    -Will continue to HOLD PTA Furosemide, KCl, Mg Oxide.   -Repeat BMP in the AM 4/23/2022.     Chronic A-fib  Essential HTN  HLD  Morbid obesity  Hemodynamically stable.   Does have minimal LE edema, no crackles, no other s/s of clinical CHF. " "  -No changes, continue to clinically monitor.   -Note diuretics being held above.   -PTA Eliquis reduced down to 2.5 mg's BID due to age/ CKD.      SAURABH  -Using home CPAP at night.      Asthma - lungs quiescent   -Resumed PTA Fluticasone nasal spray.   -Resumed home Cetirizine.       Overactive bladder  Pyruia  Denies any dysuria or  issues.   UA with negative nitrite and moderate LE. Urine culture is NGTD.           Diet: Regular Diet Adult    DVT Prophylaxis: Renally adjusted DOAC  Thornton Catheter: Not present  Central Lines: None  Code Status: Full Code                Interval History (Subjective):      Ms. Carlin was seen and examined. VSS  No new issues.  Slow to mobilize.  Endorses diffuse chronic urticaria.  No chest pain or shortness of breath.                   Physical Exam:      Last Vital Signs:  /75   Pulse 54   Temp 97.6  F (36.4  C) (Oral)   Resp 18   Ht 1.651 m (5' 5\")   Wt 101.9 kg (224 lb 11.2 oz)   SpO2 98%   BMI 37.39 kg/m        Intake/Output Summary (Last 24 hours) at 4/22/2022 1046  Last data filed at 4/22/2022 0157  Gross per 24 hour   Intake --   Output 350 ml   Net -350 ml       Constitutional: Awake, alert, cooperative, no apparent distress     Respiratory: Clear to auscultation bilaterally, no crackles or wheezing   Cardiovascular: Regular rate and rhythm, normal S1 and S2, and no murmur noted   Abdomen: Normal bowel sounds, soft, obese non-distended, non-tender   Skin: No rashes, no cyanosis, dry to touch.  Diffuse urticaria.  Multiple bruising on arms and legs in various stages of healing.    Neuro: Alert and oriented x3, generalized weakness, no new numbness, memory loss   Extremities: No edema, normal range of motion   Other(s):        All other systems: Negative          Medications:      All current medications were reviewed with changes reflected in problem list.         Data:      All new lab and imaging data was reviewed.   Labs:  Recent Labs   Lab 04/20/22  1329 "   WBC 10.7   HGB 11.1*   HCT 35.1   MCV 86        Recent Labs   Lab 04/21/22  0726 04/20/22  1329 04/17/22  0848 04/16/22  1217 04/15/22  1106    136 135 136 136   POTASSIUM 4.7 4.0 4.2 4.5 4.4   CHLORIDE 107 105 105 103 102   CO2 25 26 28 28 28   ANIONGAP 5 5 2* 5 6   GLC 96 106* 84 123* 118*   BUN 27 28 33* 31* 31*   CR 1.56* 1.58* 1.41* 1.47* 1.41*   GFRESTIMATED 33* 33* 37* 35* 37*   LAUREL 8.6 9.5 9.3 9.0 9.1   MAG  --   --  2.1 2.1 2.1   PROTTOTAL  --  6.2*  --   --  6.0*   ALBUMIN  --  3.0*  --   --  2.7*   BILITOTAL  --  1.0  --   --  1.2   ALKPHOS  --  129  --   --  92   AST  --  22  --   --  17   ALT  --  21  --   --  14     Recent Labs   Lab 04/20/22  1512   COLOR Light Yellow   APPEARANCE Clear   URINEGLC Negative   URINEBILI Negative   URINEKETONE Negative   SG 1.011   UBLD Trace*   URINEPH 6.0   PROTEIN Negative   NITRITE Negative   LEUKEST Moderate*   RBCU 3*   WBCU 7*      Imaging:   Results for orders placed or performed during the hospital encounter of 04/09/22   XR Chest 2 Views    Narrative    EXAM: XR CHEST 2 VW  LOCATION: [facility]  DATE/TIME: 4/9/2022 8:18 PM    INDICATION: Fever.  COMPARISON: 06/21/2021.      Impression    IMPRESSION: Lungs are grossly clear. No consolidation. No pleural effusion. Stable borderline enlarged cardiac silhouette size. No pulmonary edema. Aortic atherosclerosis. Breast prosthetics.       CT Abdomen Pelvis w Contrast    Narrative    EXAM: CT ABDOMEN PELVIS W CONTRAST  LOCATION: [facility]  DATE/TIME: 4/9/2022 8:01 PM    INDICATION: Nausea. Diffuse abdominal pain and tenderness.  COMPARISON: 4/22/2009  TECHNIQUE: CT scan of the abdomen and pelvis was performed following injection of IV contrast. Multiplanar reformats were obtained. Dose reduction techniques were used.  CONTRAST: 100mL Isovue 370    FINDINGS:   LOWER CHEST: Normal.    HEPATOBILIARY: Diffuse fatty infiltration of the liver. Cholelithiasis.  There are multiple stones within the common bile duct, at least 5. The common bile duct is normal caliber measuring 5 mm.    PANCREAS: Normal.    SPLEEN: Normal    ADRENAL GLANDS: A right adrenal nodule is present measuring 1.5 cm in diameter, enlarged from 2009 when it measured 0.9 cm.    KIDNEYS/BLADDER: Multiple small benign renal cysts. No follow-up needed. No hydronephrosis. The bladder is decompressed.    BOWEL: Bowel loops are normal caliber. No wall thickening. Mildly increased stool in the colon. Normal appendix.    LYMPH NODES: Normal.    VASCULATURE: Mild calcified plaque in the abdominal aorta without aneurysm.    PELVIC ORGANS: No large adnexal cysts or masses.    MUSCULOSKELETAL: Degenerative disc disease and facet arthritis in the lumbar spine.      Impression    IMPRESSION:   1.  Choledocholithiasis with at least 5 stones in the common bile duct. No associated biliary dilatation. Unclear whether this is related to the patient's acute symptoms. Correlation with biliary markers is recommended. No evidence of pancreatitis.  2.  Cholelithiasis.  3.  Diffuse fatty infiltration of the liver.  4.  A 1.5 cm right adrenal nodule is present and is otherwise not well evaluated due to single phase contrast. In retrospect, it was likely present in 2009 when it measured 0.9 cm. This is likely a benign adenoma. If the patient has a history of   malignancy, a dedicated adrenal CT or a follow-up scan in one year could be considered. Otherwise, no specific imaging follow-up is recommended. Laboratory or clinical evaluation can be considered if the patient has signs or symptoms of a   hyperfunctioning adrenal nodule.   XR Surgery DORA L/T 5 Min Fluoro w Stills    Narrative    This exam was marked as non-reportable because it will not be read by a   radiologist or a [place] non-radiologist provider.               Refugio Macdonald, APRN CNP  April 22, 2022

## 2022-04-22 NOTE — PLAN OF CARE
PRIMARY DIAGNOSIS: GENERALIZED WEAKNESS    OUTPATIENT/OBSERVATION GOALS TO BE MET BEFORE DISCHARGE  1. Orthostatic performed: No    2. Tolerating PO medications: Yes    3. Return to near baseline physical activity: No, Pt requiring A-2/3     4. Cleared for discharge by consultants (if involved): Yes    Discharge Planner Nurse   Safe discharge environment identified: No  Barriers to discharge: Yes       Entered by: Josefina Wolf 04/22/2022 12:48 AM     Please review provider order for any additional goals.   Nurse to notify provider when observation goals have been met and patient is ready for discharge.      Vitals are Temp: 97.7  F (36.5  C) Temp src: Oral BP: (!) 142/81 Pulse: 59   Resp: 18 SpO2: 100 %.    Patient is Alert and Oriented x4. They are 2-3 assist with Gait Belt and Walker.  Patient is on Contact precuations for MRSA and ESBL.  Pt is a Regular diet. Takes PO scheduled Morphine and PRN Percocet for pain management. Patient is Saline locked. CPAP on throughout the night.

## 2022-04-22 NOTE — PLAN OF CARE
Goal Outcome Evaluation:    Plan of Care Reviewed With: patient     Overall Patient Progress: no change    PRIMARY DIAGNOSIS: GENERALIZED WEAKNESS    OUTPATIENT/OBSERVATION GOALS TO BE MET BEFORE DISCHARGE  1. Orthostatic performed: N/A    2. Tolerating PO medications: Yes    3. Return to near baseline physical activity: No    4. Cleared for discharge by consultants (if involved): PT & OT recommending TCU; SW following.    Discharge Planner Nurse   Safe discharge environment identified: No  Barriers to discharge: Yes - TCU placement, insurance authorization       Entered by: Carolina Rojas 04/22/2022      Please review provider order for any additional goals.   Nurse to notify provider when observation goals have been met and patient is ready for discharge.

## 2022-04-22 NOTE — PROGRESS NOTES
Care Management Follow Up    Length of Stay (days): 0    Expected Discharge Date: 04/22/2022 pending placement      Concerns to be Addressed: discharge planning     Patient plan of care discussed at interdisciplinary rounds: Yes    Anticipated Discharge Disposition: Transitional Care     Anticipated Discharge Services: Transportation Services  Anticipated Discharge DME:      Patient/family educated on Medicare website which has current facility and service quality ratings: yes  Education Provided on the Discharge Plan:  yes  Patient/Family in Agreement with the Plan: yes    Additional Information:  CM following for discharge planning, pt was declined at [facility]. Met with pt at bedside to discuss further, she would like [facility] to [facility]. Pt has received 3 doses of her covid vaccine. Referrals sent, awaiting response.     Update 1210: Received call back from [facility], they are out of insurance network. Spoke with admissions for [facility], they do not have availability until next week. Spoke with pt, additional referrals sent with her approval, awaiting response. Will arrange WC ride at discharge.     Update 1400: Pt has been declined by all referrals sent due to bed availability or being out of insurance network. Reviewed [facility] TCU list with pt and referrals sent to 11 additional facilities, awaiting responses.     Lisa Yepez RN BSN   Inpatient Care Coordination  [facility]   Phone [phone]

## 2022-04-22 NOTE — UTILIZATION REVIEW
Concurrent stay review; Secondary Review Determination     [facility]          Under the authority of the Utilization Management Committee, the utilization review process indicated a secondary review on the above patient.  The review outcome is based on review of the medical records, discussions with staff, and applying clinical experience noted on the date of the review.          (x) Observation Status Appropriate - Concurrent stay review    RATIONALE FOR DETERMINATION   82 year old female with a history of A. fib on Eliquis, asthma, chronic pain syndrome on opiates, morbid obesity, CKD stage IIIb, HLD, GERD, gastric ulcer, HTN, ESBL E. coli, and SAURABH on CPAP who was admitted on 4/9/2022-4/18/22 due to sepsis 2/2 ESBL E.Coli UTI, Cholelithiasis and Choledocholithiasis.  She underwent ERCP on 4/14.  Cholecystectomy was scheduled for 4/15, but patient declined.  She was discharged home with home PT but now returned to the ED with generalized weakness.   Awaiting placement.     There is no clear indication to change patient's status to inpatient. The severity of illness, intensity of service provided, expected LOS and risk for adverse outcome make the care appropriate for observation.      This document was produced using voice recognition software       The information on this document is developed by the utilization review team in order for the business office to ensure compliance.  This only denotes the appropriateness of proper admission status and does not reflect the quality of care rendered.         The definitions of Inpatient Status and Observation Status used in making the determination above are those provided in the CMS Coverage Manual, Chapter 1 and Chapter 6, section 70.4.      Sincerely,     KARINA FRAZIER MD    System Medical Director  Utilization Management  [facility].

## 2022-04-22 NOTE — PLAN OF CARE
"PRIMARY DIAGNOSIS: GENERALIZED WEAKNESS    OUTPATIENT/OBSERVATION GOALS TO BE MET BEFORE DISCHARGE  1. Orthostatic performed: No    2. Tolerating PO medications: Yes    3. Return to near baseline physical activity: No    4. Cleared for discharge by consultants (if involved): Yes    Discharge Planner Nurse   Safe discharge environment identified: No  Barriers to discharge: Yes       Entered by: Josefina Wolf 04/21/2022 9:30 PM     Please review provider order for any additional goals.   Nurse to notify provider when observation goals have been met and patient is ready for discharge.    /54   Pulse 56   Temp 98.6  F (37  C) (Oral)   Resp 17   Ht 1.651 m (5' 5\")   Wt 101.9 kg (224 lb 11.2 oz)   SpO2 98%   BMI 37.39 kg/m      "

## 2022-04-22 NOTE — PROGRESS NOTES
"   04/21/22 1500   Quick Adds   Type of Visit Initial PT Evaluation       Present no   Language English   Living Environment   People in Home child(robinson), adult   Current Living Arrangements house   Home Accessibility stairs within home   Number of Stairs, Within Home, Primary seven;eight   Stair Railings, Within Home, Primary railings on both sides of stairs   Transportation Anticipated agency   Living Environment Comments Pt lives at home with her daughter, 7-8 stairs down to lower level, accessible shower w/ sliding bench, RTS/portable BSC   Self-Care   Usual Activity Tolerance fair   Current Activity Tolerance poor   Equipment Currently Used at Home tub bench;walker, rolling;raised toilet seat   Fall history within last six months yes   Activity/Exercise/Self-Care Comment per OT consult and pt report, \"pt reports she spends most of her time in lift recliner. has 40 steps to bathroom. uses 4WW and lift shoe given LE limb length differences. reports she has assistance come in \"every other day or so\" to assist with bathing and ADL. pt reports otherwise get to the bathroom, toilets and dresses self. reports daughter an assist at home\"   General Information   Onset of Illness/Injury or Date of Surgery 04/20/22   Referring Physician Lj Rios, APRN CNP   Patient/Family Therapy Goals Statement (PT) Be less afraid of falling   Pertinent History of Current Problem (include personal factors and/or comorbidities that impact the POC) Per chart: Pt is an 82 year old female admitted with generalized weakness after returning to her home after a prolonged hospital stay 4/9-4/18 due to sepsis.  She had a fall onto her buttocks on 4/18 with no new acute pain   Existing Precautions/Restrictions fall   Cognition   Affect/Mental Status (Cognition) anxious;emotionally labile   Orientation Status (Cognition) oriented x 3   Follows Commands (Cognition) follows two-step commands;25-49% accuracy   Behavioral " "Issues overwhelmed easily;difficulty managing stress;other (see comments)  (verbally manipulative)   Safety Deficit (Cognition) impulsivity  (moderately severe fear of falling)   Cognitive Status Comments Easily distracted, fluctuating lability/mood   Pain Assessment   Patient Currently in Pain No   Integumentary/Edema   Integumentary/Edema other (describe)   Integumentary/Edema Comments Blanchable redness along lateral and underside of L thigh, see nursing notes.   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, they fused it together.\"   Strength (Manual Muscle Testing)   Strength (Manual Muscle Testing) Able to perform R SLR;Able to perform L SLR;Deficits observed during functional mobility   Strength Comments Grossly deconditioned   Bed Mobility   Bed Mobility supine-sit   Comment, (Bed Mobility) maxA   Transfers   Comment, (Transfers) SST with CG/Zechariah from elevated bed, Zechariah with stand pivot transfer to/from commode   Gait/Stairs (Locomotion)   [place] Level (Gait) minimum assist (75% patient effort)   Assistive Device (Gait) walker, front-wheeled   Distance in Feet (Required for LE Total Joints) 3' x 2   Pattern (Gait) step-to;step-through   Comment, (Gait/Stairs) Uses markedly elevated shoe for L LE due to leg length discrepancy   Balance   Balance Comments Needs B UE support   Sensory Examination   Sensory Perception patient reports no sensory changes   Coordination   Coordination no deficits were identified   Muscle Tone   Muscle Tone no deficits were identified   Clinical Impression   Criteria for Skilled Therapeutic Intervention Yes, treatment indicated   PT Diagnosis (PT) Impaired fn mob   Influenced by the following impairments Strength, balance, cardiovascular endurance, inattention, impulsivity, fear of falling   Functional limitations due to impairments Bed mob, transfers, amb, stairs   Clinical " Presentation (PT Evaluation Complexity) Evolving/Changing   Clinical Presentation Rationale Multiple affecting comorbidities, assessment incl strength, balance, fn mob   Clinical Decision Making (Complexity) moderate complexity   Planned Therapy Interventions (PT) balance training;bed mobility training;gait training;home exercise program;neuromuscular re-education;patient/family education;postural re-education;strengthening;transfer training   Risk & Benefits of therapy have been explained evaluation/treatment results reviewed;care plan/treatment goals reviewed;participants included;patient;risks/benefits reviewed;current/potential barriers reviewed   PT Discharge Planning   PT Discharge Recommendation (DC Rec) Transitional Care Facility   PT Rationale for DC Rec Pt is high falls risk and risk for re-admission to hospital. Completed transfers with Zechariah, amb 3' fwd/retro with Zechariah. Impulsive, very fearful of falling, emotionally labile, incontinent. TCU recommended to reduce falls risk and risk of re-hospitalization to progress strength, balance, cardiovasc endurance, improve indep with fn mobility and quality of life.   PT Brief overview of current status Ax2 with bed mob (sleeps in recliner at home); Ax1-2 with transfers + short distance amb with tall platform shoe for leg length discrepancy and 2WW   Total Evaluation Time   Total Evaluation Time (Minutes) 10   Physical Therapy Goals   PT Frequency 5x/week   PT Predicted Duration/Target Date for Goal Attainment 04/26/22   PT Goals Bed Mobility;Transfers;Gait;Stairs   PT: Transfers Supervision/stand-by assist   PT: Gait Supervision/stand-by assist   PT: Stairs Supervision/stand-by assist   Psychosocial Support   Trust Relationship/Rapport care explained;choices provided;emotional support provided;empathic listening provided;questions answered;questions encouraged;reassurance provided;thoughts/feelings acknowledged

## 2022-04-23 LAB
ANION GAP SERPL CALCULATED.3IONS-SCNC: 7 MMOL/L (ref 3–14)
BUN SERPL-MCNC: 23 MG/DL (ref 7–30)
CALCIUM SERPL-MCNC: 9.4 MG/DL (ref 8.5–10.1)
CHLORIDE BLD-SCNC: 111 MMOL/L (ref 94–109)
CO2 SERPL-SCNC: 22 MMOL/L (ref 20–32)
CREAT SERPL-MCNC: 1.33 MG/DL (ref 0.52–1.04)
ERYTHROCYTE [DISTWIDTH] IN BLOOD BY AUTOMATED COUNT: 15.3 % (ref 10–15)
GFR SERPL CREATININE-BSD FRML MDRD: 40 ML/MIN/1.73M2
GLUCOSE BLD-MCNC: 89 MG/DL (ref 70–99)
HCT VFR BLD AUTO: 34.8 % (ref 35–47)
HGB BLD-MCNC: 11.1 G/DL (ref 11.7–15.7)
MCH RBC QN AUTO: 27.3 PG (ref 26.5–33)
MCHC RBC AUTO-ENTMCNC: 31.9 G/DL (ref 31.5–36.5)
MCV RBC AUTO: 86 FL (ref 78–100)
PLATELET # BLD AUTO: 239 10E3/UL (ref 150–450)
POTASSIUM BLD-SCNC: 5.4 MMOL/L (ref 3.4–5.3)
RBC # BLD AUTO: 4.06 10E6/UL (ref 3.8–5.2)
SODIUM SERPL-SCNC: 140 MMOL/L (ref 133–144)
WBC # BLD AUTO: 8.3 10E3/UL (ref 4–11)

## 2022-04-23 PROCEDURE — 250N000013 HC RX MED GY IP 250 OP 250 PS 637: Performed by: HOSPITALIST

## 2022-04-23 PROCEDURE — 80048 BASIC METABOLIC PNL TOTAL CA: CPT | Performed by: NURSE PRACTITIONER

## 2022-04-23 PROCEDURE — G0378 HOSPITAL OBSERVATION PER HR: HCPCS

## 2022-04-23 PROCEDURE — 85014 HEMATOCRIT: CPT | Performed by: NURSE PRACTITIONER

## 2022-04-23 PROCEDURE — 99225 PR SUBSEQUENT OBSERVATION CARE,LEVEL II: CPT | Performed by: PHYSICIAN ASSISTANT

## 2022-04-23 PROCEDURE — 250N000013 HC RX MED GY IP 250 OP 250 PS 637: Performed by: PHYSICIAN ASSISTANT

## 2022-04-23 PROCEDURE — 250N000013 HC RX MED GY IP 250 OP 250 PS 637: Performed by: NURSE PRACTITIONER

## 2022-04-23 PROCEDURE — 36416 COLLJ CAPILLARY BLOOD SPEC: CPT | Performed by: NURSE PRACTITIONER

## 2022-04-23 RX ORDER — FUROSEMIDE 20 MG
20 TABLET ORAL DAILY
Status: DISCONTINUED | OUTPATIENT
Start: 2022-04-23 | End: 2022-04-28 | Stop reason: HOSPADM

## 2022-04-23 RX ORDER — HYDROXYZINE HYDROCHLORIDE 25 MG/1
50 TABLET, FILM COATED ORAL 2 TIMES DAILY PRN
Status: DISCONTINUED | OUTPATIENT
Start: 2022-04-23 | End: 2022-04-28 | Stop reason: HOSPADM

## 2022-04-23 RX ORDER — POLYETHYLENE GLYCOL 3350 17 G/17G
17 POWDER, FOR SOLUTION ORAL DAILY
Status: DISCONTINUED | OUTPATIENT
Start: 2022-04-23 | End: 2022-04-28 | Stop reason: HOSPADM

## 2022-04-23 RX ORDER — OXYCODONE AND ACETAMINOPHEN 7.5; 325 MG/1; MG/1
1 TABLET ORAL EVERY 8 HOURS PRN
Status: DISCONTINUED | OUTPATIENT
Start: 2022-04-23 | End: 2022-04-28 | Stop reason: HOSPADM

## 2022-04-23 RX ADMIN — OXYCODONE AND ACETAMINOPHEN 1 TABLET: 7.5; 325 TABLET ORAL at 15:02

## 2022-04-23 RX ADMIN — CETIRIZINE HYDROCHLORIDE 10 MG: 10 TABLET, FILM COATED ORAL at 09:34

## 2022-04-23 RX ADMIN — Medication 500 MG: at 09:34

## 2022-04-23 RX ADMIN — APIXABAN 2.5 MG: 2.5 TABLET, FILM COATED ORAL at 20:17

## 2022-04-23 RX ADMIN — FUROSEMIDE 20 MG: 20 TABLET ORAL at 12:47

## 2022-04-23 RX ADMIN — Medication: at 09:41

## 2022-04-23 RX ADMIN — FERROUS GLUCONATE 324 MG: 324 TABLET ORAL at 09:34

## 2022-04-23 RX ADMIN — TOLTERODINE TARTRATE 2 MG: 1 TABLET, FILM COATED ORAL at 09:33

## 2022-04-23 RX ADMIN — FAMOTIDINE 20 MG: 20 TABLET ORAL at 20:17

## 2022-04-23 RX ADMIN — MORPHINE SULFATE 15 MG: 15 TABLET, EXTENDED RELEASE ORAL at 09:33

## 2022-04-23 RX ADMIN — CEPHALEXIN 500 MG: 500 CAPSULE ORAL at 09:34

## 2022-04-23 RX ADMIN — APIXABAN 2.5 MG: 2.5 TABLET, FILM COATED ORAL at 09:34

## 2022-04-23 RX ADMIN — TOLTERODINE TARTRATE 2 MG: 1 TABLET, FILM COATED ORAL at 20:17

## 2022-04-23 RX ADMIN — METOPROLOL TARTRATE 50 MG: 50 TABLET, FILM COATED ORAL at 09:34

## 2022-04-23 RX ADMIN — MORPHINE SULFATE 15 MG: 15 TABLET, EXTENDED RELEASE ORAL at 20:35

## 2022-04-23 NOTE — PLAN OF CARE
Goal Outcome Evaluation:   Complained of low back pain, eased with oral pain med. Refusing to be repositioned. Using cell phone and watching TV.

## 2022-04-23 NOTE — PROGRESS NOTES
PRIMARY DIAGNOSIS: GENERALIZED WEAKNESS    OUTPATIENT/OBSERVATION GOALS TO BE MET BEFORE DISCHARGE  1. Orthostatic performed: N/A    2. Tolerating PO medications: Yes    3. Return to near baseline physical activity: No    4. Cleared for discharge by consultants (if involved): Yes    Discharge Planner Nurse   Safe discharge environment identified: No  Barriers to discharge: Yes       Entered by: Bekah Walden 04/23/2022 11:20 AM     Please review provider order for any additional goals.   Nurse to notify provider when observation goals have been met and patient is ready for discharge.

## 2022-04-23 NOTE — PLAN OF CARE
"PRIMARY DIAGNOSIS: GENERALIZED WEAKNESS    OUTPATIENT/OBSERVATION GOALS TO BE MET BEFORE DISCHARGE  1. Orthostatic performed: N/A    2. Tolerating PO medications: Yes    3. Return to near baseline physical activity: Yes    4. Cleared for discharge by consultants (if involved): PT/OT recommending TCU    Discharge Planner Nurse   Safe discharge environment identified: No  Barriers to discharge: Yes       Entered by: Amelia Fernandez 04/23/2022 4:21 AM    /62 (BP Location: Left arm)   Pulse 60   Temp 97.7  F (36.5  C) (Oral)   Resp 18   Ht 1.651 m (5' 5\")   Wt 101.9 kg (224 lb 11.2 oz)   SpO2 100%   BMI 37.39 kg/m     PT is A&O, Ax2 with lift device. Regular diet, tolerating well.OT/PT, SW following. Maintaining contact precautions for MSRA & ESBL. No PIV access. Using CPAP at night. Pt refused BP at 0200.   Please review provider order for any additional goals.   Nurse to notify provider when observation goals have been met and patient is ready for discharge.  " [facility]  [email or web address]  [phone]    Mirta Mcfarlane MD, FACOG       OB/GYN Problem visit    Chief Complaint:   Chief Complaint   Patient presents with    Female  Problem     bump on right labia    Pelvic Pain     right side pelvic discomfort       History of Present Illness: This is a new problem being evaluated by this provider. The patient is a 49 y.o.  female who reports having a bump on her right side labia for 1 months. She is also c/o right side pelvic pain for 1 month as well  It has been almost 1 year since her last menses    +new sexual partner, worried about bump and possible infection. She does not have other concerns. LMP: No LMP recorded. Patient is perimenopausal.    PFSH:  Past Medical History:   Diagnosis Date    Bronchiolitis     DVT of upper extremity (deep vein thrombosis) (HCC)     Endometrial cyst of ovary     Endometriosis     in 25s, laparoscopy, laser    Hypertension     Pap smear for cervical cancer screening 2017    neg, hpv neg    Pelvic pain in female 2013    Sprain of chest wall 15     Past Surgical History:   Procedure Laterality Date    DELIVERY       HX GYN      x 3, laser/laproscopy      Family History   Problem Relation Age of Onset    Arthritis-osteo Mother     Heart Disease Father     Diabetes Father     Elevated Lipids Brother     Diabetes Maternal Grandmother     Diabetes Paternal Grandfather      Social History     Tobacco Use    Smoking status: Never Smoker    Smokeless tobacco: Never Used   Substance Use Topics    Alcohol use: No    Drug use: No     Allergies   Allergen Reactions    Coffee (Coffea Arabica) Shortness of Breath     hives    Penicillins Rash     Current Outpatient Medications   Medication Sig    nystatin-triamcinolone (MYCOLOG) 100,000-0.1 unit/gram-% ointment Apply  to affected area two (2) times a day for 7 days.     amLODIPine-benazepril (LOTREL) 10-40 mg per capsule TAKE ONE CAPSULE BY MOUTH ONCE DAILY    ibuprofen (MOTRIN) 600 mg tablet Take 1 Tab by mouth every eight (8) hours as needed for Pain.  hydrocortisone (ANUSOL-HC) 2.5 % rectal cream Insert  into rectum four (4) times daily.  ferrous sulfate 220 mg (44 mg iron)/5 mL solution Take 10 mL by mouth two (2) times a day. No current facility-administered medications for this visit. Review of Systems:  History obtained from the patient  Constitutional: negative for fevers, chills and weight loss  ENT ROS: negative for - hearing change, oral lesions or visual changes  Respiratory: negative for cough, wheezing or dyspnea on exertion  Cardiovascular: negative for chest pain, irregular heart beats, exertional chest pressure/discomfort  Gastrointestinal: negative for dysphagia, nausea and vomiting  Genito-Urinary ROS:  see HPI  Inteument/breast: negative for rash, breast lump and nipple discharge  Musculoskeletal:negative for stiff joints, neck pain and muscle weakness  Endocrine ROS: negative for - breast changes, galactorrhea or temperature intolerance  Hematological and Lymphatic ROS: negative for - blood clots, bruising or swollen lymph nodes    Physical Exam:  Visit Vitals  /76   Ht 5' 1\" (1.549 m)   Wt 185 lb (83.9 kg)   BMI 34.96 kg/m²       GENERAL: alert, well appearing, and in no distress  HEAD: normocephalic, atraumatic.    PULM: clear to auscultation, no wheezes, rales or rhonchi, symmetric air entry   COR: normal rate and regular rhythm, S1 and S2 normal   ABDOMEN: soft, nontender, nondistended, no masses or organomegaly   EGBUS: no lesions, no inflammation, no masses    e  VULVA: normal appearing vulva with no masses, tenderness or lesions  VAGINA: normal appearing vagina with normal color, no lesions, thin white discharge  CERVIX: normal appearing cervix without discharge or lesions, non tender  UTERUS: uterus is normal size, shape, consistency and nontender   ADNEXA: normal adnexa in size, nontender and no masses  NEURO: alert, oriented, normal speech    Assessment:  Encounter Diagnoses   Name Primary?  Labial lesion Yes    Vaginal discharge        Plan:  The patient is advised that she should contact the office if she does not note improvement or if symptoms recur  Recommend follow up with PCP for non-gynecologic complaints and chronic medical problems. She should contact our office with any questions or concerns  She could keep her routine annual exam appointment. We discussed potential causes of lower abdominal/pelvic pain: GYN, GI, , musculoskeletal, infectious process, adhesions, or other etiology  We discussed evaluation of lower abdominal/pelvic pain: including but not limited to observation, surgical evaluation/laparoscopy, imaging   We discussed treatment of lower abdominal/pelvic pain: including but not limited to: pain medication, hormonal management, surgical intervention, bowel regimen. Since pain can be a symptoms of an underlying abnormal process she is encouraged to contact my office with persistent symptoms for additional evaluation and treatment if needed. Disc endometriosis course postpartum  We discussed typical perimenopausal bleeding patterns and symptoms. I recommend that she notify MD for chaotic or symptomatic bleeding, or bleeding in between menses or intermenstrual bleeding for additional evaluation. She can also schedule a consult to discuss management of symptoms of perimenopause that are concerning her or interfering with her life or day to day function or activity. FU and US, can do with LEONARDO gilbert    Sitz bath. Disc typical course for sebaceous cyst and can observe    Discussed safer sex practices, condom use and risk factors for sexually transmitted diseases.      Orders Placed This Encounter    CT/NG/T.VAGINALIS AMPLIFICATION    HERPES SIMPLEX VIRUS (HSV) LYDIA    nystatin-triamcinolone (MYCOLOG) 100,000-0.1 unit/gram-% ointment No results found for this visit on 07/31/19.

## 2022-04-23 NOTE — PLAN OF CARE
"PRIMARY DIAGNOSIS: GENERALIZED WEAKNESS    OUTPATIENT/OBSERVATION GOALS TO BE MET BEFORE DISCHARGE  1. Orthostatic performed: N/A    2. Tolerating PO medications: Yes    3. Return to near baseline physical activity: Yes    4. Cleared for discharge by consultants (if involved): PT/OT recommending TCU    Discharge Planner Nurse   Safe discharge environment identified: No  Barriers to discharge: Yes       Entered by: Amelia Fernandez 04/22/2022 7:55 PM    /68 (BP Location: Left arm)   Pulse 57   Temp 97.7  F (36.5  C) (Oral)   Resp 18   Ht 1.651 m (5' 5\")   Wt 101.9 kg (224 lb 11.2 oz)   SpO2 92%   BMI 37.39 kg/m     PT is A&O, Ax2 with lift device. Regular diet, tolerating well. SW following. Maintaining contact precautions for MSRA & ESBL. No PIV access. Using CPAP at night.   Please review provider order for any additional goals.   Nurse to notify provider when observation goals have been met and patient is ready for discharge.  "

## 2022-04-23 NOTE — PLAN OF CARE
"PRIMARY DIAGNOSIS: GENERALIZED WEAKNESS    OUTPATIENT/OBSERVATION GOALS TO BE MET BEFORE DISCHARGE  1. Orthostatic performed: N/A    2. Tolerating PO medications: Yes    3. Return to near baseline physical activity: Yes    4. Cleared for discharge by consultants (if involved): PT/OT recommending TCU    Discharge Planner Nurse   Safe discharge environment identified: No  Barriers to discharge: Yes       Entered by: Amelia Fernandez 04/23/2022 12:59 AM    /62 (BP Location: Left arm)   Pulse 60   Temp 97.7  F (36.5  C) (Oral)   Resp 18   Ht 1.651 m (5' 5\")   Wt 101.9 kg (224 lb 11.2 oz)   SpO2 97%   BMI 37.39 kg/m     PT is A&O, Ax2 with lift device. Regular diet, tolerating well. SW following. Maintaining contact precautions for MSRA & ESBL. No PIV access. Using CPAP at night.   Please review provider order for any additional goals.   Nurse to notify provider when observation goals have been met and patient is ready for discharge.  "

## 2022-04-23 NOTE — PLAN OF CARE
Goal Outcome Evaluation:    Plan of Care Reviewed With: patient     Overall Patient Progress: no change    PRIMARY DIAGNOSIS: GENERALIZED WEAKNESS    OUTPATIENT/OBSERVATION GOALS TO BE MET BEFORE DISCHARGE  1. Orthostatic performed: N/A    2. Tolerating PO medications: Yes    3. Return to near baseline physical activity: No    4. Cleared for discharge by consultants (if involved): Yes    Discharge Planner Nurse   Safe discharge environment identified: No  Barriers to discharge: Yes       Entered by: Bekah Walden 04/23/2022 2:08 PM     Please review provider order for any additional goals.   Nurse to notify provider when observation goals have been met and patient is ready for discharge.    Patient alert and oriented  Refused labs this AM, agreeable with finger stick this afternoon  Denies pain when at rest, receiving scheduled pain medication  Up assist x2  Educated on need for repositioning, refuses  Wound care completed once today per plan of care  VSS, on room air  TCU referrals sent, pending placement   I have seen patient, mother with him. He was playing soccer 2 days ago and was hit on the head (right face) by a soccer ball. He has been having headaches and blurry vision. No changes in mental status, no LOC, no difficulty walking, no neck pain. No fever. I have recommended at least 10 days rest from intense physical activity or sports. And a CT head. I have discussed my plan with patient and mother. I have discussed this patient with the resident physician Dr Jayne you.  I have reviewed charts and I agree with assessment and plan as documented by Dr Jayne You

## 2022-04-23 NOTE — PLAN OF CARE
PRIMARY DIAGNOSIS: GENERALIZED WEAKNESS    OUTPATIENT/OBSERVATION GOALS TO BE MET BEFORE DISCHARGE  1. Orthostatic performed: N/A    2. Tolerating PO medications: Yes    3. Return to near baseline physical activity: No    4. Cleared for discharge by consultants (if involved): PT & OT recommending TCU; SW following.    Discharge Planner Nurse   Safe discharge environment identified: No  Barriers to discharge: Yes - TCU placement, insurance authorization       Entered by: Carolina Rojas 04/22/2022      Please review provider order for any additional goals.   Nurse to notify provider when observation goals have been met and patient is ready for discharge.

## 2022-04-23 NOTE — PROGRESS NOTES
"[facility]    Medicine Progress Note - Hospitalist Service    Date of Admission:  4/20/2022    Assessment & Plan          Raven Carlin is a 82 year old female with a history of A. fib on Eliquis, asthma, chronic pain syndrome on opiates, morbid obesity, CKD stage IIIb, HLD, GERD, gastric ulcer, HTN, ESBL E. coli, and SAURABH on CPAP who was admitted on 4/9/2022-4/18/22 due to sepsis 2/2 ESBL E.Coli UTI, Cholelithiasis and Choledocholithiasis. She underwent ERCP on 4/14. Cholecystectomy was scheduled for 4/15, but patient declined. She was discharged home with home PT but now returned to the ED with generalized weakness. Awaiting placement.    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  She had a fall onto her buttocks on 4/18 with no new acute pain. No focal neurologic deficits. No current signs of infection. Has lift chair at home that wasn't working so couldn't get up. Likely deconditioned from recent hospital stay. TCU recommended during previous hospitalization, patient declined.  She is now interested in TCU.  ED work up is unremarkable for acute process.  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  On Keflex prophylactically by her infectious disease provider given her chronic posterior thigh wound  - PT/OT following, recommending TCU  - SW consulted for TCU placement  - Wound care following   - Continues PTA MS Contin BID and PTA Percocet at 1 tab BID PRN.   - additional PRN Acetaminophen available  - Resumed PTA Cephalexin q daily     Recent Sepsis 2/2 UTI   Recent Choledocholithiasis and Cholelithiasis  S/p ERCP on 4/14  Hospitalized 4/9-4/18 due to UTI and urine culture grew ESBL E.Coli. She completed the antibiotic course during that hospital stay.   - Currently, afebrile " "with normal wbc. UA unremarkable.   - Denies any abdominal pain, no N/V, eating/drinking well.   - Planning for outpatient lap cholecystectomy in the near future     Hyperkalemia, mild, 5.4  Resuming home lasix today. Recheck in AM    Chronic Atrial Fibrillation   - Continue Lopressor and Eliquis at reduced dose due to age and CKD     CKD - creatinine 1.58, baseline appears to range from 1.2-1.5 so pt is likely at baseline  - Cr 1.58-->1.56-->1.33  - ok to resume home lasix now     HTN - continue Lopressor.  resume Lasix today.     HLD - continue Niacin     GERD, Hx of Gastric Ulcer - continue Famotidine     Chronic Anemia - hgb 11.1 at baseline.  Continue Ferrous Gluconate.     Overactive Bladder - continue Tolterodine     SAURABH - continue cpap    Asthma   - Continue PTA Fluticasone nasal spray.   - Continue home Cetirizine    Constipation  Add Miralax, continue PRN Senna     Morbid Obesity, BMI 41     Covid-19 negative     Diet: Regular Diet Adult    DVT Prophylaxis: DOAC  Thornton Catheter: Not present  Central Lines: None  Cardiac Monitoring: None  Code Status: Full Code      Disposition Plan   Expected Discharge: 04/25/2022     Anticipated discharge location: nursing home    Delays:     Placement - TCU  Insurance Authorization needed            The patient's care was discussed with the Bedside Nurse, Care Coordinator/ and Patient.    Radha Salazar PA-C  Hospitalist Service  [facility]  Securely message with the Vocera Web Console (learn more here)  Text page via [employer] Paging/Directory         Clinically Significant Risk Factors Present on Admission               # Coagulation Defect: home medication list includes an anticoagulant medication    # Obesity: Estimated body mass index is 37.39 kg/m  as calculated from the following:    Height as of this encounter: 1.651 m (5' 5\").    Weight as of this encounter: 101.9 kg (224 lb 11.2 oz).  "     ______________________________________________________________________    Interval History   Denies any significant issues overnight. Eating and drinking well, denies N/V or abd pain.     Data reviewed today: I reviewed all medications, new labs and imaging results over the last 24 hours. I personally reviewed no images or EKG's today.    Physical Exam   Vital Signs: Temp: 97.4  F (36.3  C) Temp src: Oral BP: (!) 164/89 (PATIENT'S HOME MACHINE) Pulse: 64   Resp: 20 SpO2: 97 % O2 Device: BiPAP/CPAP    Weight: 224 lbs 11.2 oz    GENERAL:  Comfortable.  PSYCH: pleasant, oriented, No acute distress.  HEART:  Strong distal pulses, well perfused.   LUNGS:  Normal Respiratory effort  EXTREMITIES:  L leg fixed in extension. Bilateral 1+ lower extremity edema, +2 pulses bilateral and equal.  SKIN:  Dry to touch, No rash, wound or ulcerations.  NEUROLOGIC:  Grossly intact    Data   Recent Labs   Lab 04/23/22  1057 04/21/22  0726 04/20/22  1329   WBC 8.3  --  10.7   HGB 11.1*  --  11.1*   MCV 86  --  86     --  267    137 136   POTASSIUM 5.4* 4.7 4.0   CHLORIDE 111* 107 105   CO2 22 25 26   BUN 23 27 28   CR 1.33* 1.56* 1.58*   ANIONGAP 7 5 5   LAUREL 9.4 8.6 9.5   GLC 89 96 106*   ALBUMIN  --   --  3.0*   PROTTOTAL  --   --  6.2*   BILITOTAL  --   --  1.0   ALKPHOS  --   --  129   ALT  --   --  21   AST  --   --  22     No results found for this or any previous visit (from the past 24 hour(s)).  Medications       apixaban ANTICOAGULANT  2.5 mg Oral BID     cephALEXin  500 mg Oral Daily     cetirizine  10 mg Oral Daily     famotidine  20 mg Oral QPM     ferrous gluconate  324 mg Oral Daily     fluticasone  2 spray Both Nostrils Daily     furosemide  20 mg Oral Daily     metoprolol tartrate  50 mg Oral BID     morphine  15 mg Oral Q12H     niacin  500 mg Oral Daily with breakfast     polyethylene glycol  17 g Oral Daily     tolterodine  2 mg Oral BID

## 2022-04-23 NOTE — PLAN OF CARE
PRIMARY DIAGNOSIS: GENERALIZED WEAKNESS    OUTPATIENT/OBSERVATION GOALS TO BE MET BEFORE DISCHARGE  1. Orthostatic performed: N/A    2. Tolerating PO medications: Yes    3. Return to near baseline physical activity: No    4. Cleared for discharge by consultants (if involved): PT & OT recommending TCU; SW following.    Discharge Planner Nurse   Safe discharge environment identified: No  Barriers to discharge: Yes - TCU placement, insurance authorization       Entered by: Carolina Rojas 04/22/2022        VSS. Pain controlled with MS contin & PRN percocet x 1. Patient reported diffuse itching; significantly improved with Eucerin/Amlactin. Purewick in place, changed x 2 this shift. Up with Ax2. Tolerating diet. Bruising noted on all extremities. Patient refuses blood pressure at times d/t bruising - agreeable to manual BP.     Patient pleasant. Declined getting up to chair or repositioning in bed d/t significant shoulder & hip pain. Pulsate mattress offered, patient declined stating the air pressure changes cause significant pain in hips, shoulders. Education provided on pressure injury prevention & offloading pressure to promote wound healing & prevent further injury. Patient declined repositioning multiple times. Incontinence care provided twice this shift with wound care provided x 2 as well. PT worked with patient this afternoon.     Plan: Referrals out for TCU. Awaiting placement.     Please review provider order for any additional goals.   Nurse to notify provider when observation goals have been met and patient is ready for discharge.

## 2022-04-23 NOTE — PROGRESS NOTES
Care Management Follow Up    Length of Stay (days): 0    Expected Discharge Date: 04/25/2022     Concerns to be Addressed: discharge planning     Patient plan of care discussed at interdisciplinary rounds: No    Anticipated Discharge Disposition: Transitional Care     Anticipated Discharge Services: Transportation Services  Anticipated Discharge DME:      Patient/family educated on Medicare website which has current facility and service quality ratings: yes  Education Provided on the Discharge Plan:    Patient/Family in Agreement with the Plan: yes    Referrals Placed by CM/SW:    Private pay costs discussed: Not applicable    Additional Information:  Patient is ready to discharge today.     [facility]- full this weekend, resend referral Monday   [facility]- L[facility]  [facility]- No beds until after Tuesday. Resend referral after Tuesday   [facility]- LVM  [facility]- Reviewing   [facility]- No answer, no option to LVM  [facility]-LVM  [facility]- no weekend admissions       Julia Miles

## 2022-04-24 ENCOUNTER — APPOINTMENT (OUTPATIENT)
Dept: PHYSICAL THERAPY | Facility: CLINIC | Age: 82
End: 2022-04-24
Payer: COMMERCIAL

## 2022-04-24 ENCOUNTER — APPOINTMENT (OUTPATIENT)
Dept: OCCUPATIONAL THERAPY | Facility: CLINIC | Age: 82
End: 2022-04-24
Payer: COMMERCIAL

## 2022-04-24 LAB
ANION GAP SERPL CALCULATED.3IONS-SCNC: 4 MMOL/L (ref 3–14)
BUN SERPL-MCNC: 22 MG/DL (ref 7–30)
CALCIUM SERPL-MCNC: 9.3 MG/DL (ref 8.5–10.1)
CHLORIDE BLD-SCNC: 105 MMOL/L (ref 94–109)
CO2 SERPL-SCNC: 26 MMOL/L (ref 20–32)
CREAT SERPL-MCNC: 1.47 MG/DL (ref 0.52–1.04)
GFR SERPL CREATININE-BSD FRML MDRD: 35 ML/MIN/1.73M2
GLUCOSE BLD-MCNC: 112 MG/DL (ref 70–99)
POTASSIUM BLD-SCNC: 4 MMOL/L (ref 3.4–5.3)
SODIUM SERPL-SCNC: 135 MMOL/L (ref 133–144)

## 2022-04-24 PROCEDURE — 250N000013 HC RX MED GY IP 250 OP 250 PS 637: Performed by: INTERNAL MEDICINE

## 2022-04-24 PROCEDURE — 99226 PR SUBSEQUENT OBSERVATION CARE,LEVEL III: CPT | Performed by: NURSE PRACTITIONER

## 2022-04-24 PROCEDURE — 250N000013 HC RX MED GY IP 250 OP 250 PS 637: Performed by: NURSE PRACTITIONER

## 2022-04-24 PROCEDURE — G0378 HOSPITAL OBSERVATION PER HR: HCPCS

## 2022-04-24 PROCEDURE — 250N000013 HC RX MED GY IP 250 OP 250 PS 637: Performed by: PHYSICIAN ASSISTANT

## 2022-04-24 PROCEDURE — 97530 THERAPEUTIC ACTIVITIES: CPT | Mod: GO | Performed by: OCCUPATIONAL THERAPIST

## 2022-04-24 PROCEDURE — 80048 BASIC METABOLIC PNL TOTAL CA: CPT | Performed by: NURSE PRACTITIONER

## 2022-04-24 PROCEDURE — 97530 THERAPEUTIC ACTIVITIES: CPT | Mod: GP

## 2022-04-24 PROCEDURE — 250N000013 HC RX MED GY IP 250 OP 250 PS 637: Performed by: HOSPITALIST

## 2022-04-24 PROCEDURE — 36415 COLL VENOUS BLD VENIPUNCTURE: CPT | Performed by: NURSE PRACTITIONER

## 2022-04-24 RX ORDER — DIPHENHYDRAMINE HYDROCHLORIDE 50 MG/ML
25 INJECTION INTRAMUSCULAR; INTRAVENOUS EVERY 6 HOURS PRN
Status: DISCONTINUED | OUTPATIENT
Start: 2022-04-24 | End: 2022-04-28 | Stop reason: HOSPADM

## 2022-04-24 RX ORDER — DIPHENHYDRAMINE HCL 25 MG
25 CAPSULE ORAL EVERY 6 HOURS PRN
Status: DISCONTINUED | OUTPATIENT
Start: 2022-04-24 | End: 2022-04-28 | Stop reason: HOSPADM

## 2022-04-24 RX ADMIN — OXYCODONE AND ACETAMINOPHEN 1 TABLET: 7.5; 325 TABLET ORAL at 14:41

## 2022-04-24 RX ADMIN — DIPHENHYDRAMINE HYDROCHLORIDE 25 MG: 25 CAPSULE ORAL at 10:58

## 2022-04-24 RX ADMIN — APIXABAN 2.5 MG: 2.5 TABLET, FILM COATED ORAL at 09:33

## 2022-04-24 RX ADMIN — OXYCODONE AND ACETAMINOPHEN 1 TABLET: 7.5; 325 TABLET ORAL at 09:32

## 2022-04-24 RX ADMIN — Medication: at 10:12

## 2022-04-24 RX ADMIN — FERROUS GLUCONATE 324 MG: 324 TABLET ORAL at 09:34

## 2022-04-24 RX ADMIN — OXYCODONE AND ACETAMINOPHEN 1 TABLET: 7.5; 325 TABLET ORAL at 22:34

## 2022-04-24 RX ADMIN — CEPHALEXIN 500 MG: 500 CAPSULE ORAL at 09:33

## 2022-04-24 RX ADMIN — APIXABAN 2.5 MG: 2.5 TABLET, FILM COATED ORAL at 20:35

## 2022-04-24 RX ADMIN — MORPHINE SULFATE 15 MG: 15 TABLET, EXTENDED RELEASE ORAL at 21:55

## 2022-04-24 RX ADMIN — FUROSEMIDE 20 MG: 20 TABLET ORAL at 09:32

## 2022-04-24 RX ADMIN — FAMOTIDINE 20 MG: 20 TABLET ORAL at 20:36

## 2022-04-24 RX ADMIN — TOLTERODINE TARTRATE 2 MG: 1 TABLET, FILM COATED ORAL at 20:35

## 2022-04-24 RX ADMIN — TOLTERODINE TARTRATE 2 MG: 1 TABLET, FILM COATED ORAL at 09:32

## 2022-04-24 RX ADMIN — CETIRIZINE HYDROCHLORIDE 10 MG: 10 TABLET, FILM COATED ORAL at 09:32

## 2022-04-24 RX ADMIN — MORPHINE SULFATE 15 MG: 15 TABLET, EXTENDED RELEASE ORAL at 09:33

## 2022-04-24 RX ADMIN — Medication 500 MG: at 09:32

## 2022-04-24 NOTE — PLAN OF CARE
[facility]      OUTPATIENT PHYSICAL THERAPY EVALUATION  PLAN OF TREATMENT FOR OUTPATIENT REHABILITATION  (COMPLETE FOR INITIAL CLAIMS ONLY)  Patient's Last Name, First Name, M.I.  YOB: 1940  Raven Carlin                        Provider's Name  [facility] Medical Record No.  [id number]                               Onset Date:  04/20/22   Start of Care Date:    4/21/22     Type:     _X_PT   ___OT   ___SLP Medical Diagnosis:   gen mm weakness                        PT Diagnosis:  Impaired fn mob   Visits from SOC:  1   _________________________________________________________________________________  Plan of Treatment/Functional Goals    Planned Interventions: balance training, bed mobility training, gait training, home exercise program, neuromuscular re-education, patient/family education, postural re-education, strengthening, transfer training     Goals: See Physical Therapy Goals on Care Plan in [facility] electronic health record.    Therapy Frequency: 5x/week  Predicted Duration of Therapy Intervention: 04/26/22  _________________________________________________________________________________    I CERTIFY THE NEED FOR THESE SERVICES FURNISHED UNDER        THIS PLAN OF TREATMENT AND WHILE UNDER MY CARE     (Physician co-signature of this document indicates review and certification of the therapy plan).               ,      Referring Physician: Lj Rios APRN CNP            Initial Assessment        See Physical Therapy evaluation dated   in Epic electronic health record.

## 2022-04-24 NOTE — PROGRESS NOTES
Care Management Follow Up    Length of Stay (days): 0    Expected Discharge Date: 04/25/2022 pending TCU bed availability and insurance authorization     Concerns to be Addressed: discharge planning     Patient plan of care discussed at interdisciplinary rounds: Yes    Anticipated Discharge Disposition: Transitional Care     Anticipated Discharge Services: Transportation Services    Patient/family educated on Medicare website which has current facility and service quality ratings: yes  Education Provided on the Discharge Plan:  yes  Patient/Family in Agreement with the Plan: yes    Additional Information:  Spoke with pt and update on referral process, hopeful for more updates from facilities tomorrow once more admissions are open. Will continue to follow and work on TCU placement for pt.     Lisa Yepez RN BSN   Inpatient Care Coordination  [facility]   Phone [phone]

## 2022-04-24 NOTE — PROGRESS NOTES
"[facility]    Medicine Progress Note - Hospitalist Service    Date of Admission:  4/20/2022    Assessment & Plan            Raven Carlin is a 82 year old female with a history of A. fib on Eliquis, asthma, chronic pain syndrome on opiates, morbid obesity, CKD stage IIIb, HLD, GERD, gastric ulcer, HTN, ESBL E. coli, and SAURABH on CPAP who was admitted on 4/9/2022-4/18/22 due to sepsis 2/2 ESBL E.Coli UTI, Cholelithiasis and Choledocholithiasis. She underwent ERCP on 4/14. Cholecystectomy was scheduled for 4/15, but patient declined. She was discharged home with home PT but now returned to the ED with generalized weakness. Continues to await placement.     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  She had a fall onto her buttocks on 4/18 with no new acute pain. No focal neurologic deficits. No current signs of infection. Has lift chair at home that wasn't working so couldn't get up. Likely deconditioned from recent hospital stay. TCU recommended during previous hospitalization, patient declined.  She is now interested in TCU.  ED work up is unremarkable for acute process.  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  On Keflex prophylactically by her infectious disease provider given her chronic posterior thigh wound  - PT/OT following, recommending TCU  - SW consulted for TCU placement  - Wound care following   - Continues PTA MS Contin BID and PTA Percocet at 1 tab BID PRN.   - additional PRN Acetaminophen available  - Resumed PTA Cephalexin q daily  -Nursing encouraging OOB and into chair to prevent further deconditioning.       Recent Sepsis 2/2 UTI   Recent Choledocholithiasis and Cholelithiasis  S/p ERCP on 4/14  Hospitalized 4/9-4/18 due to UTI and urine culture grew ESBL " E.Coli. She completed the antibiotic course during that hospital stay.   - Currently, afebrile with normal wbc. UA unremarkable.   - Denies any abdominal pain, no N/V, eating/drinking well.   - Planning for outpatient lap cholecystectomy in the near future     Hyperkalemia, mild, 5.4  -Lasix resumed  -Repeat BMP pending     Chronic Atrial Fibrillation   - Continue Lopressor and Eliquis at reduced dose due to age and CKD     CKD - creatinine 1.58, baseline appears to range from 1.2-1.5 so pt is likely at baseline  - Cr 1.58-->1.56-->1.33  -BMP pending  - Continue Lasix      HTN - continue Lopressor.  resume Lasix today.     HLD - continue Niacin     GERD, Hx of Gastric Ulcer - continue Famotidine     Chronic Anemia - hgb 11.1 at baseline.  Continue Ferrous Gluconate.     Overactive Bladder - continue Tolterodine     SAURABH - continue cpap     Asthma   - Continue PTA Fluticasone nasal spray.   - Continue home Cetirizine     Constipation  - Miralax, PRN Senna     Morbid Obesity, BMI 41     Covid-19 negative       Diet: Regular Diet Adult    DVT Prophylaxis: DOAC  Thornton Catheter: Not present  Central Lines: None  Cardiac Monitoring: None  Code Status: Full Code      Disposition Plan   Expected Discharge: 04/25/2022     Anticipated discharge location: nursing home    Delays:     Placement - TCU  Insurance Authorization needed            The patient's care was discussed with the Attending Physician, Dr. Sue, Bedside Nurse, Care Coordinator/ and Patient.    KATIA [facility]  Hospitalist Service  [facility]  Securely message with the Vocera Web Console (learn more here)  Text page via [employer] Paging/Directory         Clinically Significant Risk Factors Present on Admission        # Hyperkalemia: K = 5.4 mmol/L (Ref range: 3.4 - 5.3 mmol/L) on admission, will monitor as appropriate        # Coagulation Defect: home medication list includes an anticoagulant medication    # Obesity:  "Estimated body mass index is 37.39 kg/m  as calculated from the following:    Height as of this encounter: 1.651 m (5' 5\").    Weight as of this encounter: 101.9 kg (224 lb 11.2 oz).      ______________________________________________________________________    Interval History   No significant issues overnight.  Complaining of pain from laying in bed.  Has been resistant to nursing staff getting OOB into chair.  Nursing had patient in shower this AM, patient complaining of generalized itchiness, PRN benadryl ordered.  Tolerating PO intake without N/V.    Data reviewed today: I reviewed all medications, new labs and imaging results over the last 24 hours. I personally reviewed no images or EKG's today.    Physical Exam   Vital Signs: Temp: 98.2  F (36.8  C) Temp src: Oral BP: 120/65 (patient's home wrist cuff - refused hospital equipment) Pulse: 66   Resp: 18 SpO2: 100 % O2 Device: BiPAP/CPAP    Weight: 224 lbs 11.2 oz  GENERAL:  Appears comfortable, sleeping with CPAP  PSYCH: pleasant, oriented, No acute distress.  HEART:  Strong distal pulses, well perfused.   LUNGS:  Normal Respiratory effort  EXTREMITIES:  L leg fixed in extension. Bilateral 1+ lower extremity edema, +2 pulses bilateral and equal.  SKIN:  Skin intact, generalized discoloration.  Excoriation on buttocks, and generalize rash that appears to be consistent with heat rash on back.  NEUROLOGIC:  Grossly intact.  CN II-XII intact    Data   Recent Labs   Lab 04/23/22  1057 04/21/22  0726 04/20/22  1329   WBC 8.3  --  10.7   HGB 11.1*  --  11.1*   MCV 86  --  86     --  267    137 136   POTASSIUM 5.4* 4.7 4.0   CHLORIDE 111* 107 105   CO2 22 25 26   BUN 23 27 28   CR 1.33* 1.56* 1.58*   ANIONGAP 7 5 5   LAUREL 9.4 8.6 9.5   GLC 89 96 106*   ALBUMIN  --   --  3.0*   PROTTOTAL  --   --  6.2*   BILITOTAL  --   --  1.0   ALKPHOS  --   --  129   ALT  --   --  21   AST  --   --  22     "

## 2022-04-24 NOTE — PLAN OF CARE
PRIMARY DIAGNOSIS: GENERALIZED WEAKNESS     OUTPATIENT/OBSERVATION GOALS TO BE MET BEFORE DISCHARGE    1. Orthostatic performed: N/A     2. Tolerating PO medications: Yes     3. Return to near baseline physical activity: No     4. Cleared for discharge by consultants (if involved): PT & OT recommending TCU; SW following.     Discharge Planner Nurse   Safe discharge environment identified: No  Barriers to discharge: Yes - TCU placement, insurance authorization       Entered by: Carolina Rojas     /97  Pulse 76  Temp 98.4  F (Oral)  Resp 19  SpO2 98% on RA     A&Ox4. Pain controlled with MS contin & PRN percocet x 2. Patient reported diffuse itching - rash noted on back, neck, and all extremities. Improved after shower & benadryl. BM x 1 this shift - soft, incontinent. Wound care provider per POC. Patient refusing blood pressure with hospital equipment d/t bruising on extremities. Refuses repositioning. Tolerating diet. Worked with PT, up with Ax2.     Plan: TCU placement    Please review provider order for any additional goals.   Nurse to notify provider when observation goals have been met and patient is ready for discharge.

## 2022-04-24 NOTE — PLAN OF CARE
Goal Outcome Evaluation:    Plan of Care Reviewed With: patient     Overall Patient Progress: no change       PRIMARY DIAGNOSIS: GENERALIZED WEAKNESS    OUTPATIENT/OBSERVATION GOALS TO BE MET BEFORE DISCHARGE  1. Orthostatic performed: N/A    2. Tolerating PO medications: Yes    3. Return to near baseline physical activity: No    4. Cleared for discharge by consultants (if involved): Yes    Discharge Planner Nurse   Safe discharge environment identified: No, await placement to TCU facility  Barriers to discharge: Yes, no bed available at this time       Entered by: Keri Pineda 04/23/2022 11:11 PM     Please review provider order for any additional goals.   Nurse to notify provider when observation goals have been met and patient is ready for discharge.

## 2022-04-24 NOTE — PLAN OF CARE
Goal Outcome Evaluation:    Plan of Care Reviewed With: patient     Overall Patient Progress: no change       PRIMARY DIAGNOSIS: GENERALIZED WEAKNESS    OUTPATIENT/OBSERVATION GOALS TO BE MET BEFORE DISCHARGE  1. Orthostatic performed: N/A    2. Tolerating PO medications: Yes    3. Return to near baseline physical activity: No    4. Cleared for discharge by consultants (if involved): Yes    Discharge Planner Nurse   Safe discharge environment identified: No, await TCU placement  Barriers to discharge: Yes- await TCU bed availability  and insurance authorization       Entered by: Keri Pineda 04/24/2022 6:00 AM     Pt spent a fair shift. Asleep in bed in nil distress, easily aroused. No complaint voiced throughout night. Pt on home CPAP. Pt refused vital signs too be check and wishes not to be disturbed or awaken from sleep. Purewick on free drainage with fortunato color urine. Minimal activities to extremities with generalized weakness. Assist x2 with lift.  Plan- for discharge to TCU facility once a bed is available. Care continues.    Please review provider order for any additional goals.   Nurse to notify provider when observation goals have been met and patient is ready for discharge.

## 2022-04-24 NOTE — PROGRESS NOTES
Pt requested Percocet at 2030, was informed medication is ordered twice daily (Q12H) PRN and she last received med at 1502, hence its not yet available for administration. Pt state she takes med three time per day at home and requesting for it to be adjusted.  MD informed at this time.      0200- Pt asleep in bed in nil distress. Earlier MD adjusted order for pain medication (Percocet), however pt has not requested any since last PM and requested not to be disturbed while sleeping. Pt also refuse vitals signs to be checked. Care continues.

## 2022-04-24 NOTE — PLAN OF CARE
PRIMARY DIAGNOSIS: GENERALIZED WEAKNESS     OUTPATIENT/OBSERVATION GOALS TO BE MET BEFORE DISCHARGE    1. Orthostatic performed: N/A     2. Tolerating PO medications: Yes     3. Return to near baseline physical activity: No     4. Cleared for discharge by consultants (if involved): PT & OT recommending TCU; SW following.     Discharge Planner Nurse   Safe discharge environment identified: No  Barriers to discharge: Yes - TCU placement, insurance authorization       Entered by: Carolina Rojas     Please review provider order for any additional goals.   Nurse to notify provider when observation goals have been met and patient is ready for discharge.

## 2022-04-25 PROCEDURE — 250N000013 HC RX MED GY IP 250 OP 250 PS 637: Performed by: HOSPITALIST

## 2022-04-25 PROCEDURE — G0378 HOSPITAL OBSERVATION PER HR: HCPCS

## 2022-04-25 PROCEDURE — 250N000013 HC RX MED GY IP 250 OP 250 PS 637: Performed by: PHYSICIAN ASSISTANT

## 2022-04-25 PROCEDURE — 250N000013 HC RX MED GY IP 250 OP 250 PS 637: Performed by: INTERNAL MEDICINE

## 2022-04-25 PROCEDURE — 250N000013 HC RX MED GY IP 250 OP 250 PS 637: Performed by: NURSE PRACTITIONER

## 2022-04-25 PROCEDURE — 99226 PR SUBSEQUENT OBSERVATION CARE,LEVEL III: CPT | Performed by: NURSE PRACTITIONER

## 2022-04-25 RX ADMIN — MORPHINE SULFATE 15 MG: 15 TABLET, EXTENDED RELEASE ORAL at 08:34

## 2022-04-25 RX ADMIN — DIPHENHYDRAMINE HYDROCHLORIDE 25 MG: 25 CAPSULE ORAL at 08:43

## 2022-04-25 RX ADMIN — OXYCODONE AND ACETAMINOPHEN 1 TABLET: 7.5; 325 TABLET ORAL at 08:34

## 2022-04-25 RX ADMIN — FERROUS GLUCONATE 324 MG: 324 TABLET ORAL at 08:29

## 2022-04-25 RX ADMIN — TOLTERODINE TARTRATE 2 MG: 1 TABLET, FILM COATED ORAL at 08:29

## 2022-04-25 RX ADMIN — CEPHALEXIN 500 MG: 500 CAPSULE ORAL at 08:29

## 2022-04-25 RX ADMIN — METOPROLOL TARTRATE 50 MG: 50 TABLET, FILM COATED ORAL at 08:29

## 2022-04-25 RX ADMIN — CETIRIZINE HYDROCHLORIDE 10 MG: 10 TABLET, FILM COATED ORAL at 08:29

## 2022-04-25 RX ADMIN — FUROSEMIDE 20 MG: 20 TABLET ORAL at 08:29

## 2022-04-25 RX ADMIN — METOPROLOL TARTRATE 50 MG: 50 TABLET, FILM COATED ORAL at 20:05

## 2022-04-25 RX ADMIN — TOLTERODINE TARTRATE 2 MG: 1 TABLET, FILM COATED ORAL at 20:05

## 2022-04-25 RX ADMIN — APIXABAN 2.5 MG: 2.5 TABLET, FILM COATED ORAL at 08:29

## 2022-04-25 RX ADMIN — APIXABAN 2.5 MG: 2.5 TABLET, FILM COATED ORAL at 20:05

## 2022-04-25 RX ADMIN — OXYCODONE AND ACETAMINOPHEN 1 TABLET: 7.5; 325 TABLET ORAL at 20:13

## 2022-04-25 RX ADMIN — MORPHINE SULFATE 15 MG: 15 TABLET, EXTENDED RELEASE ORAL at 21:51

## 2022-04-25 RX ADMIN — Medication 500 MG: at 08:29

## 2022-04-25 RX ADMIN — FAMOTIDINE 20 MG: 20 TABLET ORAL at 20:05

## 2022-04-25 NOTE — PLAN OF CARE
Goal Outcome Evaluation:                  PRIMARY DIAGNOSIS: GENERALIZED WEAKNESS     OUTPATIENT/OBSERVATION GOALS TO BE MET BEFORE DISCHARGE  1. Orthostatic performed: N/A     2. Tolerating PO medications: Yes     3. Return to near baseline physical activity: Yes     4. Cleared for discharge by consultants (if involved): N/A     Discharge Planner Nurse   Safe discharge environment identified: Yes  Barriers to discharge: No       Entered by: Qi Jim 04/25/2022 0832  Please review provider order for any additional goals.   Nurse to notify provider when observation goals have been met and patient is ready for discharge.     Pt is a/o, up with A2 and walker. Pt needs lots of encouragement to shift weight, reposition. VSS. Pain chronic in back. Bilateral breasts placed new interdry- very excoriated. Incontinent of bladder- purewick in place. WOC, PT following. Discharge pending insurance authorization.

## 2022-04-25 NOTE — PROGRESS NOTES
PRIMARY DIAGNOSIS: GENERALIZED WEAKNESS    OUTPATIENT/OBSERVATION GOALS TO BE MET BEFORE DISCHARGE  1. Orthostatic performed: N/A    2. Tolerating PO medications: Yes    3. Return to near baseline physical activity: No    4. Cleared for discharge by consultants (if involved): Yes    Discharge Planner Nurse   Safe discharge environment identified: No  Barriers to discharge: Yes, await TCU placement and insurance authorization       Entered by: Keri Pineda 04/25/2022 5:14 AM      Pt asleep in semi fowlers position in bed.Spent a fair shift, c/o pain, med Percocet and morphine PO.  Pt on home CPAP. Metoprolol held as per parameter last PM, /52 (BP Location: Left arm)   Pulse 75.  Pt is an assist x2. Plan- pt for discharge to TCU facility once a bed is available.  Care continues.  Please review provider order for any additional goals.   Nurse to notify provider when observation goals have been met and patient is ready for discharge.

## 2022-04-25 NOTE — PROGRESS NOTES
"[facility]    Medicine Progress Note - Hospitalist Service    Date of Admission:  4/20/2022  Date of Service: 4/25/2022   Assessment & Plan            Raven Carlin is a 82 year old female with a history of A. fib on Eliquis, asthma, chronic pain syndrome on opiates, morbid obesity, CKD stage IIIb, HLD, GERD, gastric ulcer, HTN, ESBL E. coli, and SAURABH on CPAP who was admitted on 4/9/2022-4/18/22 due to sepsis 2/2 ESBL E.Coli UTI, Cholelithiasis and Choledocholithiasis. She underwent ERCP on 4/14. Cholecystectomy was scheduled for 4/15, but patient declined. She was discharged home with home PT but now returned to the ED with generalized weakness. Continues to await placement.     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  She had a fall onto her buttocks on 4/18 with no new acute pain. No focal neurologic deficits. No current signs of infection. Has lift chair at home that wasn't working so couldn't get up. Likely deconditioned from recent hospital stay. TCU recommended during previous hospitalization, patient declined.  She is now interested in TCU.  ED work up is unremarkable for acute process.  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  On Keflex prophylactically by her infectious disease provider given her chronic posterior thigh wound  - PT/OT following, recommending TCU  - SW consulted for TCU placement  - Wound care following   - Continues PTA MS Contin BID and PTA Percocet at 1 tab BID PRN.   - additional PRN Acetaminophen available  - Resumed PTA Cephalexin q daily  -Nursing encouraging OOB and into chair to prevent further deconditioning.       Recent Sepsis 2/2 UTI   Recent Choledocholithiasis and Cholelithiasis  S/p ERCP on 4/14  Hospitalized 4/9-4/18 due to UTI and " urine culture grew ESBL E.Coli. She completed the antibiotic course during that hospital stay.   - Currently, afebrile with normal wbc. UA unremarkable.   - Denies any abdominal pain, no N/V, eating/drinking well.   - Planning for outpatient lap cholecystectomy in the near future     Hyperkalemia, mild, improved   -Lasix resumed  -Repeat BMP K 4.0     Chronic Atrial Fibrillation   - Continue Lopressor and Eliquis at reduced dose due to age and CKD     CKD - creatinine 1.58, baseline appears to range from 1.2-1.5 so pt is likely at baseline  - Cr 1.47-->1.58-->1.56-->1.33  - Continue Lasix      HTN - continue Lopressor.  On Lasix.     HLD - continue Niacin     GERD, Hx of Gastric Ulcer - continue Famotidine     Chronic Anemia - hgb 11.1 at baseline.  Continue Ferrous Gluconate.     Overactive Bladder - continue Tolterodine     SAURABH - continue cpap     Asthma   - Continue PTA Fluticasone nasal spray.   - Continue home Cetirizine     Constipation  - Miralax, PRN Senna     Morbid Obesity, BMI 41  - Complicates care     Covid-19 negative       Diet: Regular Diet Adult    DVT Prophylaxis: DOAC  Thornton Catheter: Not present  Central Lines: None  Cardiac Monitoring: None  Code Status: Full Code      Disposition Plan   Expected Discharge: 04/26/2022     Anticipated discharge location: nursing home    Delays:     Placement - TCU  Insurance Authorization needed            The patient's care was discussed with the Attending Physician, Dr. Sue, Bedside Nurse, Care Coordinator/ and Patient.            ______________________________________________________________________    Interval History   Ms. Carlin was seen and examined. No significant issues overnight.  Complaining of pain from laying in bed.  Has been resistant to nursing staff getting OOB into chair. Tolerating PO intake without N/V.    Data reviewed today: I reviewed all medications, new labs and imaging results over the last 24 hours. I personally  reviewed no images or EKG's today.    Physical Exam   Vital Signs: Temp: 98  F (36.7  C) Temp src: Oral BP: 121/78 Pulse: 80   Resp: 16 SpO2: 99 % O2 Device: None (Room air)    Weight: 224 lbs 11.2 oz  GENERAL:  Appears comfortable, sleeping with CPAP  PSYCH: pleasant, oriented, No acute distress.  HEART:  Strong distal pulses, well perfused.   LUNGS:  Normal Respiratory effort  EXTREMITIES:  L leg fixed in extension. Bilateral 1+ lower extremity edema, +2 pulses bilateral and equal.  SKIN:  Skin intact, generalized discoloration.  Excoriation on buttocks, and generalize rash that appears to be consistent with heat rash on back.  NEUROLOGIC:  Grossly intact.  CN II-XII intact    Data    Last Comprehensive Metabolic Panel:  Sodium   Date Value Ref Range Status   04/24/2022 135 133 - 144 mmol/L Final   06/21/2021 134 133 - 144 mmol/L Final     Potassium   Date Value Ref Range Status   04/24/2022 4.0 3.4 - 5.3 mmol/L Final   06/21/2021 4.6 3.4 - 5.3 mmol/L Final     Chloride   Date Value Ref Range Status   04/24/2022 105 94 - 109 mmol/L Final   06/21/2021 103 94 - 109 mmol/L Final     Carbon Dioxide   Date Value Ref Range Status   06/21/2021 25 20 - 32 mmol/L Final     Carbon Dioxide (CO2)   Date Value Ref Range Status   04/24/2022 26 20 - 32 mmol/L Final     Anion Gap   Date Value Ref Range Status   04/24/2022 4 3 - 14 mmol/L Final   06/21/2021 6 3 - 14 mmol/L Final     Glucose   Date Value Ref Range Status   04/24/2022 112 (H) 70 - 99 mg/dL Final   06/21/2021 132 (H) 70 - 99 mg/dL Final     Urea Nitrogen   Date Value Ref Range Status   04/24/2022 22 7 - 30 mg/dL Final   06/21/2021 44 (H) 7 - 30 mg/dL Final     Creatinine   Date Value Ref Range Status   04/24/2022 1.47 (H) 0.52 - 1.04 mg/dL Final   06/21/2021 1.96 (H) 0.52 - 1.04 mg/dL Final     GFR Estimate   Date Value Ref Range Status   04/24/2022 35 (L) >60 mL/min/1.73m2 Final     Comment:     Effective December 21, 2021 eGFRcr in adults is calculated using the  2021 CKD-EPI creatinine equation which includes age and gender (Sylvia et al., NEJM, DOI: 10.1056/DCUNmk7208769)   06/21/2021 24 (L) >60 mL/min/[1.73_m2] Final     Comment:     Non  GFR Calc  Starting 12/18/2018, serum creatinine based estimated GFR (eGFR) will be   calculated using the Chronic Kidney Disease Epidemiology Collaboration   (CKD-EPI) equation.       Calcium   Date Value Ref Range Status   04/24/2022 9.3 8.5 - 10.1 mg/dL Final   06/21/2021 8.8 8.5 - 10.1 mg/dL Final      CBC RESULTS: Recent Labs   Lab Test 04/23/22  1057   WBC 8.3   RBC 4.06   HGB 11.1*   HCT 34.8*   MCV 86   MCH 27.3   MCHC 31.9   RDW 15.3*        KATIA George CNP  Hospitalist Service  [facility]  Securely message with the Vocera Web Console (learn more here)  Text page via [employer] Paging/Directory

## 2022-04-25 NOTE — PROGRESS NOTES
Care Management Follow Up    Length of Stay (days): 0    Expected Discharge Date: 04/25/2022     Concerns to be Addressed: discharge planning     Patient plan of care discussed at interdisciplinary rounds: Yes    Anticipated Discharge Disposition: Transitional Care     Anticipated Discharge Services: Transportation Services  Anticipated Discharge DME:      Patient/family educated on Medicare website which has current facility and service quality ratings: yes  Education Provided on the Discharge Plan:    Patient/Family in Agreement with the Plan: yes    Referrals Placed by CM/SW:    Private pay costs discussed: Not applicable    Additional Information:  Spoke with pt and updated her on referral process.  CM called all TCU's where referrals pending.  The [facility] facilities declined as pt needs share room due to isolation needs.  Left VM with other facilities.  Pt said she is working with therapy here.    Brittni Leon RN, BSN, PHN, CCM  Care Coordinator  [facility]  [phone]

## 2022-04-25 NOTE — PLAN OF CARE
Goal Outcome Evaluation:                    PRIMARY DIAGNOSIS: GENERALIZED WEAKNESS    OUTPATIENT/OBSERVATION GOALS TO BE MET BEFORE DISCHARGE  1. Orthostatic performed: N/A    2. Tolerating PO medications: Yes    3. Return to near baseline physical activity: Yes    4. Cleared for discharge by consultants (if involved): N/A    Discharge Planner Nurse   Safe discharge environment identified: Yes  Barriers to discharge: No       Entered by: Qi Jim 04/25/2022 0832     Please review provider order for any additional goals.   Nurse to notify provider when observation goals have been met and patient is ready for discharge.    Pt is a/o, up with A2 and walker. Pt needs lots of encouragement to shift weight, reposition. VSS. Pain chronic in back- gave perocet. Pt complains of itchiness- gave benadryl. Bilateral breasts placed new interdry- very excoriated. Incontinent of bladder- purewick in place. WOC, PT following. Discharge pending insurance authorization.

## 2022-04-25 NOTE — PLAN OF CARE
"Goal Outcome Evaluation:    Plan of Care Reviewed With: patient     Overall Patient Progress: no change       PRIMARY DIAGNOSIS: GENERALIZED WEAKNESS    OUTPATIENT/OBSERVATION GOALS TO BE MET BEFORE DISCHARGE  1. Orthostatic performed: N/A    2. Tolerating PO medications: Yes    3. Return to near baseline physical activity: No    4. Cleared for discharge by consultants (if involved): Yes    Discharge Planner Nurse   Safe discharge environment identified: No  Barriers to discharge: Yes, await TCU placement and insurance authorization       Entered by: Keri Pineda 04/24/2022 11:25 PM     Pt AAOX4 in semi fowlers position in bed. Requested Percocet for pain and scheduled morphine PO given as per order. Pt on room air, however requested at this time to be placed on her home CPAP. Metoprolol held as per parameter /52 (BP Location: Left arm)   Pulse 75   Temp 97.5  F (36.4  C) (Oral)   Resp 18   Ht 1.651 m (5' 5\")   Wt 101.9 kg (224 lb 11.2 oz)   SpO2 93%   BMI 37.39 kg/m    Pt is an assist x2. Pt was refusing to be turned however writer educated pt on the importance of turning frequently, pt then allowed writer and support staff to carry out task.  Plan- pt for discharge to TCU facility once a bed is available.  Care continues.  Please review provider order for any additional goals.   Nurse to notify provider when observation goals have been met and patient is ready for discharge.    "

## 2022-04-26 ENCOUNTER — APPOINTMENT (OUTPATIENT)
Dept: PHYSICAL THERAPY | Facility: CLINIC | Age: 82
End: 2022-04-26
Payer: COMMERCIAL

## 2022-04-26 PROCEDURE — G0378 HOSPITAL OBSERVATION PER HR: HCPCS

## 2022-04-26 PROCEDURE — 250N000013 HC RX MED GY IP 250 OP 250 PS 637: Performed by: NURSE PRACTITIONER

## 2022-04-26 PROCEDURE — 99224 PR SUBSEQUENT OBSERVATION CARE,LEVEL I: CPT | Performed by: NURSE PRACTITIONER

## 2022-04-26 PROCEDURE — 250N000013 HC RX MED GY IP 250 OP 250 PS 637: Performed by: PHYSICIAN ASSISTANT

## 2022-04-26 PROCEDURE — 250N000013 HC RX MED GY IP 250 OP 250 PS 637: Performed by: INTERNAL MEDICINE

## 2022-04-26 PROCEDURE — 250N000013 HC RX MED GY IP 250 OP 250 PS 637: Performed by: HOSPITALIST

## 2022-04-26 PROCEDURE — 97116 GAIT TRAINING THERAPY: CPT | Mod: GP

## 2022-04-26 PROCEDURE — 97530 THERAPEUTIC ACTIVITIES: CPT | Mod: GP

## 2022-04-26 RX ORDER — NYSTATIN 100000 U/G
CREAM TOPICAL 2 TIMES DAILY
Status: DISCONTINUED | OUTPATIENT
Start: 2022-04-26 | End: 2022-04-26

## 2022-04-26 RX ADMIN — APIXABAN 2.5 MG: 2.5 TABLET, FILM COATED ORAL at 21:30

## 2022-04-26 RX ADMIN — METOPROLOL TARTRATE 50 MG: 50 TABLET, FILM COATED ORAL at 08:08

## 2022-04-26 RX ADMIN — Medication 500 MG: at 08:08

## 2022-04-26 RX ADMIN — OXYCODONE AND ACETAMINOPHEN 1 TABLET: 7.5; 325 TABLET ORAL at 09:05

## 2022-04-26 RX ADMIN — Medication: at 15:06

## 2022-04-26 RX ADMIN — APIXABAN 2.5 MG: 2.5 TABLET, FILM COATED ORAL at 08:08

## 2022-04-26 RX ADMIN — TOLTERODINE TARTRATE 2 MG: 1 TABLET, FILM COATED ORAL at 21:30

## 2022-04-26 RX ADMIN — DIPHENHYDRAMINE HYDROCHLORIDE 25 MG: 25 CAPSULE ORAL at 10:25

## 2022-04-26 RX ADMIN — MICONAZOLE NITRATE: 20 POWDER TOPICAL at 15:01

## 2022-04-26 RX ADMIN — FERROUS GLUCONATE 324 MG: 324 TABLET ORAL at 08:08

## 2022-04-26 RX ADMIN — FUROSEMIDE 20 MG: 20 TABLET ORAL at 08:08

## 2022-04-26 RX ADMIN — MICONAZOLE NITRATE: 20 POWDER TOPICAL at 21:30

## 2022-04-26 RX ADMIN — MORPHINE SULFATE 15 MG: 15 TABLET, EXTENDED RELEASE ORAL at 09:05

## 2022-04-26 RX ADMIN — Medication: at 09:06

## 2022-04-26 RX ADMIN — TOLTERODINE TARTRATE 2 MG: 1 TABLET, FILM COATED ORAL at 08:08

## 2022-04-26 RX ADMIN — FAMOTIDINE 20 MG: 20 TABLET ORAL at 21:30

## 2022-04-26 RX ADMIN — CEPHALEXIN 500 MG: 500 CAPSULE ORAL at 08:08

## 2022-04-26 RX ADMIN — MORPHINE SULFATE 15 MG: 15 TABLET, EXTENDED RELEASE ORAL at 21:30

## 2022-04-26 RX ADMIN — CETIRIZINE HYDROCHLORIDE 10 MG: 10 TABLET, FILM COATED ORAL at 08:08

## 2022-04-26 RX ADMIN — OXYCODONE AND ACETAMINOPHEN 1 TABLET: 7.5; 325 TABLET ORAL at 21:53

## 2022-04-26 NOTE — PROGRESS NOTES
"[facility]    Medicine Progress Note - Hospitalist Service    Date of Admission:  4/20/2022  Date of Service: 4/26/2022   Assessment & Plan            Raven Carlin is a 82 year old female with a history of A. fib on Eliquis, asthma, chronic pain syndrome on opiates, morbid obesity, CKD stage IIIb, HLD, GERD, gastric ulcer, HTN, ESBL E. coli, and SAURABH on CPAP who was admitted on 4/9/2022-4/18/22 due to sepsis 2/2 ESBL E.Coli UTI, Cholelithiasis and Choledocholithiasis. She underwent ERCP on 4/14. Cholecystectomy was scheduled for 4/15, but patient declined. She was discharged home with home PT but now returned to the ED with generalized weakness. Continues to await placement.     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  She had a fall onto her buttocks on 4/18 with no new acute pain. No focal neurologic deficits. No current signs of infection. Has lift chair at home that wasn't working so couldn't get up. Likely deconditioned from recent hospital stay. TCU recommended during previous hospitalization, patient declined.  She is now interested in TCU.  ED work up is unremarkable for acute process.  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  On Keflex prophylactically by her infectious disease provider given her chronic posterior thigh wound  - PT/OT following, recommending TCU  - SW consulted for TCU placement  - Wound care following   - Continues PTA MS Contin BID and PTA Percocet at 1 tab BID PRN.   - additional PRN Acetaminophen available  - Resumed PTA Cephalexin q daily  -Nursing encouraging OOB and into chair to prevent further deconditioning.       Recent Sepsis 2/2 UTI   Recent Choledocholithiasis and Cholelithiasis  S/p ERCP on 4/14  Hospitalized 4/9-4/18 due to UTI and " urine culture grew ESBL E.Coli. She completed the antibiotic course during that hospital stay.   - Currently, afebrile with normal wbc. UA unremarkable.   - Denies any abdominal pain, no N/V, eating/drinking well.   - Planning for outpatient lap cholecystectomy in the near future     Hyperkalemia, mild, improved   -Lasix resumed  -Repeat BMP K 4.0     Chronic Atrial Fibrillation   - Continue Lopressor and Eliquis at reduced dose due to age and CKD     CKD - creatinine 1.58, baseline appears to range from 1.2-1.5 so pt is likely at baseline  - Cr 1.47-->1.58-->1.56-->1.33  - Continue Lasix      HTN - continue Lopressor.  On Lasix.     HLD - continue Niacin     GERD, Hx of Gastric Ulcer - continue Famotidine     Chronic Anemia - hgb 11.1 at baseline.  Continue Ferrous Gluconate.     Overactive Bladder  - Continue Tolterodine     SAURABH - continue cpap     Asthma   - Continue PTA Fluticasone nasal spray.   - Continue home Cetirizine     Constipation  - Contine Miralax, PRN Senna     Morbid Obesity, BMI 41  - Complicates care     Intertrigo/Puritis:  - Miconazole powder to affected areas  - Continue eucerin cream and ammonium lactate     Covid-19 negative       Diet: Regular Diet Adult    DVT Prophylaxis: DOAC  Thornton Catheter: Not present  Central Lines: None  Cardiac Monitoring: None  Code Status: Full Code      Disposition Plan   Expected Discharge: 04/27/2022     Anticipated discharge location: nursing home    Delays:     Placement - TCU  Insurance Authorization needed            The patient's care was discussed with the Bedside Nurse, Care Coordinator/ and Patient.            ______________________________________________________________________    Interval History   Ms. Carlin was seen and examined. VSS. Still continues to endorse generalized pruritus which has been amendable to ammonium lactate and eurcerin cream.    Data reviewed today: I reviewed all medications, new labs and imaging results over the  last 24 hours. I personally reviewed no images or EKG's today.    Physical Exam   Vital Signs: Temp: 98.2  F (36.8  C) Temp src: Oral BP: 118/57 Pulse: 61   Resp: 18 SpO2: 98 % O2 Device: None (Room air)    Weight: 224 lbs 11.2 oz  GENERAL:  Appears comfortable, sleeping with CPAP  PSYCH: pleasant, oriented, No acute distress.  HEART:  Strong distal pulses, well perfused.   LUNGS:  Normal Respiratory effort  EXTREMITIES:  L leg fixed in extension. Bilateral 1+ lower extremity edema, +2 pulses bilateral and equal.  SKIN:  Skin intact, generalized discoloration.  Excoriation on buttocks, skin folds and generalize rash that appears to be consistent with intretrigo/irritation.  NEUROLOGIC:  Grossly intact.  CN II-XII intact    Data    Last Comprehensive Metabolic Panel:  Sodium   Date Value Ref Range Status   04/24/2022 135 133 - 144 mmol/L Final   06/21/2021 134 133 - 144 mmol/L Final     Potassium   Date Value Ref Range Status   04/24/2022 4.0 3.4 - 5.3 mmol/L Final   06/21/2021 4.6 3.4 - 5.3 mmol/L Final     Chloride   Date Value Ref Range Status   04/24/2022 105 94 - 109 mmol/L Final   06/21/2021 103 94 - 109 mmol/L Final     Carbon Dioxide   Date Value Ref Range Status   06/21/2021 25 20 - 32 mmol/L Final     Carbon Dioxide (CO2)   Date Value Ref Range Status   04/24/2022 26 20 - 32 mmol/L Final     Anion Gap   Date Value Ref Range Status   04/24/2022 4 3 - 14 mmol/L Final   06/21/2021 6 3 - 14 mmol/L Final     Glucose   Date Value Ref Range Status   04/24/2022 112 (H) 70 - 99 mg/dL Final   06/21/2021 132 (H) 70 - 99 mg/dL Final     Urea Nitrogen   Date Value Ref Range Status   04/24/2022 22 7 - 30 mg/dL Final   06/21/2021 44 (H) 7 - 30 mg/dL Final     Creatinine   Date Value Ref Range Status   04/24/2022 1.47 (H) 0.52 - 1.04 mg/dL Final   06/21/2021 1.96 (H) 0.52 - 1.04 mg/dL Final     GFR Estimate   Date Value Ref Range Status   04/24/2022 35 (L) >60 mL/min/1.73m2 Final     Comment:     Effective December 21,  2021 eGFRcr in adults is calculated using the 2021 CKD-EPI creatinine equation which includes age and gender (Sylvia et al., NEJ, DOI: 10.1056/GOUInd2266320)   06/21/2021 24 (L) >60 mL/min/[1.73_m2] Final     Comment:     Non  GFR Calc  Starting 12/18/2018, serum creatinine based estimated GFR (eGFR) will be   calculated using the Chronic Kidney Disease Epidemiology Collaboration   (CKD-EPI) equation.       Calcium   Date Value Ref Range Status   04/24/2022 9.3 8.5 - 10.1 mg/dL Final   06/21/2021 8.8 8.5 - 10.1 mg/dL Final      CBC RESULTS: Recent Labs   Lab Test 04/23/22  1057   WBC 8.3   RBC 4.06   HGB 11.1*   HCT 34.8*   MCV 86   MCH 27.3   MCHC 31.9   RDW 15.3*        KATIA George CNP  Hospitalist Service  [facility]  Securely message with the Vocera Web Console (learn more here)  Text page via [facility] Paging/Directory

## 2022-04-26 NOTE — PLAN OF CARE
Goal Outcome Evaluation:      PRIMARY DIAGNOSIS: GENERALIZED WEAKNESS    OUTPATIENT/OBSERVATION GOALS TO BE MET BEFORE DISCHARGE  1. Orthostatic performed: NA    2. Tolerating PO medications: Yes    3. Return to near baseline physical activity: No    4. Cleared for discharge by consultants (if involved): No    Discharge Planner Nurse   Safe discharge environment identified: No- looking for TCU/placement    Barriers to discharge: Yes       Entered by: Donna Mccarty 04/26/2022 8:26 AM     Please review provider order for any additional goals.   Nurse to notify provider when observation goals have been met and patient is ready for discharge.

## 2022-04-26 NOTE — PLAN OF CARE
PRIMARY DIAGNOSIS: GENERALIZED WEAKNESS    OUTPATIENT/OBSERVATION GOALS TO BE MET BEFORE DISCHARGE  1. Orthostatic performed: No    2. Tolerating PO medications: Yes    3. Return to near baseline physical activity: Pending TCU placement    4. Cleared for discharge by consultants (if involved): Yes    Discharge Planner Nurse   Safe discharge environment identified: Yes  Barriers to discharge: No       Entered by: Jose Alberto Baron 04/26/2022 5:08 AM  A & O x 3, assist x 2 w/ bed mobility, PT/OT following, contact precaution for MRSA/ ESBL, refuses to get out of bed.  Please review provider order for any additional goals.   Nurse to notify provider when observation goals have been met and patient is ready for discharge.

## 2022-04-26 NOTE — PLAN OF CARE
Goal Outcome Evaluation:                    PRIMARY DIAGNOSIS: GENERALIZED WEAKNESS     OUTPATIENT/OBSERVATION GOALS TO BE MET BEFORE DISCHARGE  1. Orthostatic performed: N/A     2. Tolerating PO medications: Yes     3. Return to near baseline physical activity: Yes     4. Cleared for discharge by consultants (if involved): N/A     Discharge Planner Nurse   Safe discharge environment identified: Yes  Barriers to discharge: No       Entered by: Qi Jim 04/25/2022 0832  Please review provider order for any additional goals.   Nurse to notify provider when observation goals have been met and patient is ready for discharge.     Pt is a/o, up with A2 and walker. Pt needs lots of encouragement to shift weight, reposition. VSS. Pain chronic in back. Bilateral breasts placed new interdry- very excoriated. Incontinent of bladder and bowel. Purewick in place, writer wanted to remove purewick due to redness and excoration and pt refused stating how am I going to void. Educated pt on moving and getting up to the commode.  WOC, PT following. Discharge pending insurance authorization.

## 2022-04-26 NOTE — PROGRESS NOTES
Care Management Follow Up    Length of Stay (days): 0    Expected Discharge Date: 04/27/2022     Concerns to be Addressed: discharge planning     Patient plan of care discussed at interdisciplinary rounds: Yes    Anticipated Discharge Disposition: Transitional Care     Anticipated Discharge Services: Transportation Services  Anticipated Discharge DME:      Patient/family educated on Medicare website which has current facility and service quality ratings: yes  Education Provided on the Discharge Plan:    Patient/Family in Agreement with the Plan: yes    Referrals Placed by CM/SW:    Private pay costs discussed: Not applicable    Additional Information:  Using [facility] TCU list sent an additional 8 referrals.  [facility] admission is reviewing -awaiting response.  Left  for [facility]    Brittni Leon RN, BSN, PHN, CCM  Care Coordinator  [facility]  [phone]

## 2022-04-26 NOTE — PLAN OF CARE
PRIMARY DIAGNOSIS: GENERALIZED WEAKNESS    OUTPATIENT/OBSERVATION GOALS TO BE MET BEFORE DISCHARGE  1. Orthostatic performed: Pt states she does not want to get up at the moment    2. Tolerating PO medications: Yes    3. Return to near baseline physical activity: Yes    4. Cleared for discharge by consultants (if involved): Yes    Discharge Planner Nurse   Safe discharge environment identified: Yes  Barriers to discharge: No       Entered by: Jose Alberto Baron 04/25/2022 7:57 PM     Please review provider order for any additional goals.   Nurse to notify provider when observation goals have been met and patient is ready for discharge.

## 2022-04-26 NOTE — PLAN OF CARE
PRIMARY DIAGNOSIS: GENERALIZED WEAKNESS    OUTPATIENT/OBSERVATION GOALS TO BE MET BEFORE DISCHARGE  1. Orthostatic performed: N/A    2. Tolerating PO medications: Yes    3. Return to near baseline physical activity: Yes    4. Cleared for discharge by consultants (if involved): Yes    Discharge Planner Nurse   Safe discharge environment identified: Yes  Barriers to discharge: No       Entered by: Jose Alberto Baron 04/26/2022 12:46 AM     Please review provider order for any additional goals.   Nurse to notify provider when observation goals have been met and patient is ready for discharge.

## 2022-04-27 LAB — SARS-COV-2 RNA RESP QL NAA+PROBE: NEGATIVE

## 2022-04-27 PROCEDURE — 250N000013 HC RX MED GY IP 250 OP 250 PS 637: Performed by: PHYSICIAN ASSISTANT

## 2022-04-27 PROCEDURE — 250N000013 HC RX MED GY IP 250 OP 250 PS 637: Performed by: NURSE PRACTITIONER

## 2022-04-27 PROCEDURE — 250N000013 HC RX MED GY IP 250 OP 250 PS 637: Performed by: INTERNAL MEDICINE

## 2022-04-27 PROCEDURE — 99225 PR SUBSEQUENT OBSERVATION CARE,LEVEL II: CPT | Performed by: PHYSICIAN ASSISTANT

## 2022-04-27 PROCEDURE — U0003 INFECTIOUS AGENT DETECTION BY NUCLEIC ACID (DNA OR RNA); SEVERE ACUTE RESPIRATORY SYNDROME CORONAVIRUS 2 (SARS-COV-2) (CORONAVIRUS DISEASE [COVID-19]), AMPLIFIED PROBE TECHNIQUE, MAKING USE OF HIGH THROUGHPUT TECHNOLOGIES AS DESCRIBED BY CMS-2020-01-R: HCPCS | Performed by: PHYSICIAN ASSISTANT

## 2022-04-27 PROCEDURE — 250N000013 HC RX MED GY IP 250 OP 250 PS 637: Performed by: HOSPITALIST

## 2022-04-27 PROCEDURE — G0378 HOSPITAL OBSERVATION PER HR: HCPCS

## 2022-04-27 RX ADMIN — METOPROLOL TARTRATE 50 MG: 50 TABLET, FILM COATED ORAL at 08:16

## 2022-04-27 RX ADMIN — FERROUS GLUCONATE 324 MG: 324 TABLET ORAL at 08:13

## 2022-04-27 RX ADMIN — OXYCODONE AND ACETAMINOPHEN 1 TABLET: 7.5; 325 TABLET ORAL at 08:22

## 2022-04-27 RX ADMIN — METOPROLOL TARTRATE 50 MG: 50 TABLET, FILM COATED ORAL at 20:47

## 2022-04-27 RX ADMIN — CEPHALEXIN 500 MG: 500 CAPSULE ORAL at 08:15

## 2022-04-27 RX ADMIN — MICONAZOLE NITRATE: 20 POWDER TOPICAL at 09:51

## 2022-04-27 RX ADMIN — CETIRIZINE HYDROCHLORIDE 10 MG: 10 TABLET, FILM COATED ORAL at 08:16

## 2022-04-27 RX ADMIN — TOLTERODINE TARTRATE 2 MG: 1 TABLET, FILM COATED ORAL at 08:12

## 2022-04-27 RX ADMIN — TOLTERODINE TARTRATE 2 MG: 1 TABLET, FILM COATED ORAL at 20:47

## 2022-04-27 RX ADMIN — APIXABAN 2.5 MG: 2.5 TABLET, FILM COATED ORAL at 08:16

## 2022-04-27 RX ADMIN — FUROSEMIDE 20 MG: 20 TABLET ORAL at 08:16

## 2022-04-27 RX ADMIN — MORPHINE SULFATE 15 MG: 15 TABLET, EXTENDED RELEASE ORAL at 09:37

## 2022-04-27 RX ADMIN — MICONAZOLE NITRATE: 20 POWDER TOPICAL at 20:52

## 2022-04-27 RX ADMIN — OXYCODONE AND ACETAMINOPHEN 1 TABLET: 7.5; 325 TABLET ORAL at 22:56

## 2022-04-27 RX ADMIN — Medication: at 20:50

## 2022-04-27 RX ADMIN — Medication: at 20:51

## 2022-04-27 RX ADMIN — FAMOTIDINE 20 MG: 20 TABLET ORAL at 20:47

## 2022-04-27 RX ADMIN — APIXABAN 2.5 MG: 2.5 TABLET, FILM COATED ORAL at 20:47

## 2022-04-27 RX ADMIN — OXYCODONE AND ACETAMINOPHEN 1 TABLET: 7.5; 325 TABLET ORAL at 15:56

## 2022-04-27 RX ADMIN — Medication 500 MG: at 08:16

## 2022-04-27 RX ADMIN — MORPHINE SULFATE 15 MG: 15 TABLET, EXTENDED RELEASE ORAL at 20:47

## 2022-04-27 NOTE — PLAN OF CARE
Goal Outcome Evaluation:       PRIMARY DIAGNOSIS: GENERALIZED WEAKNESS     OUTPATIENT/OBSERVATION GOALS TO BE MET BEFORE DISCHARGE  1. Orthostatic performed: NA     2. Tolerating PO medications: Yes     3. Return to near baseline physical activity: No     4. Cleared for discharge by consultants (if involved): No     Discharge Planner Nurse   Safe discharge environment identified: No- looking for TCU/placement     Barriers to discharge: Yes       Entered by: Donna Mccarty 04/26/2022 1:00PM  Please review provider order for any additional goals.   Nurse to notify provider when observation goals have been met and patient is ready for discharge.

## 2022-04-27 NOTE — PROGRESS NOTES
Care Management Follow Up    Length of Stay (days): 0    Expected Discharge Date: 04/28/2022     Concerns to be Addressed: discharge planning     Patient plan of care discussed at interdisciplinary rounds: Yes    Anticipated Discharge Disposition: Transitional Care     Anticipated Discharge Services: Transportation Services  Anticipated Discharge DME:      Patient/family educated on Medicare website which has current facility and service quality ratings: yes  Education Provided on the Discharge Plan:    Patient/Family in Agreement with the Plan: yes    Referrals Placed by CM/SW:    Private pay costs discussed: Not applicable    Additional Information:  CM received VM from the following TCU:    1. [facility] [phone] - would have bed  2. [facility]- no bed  3. [facility]- no bed  4 [facility] [phone]- has bed  5. [facility]- has bed  Met with pt at 1000 this morning to update pt on options.  She choose [facility].  CM called and left VM at 1010 this am informing [facility] admission to pursue insurance auth.  Called at 12 noon and 1300 to verify they are working on insurance auth-awaiting response.    Brittni Leon RN, BSN, PHN, [facility]  Care Coordinator  [facility]  [phone]    Addendum 1400  Spoke with Jw at [facility] and he said he submitted the insurance auth this morning.  It has been taking about 24 hours to hear back.  We will touch base tomorrow morning and set up transportation once auth has been obtained.    [id number]

## 2022-04-27 NOTE — PROGRESS NOTES
"[facility]  Internal Medicine  Progress Note    Date of Service: 4/27/2022    Patient: Raven Carlin  MRN: [id number]  Admission Date: 4/20/2022  Hospital Day # 8    Assessment & Plan: Raven Carlin is a 82 year old female with a history of A. fib on Eliquis, asthma, chronic pain syndrome on opiates, morbid obesity, CKD stage IIIb, HLD, GERD, gastric ulcer, HTN, ESBL E. coli, and SAURABH on CPAP who was admitted on 4/9/2022-4/18/22 due to sepsis 2/2 ESBL E.Coli UTI, Cholelithiasis and Choledocholithiasis. She underwent ERCP on 4/14. Cholecystectomy was scheduled for 4/15, but patient declined. She was discharged home with home PT but now returned to the ED with generalized weakness. Continues to await placement.     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  She had a fall onto her buttocks on 4/18 with no new acute pain. No focal neurologic deficits. No current signs of infection. Has lift chair at home that wasn't working so couldn't get up. Likely deconditioned from recent hospital stay. TCU recommended during previous hospitalization, patient declined.  She is now interested in TCU.  Hospital work up thus far has been unremarkable for acute process.  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  On Keflex prophylactically by her infectious disease provider given her chronic posterior thigh wound  - PT/OT following, recommending TCU  - SW consulted for TCU placement  - Wound care following   - Continues PTA MS Contin BID and PTA Percocet at 1 tab BID PRN.   - additional PRN Acetaminophen available  - Resumed PTA Cephalexin q daily  - Nursing encouraging OOB and into chair to prevent further deconditioning.       Recent Sepsis 2/2 UTI   Recent Choledocholithiasis and Cholelithiasis  S/p ERCP " chest pain "on 4/14  Hospitalized 4/9-4/18 due to UTI and urine culture grew ESBL E.Coli. She completed the antibiotic course during that hospital stay.   - Currently, afebrile with normal wbc. UA unremarkable.   - Denies any abdominal pain, no N/V, eating/drinking well.   - Planning for outpatient lap cholecystectomy in the near future    Chronic Atrial Fibrillation   - Continue Lopressor and Eliquis at reduced dose due to age and CKD     CKD - creatinine has been at baseline.  Baseline appears to range from 1.2-1.5 so pt is likely at baseline  - Continue Lasix      HTN - continue Lopressor and Lasix.     HLD - continue Niacin     GERD, Hx of Gastric Ulcer - continue Famotidine     Chronic Anemia - hgb 11.1 at baseline.  Continue Ferrous Gluconate.     Overactive Bladder  - Continue Tolterodine     SAURABH - continue cpap     Asthma   - Continue PTA Fluticasone nasal spray.   - Continue home Cetirizine     Constipation - had a BM today  - Contine Miralax, PRN Senna     Morbid Obesity, BMI 41  - Complicates care     Intertrigo/Puritis:  - Miconazole powder to affected areas  - Continue eucerin cream and ammonium lactate      Covid-19 negative    Tyesha Mcqueen MS, PA-C  Hospitalist Physician Assistant  [facility]  Pager: [phone]      Subjective & Interval Hx:    Patient reports diffuse bruising on her arms from blood draws.  Hopeful to discharge to rehab soon.  Tolerating po.  Denies chest pain, shortness of breath, abdominal pain.  Had a BM today.  Is planning for visitors to come today.    Last 24 hr care team notes reviewed.   ROS:  4 point ROS including Respiratory, CV, GI and , other than that noted in the HPI, is negative,    Physical Exam:    Blood pressure 127/68, pulse 68, temperature 98  F (36.7  C), temperature source Axillary, resp. rate 20, height 1.651 m (5' 5\"), weight 101.9 kg (224 lb 11.2 oz), SpO2 99 %, not currently breastfeeding.  General: Alert, interactive, NAD, sitting up in bed applying " chest pain chest pain chest pain chest pain chest pain makeup  HEENT: AT/NC  Resp: clear to auscultation bilaterally, no crackles or wheezes  Cardiac: regular rate and rhythm, no murmur  Abdomen: Soft, nontender, nondistended. +BS.  Extremities: L leg fixed in extension. Bilateral 1+ lower extremity edema, +2 pulses bilateral and equal.  Skin: scattered areas of ecchymosis.  Neuro: Alert & oriented x 3    Labs & Images:  Reviewed in Epic   Medications:    Current Facility-Administered Medications   Medication     acetaminophen (TYLENOL) tablet 650 mg    Or     acetaminophen (TYLENOL) Suppository 650 mg     ammonium lactate (LAC-HYDRIN) 12 % lotion     apixaban ANTICOAGULANT (ELIQUIS) tablet 2.5 mg     cephALEXin (KEFLEX) capsule 500 mg     cetirizine (zyrTEC) tablet 10 mg     diphenhydrAMINE (BENADRYL) capsule 25 mg    Or     diphenhydrAMINE (BENADRYL) injection 25 mg     eucerin cream     famotidine (PEPCID) tablet 20 mg     ferrous gluconate (FERGON) tablet 324 mg     fluticasone (FLONASE) 50 MCG/ACT spray 2 spray     furosemide (LASIX) tablet 20 mg     hydrOXYzine (ATARAX) tablet 50 mg     lidocaine (LMX4) cream     lidocaine 1 % 0.1-1 mL     metoprolol tartrate (LOPRESSOR) tablet 50 mg     miconazole (MICATIN) 2 % powder     morphine (MS CONTIN) 12 hr tablet 15 mg     naloxone (NARCAN) injection 0.2 mg    Or     naloxone (NARCAN) injection 0.4 mg    Or     naloxone (NARCAN) injection 0.2 mg    Or     naloxone (NARCAN) injection 0.4 mg     niacin tablet 500 mg     ondansetron (ZOFRAN-ODT) ODT tab 4 mg    Or     ondansetron (ZOFRAN) injection 4 mg     oxyCODONE-acetaminophen (PERCOCET) 7.5-325 MG per tablet 1 tablet     polyethylene glycol (MIRALAX) Packet 17 g     senna-docusate (SENOKOT-S/PERICOLACE) 8.6-50 MG per tablet 1 tablet    Or     senna-docusate (SENOKOT-S/PERICOLACE) 8.6-50 MG per tablet 2 tablet     sodium chloride (PF) 0.9% PF flush 3 mL     tolterodine (DETROL) tablet 2 mg

## 2022-04-27 NOTE — PLAN OF CARE
PRIMARY DIAGNOSIS: GENERALIZED WEAKNESS    OUTPATIENT/OBSERVATION GOALS TO BE MET BEFORE DISCHARGE  1. Orthostatic performed: N/A    2. Tolerating PO medications: Yes    3. Return to near baseline physical activity: No    4. Cleared for discharge by consultants (if involved): No    Discharge Planner Nurse   Safe discharge environment identified: No  Barriers to discharge: Yes       Entered by: Yojana Rojas 04/27/2022        AOx4.  C/o shoulder, pain, & LLE pain rated 2/10 without movement & 10/10 w/ movement.  PRN percocet given this am.  2 assist w/ gb & walker in bed,  Pt able to ambulate w/ 1 assist gb & [place] [facility] [place]  Bed alarm maintained.  Writer & NST cleansed abd folds, shyam area, & buttock this morning - scheduled med applied along w/ new interdry + new mepilex.  Contact Iso maintained.  PT/OT, SW/CC consulted in cares.  Will cont to monitor.   Please review provider order for any additional goals.   Nurse to notify provider when observation goals have been met and patient is ready for discharge.Goal Outcome Evaluation:

## 2022-04-27 NOTE — PLAN OF CARE
Goal Outcome Evaluation:       PRIMARY DIAGNOSIS: GENERALIZED WEAKNESS     OUTPATIENT/OBSERVATION GOALS TO BE MET BEFORE DISCHARGE  1. Orthostatic performed: NA     2. Tolerating PO medications: Yes     3. Return to near baseline physical activity: No     4. Cleared for discharge by consultants (if involved): No     Discharge Planner Nurse   Safe discharge environment identified: No.  Waiting to find TCU/placement     Barriers to discharge: Yes       Entered by:   Please review provider order for any additional goals.   Nurse to notify provider when observation goals have been met and patient is ready for discharge    Pt sleeping comfortably in bed.

## 2022-04-27 NOTE — PLAN OF CARE
PRIMARY DIAGNOSIS: GENERALIZED WEAKNESS    OUTPATIENT/OBSERVATION GOALS TO BE MET BEFORE DISCHARGE  1. Orthostatic performed: N/A    2. Tolerating PO medications: Yes    3. Return to near baseline physical activity: Yes    4. Cleared for discharge by consultants (if involved): Yes    Discharge Planner Nurse   Safe discharge environment identified: Yes  Barriers to discharge: No       Entered by: Blessing Love 04/27/2022 6:38 AM  Pt slept all night, redness on the groin area improved. Mepilex dressing intact on the coccyx, Cream applied on buttocks.      Please review provider order for any additional goals.   Nurse to notify provider when observation goals have been met and patient is ready for discharge.

## 2022-04-27 NOTE — PLAN OF CARE
Goal Outcome Evaluation:       PRIMARY DIAGNOSIS: GENERALIZED WEAKNESS     OUTPATIENT/OBSERVATION GOALS TO BE MET BEFORE DISCHARGE  1. Orthostatic performed: NA     2. Tolerating PO medications: Yes     3. Return to near baseline physical activity: No     4. Cleared for discharge by consultants (if involved): No     Discharge Planner Nurse   Safe discharge environment identified: No.  Waiting to find TCU/placement     Barriers to discharge: Yes       Entered by:   Please review provider order for any additional goals.   Nurse to notify provider when observation goals have been met and patient is ready for discharge    Schedule pain medication given (mscontin) and PRN Percocet given. Pt resting comfortably in bed. VSS. A&O X 4. Rash and redness under bilateral breast and coccyx, and LLE (thigh). Tx applied.

## 2022-04-28 ENCOUNTER — APPOINTMENT (OUTPATIENT)
Dept: OCCUPATIONAL THERAPY | Facility: CLINIC | Age: 82
End: 2022-04-28
Payer: COMMERCIAL

## 2022-04-28 VITALS
SYSTOLIC BLOOD PRESSURE: 132 MMHG | HEART RATE: 65 BPM | BODY MASS INDEX: 37.44 KG/M2 | HEIGHT: 65 IN | RESPIRATION RATE: 16 BRPM | DIASTOLIC BLOOD PRESSURE: 72 MMHG | TEMPERATURE: 98.9 F | WEIGHT: 224.7 LBS | OXYGEN SATURATION: 96 %

## 2022-04-28 LAB
ANION GAP SERPL CALCULATED.3IONS-SCNC: 3 MMOL/L (ref 3–14)
BUN SERPL-MCNC: 27 MG/DL (ref 7–30)
CALCIUM SERPL-MCNC: 9 MG/DL (ref 8.5–10.1)
CHLORIDE BLD-SCNC: 105 MMOL/L (ref 94–109)
CO2 SERPL-SCNC: 29 MMOL/L (ref 20–32)
CREAT SERPL-MCNC: 1.67 MG/DL (ref 0.52–1.04)
GFR SERPL CREATININE-BSD FRML MDRD: 30 ML/MIN/1.73M2
GLUCOSE BLD-MCNC: 99 MG/DL (ref 70–99)
POTASSIUM BLD-SCNC: 3.7 MMOL/L (ref 3.4–5.3)
SODIUM SERPL-SCNC: 137 MMOL/L (ref 133–144)

## 2022-04-28 PROCEDURE — 250N000013 HC RX MED GY IP 250 OP 250 PS 637: Performed by: HOSPITALIST

## 2022-04-28 PROCEDURE — 36415 COLL VENOUS BLD VENIPUNCTURE: CPT | Performed by: PHYSICIAN ASSISTANT

## 2022-04-28 PROCEDURE — 250N000013 HC RX MED GY IP 250 OP 250 PS 637: Performed by: INTERNAL MEDICINE

## 2022-04-28 PROCEDURE — G0378 HOSPITAL OBSERVATION PER HR: HCPCS

## 2022-04-28 PROCEDURE — 97535 SELF CARE MNGMENT TRAINING: CPT | Mod: GO | Performed by: OCCUPATIONAL THERAPIST

## 2022-04-28 PROCEDURE — 250N000013 HC RX MED GY IP 250 OP 250 PS 637: Performed by: NURSE PRACTITIONER

## 2022-04-28 PROCEDURE — 250N000013 HC RX MED GY IP 250 OP 250 PS 637: Performed by: PHYSICIAN ASSISTANT

## 2022-04-28 PROCEDURE — 80048 BASIC METABOLIC PNL TOTAL CA: CPT | Performed by: PHYSICIAN ASSISTANT

## 2022-04-28 PROCEDURE — G0463 HOSPITAL OUTPT CLINIC VISIT: HCPCS | Mod: 25

## 2022-04-28 PROCEDURE — 99217 PR OBSERVATION CARE DISCHARGE: CPT | Performed by: PHYSICIAN ASSISTANT

## 2022-04-28 RX ORDER — MORPHINE SULFATE 15 MG/1
15 TABLET, FILM COATED, EXTENDED RELEASE ORAL EVERY 12 HOURS
Qty: 30 TABLET | Refills: 0 | Status: SHIPPED | OUTPATIENT
Start: 2022-04-28

## 2022-04-28 RX ORDER — METOPROLOL TARTRATE 50 MG
50 TABLET ORAL 2 TIMES DAILY
Qty: 180 TABLET | Refills: 0 | DISCHARGE
Start: 2022-04-28

## 2022-04-28 RX ORDER — OXYCODONE AND ACETAMINOPHEN 7.5; 325 MG/1; MG/1
1 TABLET ORAL 2 TIMES DAILY PRN
Qty: 30 TABLET | Refills: 0 | Status: SHIPPED | OUTPATIENT
Start: 2022-04-28

## 2022-04-28 RX ORDER — FUROSEMIDE 20 MG
20 TABLET ORAL DAILY
DISCHARGE
Start: 2022-04-28

## 2022-04-28 RX ORDER — CEPHALEXIN 500 MG/1
500 CAPSULE ORAL DAILY
DISCHARGE
Start: 2022-04-28

## 2022-04-28 RX ORDER — CETIRIZINE HYDROCHLORIDE 5 MG/1
10 TABLET ORAL DAILY
DISCHARGE
Start: 2022-04-28

## 2022-04-28 RX ORDER — FLUTICASONE PROPIONATE 50 MCG
2 SPRAY, SUSPENSION (ML) NASAL DAILY
DISCHARGE
Start: 2022-04-28

## 2022-04-28 RX ORDER — NIACIN 500 MG
500 TABLET ORAL
DISCHARGE
Start: 2022-04-28

## 2022-04-28 RX ORDER — FAMOTIDINE 20 MG/1
20 TABLET, FILM COATED ORAL EVERY EVENING
DISCHARGE
Start: 2022-04-28

## 2022-04-28 RX ORDER — FERROUS GLUCONATE 324(37.5)
1 TABLET ORAL DAILY
DISCHARGE
Start: 2022-04-28

## 2022-04-28 RX ADMIN — FUROSEMIDE 20 MG: 20 TABLET ORAL at 09:33

## 2022-04-28 RX ADMIN — APIXABAN 2.5 MG: 2.5 TABLET, FILM COATED ORAL at 09:34

## 2022-04-28 RX ADMIN — MICONAZOLE NITRATE: 20 POWDER TOPICAL at 09:00

## 2022-04-28 RX ADMIN — CEPHALEXIN 500 MG: 500 CAPSULE ORAL at 09:33

## 2022-04-28 RX ADMIN — Medication: at 13:16

## 2022-04-28 RX ADMIN — METOPROLOL TARTRATE 50 MG: 50 TABLET, FILM COATED ORAL at 09:34

## 2022-04-28 RX ADMIN — FERROUS GLUCONATE 324 MG: 324 TABLET ORAL at 09:34

## 2022-04-28 RX ADMIN — OXYCODONE AND ACETAMINOPHEN 1 TABLET: 7.5; 325 TABLET ORAL at 09:47

## 2022-04-28 RX ADMIN — CETIRIZINE HYDROCHLORIDE 10 MG: 10 TABLET, FILM COATED ORAL at 09:35

## 2022-04-28 RX ADMIN — MORPHINE SULFATE 15 MG: 15 TABLET, EXTENDED RELEASE ORAL at 09:35

## 2022-04-28 RX ADMIN — FLUTICASONE PROPIONATE 2 SPRAY: 50 SPRAY, METERED NASAL at 12:46

## 2022-04-28 RX ADMIN — Medication: at 13:15

## 2022-04-28 RX ADMIN — TOLTERODINE TARTRATE 2 MG: 1 TABLET, FILM COATED ORAL at 09:33

## 2022-04-28 RX ADMIN — Medication 500 MG: at 09:35

## 2022-04-28 NOTE — PLAN OF CARE
PRIMARY DIAGNOSIS: GENERALIZED WEAKNESS    OUTPATIENT/OBSERVATION GOALS TO BE MET BEFORE DISCHARGE  1. Orthostatic performed: N/A    2. Tolerating PO medications: Yes    3. Return to near baseline physical activity: No    4. Cleared for discharge by consultants (if involved): No    Discharge Planner Nurse   Safe discharge environment identified: No  Barriers to discharge: Yes       Entered by: Yojana Rojas 04/27/2022       Gave pt PRN percocet for pain.  Plan: TCU placement, waiting for insurance authorization.  Please review provider order for any additional goals.   Nurse to notify provider when observation goals have been met and patient is ready for discharge.Goal Outcome Evaluation:

## 2022-04-28 NOTE — PLAN OF CARE
Patient's After Visit Summary was reviewed with patient.   Patient verbalized understanding of After Visit Summary, recommended follow up and was given an opportunity to ask questions.   Discharge medications sent home with patient/family: No.  Discharged with [facility] transport.        OBSERVATION patient END time: 1654.

## 2022-04-28 NOTE — PLAN OF CARE
PRIMARY DIAGNOSIS: GENERALIZED WEAKNESS    OUTPATIENT/OBSERVATION GOALS TO BE MET BEFORE DISCHARGE  1. Orthostatic performed: N/A    2. Tolerating PO medications: Yes    3. Return to near baseline physical activity: No    4. Cleared for discharge by consultants (if involved): No    Discharge Planner Nurse   Safe discharge environment identified: No  Barriers to discharge: Yes       Entered by: Yojana Rojas RN 04/28/2022       AOx4.  C/o constant neck, back, & bilateral shoulder pain.  PRN percocet givenx1 & repositioned.  Up w/ 2 assist gb & walker.  Bed alarm maintained.  Contact Iso maintained.   Ambulated w/ pt to door & back to bed.  SW/CC consulted in discharge planning - awaiting insurance authorization.  Will cont to monitor.    Please review provider order for any additional goals.   Nurse to notify provider when observation goals have been met and patient is ready for discharge.Goal Outcome Evaluation:

## 2022-04-28 NOTE — PROGRESS NOTES
"[facility]  Internal Medicine  Progress Note    Date of Service: 4/28/2022    Patient: Raven Carlin  MRN: [id number]  Admission Date: 4/20/2022  Hospital Day # 9    Assessment & Plan: Raven Carlin is a 82 year old female Raven Carlin is a 82 year old female with a history of A. fib on Eliquis, asthma, chronic pain syndrome on opiates, morbid obesity, CKD stage IIIb, HLD, GERD, gastric ulcer, HTN, ESBL E. coli, and SAURABH on CPAP who was admitted on 4/9/2022-4/18/22 due to sepsis 2/2 ESBL E.Coli UTI, Cholelithiasis and Choledocholithiasis. She underwent ERCP on 4/14. Cholecystectomy was scheduled for 4/15, but patient declined. She was discharged home with home PT but now returned to the ED with generalized weakness. Continues to await placement.     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  She had a fall onto her buttocks on 4/18 with no new acute pain. No focal neurologic deficits. No current signs of infection. Has lift chair at home that wasn't working so couldn't get up. Likely deconditioned from recent hospital stay. TCU recommended during previous hospitalization, patient declined.  She is now interested in TCU.  Hospital work up thus far has been unremarkable for acute process.  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  On Keflex prophylactically by her infectious disease provider given her chronic posterior thigh wound  - PT/OT following, recommending TCU  - SW consulted for TCU placement  - Wound care following   - Continues PTA MS Contin BID and PTA Percocet at 1 tab BID PRN.   - additional PRN Acetaminophen available  - Resumed PTA Cephalexin q daily  - Nursing encouraging OOB and into chair to prevent further deconditioning.       Recent Sepsis 2/2 UTI   Recent " "Choledocholithiasis and Cholelithiasis  S/p ERCP on 4/14  Hospitalized 4/9-4/18 due to UTI and urine culture grew ESBL E.Coli. She completed the antibiotic course during that hospital stay.   - Currently, afebrile with normal wbc. UA unremarkable.   - Denies any abdominal pain, no N/V, eating/drinking well.   - Planning for outpatient lap cholecystectomy in the near future     Chronic Atrial Fibrillation   - Continue Lopressor and Eliquis at reduced dose due to age and CKD     CKD - creatinine has been at baseline.  - Continue Lasix   - monitor bmp     HTN - continue Lopressor and Lasix.     HLD - continue Niacin     GERD, Hx of Gastric Ulcer - continue Famotidine     Chronic Anemia - hgb 11.1 at baseline.  Continue Ferrous Gluconate.     Overactive Bladder  - Continue Tolterodine     SAURABH - continue cpap     Asthma   - Continue PTA Fluticasone nasal spray.   - Continue home Cetirizine     Constipation - had a BM today  - Contine Miralax, PRN Senna     Morbid Obesity, BMI 41  - Complicates care     Intertrigo/Puritis:  - Miconazole powder to affected areas  - Continue eucerin cream and ammonium lactate      Covid-19 negative    Tyesha Mcqueen MS, PA-C  Hospitalist Physician Assistant  [facility]  Pager: [phone]      Subjective & Interval Hx:    Patient denies chest pain, shortness of breath, abdominal pain.  She is without any new complaints.  Ready for discharge    Last 24 hr care team notes reviewed.   ROS:  4 point ROS including Respiratory, CV, GI and , other than that noted in the HPI, is negative    Physical Exam:    Blood pressure (!) 161/80, pulse 62, temperature 97.8  F (36.6  C), temperature source Oral, resp. rate 18, height 1.651 m (5' 5\"), weight 101.9 kg (224 lb 11.2 oz), SpO2 99 %, not currently breastfeeding.  General: Alert, interactive, NAD, sitting up in bed applying makeup  HEENT: AT/NC  Resp: clear to auscultation bilaterally, no crackles or wheezes  Cardiac: regular rate and " rhythm, no murmur  Abdomen: Soft, nontender, nondistended. +BS.  Extremities: L leg fixed in extension. Bilateral 1+ lower extremity edema, +2 pulses bilateral and equal.  Skin: scattered areas of ecchymosis.  Neuro: Alert & oriented x 3    Labs & Images:  Reviewed in Epic   Medications:    Current Facility-Administered Medications   Medication     acetaminophen (TYLENOL) tablet 650 mg    Or     acetaminophen (TYLENOL) Suppository 650 mg     ammonium lactate (LAC-HYDRIN) 12 % lotion     apixaban ANTICOAGULANT (ELIQUIS) tablet 2.5 mg     cephALEXin (KEFLEX) capsule 500 mg     cetirizine (zyrTEC) tablet 10 mg     diphenhydrAMINE (BENADRYL) capsule 25 mg    Or     diphenhydrAMINE (BENADRYL) injection 25 mg     eucerin cream     famotidine (PEPCID) tablet 20 mg     ferrous gluconate (FERGON) tablet 324 mg     fluticasone (FLONASE) 50 MCG/ACT spray 2 spray     furosemide (LASIX) tablet 20 mg     hydrOXYzine (ATARAX) tablet 50 mg     lidocaine (LMX4) cream     lidocaine 1 % 0.1-1 mL     metoprolol tartrate (LOPRESSOR) tablet 50 mg     miconazole (MICATIN) 2 % powder     morphine (MS CONTIN) 12 hr tablet 15 mg     naloxone (NARCAN) injection 0.2 mg    Or     naloxone (NARCAN) injection 0.4 mg    Or     naloxone (NARCAN) injection 0.2 mg    Or     naloxone (NARCAN) injection 0.4 mg     niacin tablet 500 mg     ondansetron (ZOFRAN-ODT) ODT tab 4 mg    Or     ondansetron (ZOFRAN) injection 4 mg     oxyCODONE-acetaminophen (PERCOCET) 7.5-325 MG per tablet 1 tablet     polyethylene glycol (MIRALAX) Packet 17 g     senna-docusate (SENOKOT-S/PERICOLACE) 8.6-50 MG per tablet 1 tablet    Or     senna-docusate (SENOKOT-S/PERICOLACE) 8.6-50 MG per tablet 2 tablet     sodium chloride (PF) 0.9% PF flush 3 mL     tolterodine (DETROL) tablet 2 mg

## 2022-04-28 NOTE — PLAN OF CARE
PRIMARY DIAGNOSIS: GENERALIZED WEAKNESS    OUTPATIENT/OBSERVATION GOALS TO BE MET BEFORE DISCHARGE  1. Orthostatic performed: N/A    2. Tolerating PO medications: Yes    3. Return to near baseline physical activity: No    4. Cleared for discharge by consultants (if involved): No    Discharge Planner Nurse   Safe discharge environment identified: No  Barriers to discharge: Yes       Entered by: Yojana Rojas 04/27/2022         Please review provider order for any additional goals.   Nurse to notify provider when observation goals have been met and patient is ready for discharge.Goal Outcome Evaluation:

## 2022-04-28 NOTE — PROGRESS NOTES
Care Management Discharge Note    Discharge Date: 04/28/2022       Discharge Disposition: Transitional Care    Discharge Services: Transportation Services    Discharge DME:  none    Discharge Transportation: agency    Private pay costs discussed: transportation costs    PAS Confirmation Code:  [id number])  Patient/family educated on Medicare website which has current facility and service quality ratings: yes    Education Provided on the Discharge Plan:    Persons Notified of Discharge Plans: pt, nurse, provide, TCU  Patient/Family in Agreement with the Plan: yes    Handoff Referral Completed: No    Additional Information:  CM heard from Jw at [facility] [phone] that they have received insurance auth.  CM arrange for  wheelchair transport for 1700 this evening. CM informed nurse, provider and TCU of transport time and discharge plan.    Brittni Leon RN, BSN, PHN, CCM  Care Coordinator  [facility]  [phone]    Addendum [phone]  Discharge order and scripts were faxed to TCU

## 2022-04-28 NOTE — PLAN OF CARE
Physical Therapy Discharge Summary    Reason for therapy discharge:    Discharged to transitional care facility.    Progress towards therapy goal(s). See goals on Care Plan in [facility] electronic health record for goal details.  Goals partially met.  Barriers to achieving goals:   discharge from facility.    Therapy recommendation(s):    Continued therapy is recommended.  Rationale/Recommendations:  ..Pt is below baseline for functional mobility and strength. Pt would benefit from continued skilled therapy to address these deficits.

## 2022-04-28 NOTE — PLAN OF CARE
PRIMARY DIAGNOSIS: GENERALIZED WEAKNESS    OUTPATIENT/OBSERVATION GOALS TO BE MET BEFORE DISCHARGE  1. Orthostatic performed: N/A    2. Tolerating PO medications: Yes    3. Return to near baseline physical activity: Yes    4. Cleared for discharge by consultants (if involved): Yes    Discharge Planner Nurse   Safe discharge environment identified: No  Barriers to discharge: Yes       Entered by: Yojana Rojas RN 04/28/2022      Please review provider order for any additional goals.   Nurse to notify provider when observation goals have been met and patient is ready for discharge.Goal Outcome Evaluation:

## 2022-04-28 NOTE — PROGRESS NOTES
Care Management Follow Up    Length of Stay (days): 0    Expected Discharge Date: 04/28/2022     Concerns to be Addressed: discharge planning     Patient plan of care discussed at interdisciplinary rounds: Yes    Anticipated Discharge Disposition: Transitional Care     Anticipated Discharge Services: Transportation Services  Anticipated Discharge DME:      Patient/family educated on Medicare website which has current facility and service quality ratings: yes  Education Provided on the Discharge Plan:    Patient/Family in Agreement with the Plan: yes    Referrals Placed by CM/SW:    Private pay costs discussed: Not applicable    Additional Information:  CM spoke with admirssions at [facility] he is requesting additional provider and therapy notes that are from 4/25 -4/28.  CM faxed information to [phone]- notified. TCU admissions that fax was set ad pt on schedule for therapy today and will send updated information when we have it.  TCU is hoping to obtain insurance authorization this afternoon and would like to do an evening admission.    Brittni Leon RN, BSN, PHN, CCM  Care Coordinator  [facility]  [phone]

## 2022-04-28 NOTE — DISCHARGE SUMMARY
"[facility] Observation Unit Discharge Summary          Raven Carlin MRN# [id number]   Age: 82 year old YOB: 1940     Date of Admission:  4/20/2022  Date of Discharge::  4/28/2022  Admitting Physician:  KATIA George CNP  Discharge Physician:  Tyesha Mcqueen PA-C  Primary Physician: Ena Peraza     Primary Discharge Diagnoses:   Generalized Weakness      Hospital Course:   For detail history, please refer to H & P from 4/20/2022. In brief, this is a 82 year old female with a history of A. fib on Eliquis, asthma, chronic pain syndrome on opiates, morbid obesity, CKD stage IIIb, HLD, GERD, gastric ulcer, HTN, ESBL E. coli, and SAURABH on CPAP who was admitted on 4/9/2022-4/18/22 due to sepsis 2/2 ESBL E.Coli UTI, Cholelithiasis and Choledocholithiasis. She underwent ERCP on 4/14. Cholecystectomy was scheduled for 4/15, but patient declined. She was discharged home with home PT but now returned to the ED with generalized weakness. Continues to await placement.     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  She had a fall onto her buttocks on 4/18 with no new acute pain. No focal neurologic deficits. No current signs of infection. Has lift chair at home that wasn't working so couldn't get up. Likely deconditioned from recent hospital stay. TCU recommended during previous hospitalization, patient declined.  She is now interested in TCU.  Hospital work up thus far has been unremarkable for acute process.  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  On Keflex prophylactically by her infectious disease provider given her chronic posterior thigh wound  - PT/OT following, recommending TCU  - SW consulted for TCU placement  - Wound care following  - patient was " continued on her pta narcotics      Recent Sepsis 2/2 UTI   Recent Choledocholithiasis and Cholelithiasis  S/p ERCP on 4/14 - Hospitalized 4/9-4/18 due to UTI and urine culture grew ESBL E.Coli. She completed the antibiotic course during that hospital stay.   - patient still needs to plan for outpatient lap cholecystectomy in the near future     Chronic Atrial Fibrillation - continue Lopressor and Eliquis at reduced dose due to age and CKD     CKD - creatinine has been at baseline.  Continued on pta Lasix.  Continue to monitor BMP at TCU.     HTN - continue Lopressor and Lasix.     HLD - continue Niacin     GERD, Hx of Gastric Ulcer - continue Famotidine     Chronic Anemia - hgb 11.1 at baseline.  Continue Ferrous Gluconate.     Overactive Bladder - continue Tolterodine     SAURABH - continue cpap     Asthma Continue PTA Fluticasone nasal spray and cetirizine     Morbid Obesity, BMI 41      Procedures/Imaging:     Results for orders placed or performed during the hospital encounter of 04/09/22   XR Chest 2 Views    Narrative    EXAM: XR CHEST 2 VW  LOCATION: [facility]  DATE/TIME: 4/9/2022 8:18 PM    INDICATION: Fever.  COMPARISON: 06/21/2021.      Impression    IMPRESSION: Lungs are grossly clear. No consolidation. No pleural effusion. Stable borderline enlarged cardiac silhouette size. No pulmonary edema. Aortic atherosclerosis. Breast prosthetics.       CT Abdomen Pelvis w Contrast    Narrative    EXAM: CT ABDOMEN PELVIS W CONTRAST  LOCATION: [facility]  DATE/TIME: 4/9/2022 8:01 PM    INDICATION: Nausea. Diffuse abdominal pain and tenderness.  COMPARISON: 4/22/2009  TECHNIQUE: CT scan of the abdomen and pelvis was performed following injection of IV contrast. Multiplanar reformats were obtained. Dose reduction techniques were used.  CONTRAST: 100mL Isovue 370    FINDINGS:   LOWER CHEST: Normal.    HEPATOBILIARY: Diffuse fatty infiltration of the liver. Cholelithiasis.  There are multiple stones within the common bile duct, at least 5. The common bile duct is normal caliber measuring 5 mm.    PANCREAS: Normal.    SPLEEN: Normal    ADRENAL GLANDS: A right adrenal nodule is present measuring 1.5 cm in diameter, enlarged from 2009 when it measured 0.9 cm.    KIDNEYS/BLADDER: Multiple small benign renal cysts. No follow-up needed. No hydronephrosis. The bladder is decompressed.    BOWEL: Bowel loops are normal caliber. No wall thickening. Mildly increased stool in the colon. Normal appendix.    LYMPH NODES: Normal.    VASCULATURE: Mild calcified plaque in the abdominal aorta without aneurysm.    PELVIC ORGANS: No large adnexal cysts or masses.    MUSCULOSKELETAL: Degenerative disc disease and facet arthritis in the lumbar spine.      Impression    IMPRESSION:   1.  Choledocholithiasis with at least 5 stones in the common bile duct. No associated biliary dilatation. Unclear whether this is related to the patient's acute symptoms. Correlation with biliary markers is recommended. No evidence of pancreatitis.  2.  Cholelithiasis.  3.  Diffuse fatty infiltration of the liver.  4.  A 1.5 cm right adrenal nodule is present and is otherwise not well evaluated due to single phase contrast. In retrospect, it was likely present in 2009 when it measured 0.9 cm. This is likely a benign adenoma. If the patient has a history of   malignancy, a dedicated adrenal CT or a follow-up scan in one year could be considered. Otherwise, no specific imaging follow-up is recommended. Laboratory or clinical evaluation can be considered if the patient has signs or symptoms of a   hyperfunctioning adrenal nodule.   XR Surgery DORA L/T 5 Min Fluoro w Stills    Narrative    This exam was marked as non-reportable because it will not be read by a   radiologist or a [place] non-radiologist provider.           Consultations:   PT  OT  SW    Code Status:   full    Allergies:     Allergies   Allergen Reactions     Bactrim  "[Sulfamethoxazole W-Trimethoprim] Nausea     Epinephrine Other (See Comments)     Heart races     Ketamine      Felt like she was dying     Lisinopril Cough     Simvastatin Itching        Subjective:   Patient denies chest pain, shortness of breath, abdominal pain.  She is without any new complaints.  Ready for discharge    Physical Exam:   Blood pressure 132/78, pulse 60, temperature 98.4  F (36.9  C), temperature source Oral, resp. rate 18, height 1.651 m (5' 5\"), weight 101.9 kg (224 lb 11.2 oz), SpO2 97 %, not currently breastfeeding.  General: Alert, interactive, NAD, sitting up in bed applying makeup  HEENT: AT/NC  Resp: clear to auscultation bilaterally, no crackles or wheezes  Cardiac: regular rate and rhythm, no murmur  Abdomen: Soft, nontender, nondistended. +BS.  Extremities: L leg fixed in extension. Bilateral 1+ lower extremity edema, +2 pulses bilateral and equal.  Skin: scattered areas of ecchymosis.  Neuro: Alert & oriented x 3   Discharge Medicatios:        Discharge Medication List as of 4/28/2022  4:32 PM      CONTINUE these medications which have CHANGED    Details   apixaban ANTICOAGULANT (ELIQUIS) 2.5 MG tablet Take 1 tablet (2.5 mg) by mouth 2 times daily, Transitional      cephALEXin (KEFLEX) 500 MG capsule Take 1 capsule (500 mg) by mouth daily For prophylaxis, Transitional      cetirizine (ZYRTEC) 5 MG tablet Take 2 tablets (10 mg) by mouth daily, Transitional      famotidine (PEPCID) 20 MG tablet Take 1 tablet (20 mg) by mouth every evening, Transitional      Ferrous Gluconate 324 (37.5 Fe) MG TABS Take 1 tablet (324 mg) by mouth daily, Transitional      fluticasone (FLONASE) 50 MCG/ACT nasal spray Spray 2 sprays into both nostrils daily, Transitional      furosemide (LASIX) 20 MG tablet Take 1 tablet (20 mg) by mouth daily, Transitional      metoprolol tartrate (LOPRESSOR) 50 MG tablet Take 1 tablet (50 mg) by mouth 2 times daily Appointment required for further refills, Disp-180 tablet, " R-0, TransitionalHold for sbp<120., HR<60      morphine (MS CONTIN) 15 MG CR tablet Take 1 tablet (15 mg) by mouth every 12 hours Take 15 mg by mouth every 12 hours, Disp-30 tablet, R-0, Local Print      niacin 500 MG tablet Take 1 tablet (500 mg) by mouth daily (with breakfast), Transitional      oxyCODONE-acetaminophen (PERCOCET) 7.5-325 MG per tablet Take 1 tablet by mouth 2 times daily as needed for severe pain, Disp-30 tablet, R-0, Local Print         CONTINUE these medications which have NOT CHANGED    Details   tolterodine (DETROL) 2 MG tablet Take 2 mg by mouth 2 times daily, Historical         STOP taking these medications       ascorbic acid (VITAMIN C) 500 MG CPCR Comments:   Reason for Stopping:         BETA CAROTENE PO Comments:   Reason for Stopping:         BIOTIN FORTE PO Comments:   Reason for Stopping:         Cranberry (CRAN-MAX PO) Comments:   Reason for Stopping:         Flaxseed, Linseed, (FLAX SEEDS PO) Comments:   Reason for Stopping:         fluocinolone (SYNALAR) 0.01 % solution Comments:   Reason for Stopping:         folic acid (FOLVITE) 1 MG tablet Comments:   Reason for Stopping:         Glucos-MSM-C-Oh-Sspidl-Qqkknq (GLUCOSAMINE MSM COMPLEX) TABS tablet Comments:   Reason for Stopping:         hydrOXYzine (VISTARIL) 50 MG capsule Comments:   Reason for Stopping:         Lutein 40 MG CAPS Comments:   Reason for Stopping:         Magnesium Oxide 500 MG TABS Comments:   Reason for Stopping:         multivitamin w/minerals (THERA-VIT-M) tablet Comments:   Reason for Stopping:         NONFORMULARY Comments:   Reason for Stopping:         nystatin (MYCOSTATIN) 453483 UNIT/GM external cream Comments:   Reason for Stopping:         OYSCO 500 + D 500-200 MG-UNIT tablet Comments:   Reason for Stopping:         potassium 99 MG TABS Comments:   Reason for Stopping:         Probiotic Product (PROBIOTIC DAILY PO) Comments:   Reason for Stopping:         RESVERATROL PO Comments:   Reason for  Stopping:         vitamin E (TOCOPHEROL) 400 units (180 mg) capsule Comments:   Reason for Stopping:         Zinc Gluconate 50 MG CAPS Comments:   Reason for Stopping:               Instructions Given to Patient as Discharge:     Discharge Procedure Orders   General info for SNF   Order Comments: Length of Stay Estimate: Short Term Care: Estimated # of Days <30  Condition at Discharge: Stable  Level of care:skilled   Rehabilitation Potential: Good  Admission H&P remains valid and up-to-date: Yes  Recent Chemotherapy: N/A  Use Nursing Home Standing Orders: Yes     Mantoux instructions   Order Comments: Give two-step Mantoux (PPD) Per Facility Policy Yes     Follow Up and recommended labs and tests   Order Comments: Follow up BMP recommended on 4/30     Reason for your hospital stay   Order Comments: You were hospitalized due to generalized weakness and deconditioning after a recent prolonged hospital stay.     Activity - Up with nursing assistance     Order Specific Question Answer Comments   Is discharge order? Yes      Full Code     Order Specific Question Answer Comments   Code status determined by: Discussion with patient/ legal decision maker      Physical Therapy Adult Consult   Order Comments: Evaluate and treat as clinically indicated.    Reason:  generalized weakness     Occupational Therapy Adult Consult   Order Comments: Evaluate and treat as clinically indicated.    Reason:  generalized weakness     Fall precautions     Diet   Order Comments: Follow this diet upon discharge: Orders Placed This Encounter      Regular Diet Adult     Order Specific Question Answer Comments   Is discharge order? Yes        Pending Tests at Discharge:   none    Discharge Disposition:   Discharged to short-term care facility     Tyesha Mcqueen MS, PA-C  Hospitalist Service  Pager [phone]    >30 minutes was spent in discharge planning, care coordination, physical examination and medication reconciliation on the date of  discharge, 4/28/2022

## 2022-04-28 NOTE — CARE PLAN
PRIMARY DIAGNOSIS: GENERALIZED WEAKNESS    OUTPATIENT/OBSERVATION GOALS TO BE MET BEFORE DISCHARGE  1. Orthostatic performed: No    2. Tolerating PO medications: Yes    3. Return to near baseline physical activity: No    4. Cleared for discharge by consultants (if involved): No    Discharge Planner Nurse   Safe discharge environment identified: Yes  Barriers to discharge: Yes TCU placement pending.       Entered by: Arabella Linares 04/28/2022 4:53 AM     Please review provider order for any additional goals.   Nurse to notify provider when observation goals have been met and patient is ready for discharge.

## 2022-04-28 NOTE — CARE PLAN
PRIMARY DIAGNOSIS: GENERALIZED WEAKNESS    OUTPATIENT/OBSERVATION GOALS TO BE MET BEFORE DISCHARGE  1. Orthostatic performed: No    2. Tolerating PO medications: Yes    3. Return to near baseline physical activity: No    4. Cleared for discharge by consultants (if involved): Yes TCU placement pending.    Discharge Planner Nurse   Safe discharge environment identified: No  Barriers to discharge: Yes       Entered by: Arabella Linares 04/28/2022 1:17 AM     Please review provider order for any additional goals.   Nurse to notify provider when observation goals have been met and patient is ready for discharge.

## 2022-04-28 NOTE — CARE PLAN
PRIMARY DIAGNOSIS: GENERALIZED WEAKNESS    OUTPATIENT/OBSERVATION GOALS TO BE MET BEFORE DISCHARGE  1. Orthostatic performed: No    2. Tolerating PO medications: Yes    3. Return to near baseline physical activity: No    4. Cleared for discharge by consultants (if involved): Yes TCU placement pending.    Discharge Planner Nurse   Safe discharge environment identified: No  Barriers to discharge: Yes       Entered by: Arabella Linares 04/27/2022 9:53 PM     Please review provider order for any additional goals.   Nurse to notify provider when observation goals have been met and patient is ready for discharge.

## 2022-04-28 NOTE — PROGRESS NOTES
[facility] Nurse Inpatient Assessment     Today's Assessment: Buttocks, posterior knee, abdominal folds    Patient History (according to provider note(s):    Raven Carlin is a 82 year old female with a history of A. fib on Eliquis, asthma, chronic pain syndrome on opiates, morbid obesity, CKD stage IIIb, HLD, GERD, gastric ulcer, HTN, ESBL E. coli, and SAURABH on CPAP who was admitted on 4/9/2022-4/18/22 due to sepsis 2/2 ESBL E.Coli UTI, Cholelithiasis and Choledocholithiasis.  She underwent ERCP on 4/14.  Cholecystectomy was scheduled for 4/15, but patient declined.  She was discharged home with home PT.  Now returning to the ED with generalized weakness.     AREAS ASSESSED:      Wound Location: Coccyx, buttocks and perianal area    Last photo: 4/21/22  Wound due to: Pressure Injury, Shear, Intertrigo and Moisture Associated Skin Damage (MASD)  Wound history/plan of care:   Patient with morbid obesity who reports she often scratches due to itching.    Wound base: Coccyx is healed, left buttock with 0.5x1cm and 0.7x0.7cm areas of purple discoloration.  Left buttock excoriation healed.  Perianal area healed.     Palpation of the wound bed: normal      Drainage: none     Description of drainage: none     Measurements (length x width x depth, in cm) see above      Tunneling N/A     Undermining N/A  Periwound skin: intact      Color: normal and consistent with surrounding tissue      Temperature: normal   Odor: none  Pain: moderate-generalized pain  Treatment goal: Heal   STATUS: healing  Supplies ordered: at bedside     Wound Location: Abdominal folds    Last photo: none  Wound due to: Moisture Associated Skin Damage (MASD)  Wound history/plan of care:   Patient with morbid obesity and moisture in folds.  Did have a large blister rupture on right side.  Wound base: 100 % agranular     Palpation of the wound bed: normal      Drainage: moderate     Description of drainage: serous      Measurements (length x width x depth, in cm) minor erythema remaining ruptured blister measuring 5x4cm is now mostly re-epithelialized      Tunneling N/A     Undermining N/A  Periwound skin: erythema- blanchable      Color: pink      Temperature: normal   Odor: none  Treatment goal: Heal , Drainage control and Infection control/prevention  STATUS: healing  Supplies ordered: supplies stored on unit and discussed with RN    Left posterior knee-did not assess today per patient request previous assessment listed here: multiple excoriations from scratching up to 4x0.2cm each.  Approximately 8 areas.         TREATMENT PLAN:     Coccyx, buttocks and perianal wound(s): BID   1. Cleanse with Yanni spray and soft dry wipe  2. Apply a thin layer of Critic Aid paste     Abdominal fold wound(s):  Daily:  Interdry(order#[id number]):   1.  Wash skin gently. Pat, do not rub.  2.  With clean scissors, cut enough fabric to cover the affected area, allowing for a minimum of 2 inches to extend beyond the skin fold for moisture evaporation.  3.  Lay a single layer of fabric in the skin fold, placing one edge into the base of the fold. Gently smooth the rest of the fabric over the skin, keeping it flat.  4.  Leave at least 2 inches of fabric exposed outside the skin fold.  5.  Secure the fabric in one of several ways: with the skin fold, with a small amount of skin-friendly tape, or tucked under clothing.  6.  Remove the fabric before bathing and reuse it when finished. When removing, gently separate the skin fold and lift away.  Helpful Hints  1. InterDry  can be written on with a ballpoint pen. It may be helpful to label each piece of InterDry  with the date you started using it.  2.  Each piece of InterDry  may be used up to 5 days, depending on fabric soiling, odor, amount of moisture and general skin condition. Replace InterDry  if it becomes soiled with blood, urine or stool.  3.  Do not use creams, ointments, or powders with InterDry   as it may interfere with product efficacy.    Left posterior knee wound(s): BID  1. Cleanse with wound cleanser and dry  2. Apply Sween cream and leave open to air     RECOMMEND PRIMARY TEAM ORDER: None, at this time  Education provided to: plan of care  Discussed plan of care with: Patient and Nurse  WOC Nurse follow-up plan:weekly  Notify WOC if wound(s) deteriorate.  Nursing to notify the Provider(s) and re-consult the WOC Nurse if new skin concern.    DATA:     Current support surface: Standard  Atmos Air mattress  BMI: Body mass index is 37.39 kg/m .   Active Diet Order: Orders Placed This Encounter      Regular Diet Adult      Diet     Output: No intake/output data recorded.   Labs:   Recent Labs   Lab 04/23/22  1057   HGB 11.1*   WBC 8.3     Pressure Injury Risk Assessment:   Bob Risk Assessment  Sensory Perception: 3-->slightly limited  Moisture: 3-->occasionally moist  Activity: 2-->chairfast  Mobility: 2-->very limited  Nutrition: 3-->adequate  Friction and Shear: 2-->potential problem  Bob Score: 15    Gautam Hussein RN CWOCN  Dept. Pager: [phone]  Dept. Office Number: [phone]

## 2022-04-29 NOTE — PLAN OF CARE
Occupational Therapy Discharge Summary    Reason for therapy discharge:    Discharged to transitional care facility.    Progress towards therapy goal(s). See goals on Care Plan in [facility] electronic health record for goal details.  Goals not met.  Barriers to achieving goals:   discharge from facility.    Therapy recommendation(s):    Continued therapy is recommended.  Rationale/Recommendations:  OT eval and treat at TCU.      **Pt not seen by discharging therapist on this date, note written based on previous treating therapist's notes and recommendations

## 2022-05-06 NOTE — PLAN OF CARE
[address], [address] [place] [address]  [phone]    NAME: Manuel Hoffman  AGE: 81 y o  SEX: male    Progress Note    Location:   POS: 28 ([facility])    Assessment/Plan:    Late onset Alzheimer dementia ([facility] [address] )  Stable  Continue 24/7 LT supportive care  Ave meal completion: %   Weight stable  Continue Centrum (for men) daily and House shake daily     Chronic diastolic (congestive) heart failure (HCC)  Wt Readings from Last 3 Encounters:  09/01/21 76 1 kg (167 lb 12 8 oz)  03/26/19 78 9 kg (174 lb)  03/12/19 78 9 kg (174 lb)  Euvolemic on assessment  Weight stable  On monthly weight check  Current weight: 183 0 lbs (5/1/2022) <= 187 0 lbs (4/1/2022) <= 183 2 lbs (3/2/2022)  Patient reported baseline cardiopulmonary symptoms  Trace chronic B/L LE swelling - likely dependent edema  Continue CHF protocol  Continue Torsemide 10mg daily and Metoprolol tartrate 50mg BID  Continue MUSHTAQ, regular texture with thin liquids  Renal functions WNL (3/2/2022)  Will continue to monitor for now      Essential hypertension  BP and HR stable and controlled  BP range (4/20/2022 to 5/6/2022) = 106/69 to 148/76  HR range (4/20/2022 to 5/6/2022) = 60 to 97/min  Continue Metoprolol tartrate 50mg daily + Amlodipine 5mg daily  Continue Torsemide 10mg daily and ASA 81mg daily  Will continue to monitor for now      BPH (benign prostatic hyperplasia)  Patient denies any new or worsening symptoms  Continue Tamsulosin 0 4mg daily + Finasteride 5mg daily     Ambulatory dysfunction  Fall precaution  Wheelchair bound     CAD (coronary artery disease)  Continue ASA 81mg daily  Not on statin therapy    Hypothyroidism  Improved TSH level : 3 08 (WNL: 3/23/2022) <= 3 84 (H: 2/9/2022)   Free T4: 0 85 (WNL: 3/23/2022)  Continue Levothyroxine 75mcg daily    Chief complaint / Reason for visit: Follow-up visit    History of Present Illness: This is a 78-year-old male patient admitted at [facility] for dementia  PRIMARY DIAGNOSIS: GENERALIZED WEAKNESS    OUTPATIENT/OBSERVATION GOALS TO BE MET BEFORE DISCHARGE  1. Orthostatic performed: No    2. Tolerating PO medications: Yes    3. Return to near baseline physical activity: No    4. Cleared for discharge by consultants (if involved): Yes    Discharge Planner Nurse   Safe discharge environment identified: No  Barriers to discharge: Yes       Entered by: Josefina Wolf 04/22/2022 12:48 AM     Please review provider order for any additional goals.   Nurse to notify provider when observation goals have been met and patient is ready for discharge.      Vitals are Temp: 98  F (36.7  C) Temp src: Oral BP: 121/54 Pulse: 56   Resp: 17 SpO2: 97 %.    Patient is Alert and Oriented x4. They are 2-3 assist with Gait Belt and Walker.  Patient is on Contact precuations for MRSA and ESBL.  Pt is a Regular diet.  They are complaining of 8/10 pain in their Back and buttock.  Morphine and Percocet given for pain.  Medicatons relieved pain.  Patient is Saline locked.   Patient is seen and examined today follow-up acute chronic medical conditions: Chronic Diastolic CHF, HTN, BPH, CAD, Hypothyroidism, Ambulatory dysfunction  Patient is out of bed this visit sitting in manual wheelchair watching TV in room - alert, cooperative, calm, pleasant and not in distress  Patient is verbal with clear coherent speech - oriented to name/birthday/current date  Patient acknowledged feeling well on this visit, stated, " I'm alright" - denies any acute medical concerns during ROS assessment including pain  Per nursing, no acute medical concerns for this visit - described as stable and at baseline  Review of Systems:  Per history of present illness, all other systems reviewed and negative  HISTORY:  Medical Hx: Reviewed, unchanged  Family Hx: Reviewed, unchanged  Soc Hx: Reviewed,  unchanged    ALLERGY: Reviewed, unchanged  No Known Allergies     PHYSICAL EXAM:  Vital Signs: T98 1F -P73 -R17 BP: 130/62 SpO2: 97% RA  Weight: 183 0 lbs (5/1/2022) <= 187 0 lbs (4/1/2022) <= 183 2 lbs (3/2/2022)    General: NAD  Head: Atraumatic  Normocephalic  Ear: Hard of hearing  Eye Exam: anicteric sclera, no discharge, PERRLA, No injection  Wears glasses  Left eye opacity  Oral Exam: moist mucous membranes, no buccaloropharyngeal erythema, palatine tonsils WNL  Neck Exam: no anterior cervical lymphadenopathy noted, neck supple  Cardiovascular: regular rate, irregular rhythm, no murmurs, rubs, or gallops  Pulmonary: no wheeze, no rhonchi, no rales  No chest tenderness  Abdominal: soft, non-tender, nondistended, bowel sounds audible x 4 quadrants  Ave meal completion: %  : Non distended bladder  Continent  BM every 1-2 days  Last documented BM: 5/6/2022  Extremities and skin: trace B/L LE swelling, no rashes  Intact skin  Neurological: alert, cooperative and responsive, Oriented x 3, moving all 4 extremities symmetrically  Ambulatory dysfunction      Laboratory results / Imaging reviewed: Hard copy/ies in medical chart:    * TSH (3/23/2022) = 3 08 (WNL)    * Free T4 (3/23/2022) = 0 85 (WNL)    * CBC wo diff (3/2/2022):  Specimen:  Whole blood sample (specimen)   Ref Range & Units 2 mo ago   Hemoglobin 12 5 - 17 0 g/dL 12 2 Low     Hematocrit 37 0 - 48 0 % 36  0 Low     WBC 4 0 - 10 5 thou/cmm 6 1    RBC 4 00 - 5 40 mill/cmm 4 07    Platelet Count 823 - 350 thou/cmm 168    MPV 7 5 - 11 3 fL 9 2    MCV 80 - 100 fL 89    MCH 27 0 - 36 0 pg 30 0    MCHC 32 0 - 37 0 g/dL 33 9    RDW 12 0 - 16 0 % 14 0      * CMP (3/2/2022):  Specimen:  Serum specimen (specimen)   Ref Range & Units 2 mo ago Comments   Glucose 65 - 99 mg/dL 86     BUN 7 - 28 mg/dL 23     Creatinine 0 53 - 1 30 mg/dL 0 94     Sodium 135 - 145 mmol/L 142     Potassium 3 5 - 5 2 mmol/L 3 7     Chloride 100 - 109 mmol/L 110 High      Carbon Dioxide 23 - 31 mmol/L 27     Calcium 8 5 - 10 1 mg/dL 8 8     Alkaline Phosphatase 35 - 120 U/L 83     Albumin 3 5 - 4 8 g/dL 2 8 Low      Bilirubin, Total 0 2 - 1 0 mg/dL 0 4  Use of this assay is not recommended for patients undergoing treatment with eltrombopag due to the potential for falsely elevated results  Protein, Total 6 3 - 8 3 g/dL 6 2 Low      AST <41 U/L 17     ALT <56 U/L 20     Anion Gap 3 - 11 5     eGFR, Non- >60 68     eGFR,  >60 79         Current Medications:   All medications reviewed and updated in Nursing Home Chart    CURRENT MEDICATION LIST IN OO-LTCF:    Current Outpatient Medications:     amLODIPine (NORVASC) 5 mg tablet, Take 5 mg by mouth daily, Disp: , Rfl:     aspirin 81 mg chewable tablet, Chew 81 mg daily, Disp: , Rfl:     atropine (ISOPTO ATROPINE) 1 % ophthalmic solution, Administer 1 drop into the left eye in the morning, Disp: , Rfl:     benzonatate (TESSALON PERLES) 100 mg capsule, Take 100 mg by mouth 3 (three) times a day as needed, Disp: , Rfl:     finasteride (PROSCAR) 5 mg tablet, TAKE 1 TABLET (5 MG TOTAL) BY MOUTH DAILY BPH, Disp: 90 tablet, Rfl: 3    levothyroxine 50 mcg tablet, Take 75 mcg by mouth daily , Disp: , Rfl:     metoprolol tartrate (LOPRESSOR) 50 mg tablet, Take 50 mg by mouth every 12 (twelve) hours, Disp: , Rfl:     multivitamin-iron-minerals-folic acid (CENTRUM) chewable tablet, Chew 1 tablet daily, Disp: , Rfl:     prednisoLONE acetate (PRED FORTE) 1 % ophthalmic suspension, Administer 1 drop into the left eye in the morning, Disp: , Rfl:     tamsulosin (FLOMAX) 0 4 mg, TAKE 1 CAPSULE BY MOUTH EVERY DAY WITH DINNER, Disp: 90 capsule, Rfl: 3    torsemide (DEMADEX) 10 mg tablet, Take 10 mg by mouth daily, Disp: , Rfl:       Please note:  Voice-recognition software may have been used in the preparation of this document  Occasional wrong word or "sound-alike" substitutions may have occurred due to the inherent limitations of voice recognition software  Interpretation should be guided by AZALEA Combs  5/10/2022

## 2022-06-07 ENCOUNTER — DOCUMENTATION ONLY (OUTPATIENT)
Dept: CARDIOLOGY | Facility: CLINIC | Age: 82
End: 2022-06-07
Payer: COMMERCIAL

## 2022-06-07 DIAGNOSIS — I48.21 PERMANENT ATRIAL FIBRILLATION (H): ICD-10-CM

## 2022-06-07 RX ORDER — METOPROLOL TARTRATE 50 MG
50 TABLET ORAL 2 TIMES DAILY
Qty: 180 TABLET | Refills: 0 | OUTPATIENT
Start: 2022-06-07

## 2022-06-07 NOTE — TELEPHONE ENCOUNTER
[facility] Refill Guideline reviewed.  Refill does not meet criteria. Patient due for a follow-up appointment. Routed to nursing team to review.

## 2022-06-07 NOTE — PROGRESS NOTES
Received refill requests for eliquis and lopressor. Patient has [employer] insurance which is no longer accepted in the [employer] system. Routed refill request to her PCPclinic with MARCUS Stu.

## 2022-06-10 ENCOUNTER — TELEPHONE (OUTPATIENT)
Dept: CARDIOLOGY | Facility: CLINIC | Age: 82
End: 2022-06-10
Payer: COMMERCIAL

## 2022-06-10 NOTE — TELEPHONE ENCOUNTER
I returned a fax from [employer] requesting a refill of Eliquis. We are unable to accept [employer]. I asked them to send it to her primary provider.

## 2022-06-24 ENCOUNTER — TELEPHONE (OUTPATIENT)
Dept: CARDIOLOGY | Facility: CLINIC | Age: 82
End: 2022-06-24

## 2022-06-24 NOTE — TELEPHONE ENCOUNTER
[facility] Call Center    Phone Message    May a detailed message be left on voicemail: yes     Reason for Call: Other: a request for the eliquis was supposed to be sent to through Dr. Marcelo to the [facility] program by calling [phone] -  or [phone] fax so pt could receive the script at a reduced rate.   Based on what [employer] told pt, this was not done so they are not able to reimburse her for any of the cost of her meds.  Pt is very upset that she has to pay full price for her meds and she is not able to afford this cost.  Pt stated she has worked w/Marlen Agrawal in the past and would like to speak w/Marlen again regarding the cost of this medication.  Also pt stated she has been receiving 5 mg tabs from [facility] mail order pharmacy although the script on file is for 2.5 mg tabs.   Please call pt to advise.      Action Taken: Message routed to:  [facility] cardio    Travel Screening: Not Applicable

## 2022-06-24 NOTE — TELEPHONE ENCOUNTER
Spoke with patient , Patient has [employer] Insurance   .  Patient will call PCP office to help assist in obtaining her medications.    Patient is also overdue to annual visit.     Patient agrees with plan.

## 2024-02-28 NOTE — PROGRESS NOTES
"Raven returns to discuss imaging and surgery.    Exam not repeated.    Calcium and PTH are elevated.  Osteopenia in the hips by DEXA  4 D CT shows suspicious lesion in the left lower neck.  The two findings anterior to the larynx which enhance are absolutely not in the location one would expect to find parathyroid glands, so those are false +.    Assessment:    Criteria for surgery is met with osteopenia.  Likely adenoma in the left inferior neck.  I recommend a minimally invasive neck exploration with the sestimibi injection to minimize the operative incision and the rapid PTH assay to assess the completeness of the procedure.  Raven is aware of the risks to the recurrent laryngeal nerve and the risk of hypocalcemia, particularly with treatment of  parathyroid hyperplasia.  She is also aware of the 1-2 % risk of \"failure to cure\".     Plan: schedule for surgery.    Please route or send letter to:  Primary Care Provider (PCP)      30 minutes spent with the patient at least 50% was in counseling.  " [] : Resident

## 2025-01-31 ENCOUNTER — HOSPITAL ENCOUNTER (EMERGENCY)
Facility: CLINIC | Age: 85
Discharge: HOME | End: 2025-01-31
Attending: EMERGENCY MEDICINE | Admitting: EMERGENCY MEDICINE
Payer: COMMERCIAL

## 2025-01-31 ENCOUNTER — APPOINTMENT (OUTPATIENT)
Dept: ULTRASOUND IMAGING | Facility: CLINIC | Age: 85
End: 2025-01-31
Attending: EMERGENCY MEDICINE
Payer: COMMERCIAL

## 2025-01-31 VITALS
RESPIRATION RATE: 18 BRPM | TEMPERATURE: 97.3 F | SYSTOLIC BLOOD PRESSURE: 148 MMHG | HEART RATE: 87 BPM | DIASTOLIC BLOOD PRESSURE: 95 MMHG | HEIGHT: 67 IN | BODY MASS INDEX: 34.53 KG/M2 | OXYGEN SATURATION: 99 % | WEIGHT: 220 LBS

## 2025-01-31 DIAGNOSIS — M79.89 LEG SWELLING: ICD-10-CM

## 2025-01-31 PROCEDURE — 93971 EXTREMITY STUDY: CPT | Mod: RT

## 2025-01-31 PROCEDURE — 99284 EMERGENCY DEPT VISIT MOD MDM: CPT | Mod: 25

## 2025-01-31 ASSESSMENT — ACTIVITIES OF DAILY LIVING (ADL): ADLS_ACUITY_SCORE: 65

## 2025-01-31 ASSESSMENT — COLUMBIA-SUICIDE SEVERITY RATING SCALE - C-SSRS
6. HAVE YOU EVER DONE ANYTHING, STARTED TO DO ANYTHING, OR PREPARED TO DO ANYTHING TO END YOUR LIFE?: NO
2. HAVE YOU ACTUALLY HAD ANY THOUGHTS OF KILLING YOURSELF IN THE PAST MONTH?: NO
1. IN THE PAST MONTH, HAVE YOU WISHED YOU WERE DEAD OR WISHED YOU COULD GO TO SLEEP AND NOT WAKE UP?: NO

## 2025-01-31 NOTE — ED PROVIDER NOTES
Emergency Department Note      History of Present Illness     Chief Complaint   Leg Injury      HPI   Raven Carlin is a 84 year old female who presents to the ER via medics with daughter and son.  Patient reports that she is on warfarin for A-fib.  Today her wound care nurse came to dressed her left lower extremity when they noted that the right lower extremity looked somewhat swollen.  Denies any recent falls or trauma.  Denies any chest pain, shortness breath, fevers, nausea, chills.  Patient reports her blood pressure was normal this morning.  She feels well enough otherwise.    Independent Historian   None    Review of External Notes   Reviewed nurse: Note from 1/31/2025 regarding leg swelling.  Patient referred to the emergency department.  INR 1.6 today.  Anticoagulated secondary to A-fib.    Past Medical History     Medical History and Problem List   Past Medical History:   Diagnosis Date    Arthritis     Asthma     Chronic infection     Chronic pain     CKD (chronic kidney disease), stage III (H)     Dyslipidemia     Dyspnea on exertion     Eczema     Fractured pelvis (H)     Gastric ulcer, unspecified as acute or chronic, without mention of hemorrhage or perforation     Gastro-oesophageal reflux disease     Hypertension     OAB (overactive bladder)     Sleep apnea        Medications   apixaban ANTICOAGULANT (ELIQUIS) 2.5 MG tablet  cephALEXin (KEFLEX) 500 MG capsule  cetirizine (ZYRTEC) 5 MG tablet  famotidine (PEPCID) 20 MG tablet  Ferrous Gluconate 324 (37.5 Fe) MG TABS  fluticasone (FLONASE) 50 MCG/ACT nasal spray  furosemide (LASIX) 20 MG tablet  metoprolol tartrate (LOPRESSOR) 50 MG tablet  morphine (MS CONTIN) 15 MG CR tablet  niacin 500 MG tablet  oxyCODONE-acetaminophen (PERCOCET) 7.5-325 MG per tablet  tolterodine (DETROL) 2 MG tablet        Surgical History   Past Surgical History:   Procedure Laterality Date    ARTHROPLASTY KNEE  4/2011    right    ARTHROPLASTY KNEE  11/8/2011    Left,  "Surgeon:SOO GOODRICH    ARTHROPLASTY KNEE  5/8/2012    Procedure:ARTHROPLASTY KNEE; LEFT KNEE REMOVAL OF ANTIBIOTIC SPACER , REPLACEMENT WITH LEFT HINGED KNEE; Surgeon:NE FERRARA; Location:[facility] OR    ARTHROPLASTY REVISION KNEE  11/15/2011    Procedure:ARTHROPLASTY REVISION KNEE; Left Knee Poly-Exchange and Femoral Component Revision   ; Surgeon:SOO GOODRICH; Location:[facility] OR    ASPIRATION NEEDLE KNEE Left 7/14/2015    Procedure: ASPIRATION NEEDLE KNEE;  Surgeon: Soo Goodrich MD;  Location: [facility] OR    ENDOSCOPIC RETROGRADE CHOLANGIOPANCREATOGRAM N/A 4/14/2022    Procedure: ENDOSCOPIC RETROGRADE CHOLANGIOPANCREATOGRAPHY, , sphincterotomy, stone extraction;  Surgeon: Han Reddy MD;  Location: [facility] OR    EXCHANGE POLY COMPONENT ARTHROPLASTY KNEE  8/8/2012    Procedure: EXCHANGE POLY COMPONENT ARTHROPLASTY KNEE;  left knee arthroplasty pole exchange;  Surgeon: Ne Ferrara MD;  Location:  OR    HERNIA REPAIR      MAMMOPLASTY AUGMENTATION      PARATHYROIDECTOMY Left 11/25/2019    Procedure: Excision of left inferior parathyroid adenoma, Focused neck exploration.;  Surgeon: Latonia Anguiano MD;  Location: [facility] OR    REMOVE HARDWARE ARTHROPLASTY KNEE, IRRIG & JOE, PLACE ANTIBIOTIC CEMENT SPACER, COMBINED Left 7/15/2015    Procedure: COMBINED REMOVE HARDWARE ARTHROPLASTY KNEE, IRRIGATION AND DEBRIDEMENT, PLACE ANTIBIOTIC CEMENT BEADS / SPACER;  Surgeon: Soo Goodrich MD;  Location:  OR    RHINOPLASTY      TONSILLECTOMY         Physical Exam     Patient Vitals for the past 24 hrs:   BP Temp Pulse Resp SpO2 Height Weight   01/31/25 1525 (!) 148/95 -- 87 18 99 % -- --   01/31/25 1309 (!) 181/108 97.3  F (36.3  C) 90 18 100 % 1.702 m (5' 7\") 99.8 kg (220 lb)     Physical Exam  General: Resting on the wheelchair   Head: No obvious trauma to head.  Ears, Nose, Throat:  External ears normal.  Nose normal.   Eyes:  Conjunctivae clear.    CV: Regular rate and rhythm.  No " murmurs.      Respiratory: Effort normal and breath sounds normal.  No wheezing or crackles.   Gastrointestinal: Soft.  No distension. There is no tenderness.  There is no rigidity, no rebound and no guarding.   Musculoskeletal: Left lower extremity wrapped in Kerlix.  Shortened chronically.  Right lower extremity with some mild discoloration, 2+ DP and PT pulses.  Dorsi and plantarflexion 5 out of 5.  Nontender to bony palpation.  Normal range of motion of the knee.  No appreciable effusion or edema or erythema.  Neuro: Alert. Moving all extremities appropriately.  Normal speech.    Skin: Skin is warm and dry.  No rash noted.       Diagnostics     Lab Results   Labs Ordered and Resulted from Time of ED Arrival to Time of ED Departure - No data to display    Imaging   US Lower Extremity Venous Duplex Right   Final Result   IMPRESSION:   1.  No deep venous thrombosis in the right lower extremity.        Independent Interpretation   None    ED Course      Medications Administered   Medications - No data to display    Procedures   Procedures     Discussion of Management   None    ED Course   ED Course as of 01/31/25 1529   Fri Jan 31, 2025   1516 I evaluated the patient, obtained history and performed examination.       Additional Documentation  None    Medical Decision Making / Diagnosis     CMS Diagnoses: None    MIPS       None    [facility]   Raven Carlin is a 84 year old female who presents the emergency department with leg swelling.  Patient mildly hypertensive this improved on recheck.  Patient reports normotensive at home.  Suspect related to anxiety.  Broad differential was considered include not limited to cellulitis, DVT, neurovascular compromise, fracture, dislocation, sprain strain, etc.  Overall patient's well-appearing nontoxic.  No referred pain.  Very minimal discoloration although that this is likely from patient's chronic changes.  There is no evidence of erythema, warmth or induration.  No evidence of  cellulitis or abscess on examination.  No bony tenderness, normal range of motion, do not suspect fracture, dislocation.  No indication for x-ray imaging at this time.  Ultrasound is negative for DVT.  Patient is neurovascular intact distal.  Overall suspect ongoing chronic changes patient.  Patient does not feel this looks much different from baseline.  She is following with the INR clinic to manage her subtherapeutic INR.  She has no other associated symptoms.  She wishes to discharge home.  Discussed return precaution including worsening redness, swelling, chest pain or shortness of breath.  Follow-up with primary doctor.  Patient is in agreement.    Disposition   The patient was discharged.     Diagnosis     ICD-10-CM    1. Leg swelling  M79.89            Discharge Medications   New Prescriptions    No medications on file         MD Neto Dao Jennifer L, MD  01/31/25 1539

## 2025-01-31 NOTE — DISCHARGE INSTRUCTIONS
Return to the emergency department if having worsening swelling, fevers, chills, discoloration or other concerns.  Your ultrasound today was reassuring fortunately.  Follow the INR clinic for your low INR at home.  If having worsening or persistent swelling you may consider repeat ultrasound in 1 week to ensure there is no small clot that was not visualized on our initial ultrasound.  Otherwise if symptoms have resolved I recommend just close follow-up with your primary doctor.

## 2025-01-31 NOTE — ED TRIAGE NOTES
Patient Brought in by ambulance from home. Patient was seen by home health care nurse and RN was concerned for potential clot. Area of redness and swelling noted to right shin.

## 2025-07-07 ENCOUNTER — HOSPITAL ENCOUNTER (INPATIENT)
Facility: CLINIC | Age: 85
DRG: 603 | End: 2025-07-07
Attending: EMERGENCY MEDICINE | Admitting: STUDENT IN AN ORGANIZED HEALTH CARE EDUCATION/TRAINING PROGRAM
Payer: COMMERCIAL

## 2025-07-07 DIAGNOSIS — R78.81 BACTEREMIA: ICD-10-CM

## 2025-07-07 DIAGNOSIS — L03.115 CELLULITIS OF RIGHT LOWER EXTREMITY: ICD-10-CM

## 2025-07-07 DIAGNOSIS — R53.1 GENERALIZED WEAKNESS: ICD-10-CM

## 2025-07-07 DIAGNOSIS — Z51.89 VISIT FOR WOUND CARE: Primary | ICD-10-CM

## 2025-07-07 LAB
ALBUMIN UR-MCNC: 10 MG/DL
ANION GAP SERPL CALCULATED.3IONS-SCNC: 12 MMOL/L (ref 7–15)
APPEARANCE UR: CLEAR
BACTERIA #/AREA URNS HPF: ABNORMAL /HPF
BASOPHILS # BLD AUTO: 0 10E3/UL (ref 0–0.2)
BASOPHILS NFR BLD AUTO: 0 %
BILIRUB UR QL STRIP: NEGATIVE
BUN SERPL-MCNC: 30.7 MG/DL (ref 8–23)
CALCIUM SERPL-MCNC: 8.7 MG/DL (ref 8.8–10.4)
CHLORIDE SERPL-SCNC: 99 MMOL/L (ref 98–107)
COLOR UR AUTO: YELLOW
CREAT SERPL-MCNC: 1.23 MG/DL (ref 0.51–0.95)
EGFRCR SERPLBLD CKD-EPI 2021: 43 ML/MIN/1.73M2
EOSINOPHIL # BLD AUTO: 0.1 10E3/UL (ref 0–0.7)
EOSINOPHIL NFR BLD AUTO: 0 %
ERYTHROCYTE [DISTWIDTH] IN BLOOD BY AUTOMATED COUNT: 13 % (ref 10–15)
GLUCOSE SERPL-MCNC: 166 MG/DL (ref 70–99)
GLUCOSE UR STRIP-MCNC: NEGATIVE MG/DL
HCO3 SERPL-SCNC: 22 MMOL/L (ref 22–29)
HCT VFR BLD AUTO: 37.1 % (ref 35–47)
HGB BLD-MCNC: 12.8 G/DL (ref 11.7–15.7)
HGB UR QL STRIP: NEGATIVE
HOLD SPECIMEN: NORMAL
HOLD SPECIMEN: NORMAL
IMM GRANULOCYTES # BLD: 0.2 10E3/UL
IMM GRANULOCYTES NFR BLD: 1 %
KETONES UR STRIP-MCNC: NEGATIVE MG/DL
LACTATE SERPL-SCNC: 1.8 MMOL/L (ref 0.7–2)
LEUKOCYTE ESTERASE UR QL STRIP: ABNORMAL
LYMPHOCYTES # BLD AUTO: 0.7 10E3/UL (ref 0.8–5.3)
LYMPHOCYTES NFR BLD AUTO: 4 %
MCH RBC QN AUTO: 33.1 PG (ref 26.5–33)
MCHC RBC AUTO-ENTMCNC: 34.5 G/DL (ref 31.5–36.5)
MCV RBC AUTO: 96 FL (ref 78–100)
MONOCYTES # BLD AUTO: 0.8 10E3/UL (ref 0–1.3)
MONOCYTES NFR BLD AUTO: 5 %
NEUTROPHILS # BLD AUTO: 14.6 10E3/UL (ref 1.6–8.3)
NEUTROPHILS NFR BLD AUTO: 89 %
NITRATE UR QL: NEGATIVE
NRBC # BLD AUTO: 0 10E3/UL
NRBC BLD AUTO-RTO: 0 /100
PH UR STRIP: 5.5 [PH] (ref 5–7)
PLATELET # BLD AUTO: 206 10E3/UL (ref 150–450)
POTASSIUM SERPL-SCNC: 3.8 MMOL/L (ref 3.4–5.3)
RBC # BLD AUTO: 3.87 10E6/UL (ref 3.8–5.2)
RBC URINE: 3 /HPF
SODIUM SERPL-SCNC: 133 MMOL/L (ref 135–145)
SP GR UR STRIP: 1.02 (ref 1–1.03)
SQUAMOUS EPITHELIAL: 3 /HPF
UROBILINOGEN UR STRIP-MCNC: NORMAL MG/DL
WBC # BLD AUTO: 16.3 10E3/UL (ref 4–11)
WBC URINE: 19 /HPF

## 2025-07-07 PROCEDURE — 96361 HYDRATE IV INFUSION ADD-ON: CPT

## 2025-07-07 PROCEDURE — 99222 1ST HOSP IP/OBS MODERATE 55: CPT | Performed by: PHYSICIAN ASSISTANT

## 2025-07-07 PROCEDURE — 120N000001 HC R&B MED SURG/OB

## 2025-07-07 PROCEDURE — 87040 BLOOD CULTURE FOR BACTERIA: CPT | Performed by: EMERGENCY MEDICINE

## 2025-07-07 PROCEDURE — 36415 COLL VENOUS BLD VENIPUNCTURE: CPT | Performed by: EMERGENCY MEDICINE

## 2025-07-07 PROCEDURE — 87154 CUL TYP ID BLD PTHGN 6+ TRGT: CPT | Performed by: EMERGENCY MEDICINE

## 2025-07-07 PROCEDURE — 87086 URINE CULTURE/COLONY COUNT: CPT | Performed by: PHYSICIAN ASSISTANT

## 2025-07-07 PROCEDURE — 99285 EMERGENCY DEPT VISIT HI MDM: CPT | Mod: 25

## 2025-07-07 PROCEDURE — 85004 AUTOMATED DIFF WBC COUNT: CPT | Performed by: EMERGENCY MEDICINE

## 2025-07-07 PROCEDURE — 250N000011 HC RX IP 250 OP 636: Performed by: EMERGENCY MEDICINE

## 2025-07-07 PROCEDURE — 80048 BASIC METABOLIC PNL TOTAL CA: CPT | Performed by: EMERGENCY MEDICINE

## 2025-07-07 PROCEDURE — 258N000003 HC RX IP 258 OP 636: Performed by: EMERGENCY MEDICINE

## 2025-07-07 PROCEDURE — 250N000011 HC RX IP 250 OP 636: Performed by: PHYSICIAN ASSISTANT

## 2025-07-07 PROCEDURE — 250N000013 HC RX MED GY IP 250 OP 250 PS 637: Performed by: PHYSICIAN ASSISTANT

## 2025-07-07 PROCEDURE — 83605 ASSAY OF LACTIC ACID: CPT | Performed by: EMERGENCY MEDICINE

## 2025-07-07 PROCEDURE — 96365 THER/PROPH/DIAG IV INF INIT: CPT

## 2025-07-07 PROCEDURE — 81003 URINALYSIS AUTO W/O SCOPE: CPT | Performed by: PHYSICIAN ASSISTANT

## 2025-07-07 RX ORDER — NALOXONE HYDROCHLORIDE 0.4 MG/ML
0.4 INJECTION, SOLUTION INTRAMUSCULAR; INTRAVENOUS; SUBCUTANEOUS
Status: DISCONTINUED | OUTPATIENT
Start: 2025-07-07 | End: 2025-07-11 | Stop reason: HOSPADM

## 2025-07-07 RX ORDER — OXYCODONE AND ACETAMINOPHEN 7.5; 325 MG/1; MG/1
1 TABLET ORAL EVERY 6 HOURS
COMMUNITY

## 2025-07-07 RX ORDER — METHENAMINE HIPPURATE 1000 MG/1
1 TABLET ORAL DAILY
COMMUNITY
Start: 2025-05-12

## 2025-07-07 RX ORDER — NALOXONE HYDROCHLORIDE 0.4 MG/ML
0.2 INJECTION, SOLUTION INTRAMUSCULAR; INTRAVENOUS; SUBCUTANEOUS
Status: DISCONTINUED | OUTPATIENT
Start: 2025-07-07 | End: 2025-07-11 | Stop reason: HOSPADM

## 2025-07-07 RX ORDER — ESTRADIOL 10 UG/1
10 TABLET, FILM COATED VAGINAL
COMMUNITY
Start: 2025-03-13

## 2025-07-07 RX ORDER — APIXABAN 5 MG/1
5 TABLET, FILM COATED ORAL 2 TIMES DAILY
COMMUNITY
Start: 2025-07-01

## 2025-07-07 RX ORDER — AMOXICILLIN 250 MG
1 CAPSULE ORAL 2 TIMES DAILY PRN
Status: DISCONTINUED | OUTPATIENT
Start: 2025-07-07 | End: 2025-07-11 | Stop reason: HOSPADM

## 2025-07-07 RX ORDER — CALCIUM CARBONATE 500(1250)
1 TABLET ORAL DAILY
COMMUNITY

## 2025-07-07 RX ORDER — PIPERACILLIN SODIUM, TAZOBACTAM SODIUM 4; .5 G/20ML; G/20ML
4.5 INJECTION, POWDER, LYOPHILIZED, FOR SOLUTION INTRAVENOUS ONCE
Status: COMPLETED | OUTPATIENT
Start: 2025-07-07 | End: 2025-07-07

## 2025-07-07 RX ORDER — ACETAMINOPHEN 650 MG/1
650 SUPPOSITORY RECTAL EVERY 4 HOURS PRN
Status: DISCONTINUED | OUTPATIENT
Start: 2025-07-07 | End: 2025-07-11 | Stop reason: HOSPADM

## 2025-07-07 RX ORDER — MAGNESIUM OXIDE 400 MG/1
400 TABLET ORAL 2 TIMES DAILY
Status: DISCONTINUED | OUTPATIENT
Start: 2025-07-07 | End: 2025-07-11 | Stop reason: HOSPADM

## 2025-07-07 RX ORDER — PIPERACILLIN SODIUM, TAZOBACTAM SODIUM 4; .5 G/20ML; G/20ML
4.5 INJECTION, POWDER, LYOPHILIZED, FOR SOLUTION INTRAVENOUS EVERY 6 HOURS
Status: DISCONTINUED | OUTPATIENT
Start: 2025-07-07 | End: 2025-07-08

## 2025-07-07 RX ORDER — METOPROLOL TARTRATE 50 MG
50 TABLET ORAL 2 TIMES DAILY
Status: DISCONTINUED | OUTPATIENT
Start: 2025-07-07 | End: 2025-07-11 | Stop reason: HOSPADM

## 2025-07-07 RX ORDER — OXYCODONE HYDROCHLORIDE 5 MG/1
5 TABLET ORAL EVERY 4 HOURS PRN
Status: DISCONTINUED | OUTPATIENT
Start: 2025-07-07 | End: 2025-07-07

## 2025-07-07 RX ORDER — TOLTERODINE TARTRATE 1 MG/1
2 TABLET, EXTENDED RELEASE ORAL 2 TIMES DAILY
Status: DISCONTINUED | OUTPATIENT
Start: 2025-07-07 | End: 2025-07-11 | Stop reason: HOSPADM

## 2025-07-07 RX ORDER — CETIRIZINE HYDROCHLORIDE 10 MG/1
10 TABLET ORAL DAILY
Status: DISCONTINUED | OUTPATIENT
Start: 2025-07-07 | End: 2025-07-11 | Stop reason: HOSPADM

## 2025-07-07 RX ORDER — MAGNESIUM OXIDE 400 MG/1
400 TABLET ORAL 2 TIMES DAILY
COMMUNITY

## 2025-07-07 RX ORDER — ONDANSETRON 4 MG/1
4 TABLET, FILM COATED ORAL EVERY 8 HOURS PRN
COMMUNITY
Start: 2025-05-12

## 2025-07-07 RX ORDER — ONDANSETRON 2 MG/ML
4 INJECTION INTRAMUSCULAR; INTRAVENOUS EVERY 6 HOURS PRN
Status: DISCONTINUED | OUTPATIENT
Start: 2025-07-07 | End: 2025-07-11 | Stop reason: HOSPADM

## 2025-07-07 RX ORDER — ONDANSETRON 4 MG/1
4 TABLET, ORALLY DISINTEGRATING ORAL EVERY 6 HOURS PRN
Status: DISCONTINUED | OUTPATIENT
Start: 2025-07-07 | End: 2025-07-11 | Stop reason: HOSPADM

## 2025-07-07 RX ORDER — AMOXICILLIN 250 MG
2 CAPSULE ORAL 2 TIMES DAILY PRN
Status: DISCONTINUED | OUTPATIENT
Start: 2025-07-07 | End: 2025-07-11 | Stop reason: HOSPADM

## 2025-07-07 RX ORDER — ACETAMINOPHEN 325 MG/1
650 TABLET ORAL EVERY 4 HOURS PRN
Status: DISCONTINUED | OUTPATIENT
Start: 2025-07-07 | End: 2025-07-11 | Stop reason: HOSPADM

## 2025-07-07 RX ORDER — CALCIUM CARBONATE 500 MG/1
1000 TABLET, CHEWABLE ORAL 4 TIMES DAILY PRN
Status: DISCONTINUED | OUTPATIENT
Start: 2025-07-07 | End: 2025-07-11 | Stop reason: HOSPADM

## 2025-07-07 RX ORDER — TROSPIUM CHLORIDE 20 MG/1
20 TABLET, FILM COATED ORAL 2 TIMES DAILY
COMMUNITY
Start: 2025-02-28

## 2025-07-07 RX ORDER — OXYCODONE AND ACETAMINOPHEN 7.5; 325 MG/1; MG/1
1 TABLET ORAL EVERY 6 HOURS PRN
Refills: 0 | Status: DISCONTINUED | OUTPATIENT
Start: 2025-07-07 | End: 2025-07-11 | Stop reason: HOSPADM

## 2025-07-07 RX ORDER — LIDOCAINE 40 MG/G
CREAM TOPICAL
Status: DISCONTINUED | OUTPATIENT
Start: 2025-07-07 | End: 2025-07-11

## 2025-07-07 RX ORDER — FAMOTIDINE 20 MG/1
20 TABLET, FILM COATED ORAL EVERY EVENING
Status: DISCONTINUED | OUTPATIENT
Start: 2025-07-07 | End: 2025-07-11 | Stop reason: HOSPADM

## 2025-07-07 RX ORDER — NYSTATIN 100000 U/G
CREAM TOPICAL 2 TIMES DAILY
COMMUNITY
Start: 2025-06-27

## 2025-07-07 RX ORDER — POLYETHYLENE GLYCOL 3350 17 G/17G
17 POWDER, FOR SOLUTION ORAL 2 TIMES DAILY PRN
Status: DISCONTINUED | OUTPATIENT
Start: 2025-07-07 | End: 2025-07-11 | Stop reason: HOSPADM

## 2025-07-07 RX ORDER — BIOTIN 10000 MCG
1 CAPSULE ORAL DAILY
COMMUNITY

## 2025-07-07 RX ORDER — FERROUS GLUCONATE 324(38)MG
324 TABLET ORAL DAILY
Status: DISCONTINUED | OUTPATIENT
Start: 2025-07-07 | End: 2025-07-11 | Stop reason: HOSPADM

## 2025-07-07 RX ADMIN — PIPERACILLIN AND TAZOBACTAM 4.5 G: 4; .5 INJECTION, POWDER, FOR SOLUTION INTRAVENOUS at 11:17

## 2025-07-07 RX ADMIN — TOLTERODINE TARTRATE 2 MG: 1 TABLET, FILM COATED ORAL at 21:10

## 2025-07-07 RX ADMIN — METOPROLOL TARTRATE 50 MG: 50 TABLET, FILM COATED ORAL at 21:09

## 2025-07-07 RX ADMIN — FAMOTIDINE 20 MG: 20 TABLET, FILM COATED ORAL at 21:09

## 2025-07-07 RX ADMIN — APIXABAN 5 MG: 5 TABLET, FILM COATED ORAL at 21:10

## 2025-07-07 RX ADMIN — Medication 400 MG: at 21:10

## 2025-07-07 RX ADMIN — CETIRIZINE HYDROCHLORIDE 10 MG: 10 TABLET, FILM COATED ORAL at 18:24

## 2025-07-07 RX ADMIN — SODIUM CHLORIDE 1000 ML: 0.9 INJECTION, SOLUTION INTRAVENOUS at 10:29

## 2025-07-07 RX ADMIN — VANCOMYCIN HYDROCHLORIDE 2500 MG: 5 INJECTION, POWDER, LYOPHILIZED, FOR SOLUTION INTRAVENOUS at 12:12

## 2025-07-07 RX ADMIN — FERROUS GLUCONATE 324 MG: 324 TABLET ORAL at 18:24

## 2025-07-07 RX ADMIN — PIPERACILLIN AND TAZOBACTAM 4.5 G: 4; .5 INJECTION, POWDER, FOR SOLUTION INTRAVENOUS at 18:34

## 2025-07-07 ASSESSMENT — ACTIVITIES OF DAILY LIVING (ADL)
ADLS_ACUITY_SCORE: 67
ADLS_ACUITY_SCORE: 65
ADLS_ACUITY_SCORE: 69
ADLS_ACUITY_SCORE: 65
ADLS_ACUITY_SCORE: 67
ADLS_ACUITY_SCORE: 65
ADLS_ACUITY_SCORE: 69
ADLS_ACUITY_SCORE: 65

## 2025-07-07 ASSESSMENT — COLUMBIA-SUICIDE SEVERITY RATING SCALE - C-SSRS
6. HAVE YOU EVER DONE ANYTHING, STARTED TO DO ANYTHING, OR PREPARED TO DO ANYTHING TO END YOUR LIFE?: NO
1. IN THE PAST MONTH, HAVE YOU WISHED YOU WERE DEAD OR WISHED YOU COULD GO TO SLEEP AND NOT WAKE UP?: NO
2. HAVE YOU ACTUALLY HAD ANY THOUGHTS OF KILLING YOURSELF IN THE PAST MONTH?: NO

## 2025-07-07 NOTE — ED NOTES
[facility]  ED Nurse Handoff Report    ED Chief complaint: Leg Swelling  . ED Diagnosis:   Final diagnoses:   Cellulitis of right lower extremity   Generalized weakness       Allergies:   Allergies   Allergen Reactions    Bactrim [Sulfamethoxazole W-Trimethoprim] Nausea    Epinephrine Other (See Comments)     Heart races    Ketamine      Felt like she was dying    Lisinopril Cough    Simvastatin Itching       Code Status: Full Code    Activity level - Baseline/Home:  walker.  Activity Level - Current:   assist of 1 and walker.   Lift room needed: No.   Bariatric: No   Needed: No   Isolation: Yes.   Infection: ESBL, MRSA.     Respiratory status: Room air    Vital Signs (within 30 minutes):   Vitals:    07/07/25 0952   BP: 133/72   Pulse: 84   Resp: 18   Temp: 99.2  F (37.3  C)   TempSrc: Oral   SpO2: 95%       Cardiac Rhythm:  ,      Pain level:    Patient confused: No.   Patient Falls Risk: arm band in place, patient and family education, assistive device/personal items within reach, activity supervised, and mobility aid in reach.   Elimination Status: Has voided     Patient Report - Initial Complaint: Leg Swelling.   Focused Assessment: RLE swelling and redness     Abnormal Results:   Labs Ordered and Resulted from Time of ED Arrival to Time of ED Departure   BASIC METABOLIC PANEL - Abnormal       Result Value    Sodium 133 (*)     Potassium 3.8      Chloride 99      Carbon Dioxide (CO2) 22      Anion Gap 12      Urea Nitrogen 30.7 (*)     Creatinine 1.23 (*)     GFR Estimate 43 (*)     Calcium 8.7 (*)     Glucose 166 (*)    CBC WITH PLATELETS AND DIFFERENTIAL - Abnormal    WBC Count 16.3 (*)     RBC Count 3.87      Hemoglobin 12.8      Hematocrit 37.1      MCV 96      MCH 33.1 (*)     MCHC 34.5      RDW 13.0      Platelet Count 206      % Neutrophils 89      % Lymphocytes 4      % Monocytes 5      % Eosinophils 0      % Basophils 0      % Immature Granulocytes 1      NRBCs per 100  WBC 0      Absolute Neutrophils 14.6 (*)     Absolute Lymphocytes 0.7 (*)     Absolute Monocytes 0.8      Absolute Eosinophils 0.1      Absolute Basophils 0.0      Absolute Immature Granulocytes 0.2      Absolute NRBCs 0.0     LACTIC ACID WHOLE BLOOD WITH 1X REPEAT IN 2 HR WHEN >2 - Normal    Lactic Acid, Initial 1.8     ROUTINE UA WITH MICROSCOPIC REFLEX TO CULTURE   BLOOD CULTURE   BLOOD CULTURE        No orders to display       Treatments provided: See MAR  Family Comments: Pt contacting family via cellphone  OBS brochure/video discussed/provided to patient:  N/A  ED Medications:   Medications   vancomycin (VANCOCIN) 2,500 mg in 0.9% NaCl 500 mL intermittent infusion (2,500 mg Intravenous $New Bag 7/7/25 1212)   sodium chloride 0.9% BOLUS 1,000 mL (1,000 mLs Intravenous $New Bag 7/7/25 1029)   piperacillin-tazobactam (ZOSYN) 4.5 g vial to attach to  mL bag (0 g Intravenous Stopped 7/7/25 1212)       Drips infusing:  Yes  For the majority of the shift this patient was Green.   Interventions performed were na.    Sepsis treatment initiated: No    Cares/treatment/interventions/medications to be completed following ED care: continue POC    ED Nurse Name: Eyad Little RN  12:26 PM   RECEIVING UNIT ED HANDOFF REVIEW    Above ED Nurse Handoff Report was reviewed: Yes  Reviewed by: Lazara Heart RN on July 7, 2025 at 3:54 PM   JOSE Meza called the ED to inform them the note was read: Yes

## 2025-07-07 NOTE — PHARMACY-ADMISSION MEDICATION HISTORY
Pharmacist Admission Medication History    Admission medication history is complete. The information provided in this note is only as accurate as the sources available at the time of the update.    Information Source(s): Patient and CareEverywhere/SureScripts via in-person    Pertinent Information: None    Changes made to PTA medication list:  Added: estradiol, methenamine, nystatin, trospium, calcium, cranberry, biotin, magnesium, ondansetron  Deleted: cephalexin, morphine ER, niacin, furosemide, tolterodine  Changed:   Percocet 1 tabs BID PRN --> 1 tab q6h  Eliquis 2.5mg BID --> 5mg BID    Allergies reviewed with patient and updates made in EHR: no    Medication History Completed By: JOHNY DUPREE RPH 7/7/2025 1:17 PM    PTA Med List   Medication Sig Last Dose/Taking    Biotin 10 MG CAPS Take 1 capsule by mouth daily. 7/6/2025 Morning    calcium carbonate (OS-LAUREL) 500 MG tablet Take 1 tablet by mouth daily. 7/6/2025 Morning    cetirizine (ZYRTEC) 5 MG tablet Take 2 tablets (10 mg) by mouth daily 7/6/2025 Morning    CRANBERRY PO Take 1 capsule by mouth daily. 7/6/2025 Morning    ELIQUIS ANTICOAGULANT 5 MG tablet Take 5 mg by mouth 2 times daily. 7/7/2025 Morning    estradiol (VAGIFEM) 10 MCG TABS vaginal tablet Place 10 mcg vaginally twice a week. On Mondays and Thursdays 7/3/2025    famotidine (PEPCID) 20 MG tablet Take 1 tablet (20 mg) by mouth every evening 7/6/2025 Evening    Ferrous Gluconate 324 (37.5 Fe) MG TABS Take 1 tablet (324 mg) by mouth daily 7/6/2025 Morning    fluticasone (FLONASE) 50 MCG/ACT nasal spray Spray 2 sprays into both nostrils daily 7/6/2025 Morning    magnesium oxide 400 MG tablet Take 400 mg by mouth 2 times daily. 7/6/2025 Evening    methenamine hippurate (HIPREX) 1 g tablet Take 1 g by mouth daily. 7/6/2025 Morning    metoprolol tartrate (LOPRESSOR) 50 MG tablet Take 1 tablet (50 mg) by mouth 2 times daily Appointment required for further refills 7/7/2025 Morning    nystatin  (MYCOSTATIN) [id number] UNIT/GM external cream Apply topically 2 times daily. 7/6/2025 Evening    ondansetron (ZOFRAN) 4 MG tablet Take 4 mg by mouth every 8 hours as needed for nausea or vomiting. Unknown    oxyCODONE-acetaminophen (PERCOCET) 7.5-325 MG per tablet Take 1 tablet by mouth every 6 hours. 7/7/2025 Morning    trospium (SANCTURA) 20 MG tablet Take 20 mg by mouth 2 times daily. 7/6/2025 Evening

## 2025-07-07 NOTE — H&P
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     R LE cellulitis  Early sepsis  Chronic R LE wound  Presents with R LE erythema and swelling that started 2 days ago. Noted some nausea a few days ago and today felt very weak and was unable to get to the bathroom. She also notes fever of 101 at home. Last evening, she was concerned about a DVT due to her sx and took an extra dose of Eliquis.  She does have chronic wound on R LE that she is seeing home care for, denies any acute changes to this.  In the ED, T 99.2, VS otherwise stable. BMP shows Na of 133, Cr 1.23, BUN 30, glucose 166. Lactic acid normal at 1.8. CBC significant for WBC 16.3, otherwise unremarkable. Blood cultures drawn and pending.   She was given IV Zosyn and Vanco due to her hx of MRSA, and 1L NS bolus.  - admit to inpatient  - continue IV Zosyn and Vanco for now  - follow cultures  - elevate R LE as much as able  - WOC consult to address chronic wounds  - PT consult / SW consult    PAF  - resume home Eliquis, on 2.5 mg BID, took extra dose last evening due to concern for DVT.  - resume home metoprolol  CKD stage IIIb  Cr 1.23, stable, at baseline  - avoid nephrotoxins as able  HTN  - resume home metoprolol  GERD  - resume home famotidine   Hx of MRSA  OAB  - resume home trospium  SAURABH  - uses CPAP  Chronic pain   Shortened left limb due to orthopedic surgery        Diet:  regular diet  DVT Prophylaxis: DOAC  Thornton Catheter: Not present  Lines: None     Cardiac Monitoring: None  Code Status:  full code    Clinically Significant Risk Factors Present on Admission         # Hyponatremia: Lowest Na = 133 mmol/L in last 2 days, will  monitor as appropriate        # Drug Induced Coagulation Defect: home medication list includes an anticoagulant medication    # Hypertension: Noted on problem list                      Disposition Plan     Medically Ready for Discharge: Anticipated in 2-4 Days         The patient's care was discussed with the Attending Physician, Dr. Everett, Patient, and ED provider, Dr. Gresham.    Radha Salazar PA-C  Hospitalist Service  [facility]  Securely message with [employer] (more info)  Text page via [email or web address] Paging/Directory     ______________________________________________________________________    Chief Complaint   R LE redness, swelling    History is obtained from the patient    History of Present Illness   Raven Carlin is a 85 year old female with PMHx significant for PAF, CKD stage IIIb, HTN, GERD, hx of MRSA, OAB, SAURABH, chronic pain and shortened left limb due to orthopedic surgery, who presents to the ED with right lower extremity erythema and swelling.  Patient reports onset of swelling and erythema 2 days ago.  She also notes a day of significant nausea a few days ago.  She notes a fever of 101 at home, denies chest pain, shortness of breath, nausea, vomiting or diarrhea.  She denies abdominal pain or dysuria.  She currently has a chronic wound to her right lower extremity that she is getting home wound care for, she denies any significant changes to this wound.  She also notes significant weakness today, noting she was unable to get up on her own to the bathroom which is unusual.  She states she is typically able to ambulate with a walker.      Past Medical History    Past Medical History:   Diagnosis Date    Arthritis     Asthma     pt denies, cold weather asthma per patient    Chronic infection     history of MRSA to shoulder but states she is clear    Chronic pain     CKD (chronic kidney disease), stage III (H)     Dyslipidemia     Dyspnea on exertion     Eczema     Fractured pelvis (H)      Gastric ulcer, unspecified as acute or chronic, without mention of hemorrhage or perforation     Gastro-oesophageal reflux disease     pt denies    Hypertension     OAB (overactive bladder)     Sleep apnea     CPAP       Past Surgical History   Past Surgical History:   Procedure Laterality Date    ARTHROPLASTY KNEE  4/2011    right    ARTHROPLASTY KNEE  11/8/2011    Left, Surgeon:SOO GOODRICH    ARTHROPLASTY KNEE  5/8/2012    Procedure:ARTHROPLASTY KNEE; LEFT KNEE REMOVAL OF ANTIBIOTIC SPACER , REPLACEMENT WITH LEFT HINGED KNEE; Surgeon:NE FERRARA; Location: OR    ARTHROPLASTY REVISION KNEE  11/15/2011    Procedure:ARTHROPLASTY REVISION KNEE; Left Knee Poly-Exchange and Femoral Component Revision   ; Surgeon:SOO GOODRICH; Location:[facility] OR    ASPIRATION NEEDLE KNEE Left 7/14/2015    Procedure: ASPIRATION NEEDLE KNEE;  Surgeon: Soo Goodrich MD;  Location: [facility] OR    ENDOSCOPIC RETROGRADE CHOLANGIOPANCREATOGRAM N/A 4/14/2022    Procedure: ENDOSCOPIC RETROGRADE CHOLANGIOPANCREATOGRAPHY, , sphincterotomy, stone extraction;  Surgeon: Han Reddy MD;  Location: [facility] OR    EXCHANGE POLY COMPONENT ARTHROPLASTY KNEE  8/8/2012    Procedure: EXCHANGE POLY COMPONENT ARTHROPLASTY KNEE;  left knee arthroplasty pole exchange;  Surgeon: Ne Ferrara MD;  Location:  OR    HERNIA REPAIR      MAMMOPLASTY AUGMENTATION      PARATHYROIDECTOMY Left 11/25/2019    Procedure: Excision of left inferior parathyroid adenoma, Focused neck exploration.;  Surgeon: Latonia Anguiano MD;  Location: [facility] OR    REMOVE HARDWARE ARTHROPLASTY KNEE, IRRIG & JOE, PLACE ANTIBIOTIC CEMENT SPACER, COMBINED Left 7/15/2015    Procedure: COMBINED REMOVE HARDWARE ARTHROPLASTY KNEE, IRRIGATION AND DEBRIDEMENT, PLACE ANTIBIOTIC CEMENT BEADS / SPACER;  Surgeon: Soo Goodrich MD;  Location:  OR    RHINOPLASTY      TONSILLECTOMY         Prior to Admission Medications   Prior to Admission Medications    Prescriptions Last Dose Informant Patient Reported? Taking?   Ferrous Gluconate 324 (37.5 Fe) MG TABS   No No   Sig: Take 1 tablet (324 mg) by mouth daily   apixaban ANTICOAGULANT (ELIQUIS) 2.5 MG tablet   No No   Sig: Take 1 tablet (2.5 mg) by mouth 2 times daily   cephALEXin (KEFLEX) 500 MG capsule   No No   Sig: Take 1 capsule (500 mg) by mouth daily For prophylaxis   cetirizine (ZYRTEC) 5 MG tablet   No No   Sig: Take 2 tablets (10 mg) by mouth daily   famotidine (PEPCID) 20 MG tablet   No No   Sig: Take 1 tablet (20 mg) by mouth every evening   fluticasone (FLONASE) 50 MCG/ACT nasal spray   No No   Sig: Spray 2 sprays into both nostrils daily   furosemide (LASIX) 20 MG tablet   No No   Sig: Take 1 tablet (20 mg) by mouth daily   metoprolol tartrate (LOPRESSOR) 50 MG tablet   No No   Sig: Take 1 tablet (50 mg) by mouth 2 times daily Appointment required for further refills   morphine (MS CONTIN) 15 MG CR tablet   No No   Sig: Take 1 tablet (15 mg) by mouth every 12 hours Take 15 mg by mouth every 12 hours   niacin 500 MG tablet   No No   Sig: Take 1 tablet (500 mg) by mouth daily (with breakfast)   oxyCODONE-acetaminophen (PERCOCET) 7.5-325 MG per tablet   No No   Sig: Take 1 tablet by mouth 2 times daily as needed for severe pain   tolterodine (DETROL) 2 MG tablet   Yes No   Sig: Take 2 mg by mouth 2 times daily      Facility-Administered Medications: None           Physical Exam   Vital Signs: Temp: 99.2  F (37.3  C) Temp src: Oral BP: 133/72 Pulse: 84   Resp: 18 SpO2: 95 % O2 Device: None (Room air)    Weight: 0 lbs 0 oz    GENERAL:  Comfortable.  PSYCH: pleasant, oriented, No acute distress.  HEENT:  Atraumatic, normocephalic. Normal conjunctiva, normal hearing, and oropharynx is normal.  NECK:  Supple, no neck vein distention  HEART:  Normal S1, S2 with no murmur, no pericardial rub, gallops or S3 or S4.  LUNGS:  Clear to auscultation, normal Respiratory effort. No wheezing, rales or ronchi.  GI:  Soft,  normal bowel sounds. Non-tender, non distended.   EXTREMITIES:  bilateral LE edema, trace on the L, 1+ on the R. L LE shortened. Erythema to R LE and foot, +2 pulses bilateral and equal.  SKIN:  Dry to touch, wound to R LE and L foot dressed, c/d/i  NEUROLOGIC:  CN 2-12 intact, BL 5/5 symmetric upper and lower extremity strength, sensation is intact with no focal deficits.      Medical Decision Making       65 MINUTES SPENT BY ME on the date of service doing chart review, history, exam, documentation & further activities per the note.      Data     I have personally reviewed the following data over the past 24 hrs:    16.3 (H)  \   12.8   / 206     133 (L) 99 30.7 (H) /  166 (H)   3.8 22 1.23 (H) \     Procal: N/A CRP: N/A Lactic Acid: 1.8         Imaging results reviewed over the past 24 hrs:   No results found for this or any previous visit (from the past 24 hours).

## 2025-07-07 NOTE — ED PROVIDER NOTES
Emergency Department Note      History of Present Illness     Chief Complaint   Leg Swelling    HPI   Raven Carlin is a 85 year old female with a history of knee replacement surgery presenting for evaluation of right leg swelling. Patient reports onset of right leg pain beginning 2 days ago (7/5/25). She notes leg pain and weakness, enough so that she could not get out of a chair to go shower this morning. She was experiencing nausea a couple days ago, which has since subsided. Patient denies recent falls, injuries, abdominal pain, hematuria or dysuria. She takes Eliquis regularly and took an extra dose last night (7/6/25), suspecting a blood clot in her leg. Additionally, she takes metoprolol and Percocet. She took a dose of all three medications this morning. Patient has a small mass in her RLQ, which she noticed a couple days ago that is not painful. Of note, she has a history of bladder infections.    Independent Historian   None    Review of External Notes   I reviewed the clinic note from 5/20/25.     Past Medical History     Medical History and Problem List   Arthritis  Asthma  Anemia   Acute renal failure   Atrial fibrillation   Alcohol dependence   Chronic infection  Chronic pain  CKD   Dyslipidemia  Depression   Dyspnea on exertion  Eczema  Gastric ulcer  Gastro-oesophageal reflux disease  Hypertension  Hyperparathyroidism   MRSA  OAB   Osteoporosis   Sleep apnea    Medications   Eliquis   Famotidine   Furosemide   Metoprolol tartrate  Morphine   Tolterodine     Surgical History   Knee arthroplasty   Cholangiopancreatogram   Hernia repair   Mammoplasty   Parathyroidectomy   Rhinoplasty   Tonsillectomy   Hardware removal, knee    Physical Exam     Patient Vitals for the past 24 hrs:   BP Temp Temp src Pulse Resp SpO2   07/07/25 0952 133/72 99.2  F (37.3  C) Oral 84 18 95 %     Physical Exam  General: No acute distress  Head: No obvious trauma to head.  Ears, Nose, Throat:  External ears normal.  Nose  normal.  No pharyngeal erythema, swelling or exudate.  Midline uvula. Moist mucus membranes.  Eyes:  Conjunctivae clear.   Neck: Normal range of motion.  Neck supple.   CV: Regular rate and rhythm.  No murmurs.      Respiratory: Effort normal and breath sounds normal.  No wheezing or crackles.   Gastrointestinal: Soft.  No distension. There is no tenderness.  There is no rigidity, no rebound and no guarding. 2 cm round mobile mass palpated in the RLQ that is nontender to palpation.   Musculoskeletal: Normal range of motion.  Non tender extremities to palpations. Asymmetrical length of the lower extremities. RLQ extremity is erythematous, warm to the touch and has diffuse swelling and scant drainage from the anterior mid-lower leg.   Neuro: Alert. Moving all extremities appropriately.  Normal speech.    Skin: Skin is warm and dry.  No rash noted.   Psych: Normal mood and affect. Behavior is normal.      Diagnostics     Lab Results   Labs Ordered and Resulted from Time of ED Arrival to Time of ED Departure   BASIC METABOLIC PANEL - Abnormal       Result Value    Sodium 133 (*)     Potassium 3.8      Chloride 99      Carbon Dioxide (CO2) 22      Anion Gap 12      Urea Nitrogen 30.7 (*)     Creatinine 1.23 (*)     GFR Estimate 43 (*)     Calcium 8.7 (*)     Glucose 166 (*)    CBC WITH PLATELETS AND DIFFERENTIAL - Abnormal    WBC Count 16.3 (*)     RBC Count 3.87      Hemoglobin 12.8      Hematocrit 37.1      MCV 96      MCH 33.1 (*)     MCHC 34.5      RDW 13.0      Platelet Count 206      % Neutrophils 89      % Lymphocytes 4      % Monocytes 5      % Eosinophils 0      % Basophils 0      % Immature Granulocytes 1      NRBCs per 100 WBC 0      Absolute Neutrophils 14.6 (*)     Absolute Lymphocytes 0.7 (*)     Absolute Monocytes 0.8      Absolute Eosinophils 0.1      Absolute Basophils 0.0      Absolute Immature Granulocytes 0.2      Absolute NRBCs 0.0     LACTIC ACID WHOLE BLOOD WITH 1X REPEAT IN 2 HR WHEN >2 - Normal     Lactic Acid, Initial 1.8     ROUTINE UA WITH MICROSCOPIC REFLEX TO CULTURE   BLOOD CULTURE   BLOOD CULTURE     Imaging   No orders to display     Independent Interpretation   None    ED Course      Medications Administered   Medications   vancomycin (VANCOCIN) 2,500 mg in 0.9% NaCl 500 mL intermittent infusion (has no administration in time range)   sodium chloride 0.9% BOLUS 1,000 mL (1,000 mLs Intravenous $New Bag 7/7/25 1029)   piperacillin-tazobactam (ZOSYN) 4.5 g vial to attach to  mL bag (0 g Intravenous Stopped 7/7/25 1212)     Procedures   Procedures     Discussion of Management   Admitting Hospitalist, Radha Salazar, APC for Dr. Everett    ED Course   ED Course as of 07/07/25 1212   Mon Jul 07, 2025   1003 I obtained the history and examined the patient as noted above.      1209 I spoke with Radha Salazar, APC for Dr. Everett from the hospitalist service regarding the patient's presentation, findings here in the ED, and plan of care.       Additional Documentation  None    Medical Decision Making / Diagnosis     CMS Diagnoses: IV Antibiotics given and/or elevated Lactate of 1.8 and no sepsis note found - Delete this reminder and enter the sepsis note or '.edcms' before signing chart.>>>None    MIPS   None             [facility]   Raven Carlin is a 85 year old female presenting with right leg swelling and generalized weakness.  The leg is erythematous and warm to the touch.  There is high suspicion for cellulitis.  She does have a leukocytosis.  Lactate is not elevated and there is no concern for sepsis.  Blood cultures are obtained and she is given broad-spectrum antibiotics consisting of vancomycin and Zosyn.  I discussed the patient with the hospitalist who agreed to admit the patient their service.  She remains in stable condition.    Disposition   The patient was admitted to the hospital.     Diagnosis     ICD-10-CM    1. Cellulitis of right lower extremity  L03.115       2. Generalized weakness   R53.1          Scribe Disclosure:  I, Kanu Cottrell, am serving as a scribe at 10:23 AM on 7/7/2025 to document services personally performed by Antonio Gresham MD based on my observations and the provider's statements to me.     Scribe Disclosure:  I, Kayla Clotilde, am serving as the scribe  for Kanu Cottrell, the scribe trainee, at 10:39 AM on 7/7/2025 to document services personally performed by Antonio Gresham MD based on our observations and the provider's statements to us.        Antonio Gresham MD  07/07/25 1519

## 2025-07-07 NOTE — ED TRIAGE NOTES
Pt comes in by ambulance from home with [facility]. RLE redness, sweeling, warm to touch with a new wound x2 days. Generally malaise. Fever 101 this morning. Percocet taken. . Pt took extra eliquis last night thinking this could be a blood clot.     Nonambulatory at baseline d/t old femur fx causing shortened leg and no knee.

## 2025-07-07 NOTE — PLAN OF CARE
"Goal Outcome Evaluation:      Plan of Care Reviewed With: patient    Overall Patient Progress: no change     Pt's RLE swollen, red/rani, and warm to touch. Denied itching. Has open wound on shin. Denied pain at rest in affected extremity, but reports pain with light touch. Received x1 dose IV zosyn.     Pt also has extensive excoriation abdominal folds. L >R. Wound to sacrum. Inter-dry and mepilex dressing applied to affected areas.   Initially lied to writer when asked about possession of home medications. Pt has a medication box with multiple medications in her bag. States, \"I lie to persons who doesn't want to give me what I want.\" Writer informed pt that this pharmacy has all her medication here, and that she doesn't need to take her own medications from her bag. Pt refused to send medications down to pharmacy. Charge nurse was notified.   At shift change, writer and on-coming nurse reproached pt and was able to get pt to hand over all her medications which were sent to pharmacy.       Problem: Adult Inpatient Plan of Care  Goal: Plan of Care Review  Description: The Plan of Care Review/Shift note should be completed every shift.  The Outcome Evaluation is a brief statement about your assessment that the patient is improving, declining, or no change.  This information will be displayed automatically on your shift  note.  Outcome: Progressing  Flowsheets (Taken 7/7/2025 1841)  Plan of Care Reviewed With: patient  Overall Patient Progress: no change  Goal: Patient-Specific Goal (Individualized)  Description: You can add care plan individualizations to a care plan. Examples of Individualization might be:  \"Parent requests to be called daily at 9am for status\", \"I have a hard time hearing out of my right ear\", or \"Do not touch me to wake me up as it startles  me\".  Outcome: Not Progressing  Goal: Absence of Hospital-Acquired Illness or Injury  Outcome: Not Progressing  Goal: Optimal Comfort and Wellbeing  Outcome: " Not Progressing  Intervention: Monitor Pain and Promote Comfort  Recent Flowsheet Documentation  Taken 7/7/2025 1651 by Lazara Heart, RN  Pain Management Interventions: repositioned  Goal: Readiness for Transition of Care  Outcome: Not Progressing     Problem: Delirium  Goal: Optimal Coping  Outcome: Progressing  Goal: Improved Behavioral Control  Outcome: Progressing  Goal: Improved Attention and Thought Clarity  Outcome: Progressing  Goal: Improved Sleep  Outcome: Progressing     Problem: Skin or Soft Tissue Infection  Goal: Absence of Infection Signs and Symptoms  Outcome: Not Progressing

## 2025-07-08 ENCOUNTER — ENROLLMENT (OUTPATIENT)
Dept: HOME HEALTH SERVICES | Facility: HOME HEALTH | Age: 85
End: 2025-07-08
Payer: COMMERCIAL

## 2025-07-08 ENCOUNTER — APPOINTMENT (OUTPATIENT)
Dept: PHYSICAL THERAPY | Facility: CLINIC | Age: 85
DRG: 603 | End: 2025-07-08
Attending: PHYSICIAN ASSISTANT
Payer: COMMERCIAL

## 2025-07-08 LAB
ACB COMPLEX DNA BLD POS QL NAA+NON-PROBE: NOT DETECTED
ANION GAP SERPL CALCULATED.3IONS-SCNC: 10 MMOL/L (ref 7–15)
B FRAGILIS DNA BLD POS QL NAA+NON-PROBE: NOT DETECTED
BUN SERPL-MCNC: 24.1 MG/DL (ref 8–23)
C ALBICANS DNA BLD POS QL NAA+NON-PROBE: NOT DETECTED
C AURIS DNA BLD POS QL NAA+NON-PROBE: NOT DETECTED
C GATTII+NEOFOR DNA BLD POS QL NAA+N-PRB: NOT DETECTED
C GLABRATA DNA BLD POS QL NAA+NON-PROBE: NOT DETECTED
C KRUSEI DNA BLD POS QL NAA+NON-PROBE: NOT DETECTED
C PARAP DNA BLD POS QL NAA+NON-PROBE: NOT DETECTED
C TROPICLS DNA BLD POS QL NAA+NON-PROBE: NOT DETECTED
CALCIUM SERPL-MCNC: 8.4 MG/DL (ref 8.8–10.4)
CHLORIDE SERPL-SCNC: 100 MMOL/L (ref 98–107)
CREAT SERPL-MCNC: 1.12 MG/DL (ref 0.51–0.95)
E CLOAC COMP DNA BLD POS NAA+NON-PROBE: NOT DETECTED
E COLI DNA BLD POS QL NAA+NON-PROBE: NOT DETECTED
E FAECALIS DNA BLD POS QL NAA+NON-PROBE: NOT DETECTED
E FAECIUM DNA BLD POS QL NAA+NON-PROBE: NOT DETECTED
EGFRCR SERPLBLD CKD-EPI 2021: 48 ML/MIN/1.73M2
ENTEROBACTERALES DNA BLD POS NAA+N-PRB: NOT DETECTED
ERYTHROCYTE [DISTWIDTH] IN BLOOD BY AUTOMATED COUNT: 12.9 % (ref 10–15)
GLUCOSE SERPL-MCNC: 90 MG/DL (ref 70–99)
GP B STREP DNA BLD POS QL NAA+NON-PROBE: NOT DETECTED
HAEM INFLU DNA BLD POS QL NAA+NON-PROBE: NOT DETECTED
HCO3 SERPL-SCNC: 24 MMOL/L (ref 22–29)
HCT VFR BLD AUTO: 33.6 % (ref 35–47)
HGB BLD-MCNC: 11.1 G/DL (ref 11.7–15.7)
K OXYTOCA DNA BLD POS QL NAA+NON-PROBE: NOT DETECTED
KLEBSIELLA SP DNA BLD POS QL NAA+NON-PRB: NOT DETECTED
KLEBSIELLA SP DNA BLD POS QL NAA+NON-PRB: NOT DETECTED
L MONOCYTOG DNA BLD POS QL NAA+NON-PROBE: NOT DETECTED
MCH RBC QN AUTO: 32.4 PG (ref 26.5–33)
MCHC RBC AUTO-ENTMCNC: 33 G/DL (ref 31.5–36.5)
MCV RBC AUTO: 98 FL (ref 78–100)
N MEN DNA BLD POS QL NAA+NON-PROBE: NOT DETECTED
P AERUGINOSA DNA BLD POS NAA+NON-PROBE: NOT DETECTED
PLATELET # BLD AUTO: 195 10E3/UL (ref 150–450)
POTASSIUM SERPL-SCNC: 4 MMOL/L (ref 3.4–5.3)
PROTEUS SP DNA BLD POS QL NAA+NON-PROBE: NOT DETECTED
RBC # BLD AUTO: 3.43 10E6/UL (ref 3.8–5.2)
S AUREUS DNA BLD POS QL NAA+NON-PROBE: NOT DETECTED
S AUREUS+CONS DNA BLD POS NAA+NON-PROBE: NOT DETECTED
S EPIDERMIDIS DNA BLD POS QL NAA+NON-PRB: NOT DETECTED
S LUGDUNENSIS DNA BLD POS QL NAA+NON-PRB: NOT DETECTED
S MALTOPHILIA DNA BLD POS QL NAA+NON-PRB: NOT DETECTED
S MARCESCENS DNA BLD POS NAA+NON-PROBE: NOT DETECTED
S PNEUM DNA BLD POS QL NAA+NON-PROBE: NOT DETECTED
S PYO DNA BLD POS QL NAA+NON-PROBE: DETECTED
SALMONELLA DNA BLD POS QL NAA+NON-PROBE: NOT DETECTED
SODIUM SERPL-SCNC: 134 MMOL/L (ref 135–145)
WBC # BLD AUTO: 13.9 10E3/UL (ref 4–11)

## 2025-07-08 PROCEDURE — 97110 THERAPEUTIC EXERCISES: CPT | Mod: GP | Performed by: PHYSICAL THERAPIST

## 2025-07-08 PROCEDURE — 99232 SBSQ HOSP IP/OBS MODERATE 35: CPT | Performed by: INTERNAL MEDICINE

## 2025-07-08 PROCEDURE — 258N000003 HC RX IP 258 OP 636: Performed by: PHYSICIAN ASSISTANT

## 2025-07-08 PROCEDURE — 250N000011 HC RX IP 250 OP 636: Performed by: INTERNAL MEDICINE

## 2025-07-08 PROCEDURE — 99222 1ST HOSP IP/OBS MODERATE 55: CPT | Performed by: INTERNAL MEDICINE

## 2025-07-08 PROCEDURE — 36415 COLL VENOUS BLD VENIPUNCTURE: CPT | Performed by: PHYSICIAN ASSISTANT

## 2025-07-08 PROCEDURE — 85027 COMPLETE CBC AUTOMATED: CPT | Performed by: PHYSICIAN ASSISTANT

## 2025-07-08 PROCEDURE — 97161 PT EVAL LOW COMPLEX 20 MIN: CPT | Mod: GP | Performed by: PHYSICAL THERAPIST

## 2025-07-08 PROCEDURE — 36415 COLL VENOUS BLD VENIPUNCTURE: CPT | Performed by: INTERNAL MEDICINE

## 2025-07-08 PROCEDURE — 97530 THERAPEUTIC ACTIVITIES: CPT | Mod: GP | Performed by: PHYSICAL THERAPIST

## 2025-07-08 PROCEDURE — 250N000011 HC RX IP 250 OP 636: Performed by: PHYSICIAN ASSISTANT

## 2025-07-08 PROCEDURE — G0463 HOSPITAL OUTPT CLINIC VISIT: HCPCS

## 2025-07-08 PROCEDURE — 250N000013 HC RX MED GY IP 250 OP 250 PS 637: Performed by: PHYSICIAN ASSISTANT

## 2025-07-08 PROCEDURE — G0545 PR INHRENT VISIT TO INPT/OBS W CNFRM/SUSPCT INFCT DIS BY INFCT DIS SPCIALST: HCPCS | Performed by: INTERNAL MEDICINE

## 2025-07-08 PROCEDURE — 250N000013 HC RX MED GY IP 250 OP 250 PS 637: Performed by: STUDENT IN AN ORGANIZED HEALTH CARE EDUCATION/TRAINING PROGRAM

## 2025-07-08 PROCEDURE — 87040 BLOOD CULTURE FOR BACTERIA: CPT | Performed by: INTERNAL MEDICINE

## 2025-07-08 PROCEDURE — 80048 BASIC METABOLIC PNL TOTAL CA: CPT | Performed by: PHYSICIAN ASSISTANT

## 2025-07-08 PROCEDURE — 120N000001 HC R&B MED SURG/OB

## 2025-07-08 RX ORDER — CEFTRIAXONE 2 G/1
2 INJECTION, POWDER, FOR SOLUTION INTRAMUSCULAR; INTRAVENOUS EVERY 24 HOURS
Status: DISCONTINUED | OUTPATIENT
Start: 2025-07-08 | End: 2025-07-11 | Stop reason: HOSPADM

## 2025-07-08 RX ORDER — CLINDAMYCIN PHOSPHATE 900 MG/50ML
900 INJECTION, SOLUTION INTRAVENOUS EVERY 8 HOURS
Status: DISCONTINUED | OUTPATIENT
Start: 2025-07-08 | End: 2025-07-10

## 2025-07-08 RX ADMIN — SENNOSIDES AND DOCUSATE SODIUM 1 TABLET: 50; 8.6 TABLET ORAL at 15:52

## 2025-07-08 RX ADMIN — METOPROLOL TARTRATE 50 MG: 50 TABLET, FILM COATED ORAL at 09:06

## 2025-07-08 RX ADMIN — FERROUS GLUCONATE 324 MG: 324 TABLET ORAL at 09:04

## 2025-07-08 RX ADMIN — FAMOTIDINE 20 MG: 20 TABLET, FILM COATED ORAL at 19:55

## 2025-07-08 RX ADMIN — CLINDAMYCIN PHOSPHATE 900 MG: 900 INJECTION, SOLUTION INTRAVENOUS at 14:23

## 2025-07-08 RX ADMIN — VANCOMYCIN HYDROCHLORIDE 1250 MG: 5 INJECTION, POWDER, LYOPHILIZED, FOR SOLUTION INTRAVENOUS at 12:26

## 2025-07-08 RX ADMIN — PIPERACILLIN AND TAZOBACTAM 4.5 G: 4; .5 INJECTION, POWDER, FOR SOLUTION INTRAVENOUS at 00:35

## 2025-07-08 RX ADMIN — METOPROLOL TARTRATE 50 MG: 50 TABLET, FILM COATED ORAL at 19:55

## 2025-07-08 RX ADMIN — Medication 400 MG: at 09:03

## 2025-07-08 RX ADMIN — Medication 400 MG: at 19:55

## 2025-07-08 RX ADMIN — OXYCODONE HYDROCHLORIDE AND ACETAMINOPHEN 1 TABLET: 7.5; 325 TABLET ORAL at 18:57

## 2025-07-08 RX ADMIN — POLYETHYLENE GLYCOL 3350 17 G: 17 POWDER, FOR SOLUTION ORAL at 15:52

## 2025-07-08 RX ADMIN — APIXABAN 5 MG: 5 TABLET, FILM COATED ORAL at 09:03

## 2025-07-08 RX ADMIN — TOLTERODINE TARTRATE 2 MG: 1 TABLET, FILM COATED ORAL at 19:55

## 2025-07-08 RX ADMIN — OXYCODONE HYDROCHLORIDE AND ACETAMINOPHEN 1 TABLET: 7.5; 325 TABLET ORAL at 00:34

## 2025-07-08 RX ADMIN — PIPERACILLIN AND TAZOBACTAM 4.5 G: 4; .5 INJECTION, POWDER, FOR SOLUTION INTRAVENOUS at 06:42

## 2025-07-08 RX ADMIN — CEFTRIAXONE 2 G: 2 INJECTION, POWDER, FOR SOLUTION INTRAMUSCULAR; INTRAVENOUS at 14:57

## 2025-07-08 RX ADMIN — CLINDAMYCIN PHOSPHATE 900 MG: 900 INJECTION, SOLUTION INTRAVENOUS at 22:08

## 2025-07-08 RX ADMIN — TOLTERODINE TARTRATE 2 MG: 1 TABLET, FILM COATED ORAL at 09:04

## 2025-07-08 RX ADMIN — APIXABAN 5 MG: 5 TABLET, FILM COATED ORAL at 19:55

## 2025-07-08 RX ADMIN — ONDANSETRON 4 MG: 4 TABLET, ORALLY DISINTEGRATING ORAL at 19:23

## 2025-07-08 RX ADMIN — OXYCODONE HYDROCHLORIDE AND ACETAMINOPHEN 1 TABLET: 7.5; 325 TABLET ORAL at 06:42

## 2025-07-08 RX ADMIN — OXYCODONE HYDROCHLORIDE AND ACETAMINOPHEN 1 TABLET: 7.5; 325 TABLET ORAL at 12:48

## 2025-07-08 RX ADMIN — CETIRIZINE HYDROCHLORIDE 10 MG: 10 TABLET, FILM COATED ORAL at 09:03

## 2025-07-08 ASSESSMENT — ACTIVITIES OF DAILY LIVING (ADL)
ADLS_ACUITY_SCORE: 65
ADLS_ACUITY_SCORE: 61
ADLS_ACUITY_SCORE: 65
ADLS_ACUITY_SCORE: 61
ADLS_ACUITY_SCORE: 61
ADLS_ACUITY_SCORE: 71
ADLS_ACUITY_SCORE: 71
DEPENDENT_IADLS:: CLEANING;COOKING;LAUNDRY;SHOPPING;MEAL PREPARATION;MEDICATION MANAGEMENT;MONEY MANAGEMENT;TRANSPORTATION
ADLS_ACUITY_SCORE: 71
ADLS_ACUITY_SCORE: 71
ADLS_ACUITY_SCORE: 61
CHANGE_IN_FUNCTIONAL_STATUS_SINCE_ONSET_OF_CURRENT_ILLNESS/INJURY: YES
ADLS_ACUITY_SCORE: 65
ADLS_ACUITY_SCORE: 61
ADLS_ACUITY_SCORE: 61
ADLS_ACUITY_SCORE: 65
ADLS_ACUITY_SCORE: 61
ADLS_ACUITY_SCORE: 65
ADLS_ACUITY_SCORE: 65
FALL_HISTORY_WITHIN_LAST_SIX_MONTHS: NO
ADLS_ACUITY_SCORE: 61
ADLS_ACUITY_SCORE: 65
ADLS_ACUITY_SCORE: 65
ADLS_ACUITY_SCORE: 61
ADLS_ACUITY_SCORE: 65
ADLS_ACUITY_SCORE: 61

## 2025-07-08 NOTE — PHARMACY-VANCOMYCIN DOSING SERVICE
Pharmacy Vancomycin Initial Note  Date of Service 2025  Patient's  1940  85 year old, female    Indication: Sepsis and Skin and Soft Tissue Infection    Current estimated CrCl = Estimated Creatinine Clearance: 41.1 mL/min (A) (based on SCr of 1.23 mg/dL (H)).    Creatinine for last 3 days  2025:  9:57 AM Creatinine 1.23 mg/dL    Recent Vancomycin Level(s) for last 3 days  No results found for requested labs within last 3 days.      Vancomycin IV Administrations (past 72 hours)                     vancomycin (VANCOCIN) 2,500 mg in 0.9% NaCl 500 mL intermittent infusion (mg) 2,500 mg New Bag 25 1212                    Nephrotoxins and other renal medications (From now, onward)      Start     Dose/Rate Route Frequency Ordered Stop    25 1200  vancomycin (VANCOCIN) 1,250 mg in 0.9% NaCl 250 mL intermittent infusion         1,250 mg  over 90 Minutes Intravenous EVERY 24 HOURS 25 2202      25 1800  piperacillin-tazobactam (ZOSYN) 4.5 g vial to attach to  mL bag         4.5 g  over 30 Minutes Intravenous EVERY 6 HOURS 25 1649              Contrast Orders - past 72 hours (72h ago, onward)      None            [employer] Prediction of Planned Initial Vancomycin Regimen  Loading dose: 2500mg  Regimen: 1250 mg IV every 24 hours.  Exposure target: AUC24 (range) 400-600 mg/L.hr   AUC24,ss: 535 mg/L.hr  Probability of AUC24 > 400: 78 %  Ctrough,ss: 17.2 mg/L  Probability of Ctrough,ss > 20: 38 %  Probability of nephrotoxicity (Lodise DAYA ): 13 %      Plan:  Vancomycin  1250 mg IV q24h.   Vancomycin monitoring method: AUC  Vancomycin therapeutic monitoring goal: 400-600 mg*h/L  Pharmacy will check vancomycin levels as appropriate in 1-3 Days.    Serum creatinine levels will be ordered daily for the first week of therapy and at least twice weekly for subsequent weeks.      Miller Banuelos [facility]

## 2025-07-08 NOTE — PLAN OF CARE
"Goal Outcome Evaluation:      Plan of Care Reviewed With: patient    Overall Patient Progress: no changeOverall Patient Progress: no change    Outcome Evaluation: pt is alert and oriented x4, assist of 1-2  with GB and walker, difficulties ambulating according to patient because of LLE has no knee and RLE swollen cellulitis on zoysn for antiobiotic , on percocet for pain, patient had a whole bag/pill organizer with her was sent to pharmacy for storage , on skin assessment found one pill believed to be percocet in her bed was put in her medicine cabinet. pt has multiple wounds and WOC was consulted.      Problem: Adult Inpatient Plan of Care  Goal: Plan of Care Review  Description: The Plan of Care Review/Shift note should be completed every shift.  The Outcome Evaluation is a brief statement about your assessment that the patient is improving, declining, or no change.  This information will be displayed automatically on your shift  note.  Outcome: Not Progressing  Flowsheets (Taken 7/8/2025 0614)  Outcome Evaluation: pt is alert and oriented x4, assist of 1-2  with GB and walker, difficulties ambulating according to patient because of LLE has no knee and RLE swollen cellulitis on zoysn for antiobiotic , on percocet for pain, patient had a whole bag/pill organizer with her was sent to pharmacy for storage , on skin assessment found one pill believed to be percocet in her bed was put in her medicine cabinet. pt has multiple wounds and WOC was consulted.  Plan of Care Reviewed With: patient  Overall Patient Progress: no change  Goal: Patient-Specific Goal (Individualized)  Description: You can add care plan individualizations to a care plan. Examples of Individualization might be:  \"Parent requests to be called daily at 9am for status\", \"I have a hard time hearing out of my right ear\", or \"Do not touch me to wake me up as it startles  me\".  Outcome: Not Progressing  Goal: Absence of Hospital-Acquired Illness or " Injury  Outcome: Not Progressing  Intervention: Identify and Manage Fall Risk  Recent Flowsheet Documentation  Taken 7/8/2025 0030 by June La RN  Safety Promotion/Fall Prevention:   activity supervised   assistive device/personal items within reach   clutter free environment maintained   increased rounding and observation   increase visualization of patient   nonskid shoes/slippers when out of bed   room organization consistent   safety round/check completed   room near nurse's station   treat reversible contributory factors  Intervention: Prevent Skin Injury  Recent Flowsheet Documentation  Taken 7/8/2025 0030 by June La RN  Body Position: position changed independently  Skin Protection:   adhesive use limited   drying agents applied   incontinence pads utilized   tubing/devices free from skin contact  Intervention: Prevent and Manage VTE (Venous Thromboembolism) Risk  Recent Flowsheet Documentation  Taken 7/8/2025 0030 by June La RN  VTE Prevention/Management: SCDs off (sequential compression devices)  Intervention: Prevent Infection  Recent Flowsheet Documentation  Taken 7/8/2025 0030 by June La RN  Infection Prevention:   cohorting utilized   hand hygiene promoted  Goal: Optimal Comfort and Wellbeing  Outcome: Not Progressing  Intervention: Provide Person-Centered Care  Recent Flowsheet Documentation  Taken 7/8/2025 0030 by June La RN  Trust Relationship/Rapport:   care explained   choices provided   questions answered   thoughts/feelings acknowledged   reassurance provided  Goal: Readiness for Transition of Care  Outcome: Not Progressing  Flowsheets (Taken 7/8/2025 0400)  Transportation Anticipated: agency  Intervention: Mutually Develop Transition Plan  Recent Flowsheet Documentation  Taken 7/8/2025 0400 by June La RN  Transportation Anticipated: agency  Patient/Family Anticipates Transition to: home with  help/services  Equipment Currently Used at Home:   shower chair   raised toilet seat   walker, rolling     Problem: Delirium  Goal: Optimal Coping  Outcome: Not Progressing  Goal: Improved Behavioral Control  Outcome: Not Progressing  Intervention: Minimize Safety Risk  Recent Flowsheet Documentation  Taken 7/8/2025 0030 by June La RN  Enhanced Safety Measures:   assistive devices when indicated   pain management   review medications for side effects with activity  Trust Relationship/Rapport:   care explained   choices provided   questions answered   thoughts/feelings acknowledged   reassurance provided  Goal: Improved Attention and Thought Clarity  Outcome: Not Progressing  Goal: Improved Sleep  Outcome: Not Progressing     Problem: Skin or Soft Tissue Infection  Goal: Absence of Infection Signs and Symptoms  Outcome: Not Progressing  Intervention: Minimize and Manage Infection Progression  Recent Flowsheet Documentation  Taken 7/8/2025 0030 by June La, RN  Infection Prevention:   cohorting utilized   hand hygiene promoted  Isolation Precautions: contact precautions initiated

## 2025-07-08 NOTE — CONSULTS
[facility]    Infectious Disease Consultation     Date of Admission:  7/7/2025  Date of Consult (When I saw the patient): 07/08/25    Assessment & Plan   Raven Carlin is a 85 year old female who was admitted on 7/7/2025.     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   Found ot be bacteremic with GAS  On vanco and zosyn     Recommendations   Continue on vanco   Stop zosyn   Add ceftriaxone and clindamycin   Get a repeat set of blood culture   Follow up on the susceptibilities       Brayan Dawn MD    Reason for Consult   Reason for consult: I was asked to evaluate this patient for cellulitis and bacteremia.    Primary Care Physician   Rola Burgess    Chief Complaint   Feeling unwell       History is obtained from the patient and medical records    History of Present Illness   Raven Carlin is a 85 year old female who presents with right LE cellulitis, nausea, not feeling well     Past Medical History   I have reviewed this patient's medical history and updated it with pertinent information if needed.   Past Medical History:   Diagnosis Date    Arthritis     Asthma     pt denies, cold weather asthma per patient    Chronic infection     history of MRSA to shoulder but states she is clear    Chronic pain     CKD (chronic kidney disease), stage III (H)     Dyslipidemia     Dyspnea on exertion     Eczema     Fractured pelvis (H)     Gastric ulcer, unspecified as acute or chronic, without mention of hemorrhage or perforation     Gastro-oesophageal reflux disease     pt denies    Hypertension     OAB (overactive bladder)     Sleep apnea     CPAP       Past Surgical History   I have reviewed this patient's surgical history and updated it with pertinent information if needed.  Past Surgical History:   Procedure Laterality Date     ARTHROPLASTY KNEE  4/2011    right    ARTHROPLASTY KNEE  11/8/2011    Left, Surgeon:SOO GOODRICH    ARTHROPLASTY KNEE  5/8/2012    Procedure:ARTHROPLASTY KNEE; LEFT KNEE REMOVAL OF ANTIBIOTIC SPACER , REPLACEMENT WITH LEFT HINGED KNEE; Surgeon:NE FERRARA; Location:[facility] OR    ARTHROPLASTY REVISION KNEE  11/15/2011    Procedure:ARTHROPLASTY REVISION KNEE; Left Knee Poly-Exchange and Femoral Component Revision   ; Surgeon:SOO GOODRICH; Location:[facility] OR    ASPIRATION NEEDLE KNEE Left 7/14/2015    Procedure: ASPIRATION NEEDLE KNEE;  Surgeon: Soo Goodrich MD;  Location: [facility] OR    ENDOSCOPIC RETROGRADE CHOLANGIOPANCREATOGRAM N/A 4/14/2022    Procedure: ENDOSCOPIC RETROGRADE CHOLANGIOPANCREATOGRAPHY, , sphincterotomy, stone extraction;  Surgeon: Han Reddy MD;  Location: [facility] OR    EXCHANGE POLY COMPONENT ARTHROPLASTY KNEE  8/8/2012    Procedure: EXCHANGE POLY COMPONENT ARTHROPLASTY KNEE;  left knee arthroplasty pole exchange;  Surgeon: Ne Ferrara MD;  Location: [facility] OR    HERNIA REPAIR      MAMMOPLASTY AUGMENTATION      PARATHYROIDECTOMY Left 11/25/2019    Procedure: Excision of left inferior parathyroid adenoma, Focused neck exploration.;  Surgeon: Latonia Angiuano MD;  Location: [facility] OR    REMOVE HARDWARE ARTHROPLASTY KNEE, IRRIG & JOE, PLACE ANTIBIOTIC CEMENT SPACER, COMBINED Left 7/15/2015    Procedure: COMBINED REMOVE HARDWARE ARTHROPLASTY KNEE, IRRIGATION AND DEBRIDEMENT, PLACE ANTIBIOTIC CEMENT BEADS / SPACER;  Surgeon: Soo Goodrich MD;  Location: [facility] OR    RHINOPLASTY      TONSILLECTOMY         Prior to Admission Medications   Prior to Admission Medications   Prescriptions Last Dose Informant Patient Reported? Taking?   Biotin 10 MG CAPS 7/6/2025 Morning  Yes Yes   Sig: Take 1 capsule by mouth daily.   CRANBERRY PO 7/6/2025 Morning  Yes Yes   Sig: Take 1 capsule by mouth daily.   ELIQUIS ANTICOAGULANT 5 MG tablet 7/7/2025 Morning  Yes Yes   Sig: Take 5 mg by  mouth 2 times daily.   Ferrous Gluconate 324 (37.5 Fe) MG TABS 7/6/2025 Morning  No Yes   Sig: Take 1 tablet (324 mg) by mouth daily   calcium carbonate (OS-LAUREL) 500 MG tablet 7/6/2025 Morning  Yes Yes   Sig: Take 1 tablet by mouth daily.   cetirizine (ZYRTEC) 5 MG tablet 7/6/2025 Morning  No Yes   Sig: Take 2 tablets (10 mg) by mouth daily   estradiol (VAGIFEM) 10 MCG TABS vaginal tablet 7/3/2025  Yes Yes   Sig: Place 10 mcg vaginally twice a week. On Mondays and Thursdays   famotidine (PEPCID) 20 MG tablet 7/6/2025 Evening  No Yes   Sig: Take 1 tablet (20 mg) by mouth every evening   fluticasone (FLONASE) 50 MCG/ACT nasal spray 7/6/2025 Morning  No Yes   Sig: Spray 2 sprays into both nostrils daily   magnesium oxide 400 MG tablet 7/6/2025 Evening  Yes Yes   Sig: Take 400 mg by mouth 2 times daily.   methenamine hippurate (HIPREX) 1 g tablet 7/6/2025 Morning  Yes Yes   Sig: Take 1 g by mouth daily.   metoprolol tartrate (LOPRESSOR) 50 MG tablet 7/7/2025 Morning  No Yes   Sig: Take 1 tablet (50 mg) by mouth 2 times daily Appointment required for further refills   nystatin (MYCOSTATIN) 605990 UNIT/GM external cream 7/6/2025 Evening  Yes Yes   Sig: Apply topically 2 times daily.   ondansetron (ZOFRAN) 4 MG tablet Unknown  Yes Yes   Sig: Take 4 mg by mouth every 8 hours as needed for nausea or vomiting.   oxyCODONE-acetaminophen (PERCOCET) 7.5-325 MG per tablet 7/7/2025 Morning  Yes Yes   Sig: Take 1 tablet by mouth every 6 hours.   trospium (SANCTURA) 20 MG tablet 7/6/2025 Evening  Yes Yes   Sig: Take 20 mg by mouth 2 times daily.      Facility-Administered Medications: None     Allergies   Allergies   Allergen Reactions    Bactrim [Sulfamethoxazole W-Trimethoprim] Nausea    Epinephrine Other (See Comments)     Heart races    Ketamine      Felt like she was dying    Lisinopril Cough    Simvastatin Itching       Immunization History   Immunization History   Administered Date(s) Administered    COVID-19 12+ (MODERNA)  "11/28/2023, 10/25/2024    COVID-19 MONOVALENT 12+ (Pfizer) 03/26/2021, 04/16/2021, 11/02/2021       Social History   I have reviewed this patient's social history and updated it with pertinent information if needed. Raven Carlin  reports that she quit smoking about 40 years ago. She started smoking about 65 years ago. She has a 12.8 pack-year smoking history. She has never used smokeless tobacco. She reports current alcohol use. She reports that she does not use drugs.    Family History   I have reviewed this patient's family history and updated it with pertinent information if needed.   Family History   Problem Relation Age of Onset    Colon Cancer Father     Diabetes Daughter     Hypertension Mother     Colon Cancer Mother        Review of Systems   The 10 point Review of Systems is negative    Physical Exam   Temp: 97.4  F (36.3  C) Temp src: Temporal BP: 127/63 Pulse: 78   Resp: 18 SpO2: 99 % O2 Device: None (Room air)    Vital Signs with Ranges  Temp:  [97.4  F (36.3  C)-98.9  F (37.2  C)] 97.4  F (36.3  C)  Pulse:  [64-79] 78  Resp:  [18] 18  BP: ()/(53-82) 127/63  SpO2:  [91 %-99 %] 99 %  217 lbs 4.8 oz  Body mass index is 34.03 kg/m .    GENERAL APPEARANCE:  awake  EYES: Eyes grossly normal to inspection  NECK: no adenopathy  RESP: lungs clear   CV: regular rates and rhythm  LYMPHATICS: normal ant/post cervical and supraclavicular nodes  ABDOMEN: soft, nontender  MS: Right LE swelling slight redness   SKIN: no suspicious lesions or rashes        Data   All laboratory and imaging data in the past 24 hours reviewed  No results for input(s): \"CULT\" in the last 168 hours.  Recent Labs   Lab Test 06/21/21  1524 06/21/21  1512 06/21/21  1354   CULT No growth No growth >100,000 colonies/mL  Escherichia coli  This isolate does not meet the criteria of an ESBL . A different resistance   mechanism may be present.  *  >100,000 colonies/mL  Strain 2  Escherichia coli ESBL  ESBL (extended beta lactamase) " producing organisms require contact precautions.  *          All cultures:  Recent Labs   Lab 07/07/25  1113 07/07/25  1106   CULTURE Positive on the 1st day of incubation*  Gram positive cocci in pairs and chains* No growth after 12 hours      Blood culture:  Results for orders placed or performed during the hospital encounter of 07/07/25   Blood Culture Peripheral blood (BC) Hand, Right    Collection Time: 07/07/25 11:13 AM    Specimen: Hand, Right; Peripheral blood (BC)   Result Value Ref Range    Culture Positive on the 1st day of incubation (A)     Culture Gram positive cocci in pairs and chains (AA)    Blood Culture Peripheral blood (BC) Arm, Right    Collection Time: 07/07/25 11:06 AM    Specimen: Arm, Right; Peripheral blood (BC)   Result Value Ref Range    Culture No growth after 12 hours    Results for orders placed or performed during the hospital encounter of 04/09/22   Blood Culture Hand, Left    Collection Time: 04/09/22  6:36 PM    Specimen: Hand, Left; Blood   Result Value Ref Range    Culture No Growth    Blood Culture Peripheral Blood    Collection Time: 04/09/22  6:03 PM    Specimen: Peripheral Blood   Result Value Ref Range    Culture No Growth    Results for orders placed or performed during the hospital encounter of 06/21/21   Blood culture    Collection Time: 06/21/21  3:24 PM    Specimen: Blood    Left Hand   Result Value Ref Range    Specimen Description Blood Left Hand     Culture Micro No growth    Blood culture    Collection Time: 06/21/21  3:12 PM    Specimen: Blood    Right Arm   Result Value Ref Range    Specimen Description Blood Right Arm     Culture Micro No growth       Urine culture:  Results for orders placed or performed during the hospital encounter of 04/20/22   Urine Culture    Collection Time: 04/20/22  3:12 PM    Specimen: Urine, Clean Catch   Result Value Ref Range    Culture <10,000 CFU/mL Mixture of urogenital bentley    Results for orders placed or performed during the  hospital encounter of 04/09/22   Urine Culture    Collection Time: 04/09/22  7:40 PM    Specimen: Urine, Catheter   Result Value Ref Range    Culture >100,000 CFU/mL Escherichia coli ESBL (A)        Susceptibility    Escherichia coli ESBL - SUE*     Ampicillin >=32.0 Resistant ug/mL     Piperacillin/Tazobactam 64.0 Intermediate ug/mL     Cefazolin* >=64.0 Resistant ug/mL      * Cefazolin SUE breakpoints are for the treatment of uncomplicated urinary tract infections. For the treatment of systemic infections, please contact the laboratory for additional testing.     Cefoxitin >=64.0 Resistant ug/mL     Ceftazidime  Resistant      Ceftriaxone  Resistant      Cefepime  Resistant      Meropenem* <=0.25 Susceptible ug/mL      * Enterobacterales that are susceptible to meropenem are usually susceptible to ertapenem.     Gentamicin <=1.0 Susceptible ug/mL     Tobramycin <=1.0 Susceptible ug/mL     Ciprofloxacin <=0.25 Susceptible ug/mL     Levofloxacin <=0.12 Susceptible ug/mL     Nitrofurantoin <=16.0 Susceptible ug/mL     Trimethoprim/Sulfamethoxazole <=1/19 Susceptible ug/mL     * ESBL (extended spectrum beta lactamase) producing organisms require contact precautions.   Results for orders placed or performed during the hospital encounter of 06/21/21   Urine Culture Aerobic Bacterial    Collection Time: 06/21/21  1:54 PM    Specimen: Catheterized Urine   Result Value Ref Range    Specimen Description Catheterized Urine     Special Requests Specimen received in preservative     Culture Micro (A)      >100,000 colonies/mL  Escherichia coli  This isolate does not meet the criteria of an ESBL . A different resistance   mechanism may be present.      Culture Micro (A)      >100,000 colonies/mL  Strain 2  Escherichia coli ESBL  ESBL (extended beta lactamase) producing organisms require contact precautions.         Susceptibility    Escherichia coli ESBL - SUE     Ampicillin >=32 Resistant ug/mL     Cefazolin* >=64  Resistant ug/mL      * Cefazolin SUE breakpoints are for the treatment of uncomplicated urinary tract infections.  For the treatment of systemic infections, please contact the laboratory for additional testing.Cefazolin SUE breakpoints are for the treatment of uncomplicated urinary tract infections.  For the treatment of systemic infections, please contact the laboratory for additional testing.     Cefoxitin >=64 Resistant ug/mL     Ciprofloxacin <=0.25 Susceptible ug/mL     Gentamicin <=1 Susceptible ug/mL     Levofloxacin <=0.12 Susceptible ug/mL     Nitrofurantoin <=16 Susceptible ug/mL     Tobramycin <=1 Susceptible ug/mL     Trimethoprim/Sulfamethoxazole <=1/19 Susceptible ug/mL     Ampicillin/Sulbactam >=32 Resistant ug/mL     Piperacillin/Tazo >=128 Resistant ug/mL     Meropenem* <=0.25 Susceptible ug/mL      * Cefazolin SUE breakpoints are for the treatment of uncomplicated urinary tract infections.  For the treatment of systemic infections, please contact the laboratory for additional testing.Cefazolin SUE breakpoints are for the treatment of uncomplicated urinary tract infections.  For the treatment of systemic infections, please contact the laboratory for additional testing.Enterobacteriaceae that are susceptible to meropenem are usually susceptible to ertapenem.     Ceftazidime >64.0 Resistant ug/mL     Ceftriaxone >64.0 Resistant ug/mL     Cefepime  Resistant ug/mL    Escherichia coli - SUE     Ampicillin >=32 Resistant ug/mL     Cefazolin* >=64 Resistant ug/mL      * Cefazolin SUE breakpoints are for the treatment of uncomplicated urinary tract infections.  For the treatment of systemic infections, please contact the laboratory for additional testing.     Cefoxitin >=64 Resistant ug/mL     Ciprofloxacin <=0.25 Susceptible ug/mL     Gentamicin <=1 Susceptible ug/mL     Levofloxacin <=0.12 Susceptible ug/mL     Nitrofurantoin <=16 Susceptible ug/mL     Tobramycin <=1 Susceptible ug/mL      Trimethoprim/Sulfamethoxazole <=1/19 Susceptible ug/mL     Ampicillin/Sulbactam >=32 Resistant ug/mL     Piperacillin/Tazo 32 Intermediate ug/mL     Meropenem <=0.25 Susceptible ug/mL     Ceftazidime >32.0 Resistant ug/mL     Ceftriaxone >256.0 Resistant ug/mL     Cefepime 1.0 Susceptible ug/mL

## 2025-07-08 NOTE — PLAN OF CARE
Goal Outcome Evaluation:      Plan of Care Reviewed With: patient          Outcome Evaluation: Likely d/c back to home with resumption of HC and new home infusion for IV ABX

## 2025-07-08 NOTE — DISCHARGE INSTRUCTIONS
Right lateral lower extremity  wound(s): Daily    Cleanse wound wound cleanser Vashe and pat dry  Cut a piece of Hydrofera Blue to fit the wound shape. Soak it with normal saline and squeeze out excess fluid. (do not use vashe with hydrofera blue as they are not compatible)  Cover up the wound with Mepilex     Left dorsal foot  wound(s): Every 3 days   Cleanse wound wound cleanser Vashe and pat dry  2.   Cover up the wound with Mepilex     Bilateral buttocks and sacrum wound(s): BID and PRN   Cleanse the area with Yanni cleanse and protect, very gently with soft cloth.  Apply thin layer of Barrier paste (ex: Critic aid) on to pen and red areas  With repeat application, do not scrub the paste, only remove soiled paste and reapply.  If complete removal of paste is necessary use baby oil/mineral oil (#367322) and soft wash cloth.  Ensure pt has chair cushion (#686886) while sitting up in the chair.  Use only one blue pad in between mattress and pt. No brief in bed.     Skin fold treatment: Daily  DO not use interdry (#473301) with any other creams or powder.  Wash skin gently. Pat dry, do not rub.  Cut the appropriate size of interdry (#701942) with clean scissors, allowing a minimum of 2 inches of the fabric exposed outside the skin fold for moisture evaporation.  Lay a single layer of fabric in the skin fold, placing one edge of the fabric into the base of the fold. Gently smooth the rest of the fabric over the skin, keeping it flat. Leave at least 2 inches of the fabric exposed outside the skin fold.  Secure the fabric in one of several ways; with the skin fold, with a small amount of tape, or tucked under clothing.  When to Dispose: Each piece of InterDry may be used up to 5 days, depending on fabric soiling, odor, amount of moisture and general skin condition. May label with skin marker to date     Removal: Remove the fabric before bathing and reuse when finished. When removing the fabric from skin folds, gently  separate the skin fold and lift away fabric.

## 2025-07-08 NOTE — PROGRESS NOTES
[facility]    Hospitalist Progress Note  Name: Raven Carlin    MRN: [id number]  Provider:  Montana Saul DO  Date of Service: 07/08/2025    Summary of Stay: Raven Carlin is a 85 year old female with a history of chronic right lower extremity wounds and perianal wounds and abdominal fold wounds, paroxysmal atrial fibrillation on Eliquis, chronic kidney disease stage IIIb, hypertension, GERD, history of MRSA, OAB, SAURABH, chronic pain, obesity admitted on 7/7/2025 with right lower extremity pain.  In the emergency department, the patient was found to have a temperature of 99.2  F, blood pressure 133/72, heart rate 84, respiratory rate 18, SpO2 95% on room air.  Initial lab work showed sodium 133, BUN/creatinine 30.7/1.23, leukocytosis of 16.3, lactic acid 1.8.  The patient was started on vancomycin and IV Zosyn in the setting of right lower extremity cellulitis with sepsis.  1 out of 2 blood cultures returned positive for Streptococcus pyogenes.  Infectious disease was consulted to see the patient who adjusted the antibiotics to vancomycin, ceftriaxone, and clindamycin.    TODAY'S PLAN: Appreciate infectious disease recommendations.  1 out of 2 blood cultures positive for Streptococcus pyogenes.  Initially on vancomycin and Zosyn, now transition to vancomycin, clindamycin, and ceftriaxone.  Repeat blood cultures pending.  Overall, her white blood cell count is improving, however she remains with significant lower extremity pain and swelling and erythema.  Anticipate a few days in the hospital for the above and to ensure her repeat blood cultures remain negative.  Possible she may need IV antibiotics at discharge, ultimately defer to infectious disease.    Problem List:   RLE Cellulitis with Sepsis  Strep Pyogenes Bacteremia  Chronic RLE Wounds, Perianal Wound, Abdominal Fold Wounds - POA  - Appreciate ID recommendations  - Vanco, ceftriaxone, clindamycin  - 1/2 blood cultures positive for strep  pyogenes.  Repeat blood cultures pending  - Elevate RLE  - Appreciate WOC recommendations  - Appreciate PT recommendations    Chronic Medical Problems:  Paroxysmal Atrial Fibrillation on Eliquis  Chronic Kidney Disease Stage 3b  Hypertension  GERD  Hx of MRSA  OAB  SAURABH  Chronic Pain  Obesity - BMI 34.03    I spent 46 minutes in reviewing this patient's labs, imaging, medications, medical history.  In addition time was spent interviewing the patient, communicating with family, and medical decision making.      DVT Prophylaxis: DOAC  Code Status: Full Code  Diet: Combination Diet Regular Diet Adult    Thornton Catheter: Not present    Disposition: Medically Ready for Discharge: Anticipated in 2-4 Days    Goals to discharge include: abx plan established  Family updated today: No     Interval History   Pt seen and examined.  Pt reports still having some pain in her RLE.    -Data reviewed today: I personally reviewed all new labs and imaging results over the last 24 hours.     Physical Exam   Temp: 97.4  F (36.3  C) Temp src: Temporal BP: 127/63 Pulse: 78   Resp: 18 SpO2: 99 % O2 Device: None (Room air)    Vitals:    07/07/25 2145   Weight: 98.6 kg (217 lb 4.8 oz)     Vital Signs with Ranges  Temp:  [97.4  F (36.3  C)-98.9  F (37.2  C)] 97.4  F (36.3  C)  Pulse:  [64-79] 78  Resp:  [18] 18  BP: ()/(53-82) 127/63  SpO2:  [91 %-99 %] 99 %  I/O last 3 completed shifts:  In: -   Out: 350 [Urine:350]    GENERAL: No apparent distress. Awake, alert, and fully oriented.  HEENT: Normocephalic, atraumatic. Extraocular movements intact.  CARDIOVASCULAR: Regular rate and rhythm without murmurs or rubs. No S3.  PULMONARY: Clear bilaterally.  GASTROINTESTINAL: Soft, non-tender, non-distended. Bowel sounds normoactive.   EXTREMITIES: Erythema and edema to RLE  NEUROLOGICAL: CN 2-12 grossly intact, no focal neurological deficits.  DERMATOLOGICAL: No rash, ulcer, bruising, nor jaundice.    Medications   Current Facility-Administered  Medications   Medication Dose Route Frequency Provider Last Rate Last Admin    Patient is already receiving anticoagulation with heparin, enoxaparin (LOVENOX), warfarin (COUMADIN)  or other anticoagulant medication   Does not apply Continuous PRN Radha Salazar PA-C         Current Facility-Administered Medications   Medication Dose Route Frequency Provider Last Rate Last Admin    apixaban ANTICOAGULANT (ELIQUIS) tablet 5 mg  5 mg Oral BID Radha Salazar PA-C   5 mg at 07/08/25 0903    cefTRIAXone (ROCEPHIN) 2 g vial to attach to  ml bag for ADULTS or NS 50 ml bag for PEDS  2 g Intravenous Q24H Brayan Dawn MD        cetirizine (zyrTEC) tablet 10 mg  10 mg Oral Daily Radha Salazar PA-C   10 mg at 07/08/25 0903    clindamycin (CLEOCIN) 900 mg in 50 mL D5W intermittent infusion  900 mg Intravenous Q8H Brayan Dawn MD        famotidine (PEPCID) tablet 20 mg  20 mg Oral QPM Radha Salazar PA-C   20 mg at 07/07/25 2109    ferrous gluconate (FERGON) tablet 324 mg  324 mg Oral Daily Radha Salazar PA-C   324 mg at 07/08/25 0904    magnesium oxide (MAG-OX) tablet 400 mg  400 mg Oral BID Radha Salazar PA-C   400 mg at 07/08/25 0903    metoprolol tartrate (LOPRESSOR) tablet 50 mg  50 mg Oral BID Radha Salazar PA-C   50 mg at 07/08/25 0906    sodium chloride (PF) 0.9% PF flush 3 mL  3 mL Intracatheter Q8H [facility] Radha Salazar PA-C   3 mL at 07/08/25 0644    tolterodine (DETROL) tablet 2 mg  2 mg Oral BID Radha Salazar PA-C   2 mg at 07/08/25 0904    vancomycin (VANCOCIN) 1,250 mg in 0.9% NaCl 250 mL intermittent infusion  1,250 mg Intravenous Q24H Radha Salazar PA-C   1,250 mg at 07/08/25 1226     Data     Laboratory:  Recent Labs   Lab 07/08/25  0749 07/07/25  0957   WBC 13.9* 16.3*   HGB 11.1* 12.8   HCT 33.6* 37.1   MCV 98 96    206     Recent Labs   Lab 07/08/25  0749 07/07/25  0957   * 133*   POTASSIUM 4.0 3.8   CHLORIDE 100 99   CO2 24 22  "  ANIONGAP 10 12   GLC 90 166*   BUN 24.1* 30.7*   CR 1.12* 1.23*   GFRESTIMATED 48* 43*   LAUREL 8.4* 8.7*     No results for input(s): \"CULT\" in the last 168 hours.  Troponin I ES   Date Value Ref Range Status   03/12/2020 <0.015 0.000 - 0.045 ug/L Final     Comment:     The 99th percentile for upper reference range is 0.045 ug/L.  Troponin values   in the range of 0.045 - 0.120 ug/L may be associated with risks of adverse   clinical events.         Imaging:  No results found for this or any previous visit (from the past 24 hours).      Montana Saul DO  [facility] Hospitalist  [address] [place] [address]  Securely message with [employer] (more info)  Text page via [facility] Paging/Directory   07/08/2025   "

## 2025-07-08 NOTE — CONSULTS
[facility] Nurse Inpatient Assessment     Consulted for: L foot, R shin    Summary:   Chronic wound located on the right lateral lower extremity   Wound on the left dorsal foot, reportedly caused by shoe friction or pressure, present on admission.   Detailed assessment of the perineal area, sacral area, and abdominal folds was not performed as the patient was seated in a recliner, and the RN did not notify the team of updated wound consults at the time 12:32 pm. The [facility] team will follow up tomorrow for reassessment.    [facility] nurse follow-up plan: weekly    Patient History (according to provider note(s):       Raven Carlin is a 85 year old female with a history of chronic right lower extremity wounds and perianal wounds and abdominal fold wounds, paroxysmal atrial fibrillation on Eliquis, chronic kidney disease stage IIIb, hypertension, GERD, history of MRSA, OAB, SAURABH, chronic pain, obesity admitted on 7/7/2025 with right lower extremity pain.  In the emergency department, the patient was found to have a temperature of 99.2  F, blood pressure 133/72, heart rate 84, respiratory rate 18, SpO2 95% on room air.  Initial lab work showed sodium 133, BUN/creatinine 30.7/1.23, leukocytosis of 16.3, lactic acid 1.8.  The patient was started on vancomycin and IV Zosyn in the setting of right lower extremity cellulitis with sepsis.  1 out of 2 blood cultures returned positive for Streptococcus pyogenes.  Infectious disease was consulted to see the patient who adjusted the antibiotics to vancomycin, ceftriaxone, and clindamycin.     Assessment:      Areas visualized during today's visit: Focused:, Lower extremities , and Bilateral    Wound location: right lateral lower extremity     Last photo: 7/8  Wound due to: Trauma in 2021  Wound history/plan of care: Chronic wound located on the right lateral lower extremity with history of RLE Cellulitis with Sepsis. In 2021, patient reportedly struck her right  leg and sustained bruising and a hematoma to her right low leg. ID is following up.     Wound base: 100 % Serous scabbing, Superficial scab, and Dry drainage,      Palpation of the wound bed: fluctuance     Drainage: small     Description of drainage: serosanguinous     Measurements (length x width x depth, in cm): 0.5  x 0.3  x  0.1 cm     Tunneling: N/A     Undermining: N/A  Periwound skin: Intact      Color: pale and pink- new skin color      Temperature: normal   Odor: none  Pain: absent and denies , none  Pain interventions prior to dressing change: patient tolerated well  Treatment goal: Heal , Infection control/prevention, and Protection  STATUS: initial assessment  Supplies ordered: ordered Hydrofera blue and discussed with patient     2. Wound location: Left dorsal foot      Last photo: 7/8  Wound due to: Pressure Injury and Friction  Wound history/plan of care: Wound on the left dorsal foot, reportedly caused by shoe friction or pressure, present on admission. Patient is currently wearing orthopedic shoes provided by her outpatient clinic. It is recommended that the patient return to her outpatient orthopedic clinic for re-evaluation of the footwear to ensure proper fit and function.    Wound base: 100 % Dermis, Moist, and Pink,      Palpation of the wound bed: normal     Drainage: none     Description of drainage: none     Measurements (length x width x depth, in cm): 0.5  x 0.7  x  0.1 cm     Tunneling: N/A     Undermining: N/A  Periwound skin: Intact      Color: normal and consistent with surrounding tissue      Temperature: normal   Odor: none  Pain: absent and denies , none  Pain interventions prior to dressing change: patient tolerated well  Treatment goal: Heal  and Protection  STATUS: initial assessment  Supplies ordered: supplies stored on unit and discussed with patient          Treatment Plan:     Right lateral lower extremity  wound(s): Daily       Cleanse wound wound cleanser Vashe and pat  dry  Cut a piece of Hydrofera Blue to fit the wound shape. Soak it with normal saline and squeeze out excess fluid. (do not use vashe with hydrofera blue as they are not compatible)  Cover up the wound with Mepilex      Left dorsal foot  wound(s): Every 3 days     Cleanse wound wound cleanser Vashe and pat dry  2.   Cover up the wound with Mepilex    Orders: Written    RECOMMEND PRIMARY TEAM ORDER: None, at this time  Education provided: plan of care, wound progress, and Infection prevention   Discussed plan of care with: Patient  Notify [facility] if wound(s) deteriorate.  Nursing to notify the Provider(s) and re-consult the [facility] Nurse if new skin concern.    DATA:     Current support surface: Standard  Low air loss (JACQUI pump, Isolibrium, Pulsate)  Containment of urine/stool: Continent of bladder, Continent of bowel, and Incontinence Protocol  BMI: Body mass index is 34.03 kg/m .   Active diet order: Orders Placed This Encounter      Combination Diet Regular Diet Adult     Output: I/O last 3 completed shifts:  In: -   Out: 350 [Urine:350]     Labs:   Recent Labs   Lab 07/08/25  0749   HGB 11.1*   WBC 13.9*     Pressure injury risk assessment:   Sensory Perception: 3-->slightly limited  Moisture: 1-->constantly moist  Activity: 3-->walks occasionally  Mobility: 3-->slightly limited  Nutrition: 3-->adequate  Friction and Shear: 2-->potential problem  Bob Score: 15    KAREN MockN, RN    1st choice: Securely message with [employer] ([facility] [employer])   2nd option: [facility] Office Phone [phone], messages checked periodically Mon-Fri 8a-4p

## 2025-07-08 NOTE — PROGRESS NOTES
07/08/25 1005   Appointment Info   Signing Clinician's Name / Credentials (PT) DONTA Frances   Student Supervision Therapy services provided with the co-signing licensed therapist guiding and directing the services, and providing the skilled judgement and assessment throughout the session   Rehab Comments (PT) Lift/platform shoe L LE   Living Environment   People in Home child(robinson), adult   Current Living Arrangements house   Home Accessibility stairs within home   Number of Stairs, Within Home, Primary seven   Stair Railings, Within Home, Primary railings on both sides of stairs   Transportation Anticipated family or friend will provide   Living Environment Comments Pt lives in a house with her daughter. No CHRIS but has 7 stairs to inside that she occassionally needs to navigate. Mostly stays home, receives cares at home, daughter and friends are very supportive. Uses 4WW for mobility, lift chair for bed mobility, daughter is there if she needs any help.   Self-Care   Usual Activity Tolerance moderate   Current Activity Tolerance fair   Equipment Currently Used at Home shower chair;raised toilet seat;walker, rolling;walker, standard;cane, straight;lift device   Fall history within last six months yes   Number of times patient has fallen within last six months 1   Activity/Exercise/Self-Care Comment Pt uses 4WW at baseline, lift chair for sleeping.   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   General  Observations Uses raised shoe on LLE due to leg length discrepancy.   Cognition   Affect/Mental Status (Cognition) WFL   Orientation Status (Cognition) oriented x 4   Follows Commands (Cognition) WFL   Integumentary/Edema   Integumentary/Edema Comments RLE swelling and erythema   Range of Motion (ROM)   Range of Motion ROM deficits secondary to surgical procedure   ROM Comment LLE stays in full extension due to past surgical procedure on knee. WFL for GALLITO UE, R LE   Strength (Manual Muscle Testing)   Strength (Manual Muscle Testing) Deficits observed during functional mobility   Strength Comments Requires high bed height to perform sit<>stand and heavy use of UE. Able to tolerate multiple sit<>stand from recliner with CGA. Demonstrates decreased activity tolerance with functional mobility.   Bed Mobility   Assistive Device (Bed Mobility) bed rails   Comment, (Bed Mobility) Amara x2 supine to sit   Transfers   Transfers sit-stand transfer   Comment, (Transfers) CGA with FWW, raised bed height   Gait/Stairs (Locomotion)   Ware Level (Gait) contact guard;1 person assist   Assistive Device (Gait) walker, front-wheeled   Distance in Feet (Gait) 20   Pattern (Gait) step-to   Deviations/Abnormal Patterns (Gait) gait speed decreased;stride length decreased;weight shifting decreased   Comment, (Gait/Stairs) CGA with FWW ambulation, did not trial stairs   Balance   Balance Comments Demonstrates good balance seated EOB, needs assistance of FWW for mobility in standing. Gaze downward when ambulating.   Clinical Impression   Criteria for Skilled Therapeutic Intervention Yes, treatment indicated   PT Diagnosis (PT) decreased functional mobility   Influenced by the following impairments impaired ROM, strength, balance, activity tolerance   Functional limitations due to impairments impaired, transfers, gait, stairs   Clinical Presentation (PT Evaluation Complexity) stable   Clinical Presentation Rationale clinical judgement    Clinical Decision Making (Complexity) low complexity   Planned Therapy Interventions (PT) balance training;bed mobility training;gait training;neuromuscular re-education;patient/family education;stair training;strengthening;transfer training;progressive activity/exercise   Risk & Benefits of therapy have been explained evaluation/treatment results reviewed;care plan/treatment goals reviewed;risks/benefits reviewed;current/potential barriers reviewed;participants voiced agreement with care plan;participants included;patient   PT Total Evaluation Time   PT Eval, Low Complexity Minutes (38996) 8   Physical Therapy Goals   PT Frequency Daily   PT Predicted Duration/Target Date for Goal Attainment 07/12/25   PT Goals Transfers;Gait;Stairs   PT: Transfers Supervision/stand-by assist;Sit to/from stand;Assistive device   PT: Gait Supervision/stand-by assist;Assistive device;Rolling walker;150 feet   PT: Stairs Supervision/stand-by assist;7 stairs;Assistive device   PT Discharge Planning   PT Plan increase independence with transfers, inc ambulation distance, trial stairs if able, repeated STS   PT Discharge Recommendation (DC Rec) home with assist;home with home care physical therapy   PT Rationale for DC Rec Pt presents below baseline mobility. Lives with supportive daugther who helps with any needs, receives most cares at home and has a very modified house with equipment for mobility. Tolerance to activity is limiting pt right now, plan to assess stair mobility. Recommend home assist with home care PT with use of FWW for mobility once discharged.   PT Brief overview of current status Ax1 FWW   PT Total Distance Amb During Session (feet) 80   Physical Therapy Time and Intention   Timed Code Treatment Minutes 20   Total Session Time (sum of timed and untimed services) 28

## 2025-07-08 NOTE — CONSULTS
Care Management Initial Consult    General Information  Assessment completed with: PatientRaven  Type of CM/SW Visit: Initial Assessment  Primary Care Provider verified and updated as needed: Yes   Readmission within the last 30 days: no previous admission in last 30 days      Reason for Consult: discharge planning  Advance Care Planning: Advance Care Planning Reviewed: no concerns identified        Communication Assessment  Patient's communication style: spoken language (English or Bilingual)    Hearing Difficulty or Deaf: no   Wear Glasses or Blind: yes    Cognitive  Cognitive/Neuro/Behavioral: WDL  Level of Consciousness: alert  Arousal Level: opens eyes spontaneously  Orientation: oriented x 4  Mood/Behavior: cooperative, calm  Best Language: 0 - No aphasia  Speech: clear, logical, spontaneous    Living Environment:   People in home: child(robinson), adult  Raven and her dtr Fidelina  Current living Arrangements: house      Able to return to prior arrangements: yes    Family/Social Support:  Care provided by: self, homecare agency  Provides care for: no one  Marital Status:   Support system: Children          Description of Support System: Supportive, Involved    Support Assessment: Adequate family and caregiver support, Adequate social supports    Current Resources:   Patient receiving home care services: Yes  Skilled Home Care Services: Skilled Nursing, Home Health Aid     Community Resources: [facility], [facility] facility  Equipment currently used at home: raised toilet seat, shower chair, walker, rolling, walker, standard, wheelchair, manual, lift device  Supplies currently used at home: None    Does the patient's insurance plan have a 3 day qualifying hospital stay waiver?  No    Lifestyle & Psychosocial Needs:  Social Drivers of Health     Food Insecurity: Low Risk  (7/8/2025)    Food Insecurity     Within the past 12 months, did you worry that your food would run out before you got money to buy more?: No     Within  the past 12 months, did the food you bought just not last and you didn t have money to get more?: No   Depression: Not at risk (3/7/2025)    Received from [facility]    PHQ-2     PHQ-2 TOTAL SCORE: 0   Housing Stability: Low Risk  (7/8/2025)    Housing Stability     Do you have housing? : Yes     Are you worried about losing your housing?: No   Tobacco Use: Medium Risk (5/19/2025)    Received from [facility]    Patient History     Smoking Tobacco Use: Former     Smokeless Tobacco Use: Never     Passive Exposure: Not on file   Financial Resource Strain: Low Risk  (7/8/2025)    Financial Resource Strain     Within the past 12 months, have you or your family members you live with been unable to get utilities (heat, electricity) when it was really needed?: No   Alcohol Use: Not on file   Transportation Needs: Low Risk  (7/8/2025)    Transportation Needs     Within the past 12 months, has lack of transportation kept you from medical appointments, getting your medicines, non-medical meetings or appointments, work, or from getting things that you need?: No   Physical Activity: Not on file   Interpersonal Safety: Low Risk  (7/8/2025)    Interpersonal Safety     Do you feel physically and emotionally safe where you currently live?: Yes     Within the past 12 months, have you been hit, slapped, kicked or otherwise physically hurt by someone?: No     Within the past 12 months, have you been humiliated or emotionally abused in other ways by your partner or ex-partner?: No   Stress: Not on file   Social Connections: Not on file   Health Literacy: Not on file       Functional Status:  Prior to admission patient needed assistance:   Dependent ADLs:: Ambulation-walker, Bathing  Dependent IADLs:: Cleaning, Cooking, Laundry, Shopping, Meal Preparation, Medication Management, Money Management, Transportation          Discussed  Partnership in Safe Discharge Planning   document with patient/family: No    Additional Information  Patient was admitted on 7/7/25 with right lower extremity cellulitis. Care management consulted to assist with discharge planning.     Met with patient at bedside to complete consult. Address, contact info, emergency contacts, and PCP verified. Patient states she lives at home with her daughter Fidelina. Pt explained that she is currently open to [facility] for skilled nursing to assist with wound cares, [facility] to assist with bathing, and [facility] for 4hrs/week PCA services. Writer confirmed with both [facility] and [facility] that patient is currently on service with them.     Patient will likely need IV antibiotics at discharge and would like referral to [facility] for benefit check. Patient states that her dtr Fidelina will likely be the teachable party for home infusion. [facility] benefit check has been sent and currently pending. Patient is currently on vanco, ceftriaxone and clindamycin. Final IV ABX plan is still to be determined.  Pt states that her son Maia will be the one to provide transportation at discharge as his vehicle works best for her.        Next Steps: Continue to follow and monitor for discharge needs.       ADDENDUM 1448: Received update from  Home [facility] that pt's insurance is out of network with their agency. Pt should be in network with [facility] with her [facility] [employer] plan. Referral has been sent to [facility] for benefit check.       ADDENDUM 9316: Received call back from Consuelo @ [facility] ([phone]). Patient has 100% coverage with her [facility] [employer] plan for home infusion. Janice has accepted referral.  Team to coordinate discharge with Consuelo.       Ro Nicole RN  Care Coordinator  [facility]  [phone]

## 2025-07-09 ENCOUNTER — APPOINTMENT (OUTPATIENT)
Dept: PHYSICAL THERAPY | Facility: CLINIC | Age: 85
DRG: 603 | End: 2025-07-09
Payer: COMMERCIAL

## 2025-07-09 LAB
ANION GAP SERPL CALCULATED.3IONS-SCNC: 10 MMOL/L (ref 7–15)
BACTERIA UR CULT: NO GROWTH
BUN SERPL-MCNC: 23.4 MG/DL (ref 8–23)
CALCIUM SERPL-MCNC: 8.5 MG/DL (ref 8.8–10.4)
CHLORIDE SERPL-SCNC: 103 MMOL/L (ref 98–107)
CREAT SERPL-MCNC: 1.11 MG/DL (ref 0.51–0.95)
EGFRCR SERPLBLD CKD-EPI 2021: 49 ML/MIN/1.73M2
ERYTHROCYTE [DISTWIDTH] IN BLOOD BY AUTOMATED COUNT: 13.1 % (ref 10–15)
GLUCOSE SERPL-MCNC: 92 MG/DL (ref 70–99)
HCO3 SERPL-SCNC: 24 MMOL/L (ref 22–29)
HCT VFR BLD AUTO: 33.7 % (ref 35–47)
HGB BLD-MCNC: 11.1 G/DL (ref 11.7–15.7)
MCH RBC QN AUTO: 32.1 PG (ref 26.5–33)
MCHC RBC AUTO-ENTMCNC: 32.9 G/DL (ref 31.5–36.5)
MCV RBC AUTO: 97 FL (ref 78–100)
PLATELET # BLD AUTO: 222 10E3/UL (ref 150–450)
POTASSIUM SERPL-SCNC: 3.8 MMOL/L (ref 3.4–5.3)
RBC # BLD AUTO: 3.46 10E6/UL (ref 3.8–5.2)
SODIUM SERPL-SCNC: 137 MMOL/L (ref 135–145)
VANCOMYCIN SERPL-MCNC: 13.8 UG/ML (ref ?–25)
WBC # BLD AUTO: 10.9 10E3/UL (ref 4–11)

## 2025-07-09 PROCEDURE — 250N000013 HC RX MED GY IP 250 OP 250 PS 637: Performed by: INTERNAL MEDICINE

## 2025-07-09 PROCEDURE — 99232 SBSQ HOSP IP/OBS MODERATE 35: CPT | Performed by: INTERNAL MEDICINE

## 2025-07-09 PROCEDURE — 258N000003 HC RX IP 258 OP 636: Performed by: PHYSICIAN ASSISTANT

## 2025-07-09 PROCEDURE — 80048 BASIC METABOLIC PNL TOTAL CA: CPT | Performed by: INTERNAL MEDICINE

## 2025-07-09 PROCEDURE — 85014 HEMATOCRIT: CPT | Performed by: INTERNAL MEDICINE

## 2025-07-09 PROCEDURE — 250N000011 HC RX IP 250 OP 636: Performed by: PHYSICIAN ASSISTANT

## 2025-07-09 PROCEDURE — 120N000001 HC R&B MED SURG/OB

## 2025-07-09 PROCEDURE — 97530 THERAPEUTIC ACTIVITIES: CPT | Mod: GP

## 2025-07-09 PROCEDURE — 250N000013 HC RX MED GY IP 250 OP 250 PS 637: Performed by: STUDENT IN AN ORGANIZED HEALTH CARE EDUCATION/TRAINING PROGRAM

## 2025-07-09 PROCEDURE — 80202 ASSAY OF VANCOMYCIN: CPT | Performed by: INTERNAL MEDICINE

## 2025-07-09 PROCEDURE — 250N000011 HC RX IP 250 OP 636: Performed by: INTERNAL MEDICINE

## 2025-07-09 PROCEDURE — 250N000013 HC RX MED GY IP 250 OP 250 PS 637: Performed by: PHYSICIAN ASSISTANT

## 2025-07-09 PROCEDURE — 36415 COLL VENOUS BLD VENIPUNCTURE: CPT | Performed by: INTERNAL MEDICINE

## 2025-07-09 PROCEDURE — G0463 HOSPITAL OUTPT CLINIC VISIT: HCPCS

## 2025-07-09 RX ORDER — HYDROXYZINE HYDROCHLORIDE 10 MG/1
10 TABLET, FILM COATED ORAL 3 TIMES DAILY PRN
Status: DISCONTINUED | OUTPATIENT
Start: 2025-07-09 | End: 2025-07-11 | Stop reason: HOSPADM

## 2025-07-09 RX ORDER — SENNOSIDES 8.6 MG
1-2 TABLET ORAL DAILY
Status: DISCONTINUED | OUTPATIENT
Start: 2025-07-09 | End: 2025-07-11 | Stop reason: HOSPADM

## 2025-07-09 RX ADMIN — CLINDAMYCIN PHOSPHATE 900 MG: 900 INJECTION, SOLUTION INTRAVENOUS at 21:34

## 2025-07-09 RX ADMIN — METOPROLOL TARTRATE 50 MG: 50 TABLET, FILM COATED ORAL at 20:05

## 2025-07-09 RX ADMIN — OXYCODONE HYDROCHLORIDE AND ACETAMINOPHEN 1 TABLET: 7.5; 325 TABLET ORAL at 08:07

## 2025-07-09 RX ADMIN — HYDROXYZINE HYDROCHLORIDE 10 MG: 10 TABLET, FILM COATED ORAL at 10:39

## 2025-07-09 RX ADMIN — OXYCODONE HYDROCHLORIDE AND ACETAMINOPHEN 1 TABLET: 7.5; 325 TABLET ORAL at 01:03

## 2025-07-09 RX ADMIN — TOLTERODINE TARTRATE 2 MG: 1 TABLET, FILM COATED ORAL at 20:05

## 2025-07-09 RX ADMIN — Medication 400 MG: at 20:05

## 2025-07-09 RX ADMIN — OXYCODONE HYDROCHLORIDE AND ACETAMINOPHEN 1 TABLET: 7.5; 325 TABLET ORAL at 18:51

## 2025-07-09 RX ADMIN — CETIRIZINE HYDROCHLORIDE 10 MG: 10 TABLET, FILM COATED ORAL at 08:07

## 2025-07-09 RX ADMIN — CLINDAMYCIN PHOSPHATE 900 MG: 900 INJECTION, SOLUTION INTRAVENOUS at 06:06

## 2025-07-09 RX ADMIN — VANCOMYCIN HYDROCHLORIDE 1250 MG: 5 INJECTION, POWDER, LYOPHILIZED, FOR SOLUTION INTRAVENOUS at 12:10

## 2025-07-09 RX ADMIN — CEFTRIAXONE 2 G: 2 INJECTION, POWDER, FOR SOLUTION INTRAMUSCULAR; INTRAVENOUS at 10:39

## 2025-07-09 RX ADMIN — ONDANSETRON 4 MG: 4 TABLET, ORALLY DISINTEGRATING ORAL at 09:42

## 2025-07-09 RX ADMIN — METOPROLOL TARTRATE 50 MG: 50 TABLET, FILM COATED ORAL at 08:07

## 2025-07-09 RX ADMIN — APIXABAN 5 MG: 5 TABLET, FILM COATED ORAL at 20:05

## 2025-07-09 RX ADMIN — CLINDAMYCIN PHOSPHATE 900 MG: 900 INJECTION, SOLUTION INTRAVENOUS at 13:56

## 2025-07-09 RX ADMIN — OXYCODONE HYDROCHLORIDE AND ACETAMINOPHEN 1 TABLET: 7.5; 325 TABLET ORAL at 14:15

## 2025-07-09 RX ADMIN — Medication 400 MG: at 08:07

## 2025-07-09 RX ADMIN — TOLTERODINE TARTRATE 2 MG: 1 TABLET, FILM COATED ORAL at 08:07

## 2025-07-09 RX ADMIN — FAMOTIDINE 20 MG: 20 TABLET, FILM COATED ORAL at 20:05

## 2025-07-09 RX ADMIN — SENNOSIDES 1 TABLET: 8.6 TABLET, FILM COATED ORAL at 09:42

## 2025-07-09 RX ADMIN — FERROUS GLUCONATE 324 MG: 324 TABLET ORAL at 08:07

## 2025-07-09 RX ADMIN — APIXABAN 5 MG: 5 TABLET, FILM COATED ORAL at 08:07

## 2025-07-09 ASSESSMENT — ACTIVITIES OF DAILY LIVING (ADL)
ADLS_ACUITY_SCORE: 66
ADLS_ACUITY_SCORE: 67
ADLS_ACUITY_SCORE: 67
ADLS_ACUITY_SCORE: 65
ADLS_ACUITY_SCORE: 65
ADLS_ACUITY_SCORE: 67
ADLS_ACUITY_SCORE: 65
ADLS_ACUITY_SCORE: 67
ADLS_ACUITY_SCORE: 66
ADLS_ACUITY_SCORE: 67
ADLS_ACUITY_SCORE: 65
ADLS_ACUITY_SCORE: 65
ADLS_ACUITY_SCORE: 67
ADLS_ACUITY_SCORE: 67
ADLS_ACUITY_SCORE: 65
ADLS_ACUITY_SCORE: 67
ADLS_ACUITY_SCORE: 65
ADLS_ACUITY_SCORE: 65
ADLS_ACUITY_SCORE: 67

## 2025-07-09 NOTE — PHARMACY-VANCOMYCIN DOSING SERVICE
Pharmacy Vancomycin Note  Date of Service 2025  Patient's  1940   85 year old, female    Indication: Sepsis and Skin and Soft Tissue Infection  Day of Therapy: 3  Current vancomycin regimen:  1250 mg IV q24h  Current vancomycin monitoring method: AUC  Current vancomycin therapeutic monitoring goal: 400-600 mg*h/L    InsightRX Prediction of Current Vancomycin Regimen  Regimen: 1250 mg IV every 24 hours.  Start time: 12:26 on 2025  Exposure target: AUC24 (range) 400-600 mg/L.hr   AUC24,ss: 476 mg/L.hr  Probability of AUC24 > 400: 84 %  Ctrough,ss: 14.9 mg/L  Probability of Ctrough,ss > 20: 14 %  Probability of nephrotoxicity (Lodise DAYA ): 10 %      Current estimated CrCl = Estimated Creatinine Clearance: 45.5 mL/min (A) (based on SCr of 1.11 mg/dL (H)).    Creatinine for last 3 days  2025:  9:57 AM Creatinine 1.23 mg/dL  2025:  7:49 AM Creatinine 1.12 mg/dL  2025:  6:53 AM Creatinine 1.11 mg/dL    Recent Vancomycin Levels (past 3 days)  2025: 10:30 AM Vancomycin 13.8 ug/mL    Vancomycin IV Administrations (past 72 hours)                     vancomycin (VANCOCIN) 1,250 mg in 0.9% NaCl 250 mL intermittent infusion (mg) 1,250 mg New Bag 25 1226    vancomycin (VANCOCIN) 2,500 mg in 0.9% NaCl 500 mL intermittent infusion (mg) 2,500 mg New Bag 25 1212                    Nephrotoxins and other renal medications (From now, onward)      Start     Dose/Rate Route Frequency Ordered Stop    25 1200  vancomycin (VANCOCIN) 1,250 mg in 0.9% NaCl 250 mL intermittent infusion         1,250 mg  over 90 Minutes Intravenous EVERY 24 HOURS 25 2202                 Contrast Orders - past 72 hours (72h ago, onward)      None            Interpretation of levels and current regimen:  Vancomycin level is reflective of -600    Has serum creatinine changed greater than 50% in last 72 hours: No    Urine output:  unable to determine    Renal Function: Stable    Plan:  Continue  Current Dose of 1,250 mg q24h.  Vancomycin monitoring method: AUC  Vancomycin therapeutic monitoring goal: 400-600 mg*h/L  Pharmacy will check vancomycin levels as appropriate in 1-3 Days.  Serum creatinine levels will be ordered daily for the first week of therapy and at least twice weekly for subsequent weeks.    JOHNY DUPREE RP

## 2025-07-09 NOTE — UTILIZATION REVIEW
Admission Status; Secondary Review Determination       Under the authority of the Utilization Management Committee, the utilization review process indicated a secondary review on the above patient. The review outcome is based on review of the medical records, discussions with staff, and applying clinical experience noted on the date of the review.     (x) Inpatient Status Appropriate - This patient's medical care is consistent with medical management for inpatient care and reasonable inpatient medical practice.     RATIONALE FOR DETERMINATION     Patient requires inpatient admission versus short stay observation or outpatient treatment for the following reasons:  Patient Age/Gender: 85-year-old female  Admission Date: 07/07/2025  Primary Diagnoses: Right Lower Extremity Cellulitis with Sepsis, Streptococcus Pyogenes Bacteremia  Chronic Conditions: Paroxysmal Atrial Fibrillation (on Eliquis), Chronic Kidney Disease Stage IIIb, Hypertension, GERD, History of MRSA, Overactive Bladder, Obstructive Sleep Apnea, Chronic Pain, Obesity  Clinical Summary: The patient presented with right lower extremity pain and was found to have leukocytosis (WBC 16.3), indicating a severe infection likely due to cellulitis and sepsis. The blood cultures confirmed the presence of Streptococcus pyogenes bacteremia, necessitating immediate and aggressive intravenous antibiotic therapy with vancomycin, ceftriaxone, and clindamycin. The presence of bacteremia in an elderly patient with multiple comorbidities, including chronic kidney disease and atrial fibrillation, significantly increases the risk of complications such as endocarditis, osteomyelitis, or systemic inflammatory response syndrome (SIRS), justifying inpatient care to manage these risks effectively.    According to current guidelines, hospitalization for patients with bacteremia, especially in the context of sepsis and multiple chronic conditions, is warranted to monitor for  complications and ensure appropriate therapeutic response (source: Infectious Diseases Society of [place] Guidelines). The anticipated length of stay for bacteremia with sepsis can range from 3 to 7 days, depending on the patient's response to treatment and stability.    Appeal Argument - Legal and Regulatory Basis: Under the Medicare  2 midnights rule,  inpatient admission is justified if the admitting physician expects the patient to require hospital care spanning at least two midnights. Given the patient's diagnosis of Streptococcus pyogenes bacteremia and sepsis, coupled with the need for continuous IV antibiotics and monitoring, the expectation of a hospital stay beyond two midnights is clinically reasonable and meets the Medicare criteria for inpatient status.  Request for Reversal: Based on the clinical evidence, standard of care, and Medicare guidelines, we respectfully request a reversal of the inpatient status denial for this admission. The patient's condition, treatment needs, and anticipated length of stay clearly justify the necessity for inpatient care under the  2 midnights rule,  and any deviation from this could compromise patient safety and treatment efficacy. We urge [employer] to reconsider the denial and approve inpatient reimbursement for this medically necessary admission.    Thank you for your prompt attention to this matter. We look forward to your favorable decision based on the evidence presented.    The expected length of stay at the time of admission was more than 2 nights because of the severity of illness, intensity of service provided, and risk for adverse outcome. Inpatient admission is appropriate.         This document was produced using voice recognition software       The information on this document is developed by the utilization review team in order for the business office to ensure compliance. This only denotes the appropriateness of proper admission status and  does not reflect the quality of care rendered.   The definitions of Inpatient Status and Observation Status used in making the determination above are those provided in the CMS Coverage Manual, Chapter 1 and Chapter 6, section 70.4.   Sincerely,   Joshua Lewis DO  Physician Advisor  [facility].

## 2025-07-09 NOTE — PROGRESS NOTES
Care Management Follow Up    Length of Stay (days): 2    Expected Discharge Date: 07/11/2025     Concerns to be Addressed: discharge planning     Patient plan of care discussed at interdisciplinary rounds: Yes    Anticipated Discharge Disposition: Home Care, Home Infusion     Anticipated Discharge Services: Home Care  Anticipated Discharge DME: None    Patient/family educated on Medicare website which has current facility and service quality ratings: yes  Education Provided on the Discharge Plan:  yes  Patient/Family in Agreement with the Plan: yes    Referrals Placed by CM/SW:  home infusion  Private pay costs discussed: insurance costs out of pocket expenses, co-pays, and deductibles    Discussed  Partnership in Safe Discharge Planning  document with patient/family: No     Handoff Completed: No, handoff not indicated or clinically appropriate    Additional Information:  Received call from Consuelo @ [facility] (ph [phone]) updating me that they would like to send an infusion nurse out to the hospital in the next couple days for teaching (infusion nurse Saundra -[phone]) . They also plan to have home infusion teaching at pt's home on day of discharge. Consuelo states that they have been in contact with [facility], they are aware that home infusion will now be involved. [facility] RN will be doing RN visits and labs/dressing changes. Pt will also require home care PT at discharge. Consuelo will update [facility]  of this as well. Met with patient at bedside to update her on the info above and her insurance coverage for home infusion. Patient is very happy with plan moving forward. She would like her daughter to be present for teaching as well.     Next Steps: Coordinate discharge home with home care and home infusion when medically ready.         Ro Nicole, RN  Care Coordinator  [facility]  [phone]

## 2025-07-09 NOTE — PLAN OF CARE
"Aox4, forgetful at times. Able to communicate needs well. On RA. Pain managed with PRN percocet x3 and atarax. Reported some nausea. PRN Zofran given with relief. Up with A2 gait belt and walker. Voiding via external cath. Good urine output. Nory in color, encouraged fluid in take. Scheduled senna given. On IV abx. Some redness to coccyx. Scattered scab to bilat extremities. Woc assessed. Uneventful shift.   Problem: Adult Inpatient Plan of Care  Goal: Plan of Care Review  Description: The Plan of Care Review/Shift note should be completed every shift.  The Outcome Evaluation is a brief statement about your assessment that the patient is improving, declining, or no change.  This information will be displayed automatically on your shift  note.  Outcome: Progressing  Flowsheets (Taken 7/9/2025 1319)  Plan of Care Reviewed With: patient  Overall Patient Progress: improving  Goal: Patient-Specific Goal (Individualized)  Description: You can add care plan individualizations to a care plan. Examples of Individualization might be:  \"Parent requests to be called daily at 9am for status\", \"I have a hard time hearing out of my right ear\", or \"Do not touch me to wake me up as it startles  me\".  Outcome: Progressing  Goal: Absence of Hospital-Acquired Illness or Injury  Outcome: Progressing  Intervention: Identify and Manage Fall Risk  Recent Flowsheet Documentation  Taken 7/9/2025 0807 by Michelle Hardy, CLIFFORD  Safety Promotion/Fall Prevention:   activity supervised   clutter free environment maintained   lighting adjusted   mobility aid in reach   nonskid shoes/slippers when out of bed   patient and family education   room near nurse's station   room organization consistent   safety round/check completed  Intervention: Prevent Skin Injury  Recent Flowsheet Documentation  Taken 7/9/2025 0807 by Michelle Hardy, RN  Body Position:   turned   left  Intervention: Prevent and Manage VTE (Venous Thromboembolism) Risk  Recent Flowsheet " Documentation  Taken 7/9/2025 0807 by Michelle Hardy RN  VTE Prevention/Management: SCDs off (sequential compression devices)  Intervention: Prevent Infection  Recent Flowsheet Documentation  Taken 7/9/2025 0807 by Michelle Hardy RN  Infection Prevention:   cohorting utilized   hand hygiene promoted   single patient room provided  Goal: Optimal Comfort and Wellbeing  Outcome: Progressing  Intervention: Monitor Pain and Promote Comfort  Recent Flowsheet Documentation  Taken 7/9/2025 0807 by Michelle Hardy RN  Pain Management Interventions:   medication (see MAR)   repositioned  Intervention: Provide Person-Centered Care  Recent Flowsheet Documentation  Taken 7/9/2025 0807 by Michelle Hardy RN  Trust Relationship/Rapport:   care explained   choices provided   questions answered   questions encouraged   thoughts/feelings acknowledged  Goal: Readiness for Transition of Care  Outcome: Progressing     Problem: Delirium  Goal: Optimal Coping  Outcome: Progressing  Intervention: Optimize Psychosocial Adjustment to Delirium  Recent Flowsheet Documentation  Taken 7/9/2025 0807 by Michelle Hardy RN  Family/Support System Care: self-care encouraged  Goal: Improved Behavioral Control  Outcome: Progressing  Intervention: Minimize Safety Risk  Recent Flowsheet Documentation  Taken 7/9/2025 0807 by Michelle Hardy RN  Enhanced Safety Measures: pain management  Trust Relationship/Rapport:   care explained   choices provided   questions answered   questions encouraged   thoughts/feelings acknowledged  Goal: Improved Attention and Thought Clarity  Outcome: Progressing  Goal: Improved Sleep  Outcome: Progressing     Problem: Skin or Soft Tissue Infection  Goal: Absence of Infection Signs and Symptoms  Outcome: Progressing  Intervention: Minimize and Manage Infection Progression  Recent Flowsheet Documentation  Taken 7/9/2025 0807 by Michelle Hardy RN  Infection Prevention:   cohorting utilized   hand hygiene promoted   single patient room  provided  Isolation Precautions: contact precautions maintained     Problem: Skin Injury Risk Increased  Goal: Skin Health and Integrity  Outcome: Progressing  Intervention: Plan: Nurse Driven Intervention: Moisture Management  Recent Flowsheet Documentation  Taken 7/9/2025 0807 by Michelle Hardy RN  Moisture Interventions: Encourage regular toileting  Intervention: Plan: Nurse Driven Intervention: Friction and Shear  Recent Flowsheet Documentation  Taken 7/9/2025 0807 by Michelle Hardy RN  Friction/Shear Interventions: HOB 30 degrees or less  Intervention: Optimize Skin Protection  Recent Flowsheet Documentation  Taken 7/9/2025 1244 by Michelle Hardy, RN  Activity Management:   ambulated in room   up in chair  Taken 7/9/2025 0807 by Michelle Hardy, RN  Activity Management: activity adjusted per tolerance  Head of Bed (HOB) Positioning: HOB at 30-45 degrees   Goal Outcome Evaluation:      Plan of Care Reviewed With: patient    Overall Patient Progress: improvingOverall Patient Progress: improving

## 2025-07-09 NOTE — PLAN OF CARE
"Goal Outcome Evaluation:      Plan of Care Reviewed With: patient    Overall Patient Progress: no changeOverall Patient Progress: no change    Outcome Evaluation: VS monitored, Contact ISO, A1 w/ww and gb, needs shoes on for ambulation, IV Vanco/Rocephin/Cleocin cont, voiding, R LE gabriel/red with edema, elevating on pillows, Percocet for pain, ash reg diet, lbm 7/6, bowel meds given, home w/HC upon d/c, possible need for a line with long term IV abx based off cultures.    Problem: Adult Inpatient Plan of Care  Goal: Plan of Care Review  Description: The Plan of Care Review/Shift note should be completed every shift.  The Outcome Evaluation is a brief statement about your assessment that the patient is improving, declining, or no change.  This information will be displayed automatically on your shift  note.  Outcome: Progressing  Flowsheets (Taken 7/8/2025 1910)  Outcome Evaluation: VS monitored, Contact ISO, A1 w/ww and gb, needs shoes on for ambulation, IV Vanco/Rocephin/Cleocin cont, voiding, R LE gabriel/red with edema, elevating on pillows, Percocet for pain, ash reg diet, lbm 7/6, bowel meds given, home w/HC upon d/c, possible need for a line with long term IV abx based off cultures.  Plan of Care Reviewed With: patient  Overall Patient Progress: no change  Goal: Patient-Specific Goal (Individualized)  Description: You can add care plan individualizations to a care plan. Examples of Individualization might be:  \"Parent requests to be called daily at 9am for status\", \"I have a hard time hearing out of my right ear\", or \"Do not touch me to wake me up as it startles  me\".  Outcome: Progressing  Goal: Absence of Hospital-Acquired Illness or Injury  Outcome: Progressing  Intervention: Identify and Manage Fall Risk  Recent Flowsheet Documentation  Taken 7/8/2025 0906 by Alesia Whipple RN  Safety Promotion/Fall Prevention:   activity supervised   assistive device/personal items within reach   clutter free environment " maintained   lighting adjusted   mobility aid in reach   nonskid shoes/slippers when out of bed   patient and family education   room organization consistent   safety round/check completed   supervised activity   treat reversible contributory factors   treat underlying cause  Intervention: Prevent Skin Injury  Recent Flowsheet Documentation  Taken 7/8/2025 1435 by Alesia Whipple RN  Body Position:   turned   right   side-lying  Taken 7/8/2025 0906 by Alesia Whipple RN  Body Position:   supine, head elevated   supine, legs elevated  Skin Protection:   adhesive use limited   incontinence pads utilized   tubing/devices free from skin contact  Intervention: Prevent and Manage VTE (Venous Thromboembolism) Risk  Recent Flowsheet Documentation  Taken 7/8/2025 0906 by Alesia Whipple RN  VTE Prevention/Management: (no order for PCD's. Also, would not use d/t wounds on foot and shin.) SCDs off (sequential compression devices)  Intervention: Prevent Infection  Recent Flowsheet Documentation  Taken 7/8/2025 0906 by Alesia Whipple RN  Infection Prevention:   hand hygiene promoted   rest/sleep promoted   single patient room provided  Goal: Optimal Comfort and Wellbeing  Outcome: Progressing  Intervention: Monitor Pain and Promote Comfort  Recent Flowsheet Documentation  Taken 7/8/2025 1857 by Alesia Whipple RN  Pain Management Interventions:   medication (see MAR)   repositioned  Taken 7/8/2025 1248 by Alesia Whipple RN  Pain Management Interventions: medication (see MAR)  Taken 7/8/2025 0724 by Alesia Whipple RN  Pain Management Interventions: medication (see MAR)  Intervention: Provide Person-Centered Care  Recent Flowsheet Documentation  Taken 7/8/2025 0906 by Alesia Whipple RN  Trust Relationship/Rapport:   care explained   choices provided   questions answered   questions encouraged   thoughts/feelings acknowledged  Goal: Readiness for Transition of Care  Outcome: Progressing     Problem: Delirium  Goal: Optimal  Coping  Outcome: Progressing  Intervention: Optimize Psychosocial Adjustment to Delirium  Recent Flowsheet Documentation  Taken 7/8/2025 0906 by Alesia Whipple RN  Family/Support System Care: self-care encouraged  Goal: Improved Behavioral Control  Outcome: Progressing  Intervention: Minimize Safety Risk  Recent Flowsheet Documentation  Taken 7/8/2025 0906 by Alesia Whipple RN  Enhanced Safety Measures:   assistive devices when indicated   pain management   patient/family teach back on injury risk   review medications for side effects with activity  Trust Relationship/Rapport:   care explained   choices provided   questions answered   questions encouraged   thoughts/feelings acknowledged  Goal: Improved Attention and Thought Clarity  Outcome: Progressing  Goal: Improved Sleep  Outcome: Progressing  Intervention: Promote Sleep  Recent Flowsheet Documentation  Taken 7/8/2025 0906 by Alesia Whipple RN  Sleep/Rest Enhancement:   awakenings minimized   noise level reduced   regular sleep/rest pattern promoted   relaxation techniques promoted     Problem: Skin or Soft Tissue Infection  Goal: Absence of Infection Signs and Symptoms  Outcome: Progressing  Intervention: Minimize and Manage Infection Progression  Recent Flowsheet Documentation  Taken 7/8/2025 0906 by Alesia Whipple RN  Infection Prevention:   hand hygiene promoted   rest/sleep promoted   single patient room provided  Isolation Precautions: contact precautions maintained

## 2025-07-09 NOTE — CONSULTS
[facility] Nurse Inpatient Assessment     Consulted for: L foot, R shin  7/9: Perianal, sacrum and abdominal folds added to consult 7/8 after [facility] assessed L foot and R shin    Summary:   Chronic wound located on the right lateral lower extremity   Wound on the left dorsal foot, reportedly caused by shoe friction or pressure, present on admission.   Detailed assessment of the perineal area, sacral area, and abdominal folds was not performed as the patient was seated in a recliner, and the RN did not notify the team of updated wound consults at the time 12:32 pm. The [facility] team will follow up tomorrow for reassessment.    [facility] nurse follow-up plan: weekly    Patient History (according to provider note(s):       Raven Carlin is a 85 year old female with a history of chronic right lower extremity wounds and perianal wounds and abdominal fold wounds, paroxysmal atrial fibrillation on Eliquis, chronic kidney disease stage IIIb, hypertension, GERD, history of MRSA, OAB, SAURABH, chronic pain, obesity admitted on 7/7/2025 with right lower extremity pain.  In the emergency department, the patient was found to have a temperature of 99.2  F, blood pressure 133/72, heart rate 84, respiratory rate 18, SpO2 95% on room air.  Initial lab work showed sodium 133, BUN/creatinine 30.7/1.23, leukocytosis of 16.3, lactic acid 1.8.  The patient was started on vancomycin and IV Zosyn in the setting of right lower extremity cellulitis with sepsis.  1 out of 2 blood cultures returned positive for Streptococcus pyogenes.  Infectious disease was consulted to see the patient who adjusted the antibiotics to vancomycin, ceftriaxone, and clindamycin.     Assessment:      Areas visualized during today's visit: Skin folds  and Sacrum/coccyx- no open areas noted under abdominal pannus or breasts- recommend interdry for prevention since this is a chronic issue for patient.    Wound location: bilateral buttocks and sacrum    Last  photo: 7/9/25  Wound due to: Pressure Injury, Friction, and Incontinence Associated Dermatitis (IAD)- stage 2 and present on admission  Wound history/plan of care: Per pt she wears briefs and is incontinent of urine at times which can account for moisture damage to buttocks and within deep gluteal cleft. Pressure could also be a factor as well as friction from sliding down in her bed/chair    Wound base: 100 % Dermis to sacrum/gluteal cleft, 100 % intact dry peeling Epidermis to buttocks     Palpation of the wound bed: normal      Drainage: small     Description of drainage: serosanguinous     Measurements (length x width x depth, in cm): 1  x 1  x  0.1 cm      Tunneling: N/A     Undermining: N/A  Periwound skin: Intact, Erythema- blanchable, and Macerated      Color: normal and consistent with surrounding tissue and red      Temperature: normal   Odor: none  Pain: mild and during dressing change, tender  Pain interventions prior to dressing change: patient tolerated well and slow and gentle cares   Treatment goal: Maintain (prevention of deterioration) and Protection  STATUS: initial assessment  Supplies ordered: supplies stored on unit, discussed with RN, and discussed with patient     Wound location: right lateral lower extremity - not assessed 7/9    Last photo: 7/8  Wound due to: Trauma in 2021  Wound history/plan of care: Chronic wound located on the right lateral lower extremity with history of RLE Cellulitis with Sepsis. In 2021, patient reportedly struck her right leg and sustained bruising and a hematoma to her right low leg. ID is following up.     Wound base: 100 % Serous scabbing, Superficial scab, and Dry drainage,      Palpation of the wound bed: fluctuance     Drainage: small     Description of drainage: serosanguinous     Measurements (length x width x depth, in cm): 0.5  x 0.3  x  0.1 cm     Tunneling: N/A     Undermining: N/A  Periwound skin: Intact      Color: pale and pink- new skin color       Temperature: normal   Odor: none  Pain: absent and denies , none  Pain interventions prior to dressing change: patient tolerated well  Treatment goal: Heal , Infection control/prevention, and Protection  STATUS: initial assessment  Supplies ordered: ordered Hydrofera blue and discussed with patient     Wound location: Left dorsal foot  - not assessed 7/9    Last photo: 7/8  Wound due to: Pressure Injury and Friction  Wound history/plan of care: Wound on the left dorsal foot, reportedly caused by shoe friction or pressure, present on admission. Patient is currently wearing orthopedic shoes provided by her outpatient clinic. It is recommended that the patient return to her outpatient orthopedic clinic for re-evaluation of the footwear to ensure proper fit and function.    Wound base: 100 % Dermis, Moist, and Pink,      Palpation of the wound bed: normal     Drainage: none     Description of drainage: none     Measurements (length x width x depth, in cm): 0.5  x 0.7  x  0.1 cm     Tunneling: N/A     Undermining: N/A  Periwound skin: Intact      Color: normal and consistent with surrounding tissue      Temperature: normal   Odor: none  Pain: absent and denies , none  Pain interventions prior to dressing change: patient tolerated well  Treatment goal: Heal  and Protection  STATUS: initial assessment  Supplies ordered: supplies stored on unit and discussed with patient          Treatment Plan:     Right lateral lower extremity  wound(s): Daily    Cleanse wound wound cleanser Vashe and pat dry  Cut a piece of Hydrofera Blue to fit the wound shape. Soak it with normal saline and squeeze out excess fluid. (do not use vashe with hydrofera blue as they are not compatible)  Cover up the wound with Mepilex    Left dorsal foot  wound(s): Every 3 days   Cleanse wound wound cleanser Vashe and pat dry  2.   Cover up the wound with Mepilex    Bilateral buttocks and sacrum wound(s): BID and PRN   Cleanse the area with Yanni cleanse and  protect, very gently with soft cloth.  Apply thin layer of Barrier paste (ex: Critic aid) on to pen and red areas  With repeat application, do not scrub the paste, only remove soiled paste and reapply.  If complete removal of paste is necessary use baby oil/mineral oil (#218878) and soft wash cloth.  Ensure pt has chair cushion (#769488) while sitting up in the chair.  Use only one blue pad in between mattress and pt. No brief in bed.    Skin fold treatment: Daily  DO not use interdry (#134373) with any other creams or powder.  Wash skin gently. Pat dry, do not rub.  Cut the appropriate size of interdry (#271227) with clean scissors, allowing a minimum of 2 inches of the fabric exposed outside the skin fold for moisture evaporation.  Lay a single layer of fabric in the skin fold, placing one edge of the fabric into the base of the fold. Gently smooth the rest of the fabric over the skin, keeping it flat. Leave at least 2 inches of the fabric exposed outside the skin fold.  Secure the fabric in one of several ways; with the skin fold, with a small amount of tape, or tucked under clothing.  When to Dispose: Each piece of InterDry may be used up to 5 days, depending on fabric soiling, odor, amount of moisture and general skin condition. May label with skin marker to date    Removal: Remove the fabric before bathing and reuse when finished. When removing the fabric from skin folds, gently separate the skin fold and lift away fabric.     Orders: Reviewed, Written, and Updated    RECOMMEND PRIMARY TEAM ORDER: None, at this time  Education provided: plan of care, wound progress, and Moisture management  Discussed plan of care with: Patient  Notify WOC if wound(s) deteriorate.  Nursing to notify the Provider(s) and re-consult the WOC Nurse if new skin concern.    DATA:     Current support surface: Standard  Low air loss (JACQUI pump, Isolibrium, Pulsate)  Containment of urine/stool: Continent of bladder, Continent of bowel,  and Incontinence Protocol  BMI: Body mass index is 34.03 kg/m .   Active diet order: Orders Placed This Encounter      Combination Diet Regular Diet Adult     Output: No intake/output data recorded.     Labs:   Recent Labs   Lab 07/09/25  0653   HGB 11.1*   WBC 10.9     Pressure injury risk assessment:   Sensory Perception: 4-->no impairment  Moisture: 3-->occasionally moist  Activity: 3-->walks occasionally  Mobility: 3-->slightly limited  Nutrition: 3-->adequate  Friction and Shear: 2-->potential problem  Bob Score: 18      1st choice: Securely message with [employer] ([facility] [employer])   2nd option: [facility] Office Phone [phone], messages checked periodically Mon-Fri 8a-4p

## 2025-07-09 NOTE — PLAN OF CARE
"/63  Pulse 73   Temp 98.1  F  Resp 18  SpO2 94%     Patient is alert and oriented x4, pain 1/10 after percocet was given, assist of 1 with gait belt and walker, on IV antiobiotics for RLE cellulitis, leg is red and swollen, no BM but senna and miralax was given during the day shift.    Goal Outcome Evaluation:      Plan of Care Reviewed With: patient    Overall Patient Progress: no changeOverall Patient Progress: no change    Outcome Evaluation: Patient is alert and oriented x4, pain 1/10 after percocet was given, assist of 1 with gait belt and walker, on IV antiobiotics for RLE cellulitis, leg is red and swollen, no BM but senna and miralax was given during the day shift.      Problem: Adult Inpatient Plan of Care  Goal: Plan of Care Review  Description: The Plan of Care Review/Shift note should be completed every shift.  The Outcome Evaluation is a brief statement about your assessment that the patient is improving, declining, or no change.  This information will be displayed automatically on your shift  note.  Outcome: Progressing  Flowsheets (Taken 7/8/2025 2125)  Outcome Evaluation: Patient is alert and oriented x4, pain 1/10 after percocet was given, assist of 1 with gait belt and walker, on IV antiobiotics for RLE cellulitis, leg is red and swollen, no BM but senna and miralax was given during the day shift.  Plan of Care Reviewed With: patient  Overall Patient Progress: no change  Goal: Patient-Specific Goal (Individualized)  Description: You can add care plan individualizations to a care plan. Examples of Individualization might be:  \"Parent requests to be called daily at 9am for status\", \"I have a hard time hearing out of my right ear\", or \"Do not touch me to wake me up as it startles  me\".  Outcome: Progressing  Goal: Absence of Hospital-Acquired Illness or Injury  Outcome: Progressing  Intervention: Identify and Manage Fall Risk  Recent Flowsheet Documentation  Taken 7/8/2025 1951 by Rossi, " Sarai, RN  Safety Promotion/Fall Prevention:   activity supervised   clutter free environment maintained   lighting adjusted   mobility aid in reach   nonskid shoes/slippers when out of bed   patient and family education   room near nurse's station   room organization consistent   safety round/check completed  Intervention: Prevent Skin Injury  Recent Flowsheet Documentation  Taken 7/8/2025 1951 by Sarai Richey, RN  Body Position: position changed independently  Intervention: Prevent and Manage VTE (Venous Thromboembolism) Risk  Recent Flowsheet Documentation  Taken 7/8/2025 1951 by Sarai Richey, CLIFFORD  VTE Prevention/Management: SCDs off (sequential compression devices)  Intervention: Prevent Infection  Recent Flowsheet Documentation  Taken 7/8/2025 1951 by Sarai Richey, RN  Infection Prevention:   cohorting utilized   hand hygiene promoted   rest/sleep promoted   single patient room provided  Goal: Optimal Comfort and Wellbeing  Outcome: Progressing  Goal: Readiness for Transition of Care  Outcome: Progressing

## 2025-07-09 NOTE — PLAN OF CARE
"Goal Outcome Evaluation:      Plan of Care Reviewed With: patient    Overall Patient Progress: no changeOverall Patient Progress: no change     Pt is A&Ox4, able to make needs known. PRN percocet given for pain. VSS on RA.  Problem: Adult Inpatient Plan of Care  Goal: Plan of Care Review  Description: The Plan of Care Review/Shift note should be completed every shift.  The Outcome Evaluation is a brief statement about your assessment that the patient is improving, declining, or no change.  This information will be displayed automatically on your shift  note.  Outcome: Progressing  Flowsheets (Taken 7/9/2025 0610)  Plan of Care Reviewed With: patient  Overall Patient Progress: no change  Goal: Patient-Specific Goal (Individualized)  Description: You can add care plan individualizations to a care plan. Examples of Individualization might be:  \"Parent requests to be called daily at 9am for status\", \"I have a hard time hearing out of my right ear\", or \"Do not touch me to wake me up as it startles  me\".  Outcome: Progressing  Goal: Absence of Hospital-Acquired Illness or Injury  Outcome: Progressing  Intervention: Identify and Manage Fall Risk  Recent Flowsheet Documentation  Taken 7/9/2025 0100 by German Rojas RN  Safety Promotion/Fall Prevention:   activity supervised   clutter free environment maintained   lighting adjusted   mobility aid in reach   nonskid shoes/slippers when out of bed   patient and family education   room near nurse's station   room organization consistent   safety round/check completed  Intervention: Prevent Skin Injury  Recent Flowsheet Documentation  Taken 7/9/2025 0100 by German Rojas, RN  Body Position: position changed independently  Intervention: Prevent and Manage VTE (Venous Thromboembolism) Risk  Recent Flowsheet Documentation  Taken 7/9/2025 0100 by German Rojas, RN  VTE Prevention/Management: SCDs off (sequential compression devices)  Intervention: Prevent Infection  Recent " Flowsheet Documentation  Taken 7/9/2025 0100 by German Rojas RN  Infection Prevention:   cohorting utilized   hand hygiene promoted   rest/sleep promoted   single patient room provided  Goal: Optimal Comfort and Wellbeing  Outcome: Progressing  Goal: Readiness for Transition of Care  Outcome: Progressing     Problem: Delirium  Goal: Optimal Coping  Outcome: Progressing  Goal: Improved Behavioral Control  Outcome: Progressing  Intervention: Minimize Safety Risk  Recent Flowsheet Documentation  Taken 7/9/2025 0100 by German Rojas RN  Enhanced Safety Measures: pain management  Goal: Improved Attention and Thought Clarity  Outcome: Progressing  Goal: Improved Sleep  Outcome: Progressing     Problem: Skin or Soft Tissue Infection  Goal: Absence of Infection Signs and Symptoms  Outcome: Progressing  Intervention: Minimize and Manage Infection Progression  Recent Flowsheet Documentation  Taken 7/9/2025 0100 by German Rojas RN  Infection Prevention:   cohorting utilized   hand hygiene promoted   rest/sleep promoted   single patient room provided  Isolation Precautions: contact precautions maintained     Problem: Skin Injury Risk Increased  Goal: Skin Health and Integrity  Outcome: Progressing  Intervention: Plan: Nurse Driven Intervention: Moisture Management  Recent Flowsheet Documentation  Taken 7/9/2025 0100 by German Rojas RN  Moisture Interventions: Encourage regular toileting  Intervention: Plan: Nurse Driven Intervention: Friction and Shear  Recent Flowsheet Documentation  Taken 7/9/2025 0100 by German Rojas RN  Friction/Shear Interventions: HOB 30 degrees or less  Intervention: Optimize Skin Protection  Recent Flowsheet Documentation  Taken 7/9/2025 0100 by German Rojas RN  Activity Management: activity adjusted per tolerance  Head of Bed (HOB) Positioning: HOB at 30-45 degrees

## 2025-07-09 NOTE — PROGRESS NOTES
[facility]    Hospitalist Progress Note  Name: Raven Carlin    MRN: [id number]  Provider:  Montana Saul DO  Date of Service: 07/09/2025    Summary of Stay: Raven Carlin is a 85 year old female with a history of chronic right lower extremity wounds and perianal wounds and abdominal fold wounds, paroxysmal atrial fibrillation on Eliquis, chronic kidney disease stage IIIb, hypertension, GERD, history of MRSA, OAB, SAURABH, chronic pain, obesity admitted on 7/7/2025 with right lower extremity pain.  In the emergency department, the patient was found to have a temperature of 99.2  F, blood pressure 133/72, heart rate 84, respiratory rate 18, SpO2 95% on room air.  Initial lab work showed sodium 133, BUN/creatinine 30.7/1.23, leukocytosis of 16.3, lactic acid 1.8.  The patient was started on vancomycin and IV Zosyn in the setting of right lower extremity cellulitis with sepsis.  1 out of 2 blood cultures returned positive for Streptococcus pyogenes.  Infectious disease was consulted to see the patient who adjusted the antibiotics to vancomycin, ceftriaxone, and clindamycin.     TODAY'S PLAN: Appreciate infectious disease recommendations.  Repeat blood cultures negative at 12 hours.  Continue vancomycin, ceftriaxone, clindamycin.  Patient denies any diarrhea, in fact reports constipation and is asking for stool softeners.  She states she normally takes a stool softener once daily and MiraLAX twice a week.  Will schedule Senokot once daily and she has MiraLAX as needed.  Continue to elevate right lower extremity as able.  Anticipate another 2 to 3 days in the hospital for the above.  All questions answered.    Problem List:   RLE Cellulitis with Sepsis  Strep Pyogenes Bacteremia  Chronic RLE Wounds, Perianal Wound, Abdominal Fold Wounds - POA  - Appreciate ID recommendations  - Vanco, ceftriaxone, clindamycin  - 1/2 blood cultures positive for strep pyogenes.  Repeat blood cultures pending  - Elevate  RLE  - Appreciate WOC recommendations  - Appreciate PT recommendations     Chronic Medical Problems:  Paroxysmal Atrial Fibrillation on Eliquis  Chronic Kidney Disease Stage 3b  Hypertension  GERD  Hx of MRSA  OAB  SAURABH  Chronic Pain  Obesity - BMI 34.03    I spent 42 minutes in reviewing this patient's labs, imaging, medications, medical history.  In addition time was spent interviewing the patient, communicating with family, and medical decision making.      DVT Prophylaxis: DOAC  Code Status: Full Code  Diet: Combination Diet Regular Diet Adult    Thornton Catheter: Not present    Disposition: Medically Ready for Discharge: Anticipated in 2-4 Days    Goals to discharge include: abx plan established  Family updated today: No     Interval History   Pt seen and examined.  Pt reports still having some pain in her R leg.  Denies any diarrhea.  Reports poor appetite for the food at the cafeteria.  Reports issues not sleeping well.    -Data reviewed today: I personally reviewed all new labs and imaging results over the last 24 hours.     Physical Exam   Temp: 98.7  F (37.1  C) Temp src: Temporal BP: 131/55 Pulse: 71   Resp: 18 SpO2: 96 % O2 Device: None (Room air)    Vitals:    07/07/25 2145   Weight: 98.6 kg (217 lb 4.8 oz)     Vital Signs with Ranges  Temp:  [97.1  F (36.2  C)-98.7  F (37.1  C)] 98.7  F (37.1  C)  Pulse:  [65-78] 71  Resp:  [18-20] 18  BP: (112-131)/(50-68) 131/55  SpO2:  [94 %-99 %] 96 %  No intake/output data recorded.    GENERAL: No apparent distress. Awake, alert, and fully oriented.  HEENT: Normocephalic, atraumatic. Extraocular movements intact.  CARDIOVASCULAR: Regular rate and rhythm without murmurs or rubs. No S3.  PULMONARY: Clear bilaterally.  GASTROINTESTINAL: Soft, non-tender, non-distended. Bowel sounds normoactive.   EXTREMITIES: Erythematous and edematous RLE  NEUROLOGICAL: CN 2-12 grossly intact, no focal neurological deficits.  DERMATOLOGICAL: No rash, ulcer, bruising, nor  jaundice.    Medications   Current Facility-Administered Medications   Medication Dose Route Frequency Provider Last Rate Last Admin    Patient is already receiving anticoagulation with heparin, enoxaparin (LOVENOX), warfarin (COUMADIN)  or other anticoagulant medication   Does not apply Continuous PRN Radha Salazar PA-C         Current Facility-Administered Medications   Medication Dose Route Frequency Provider Last Rate Last Admin    apixaban ANTICOAGULANT (ELIQUIS) tablet 5 mg  5 mg Oral BID Radha Salazar PA-C   5 mg at 07/09/25 0807    cefTRIAXone (ROCEPHIN) 2 g vial to attach to  ml bag for ADULTS or NS 50 ml bag for PEDS  2 g Intravenous Q24H Brayan Dawn MD   2 g at 07/08/25 1457    cetirizine (zyrTEC) tablet 10 mg  10 mg Oral Daily Radha Salazar PA-C   10 mg at 07/09/25 0807    clindamycin (CLEOCIN) 900 mg in 50 mL D5W intermittent infusion  900 mg Intravenous Q8H Brayan Dawn  mL/hr at 07/09/25 0606 900 mg at 07/09/25 0606    famotidine (PEPCID) tablet 20 mg  20 mg Oral QPM Radha Salazar PA-C   20 mg at 07/08/25 1955    ferrous gluconate (FERGON) tablet 324 mg  324 mg Oral Daily Radha Salazar PA-C   324 mg at 07/09/25 0807    magnesium oxide (MAG-OX) tablet 400 mg  400 mg Oral BID Radha Salazar PA-C   400 mg at 07/09/25 0807    metoprolol tartrate (LOPRESSOR) tablet 50 mg  50 mg Oral BID Radha Salazar PA-C   50 mg at 07/09/25 0807    sennosides (SENOKOT) tablet 1-2 tablet  1-2 tablet Oral Daily Montana Saul DO        sodium chloride (PF) 0.9% PF flush 3 mL  3 mL Intracatheter Q8H [facility] Radha Salazar PA-C   3 mL at 07/09/25 0606    tolterodine (DETROL) tablet 2 mg  2 mg Oral BID Radha Salazar PA-C   2 mg at 07/09/25 0807    vancomycin (VANCOCIN) 1,250 mg in 0.9% NaCl 250 mL intermittent infusion  1,250 mg Intravenous Q24H Radha Salazar PA-C   1,250 mg at 07/08/25 1226     Data     Laboratory:  Recent Labs   Lab  "07/09/25  0653 07/08/25  0749 07/07/25  0957   WBC 10.9 13.9* 16.3*   HGB 11.1* 11.1* 12.8   HCT 33.7* 33.6* 37.1   MCV 97 98 96    195 206     Recent Labs   Lab 07/09/25  0653 07/08/25  0749 07/07/25  0957    134* 133*   POTASSIUM 3.8 4.0 3.8   CHLORIDE 103 100 99   CO2 24 24 22   ANIONGAP 10 10 12   GLC 92 90 166*   BUN 23.4* 24.1* 30.7*   CR 1.11* 1.12* 1.23*   GFRESTIMATED 49* 48* 43*   LAUREL 8.5* 8.4* 8.7*     No results for input(s): \"CULT\" in the last 168 hours.  Troponin I ES   Date Value Ref Range Status   03/12/2020 <0.015 0.000 - 0.045 ug/L Final     Comment:     The 99th percentile for upper reference range is 0.045 ug/L.  Troponin values   in the range of 0.045 - 0.120 ug/L may be associated with risks of adverse   clinical events.         Imaging:  No results found for this or any previous visit (from the past 24 hours).      Montana Saul DO  Critical access hospital Hospitalist  [address] [place] [address]  Securely message with [employer] (more info)  Text page via [facility] Paging/Directory   07/09/2025   "

## 2025-07-10 ENCOUNTER — APPOINTMENT (OUTPATIENT)
Dept: PHYSICAL THERAPY | Facility: CLINIC | Age: 85
DRG: 603 | End: 2025-07-10
Attending: INTERNAL MEDICINE
Payer: COMMERCIAL

## 2025-07-10 VITALS
DIASTOLIC BLOOD PRESSURE: 65 MMHG | SYSTOLIC BLOOD PRESSURE: 127 MMHG | OXYGEN SATURATION: 92 % | HEART RATE: 72 BPM | WEIGHT: 217.3 LBS | TEMPERATURE: 97.9 F | RESPIRATION RATE: 16 BRPM | BODY MASS INDEX: 34.03 KG/M2

## 2025-07-10 LAB
ANION GAP SERPL CALCULATED.3IONS-SCNC: 11 MMOL/L (ref 7–15)
BACTERIA SPEC CULT: ABNORMAL
BACTERIA SPEC CULT: ABNORMAL
BACTERIA SPEC CULT: NORMAL
BUN SERPL-MCNC: 20.8 MG/DL (ref 8–23)
CALCIUM SERPL-MCNC: 8.6 MG/DL (ref 8.8–10.4)
CHLORIDE SERPL-SCNC: 100 MMOL/L (ref 98–107)
CREAT SERPL-MCNC: 1.08 MG/DL (ref 0.51–0.95)
EGFRCR SERPLBLD CKD-EPI 2021: 50 ML/MIN/1.73M2
ERYTHROCYTE [DISTWIDTH] IN BLOOD BY AUTOMATED COUNT: 13.2 % (ref 10–15)
GLUCOSE SERPL-MCNC: 91 MG/DL (ref 70–99)
HCO3 SERPL-SCNC: 23 MMOL/L (ref 22–29)
HCT VFR BLD AUTO: 33.8 % (ref 35–47)
HGB BLD-MCNC: 11.4 G/DL (ref 11.7–15.7)
MCH RBC QN AUTO: 32.6 PG (ref 26.5–33)
MCHC RBC AUTO-ENTMCNC: 33.7 G/DL (ref 31.5–36.5)
MCV RBC AUTO: 97 FL (ref 78–100)
PLATELET # BLD AUTO: 240 10E3/UL (ref 150–450)
POTASSIUM SERPL-SCNC: 4 MMOL/L (ref 3.4–5.3)
RBC # BLD AUTO: 3.5 10E6/UL (ref 3.8–5.2)
SODIUM SERPL-SCNC: 134 MMOL/L (ref 135–145)
WBC # BLD AUTO: 9.5 10E3/UL (ref 4–11)

## 2025-07-10 PROCEDURE — 250N000011 HC RX IP 250 OP 636: Performed by: INTERNAL MEDICINE

## 2025-07-10 PROCEDURE — 97530 THERAPEUTIC ACTIVITIES: CPT | Mod: GP

## 2025-07-10 PROCEDURE — 80048 BASIC METABOLIC PNL TOTAL CA: CPT | Performed by: INTERNAL MEDICINE

## 2025-07-10 PROCEDURE — 120N000001 HC R&B MED SURG/OB

## 2025-07-10 PROCEDURE — 36415 COLL VENOUS BLD VENIPUNCTURE: CPT | Performed by: INTERNAL MEDICINE

## 2025-07-10 PROCEDURE — 99232 SBSQ HOSP IP/OBS MODERATE 35: CPT | Performed by: INTERNAL MEDICINE

## 2025-07-10 PROCEDURE — 250N000013 HC RX MED GY IP 250 OP 250 PS 637: Performed by: PHYSICIAN ASSISTANT

## 2025-07-10 PROCEDURE — 250N000013 HC RX MED GY IP 250 OP 250 PS 637: Performed by: INTERNAL MEDICINE

## 2025-07-10 PROCEDURE — 250N000013 HC RX MED GY IP 250 OP 250 PS 637: Performed by: STUDENT IN AN ORGANIZED HEALTH CARE EDUCATION/TRAINING PROGRAM

## 2025-07-10 PROCEDURE — 85027 COMPLETE CBC AUTOMATED: CPT | Performed by: INTERNAL MEDICINE

## 2025-07-10 RX ADMIN — OXYCODONE HYDROCHLORIDE AND ACETAMINOPHEN 1 TABLET: 7.5; 325 TABLET ORAL at 19:02

## 2025-07-10 RX ADMIN — Medication 400 MG: at 19:58

## 2025-07-10 RX ADMIN — TOLTERODINE TARTRATE 2 MG: 1 TABLET, FILM COATED ORAL at 19:58

## 2025-07-10 RX ADMIN — FERROUS GLUCONATE 324 MG: 324 TABLET ORAL at 08:20

## 2025-07-10 RX ADMIN — CETIRIZINE HYDROCHLORIDE 10 MG: 10 TABLET, FILM COATED ORAL at 08:20

## 2025-07-10 RX ADMIN — APIXABAN 5 MG: 5 TABLET, FILM COATED ORAL at 08:20

## 2025-07-10 RX ADMIN — OXYCODONE HYDROCHLORIDE AND ACETAMINOPHEN 1 TABLET: 7.5; 325 TABLET ORAL at 06:53

## 2025-07-10 RX ADMIN — FAMOTIDINE 20 MG: 20 TABLET, FILM COATED ORAL at 19:58

## 2025-07-10 RX ADMIN — TOLTERODINE TARTRATE 2 MG: 1 TABLET, FILM COATED ORAL at 08:20

## 2025-07-10 RX ADMIN — CLINDAMYCIN PHOSPHATE 900 MG: 900 INJECTION, SOLUTION INTRAVENOUS at 06:11

## 2025-07-10 RX ADMIN — METOPROLOL TARTRATE 50 MG: 50 TABLET, FILM COATED ORAL at 19:58

## 2025-07-10 RX ADMIN — METOPROLOL TARTRATE 50 MG: 50 TABLET, FILM COATED ORAL at 08:20

## 2025-07-10 RX ADMIN — POLYETHYLENE GLYCOL 3350 17 G: 17 POWDER, FOR SOLUTION ORAL at 08:29

## 2025-07-10 RX ADMIN — OXYCODONE HYDROCHLORIDE AND ACETAMINOPHEN 1 TABLET: 7.5; 325 TABLET ORAL at 00:53

## 2025-07-10 RX ADMIN — CEFTRIAXONE 2 G: 2 INJECTION, POWDER, FOR SOLUTION INTRAMUSCULAR; INTRAVENOUS at 10:37

## 2025-07-10 RX ADMIN — Medication 400 MG: at 08:20

## 2025-07-10 RX ADMIN — APIXABAN 5 MG: 5 TABLET, FILM COATED ORAL at 19:58

## 2025-07-10 RX ADMIN — OXYCODONE HYDROCHLORIDE AND ACETAMINOPHEN 1 TABLET: 7.5; 325 TABLET ORAL at 12:59

## 2025-07-10 RX ADMIN — SENNOSIDES 2 TABLET: 8.6 TABLET, FILM COATED ORAL at 08:20

## 2025-07-10 ASSESSMENT — ACTIVITIES OF DAILY LIVING (ADL)
ADLS_ACUITY_SCORE: 67
ADLS_ACUITY_SCORE: 66
ADLS_ACUITY_SCORE: 67
ADLS_ACUITY_SCORE: 66
ADLS_ACUITY_SCORE: 67
ADLS_ACUITY_SCORE: 66
ADLS_ACUITY_SCORE: 67
ADLS_ACUITY_SCORE: 67
ADLS_ACUITY_SCORE: 66
ADLS_ACUITY_SCORE: 67

## 2025-07-10 NOTE — PLAN OF CARE
"Please see flowsheets for detailed vital signs and assessments.   Neuro: intact, forgetful at times   Vital Signs: /56 (BP Location: Right arm)   Pulse 67   Temp 97.6  F (36.4  C) (Temporal)   Resp 16   Wt 98.6 kg (217 lb 4.8 oz)   SpO2 95%   BMI 34.03 kg/m     Pain: pain managed PRN percocet  Tele: N/A  CMS: Intact  Respiratory: LS clear with no sign of accessory muscle use. No labored breathing. On RA  GI: reported constipation. Scheduled senna with PRN miralax given, no success   : voiding w/o difficulty via pure-wick   Skin: redness to bilat lower extremities, coccyx. Swelling to right leg. Redness to abdominal folds, inter-dry in place.  Activity: up with A2 gait belt and walker, once up, could be A1 gait belt and walker  Diet: regular diet  Protocols: N/A  Plan: will discharge IV Abx when medically ready.     Problem: Adult Inpatient Plan of Care  Goal: Plan of Care Review  Description: The Plan of Care Review/Shift note should be completed every shift.  The Outcome Evaluation is a brief statement about your assessment that the patient is improving, declining, or no change.  This information will be displayed automatically on your shift  note.  Outcome: Progressing  Flowsheets (Taken 7/10/2025 0142)  Plan of Care Reviewed With: patient  Overall Patient Progress: improving  Goal: Patient-Specific Goal (Individualized)  Description: You can add care plan individualizations to a care plan. Examples of Individualization might be:  \"Parent requests to be called daily at 9am for status\", \"I have a hard time hearing out of my right ear\", or \"Do not touch me to wake me up as it startles  me\".  Outcome: Progressing  Goal: Absence of Hospital-Acquired Illness or Injury  Outcome: Progressing  Intervention: Identify and Manage Fall Risk  Recent Flowsheet Documentation  Taken 7/10/2025 5373 by Michelle Hardy RN  Safety Promotion/Fall Prevention:   activity supervised   clutter free environment maintained   " lighting adjusted   mobility aid in reach   nonskid shoes/slippers when out of bed   patient and family education   room near nurse's station   room organization consistent   safety round/check completed  Intervention: Prevent Skin Injury  Recent Flowsheet Documentation  Taken 7/10/2025 0735 by Michelle Hardy RN  Body Position: supine, head elevated  Intervention: Prevent and Manage VTE (Venous Thromboembolism) Risk  Recent Flowsheet Documentation  Taken 7/10/2025 0735 by Michelle Hardy RN  VTE Prevention/Management: SCDs off (sequential compression devices)  Intervention: Prevent Infection  Recent Flowsheet Documentation  Taken 7/10/2025 0735 by Michelle Hardy RN  Infection Prevention:   cohorting utilized   hand hygiene promoted   single patient room provided  Goal: Optimal Comfort and Wellbeing  Outcome: Progressing  Goal: Readiness for Transition of Care  Outcome: Progressing     Problem: Delirium  Goal: Optimal Coping  Outcome: Progressing  Goal: Improved Behavioral Control  Outcome: Progressing  Intervention: Minimize Safety Risk  Recent Flowsheet Documentation  Taken 7/10/2025 0735 by Michelle Hardy RN  Enhanced Safety Measures: pain management  Goal: Improved Attention and Thought Clarity  Outcome: Progressing  Goal: Improved Sleep  Outcome: Progressing     Problem: Skin or Soft Tissue Infection  Goal: Absence of Infection Signs and Symptoms  Outcome: Progressing  Intervention: Minimize and Manage Infection Progression  Recent Flowsheet Documentation  Taken 7/10/2025 0735 by Michelle Hardy RN  Infection Prevention:   cohorting utilized   hand hygiene promoted   single patient room provided  Isolation Precautions: contact precautions maintained     Problem: Skin Injury Risk Increased  Goal: Skin Health and Integrity  Outcome: Progressing  Intervention: Plan: Nurse Driven Intervention: Moisture Management  Recent Flowsheet Documentation  Taken 7/10/2025 0735 by Michelle Hardy RN  Moisture Interventions:   Encourage  regular toileting   No brief in bed  Intervention: Plan: Nurse Driven Intervention: Friction and Shear  Recent Flowsheet Documentation  Taken 7/10/2025 0735 by Michelle Hardy RN  Friction/Shear Interventions: HOB 30 degrees or less  Intervention: Optimize Skin Protection  Recent Flowsheet Documentation  Taken 7/10/2025 1410 by Michelle Hardy RN  Activity Management: up in chair  Taken 7/10/2025 0735 by Michelle Hardy RN  Activity Management: activity adjusted per tolerance  Head of Bed (HOB) Positioning: HOB at 20-30 degrees     Problem: Comorbidity Management  Goal: Blood Pressure in Desired Range  Outcome: Progressing  Intervention: Maintain Blood Pressure Management  Recent Flowsheet Documentation  Taken 7/10/2025 0735 by Michelle Hardy RN  Medication Review/Management: medications reviewed     Problem: Pain Acute  Goal: Optimal Pain Control and Function  Outcome: Progressing  Intervention: Prevent or Manage Pain  Recent Flowsheet Documentation  Taken 7/10/2025 0735 by Michelle Hardy RN  Medication Review/Management: medications reviewed   Goal Outcome Evaluation:      Plan of Care Reviewed With: patient    Overall Patient Progress: improvingOverall Patient Progress: improving

## 2025-07-10 NOTE — PLAN OF CARE
"Goal Outcome Evaluation:    Plan of Care Reviewed With: patient    Overall Patient Progress: improving    Outcome Evaluation: Up with A2 GB/walker. forgetful. Clindamycin, Rocephin and Vancomycin continued. Contact precaution maintained. PRN percocet given q6h. Plan for PICC placement for Abx.    Problem: Adult Inpatient Plan of Care  Goal: Plan of Care Review  Description: The Plan of Care Review/Shift note should be completed every shift.  The Outcome Evaluation is a brief statement about your assessment that the patient is improving, declining, or no change.  This information will be displayed automatically on your shift  note.  Outcome: Progressing  Flowsheets (Taken 7/10/2025 0157)  Outcome Evaluation: Up with A2 GB/walker. forgetful. Clindamycin, Rocephin and Vancomycin continued. Contact precaution maintained. PRN percocet given q6h. Plan for PICC placement for Abx.  Plan of Care Reviewed With: patient  Overall Patient Progress: improving  Goal: Patient-Specific Goal (Individualized)  Description: You can add care plan individualizations to a care plan. Examples of Individualization might be:  \"Parent requests to be called daily at 9am for status\", \"I have a hard time hearing out of my right ear\", or \"Do not touch me to wake me up as it startles  me\".  Outcome: Progressing  Goal: Absence of Hospital-Acquired Illness or Injury  Outcome: Progressing  Intervention: Identify and Manage Fall Risk  Recent Flowsheet Documentation  Taken 7/9/2025 1950 by Mohinder Bermudez RN  Safety Promotion/Fall Prevention:   activity supervised   clutter free environment maintained   lighting adjusted   mobility aid in reach   nonskid shoes/slippers when out of bed   patient and family education   room near nurse's station   room organization consistent   safety round/check completed  Intervention: Prevent Skin Injury  Recent Flowsheet Documentation  Taken 7/9/2025 1950 by Mohinder Bermudez RN  Body Position: supine, head " elevated  Skin Protection:   adhesive use limited   incontinence pads utilized   tubing/devices free from skin contact  Intervention: Prevent and Manage VTE (Venous Thromboembolism) Risk  Recent Flowsheet Documentation  Taken 7/9/2025 1950 by Mohinder Bermudez RN  VTE Prevention/Management: SCDs off (sequential compression devices)  Intervention: Prevent Infection  Recent Flowsheet Documentation  Taken 7/9/2025 1950 by Mohinder Bermudez RN  Infection Prevention:   cohorting utilized   hand hygiene promoted   single patient room provided  Goal: Optimal Comfort and Wellbeing  Outcome: Progressing  Intervention: Provide Person-Centered Care  Recent Flowsheet Documentation  Taken 7/9/2025 1950 by Mohinder Bermudez RN  Trust Relationship/Rapport:   care explained   choices provided   questions answered   questions encouraged   thoughts/feelings acknowledged  Goal: Readiness for Transition of Care  Outcome: Progressing     Problem: Skin or Soft Tissue Infection  Goal: Absence of Infection Signs and Symptoms  Outcome: Progressing  Intervention: Minimize and Manage Infection Progression  Recent Flowsheet Documentation  Taken 7/9/2025 1950 by Mohinder Bermudez RN  Infection Prevention:   cohorting utilized   hand hygiene promoted   single patient room provided  Isolation Precautions: contact precautions maintained     Problem: Skin Injury Risk Increased  Goal: Skin Health and Integrity  Outcome: Progressing  Intervention: Plan: Nurse Driven Intervention: Moisture Management  Recent Flowsheet Documentation  Taken 7/9/2025 2000 by Mohinder Bermudez RN  Moisture Interventions:   Encourage regular toileting   Urinary collection device  Bathing/Skin Care: incontinence care  Taken 7/9/2025 1950 by Mohinder Bermudez RN  Moisture Interventions:   Encourage regular toileting   Urinary collection device  Intervention: Plan: Nurse Driven Intervention: Friction and Shear  Recent Flowsheet Documentation  Taken 7/9/2025 1950 by  Mohinder Bermudez RN  Friction/Shear Interventions: HOB 30 degrees or less  Intervention: Optimize Skin Protection  Recent Flowsheet Documentation  Taken 7/9/2025 1950 by Mohinder Bermudez RN  Skin Protection:   adhesive use limited   incontinence pads utilized   tubing/devices free from skin contact  Activity Management: activity adjusted per tolerance  Head of Bed (HOB) Positioning: HOB at 30-45 degrees     Problem: Comorbidity Management  Goal: Blood Pressure in Desired Range  Outcome: Progressing  Intervention: Maintain Blood Pressure Management  Recent Flowsheet Documentation  Taken 7/9/2025 1950 by Mohinder Bermudez RN  Medication Review/Management: medications reviewed     Problem: Pain Acute  Goal: Optimal Pain Control and Function  Outcome: Progressing  Intervention: Prevent or Manage Pain  Recent Flowsheet Documentation  Taken 7/9/2025 1950 by Mohinder Bermudez RN  Sleep/Rest Enhancement: regular sleep/rest pattern promoted  Medication Review/Management: medications reviewed

## 2025-07-10 NOTE — PROGRESS NOTES
[facility]    Hospitalist Progress Note  Name: Raven Carlin    MRN: [id number]  Provider:  Montana Saul DO  Date of Service: 07/10/2025    Summary of Stay: Raven Carlin is a 85 year old female with a history of chronic right lower extremity wounds and perianal wounds and abdominal fold wounds, paroxysmal atrial fibrillation on Eliquis, chronic kidney disease stage IIIb, hypertension, GERD, history of MRSA, OAB, SAURABH, chronic pain, obesity admitted on 7/7/2025 with right lower extremity pain.  In the emergency department, the patient was found to have a temperature of 99.2  F, blood pressure 133/72, heart rate 84, respiratory rate 18, SpO2 95% on room air.  Initial lab work showed sodium 133, BUN/creatinine 30.7/1.23, leukocytosis of 16.3, lactic acid 1.8.  The patient was started on vancomycin and IV Zosyn in the setting of right lower extremity cellulitis with sepsis.  1 out of 2 blood cultures returned positive for Streptococcus pyogenes.  Infectious disease was consulted to see the patient who adjusted the antibiotics to vancomycin, ceftriaxone, and clindamycin.     TODAY'S PLAN:  Appreciate ID recommendations.  Repeat blood cultures negative to date.  Still having swelling in RLE and still erythematous.  Hopeful for abx guidance per ID with hope of discharge in the next 1-2 days.  Appreciate PT recommendations as well.  All questions answered.    Problem List:   RLE Cellulitis  Strep Pyogenes Bacteremia  Chronic RLE Wounds, Perianal Wound, Abdominal Fold Wounds, Bilateral Buttocks and Sacrum, L Dorsal Foot - POA  Bilateral buttocks and sacrum, Stage 2 Pressure Injury, Present on Admission   - Appreciate ID recommendations  - Sepsis ruled out  - Vanco, ceftriaxone, clindamycin  - 1/2 blood cultures positive for strep pyogenes.  Repeat blood cultures pending  - Elevate RLE  - Appreciate WOC recommendations  - Appreciate PT recommendations     Chronic Medical Problems:  Paroxysmal Atrial  Fibrillation on Eliquis  Chronic Kidney Disease Stage 3b  Hypertension  GERD  Hx of MRSA  OAB  SAURABH  Chronic Pain  Obesity - BMI 34.03    I spent 42 minutes in reviewing this patient's labs, imaging, medications, medical history.  In addition time was spent interviewing the patient, communicating with family, and medical decision making.      DVT Prophylaxis: DOAC  Code Status: Full Code  Diet: Combination Diet Regular Diet Adult    Thornton Catheter: Not present    Disposition: Medically Ready for Discharge: Anticipated Tomorrow    Goals to discharge include: abx plan established  Family updated today: No     Interval History   Pt seen and examined.  Pt reports feeling like no change in her leg swelling.    -Data reviewed today: I personally reviewed all new labs and imaging results over the last 24 hours.     Physical Exam   Temp: 97.6  F (36.4  C) Temp src: Temporal BP: 124/56 Pulse: 67   Resp: 16 SpO2: 95 % O2 Device: None (Room air)    Vitals:    07/07/25 2145   Weight: 98.6 kg (217 lb 4.8 oz)     Vital Signs with Ranges  Temp:  [97.3  F (36.3  C)-97.7  F (36.5  C)] 97.6  F (36.4  C)  Pulse:  [67-70] 67  Resp:  [16] 16  BP: (105-132)/(49-60) 124/56  SpO2:  [95 %-98 %] 95 %  I/O last 3 completed shifts:  In: 840 [P.O.:840]  Out: 650 [Urine:650]    GENERAL: No apparent distress. Awake, alert, and fully oriented.  HEENT: Normocephalic, atraumatic. Extraocular movements intact.  CARDIOVASCULAR: Regular rate and rhythm without murmurs or rubs. No S3.  PULMONARY: Clear bilaterally.  GASTROINTESTINAL: Soft, non-tender, non-distended. Bowel sounds normoactive.   EXTREMITIES: Swelling and erythema of RLE  NEUROLOGICAL: CN 2-12 grossly intact, no focal neurological deficits.  DERMATOLOGICAL: No rash, ulcer, bruising, nor jaundice.    Medications   Current Facility-Administered Medications   Medication Dose Route Frequency Provider Last Rate Last Admin    Patient is already receiving anticoagulation with heparin, enoxaparin  (LOVENOX), warfarin (COUMADIN)  or other anticoagulant medication   Does not apply Continuous PRN Radha Salazar PA-C         Current Facility-Administered Medications   Medication Dose Route Frequency Provider Last Rate Last Admin    apixaban ANTICOAGULANT (ELIQUIS) tablet 5 mg  5 mg Oral BID Radha Salazar PA-C   5 mg at 07/10/25 0820    cefTRIAXone (ROCEPHIN) 2 g vial to attach to  ml bag for ADULTS or NS 50 ml bag for PEDS  2 g Intravenous Q24H Brayan Dawn MD   2 g at 07/10/25 1037    cetirizine (zyrTEC) tablet 10 mg  10 mg Oral Daily Radha Salazar PA-C   10 mg at 07/10/25 0820    clindamycin (CLEOCIN) 900 mg in 50 mL D5W intermittent infusion  900 mg Intravenous Q8H Brayan Dawn  mL/hr at 07/10/25 0611 900 mg at 07/10/25 0611    famotidine (PEPCID) tablet 20 mg  20 mg Oral QPM Radha Salazar PA-C   20 mg at 07/09/25 2005    ferrous gluconate (FERGON) tablet 324 mg  324 mg Oral Daily Radha Salazar PA-C   324 mg at 07/10/25 0820    magnesium oxide (MAG-OX) tablet 400 mg  400 mg Oral BID Radha Salazar PA-C   400 mg at 07/10/25 0820    metoprolol tartrate (LOPRESSOR) tablet 50 mg  50 mg Oral BID Radha Salazar PA-C   50 mg at 07/10/25 0820    sennosides (SENOKOT) tablet 1-2 tablet  1-2 tablet Oral Daily oMntana Saul DO   2 tablet at 07/10/25 0820    sodium chloride (PF) 0.9% PF flush 3 mL  3 mL Intracatheter Q8H [facility] Radha Salazar PA-C   3 mL at 07/10/25 0611    tolterodine (DETROL) tablet 2 mg  2 mg Oral BID Radha Salazar PA-C   2 mg at 07/10/25 0820    vancomycin (VANCOCIN) 1,250 mg in 0.9% NaCl 250 mL intermittent infusion  1,250 mg Intravenous Q24H Radha Salazar PA-C   1,250 mg at 07/09/25 1210     Data     Laboratory:  Recent Labs   Lab 07/10/25  0919 07/09/25  0653 07/08/25  0749   WBC 9.5 10.9 13.9*   HGB 11.4* 11.1* 11.1*   HCT 33.8* 33.7* 33.6*   MCV 97 97 98    222 195     Recent Labs   Lab 07/10/25  0919  "07/09/25  0653 07/08/25  0749   * 137 134*   POTASSIUM 4.0 3.8 4.0   CHLORIDE 100 103 100   CO2 23 24 24   ANIONGAP 11 10 10   GLC 91 92 90   BUN 20.8 23.4* 24.1*   CR 1.08* 1.11* 1.12*   GFRESTIMATED 50* 49* 48*   LAUREL 8.6* 8.5* 8.4*     No results for input(s): \"CULT\" in the last 168 hours.  Troponin I ES   Date Value Ref Range Status   03/12/2020 <0.015 0.000 - 0.045 ug/L Final     Comment:     The 99th percentile for upper reference range is 0.045 ug/L.  Troponin values   in the range of 0.045 - 0.120 ug/L may be associated with risks of adverse   clinical events.         Imaging:  No results found for this or any previous visit (from the past 24 hours).      Montana Saul DO  [facility] Hospitalist  [address] [place] [place] [address]  Securely message with [employer] (more info)  Text page via [facility] Paging/Directory   07/10/2025   "

## 2025-07-10 NOTE — PROGRESS NOTES
[facility]    Infectious Disease Progress Note    Date of Service (when I saw the patient): 07/10/2025     Assessment & Plan   Raven Carlin is a 85 year old female who was admitted on 7/7/2025.   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   Found ot be bacteremic with GAS      Recommendations   Can stop vanco and clindamycin   Continue on ceftriaxone alone , in hospital till further improvement in cellulitis   Given patient was bacteremic with GAS even though transiently will need some IV antibiotics 10 - 14 days   Follow up on the repeat culture   No PICC or midline unless repeat blood culture negative       Brayan Dawn MD    Interval History   Tolerating antibiotics ok   No new rashes or issues with antibiotics   Labs reviewed   No changes to past medical, social or family history   Feels better       Physical Exam   Temp: 97.6  F (36.4  C) Temp src: Temporal BP: 124/56 Pulse: 67   Resp: 16 SpO2: 95 % O2 Device: None (Room air)    Vitals:    07/07/25 2145   Weight: 98.6 kg (217 lb 4.8 oz)     Vital Signs with Ranges  Temp:  [97.3  F (36.3  C)-97.7  F (36.5  C)] 97.6  F (36.4  C)  Pulse:  [67-70] 67  Resp:  [16] 16  BP: (105-132)/(49-60) 124/56  SpO2:  [95 %-98 %] 95 %    Constitutional: Awake, alert, cooperative, no apparent distress  Lungs: Clear to auscultation bilaterally, no crackles or wheezing  Cardiovascular: Regular rate and rhythm, normal S1 and S2, and no murmur noted  Abdomen: Normal bowel sounds, soft, non-distended, non-tender  Skin: No rashes, no cyanosis, no edema  Other:    Medications   Current Facility-Administered Medications   Medication Dose Route Frequency Provider Last Rate Last Admin    Patient is already receiving anticoagulation with heparin, enoxaparin (LOVENOX), warfarin (COUMADIN)  or other  anticoagulant medication   Does not apply Continuous PRN Radha Salazar PA-C         Current Facility-Administered Medications   Medication Dose Route Frequency Provider Last Rate Last Admin    apixaban ANTICOAGULANT (ELIQUIS) tablet 5 mg  5 mg Oral BID Radha Salazar PA-C   5 mg at 07/10/25 0820    cefTRIAXone (ROCEPHIN) 2 g vial to attach to  ml bag for ADULTS or NS 50 ml bag for PEDS  2 g Intravenous Q24H Brayan Dawn MD   2 g at 07/10/25 1037    cetirizine (zyrTEC) tablet 10 mg  10 mg Oral Daily Radha Salazar PA-C   10 mg at 07/10/25 0820    famotidine (PEPCID) tablet 20 mg  20 mg Oral QPM Radha Salazar PA-C   20 mg at 07/09/25 2005    ferrous gluconate (FERGON) tablet 324 mg  324 mg Oral Daily Radha Salazar PA-C   324 mg at 07/10/25 0820    magnesium oxide (MAG-OX) tablet 400 mg  400 mg Oral BID Radha Salazar PA-C   400 mg at 07/10/25 0820    metoprolol tartrate (LOPRESSOR) tablet 50 mg  50 mg Oral BID Radha Salazar PA-C   50 mg at 07/10/25 0820    sennosides (SENOKOT) tablet 1-2 tablet  1-2 tablet Oral Daily Montana Saul DO   2 tablet at 07/10/25 0820    sodium chloride (PF) 0.9% PF flush 3 mL  3 mL Intracatheter Q8H [facility] Radha Salazar PA-C   3 mL at 07/10/25 0611    tolterodine (DETROL) tablet 2 mg  2 mg Oral BID Radha Salazar PA-C   2 mg at 07/10/25 0820       Data   All microbiology laboratory data reviewed.  Recent Labs   Lab Test 07/10/25  0919 07/09/25  0653 07/08/25  0749   WBC 9.5 10.9 13.9*   HGB 11.4* 11.1* 11.1*   HCT 33.8* 33.7* 33.6*   MCV 97 97 98    222 195     Recent Labs   Lab Test 07/10/25  0919 07/09/25  0653 07/08/25  0749   CR 1.08* 1.11* 1.12*     No lab results found.  Recent Labs   Lab Test 06/21/21  1524 06/21/21  1512 06/21/21  1354   CULT No growth No growth >100,000 colonies/mL  Escherichia coli  This isolate does not meet the criteria of an ESBL . A different resistance   mechanism may be  present.  *  >100,000 colonies/mL  Strain 2  Escherichia coli ESBL  ESBL (extended beta lactamase) producing organisms require contact precautions.  *       All cultures:  Recent Labs   Lab 07/08/25  1050 07/08/25  1036 07/07/25  2142 07/07/25  1113 07/07/25  1106   CULTURE No growth after 1 day No growth after 1 day No Growth Positive on the 1st day of incubation*  Streptococcus pyogenes (Group A Streptococcus)* No growth after 2 days      Blood culture:  Results for orders placed or performed during the hospital encounter of 07/07/25   Blood Culture Peripheral blood (BC) Hand, Right    Collection Time: 07/08/25 10:50 AM    Specimen: Hand, Right; Peripheral blood (BC)   Result Value Ref Range    Culture No growth after 1 day    Blood Culture Peripheral blood (BC) Hand, Left    Collection Time: 07/08/25 10:36 AM    Specimen: Hand, Left; Peripheral blood (BC)   Result Value Ref Range    Culture No growth after 1 day    Blood Culture Peripheral blood (BC) Hand, Right    Collection Time: 07/07/25 11:13 AM    Specimen: Hand, Right; Peripheral blood (BC)   Result Value Ref Range    Culture Positive on the 1st day of incubation (A)     Culture Streptococcus pyogenes (Group A Streptococcus) (AA)        Susceptibility    Streptococcus pyogenes (Group A Streptococcus) - SUE     Penicillin <=0.016 Susceptible ug/mL     Cefotaxime 0.016 Susceptible ug/mL     Ceftriaxone 0.032 Susceptible ug/mL     Vancomycin 0.75 Susceptible ug/mL     Meropenem 0.008 Susceptible ug/mL   Blood Culture Peripheral blood (BC) Arm, Right    Collection Time: 07/07/25 11:06 AM    Specimen: Arm, Right; Peripheral blood (BC)   Result Value Ref Range    Culture No growth after 2 days    Results for orders placed or performed during the hospital encounter of 04/09/22   Blood Culture Hand, Left    Collection Time: 04/09/22  6:36 PM    Specimen: Hand, Left; Blood   Result Value Ref Range    Culture No Growth    Blood Culture Peripheral Blood     Collection Time: 04/09/22  6:03 PM    Specimen: Peripheral Blood   Result Value Ref Range    Culture No Growth    Results for orders placed or performed during the hospital encounter of 06/21/21   Blood culture    Collection Time: 06/21/21  3:24 PM    Specimen: Blood    Left Hand   Result Value Ref Range    Specimen Description Blood Left Hand     Culture Micro No growth    Blood culture    Collection Time: 06/21/21  3:12 PM    Specimen: Blood    Right Arm   Result Value Ref Range    Specimen Description Blood Right Arm     Culture Micro No growth       Urine culture:  Results for orders placed or performed during the hospital encounter of 07/07/25   Urine Culture    Collection Time: 07/07/25  9:42 PM    Specimen: Urine, Clean Catch   Result Value Ref Range    Culture No Growth    Results for orders placed or performed during the hospital encounter of 04/20/22   Urine Culture    Collection Time: 04/20/22  3:12 PM    Specimen: Urine, Clean Catch   Result Value Ref Range    Culture <10,000 CFU/mL Mixture of urogenital bentley    Results for orders placed or performed during the hospital encounter of 04/09/22   Urine Culture    Collection Time: 04/09/22  7:40 PM    Specimen: Urine, Catheter   Result Value Ref Range    Culture >100,000 CFU/mL Escherichia coli ESBL (A)        Susceptibility    Escherichia coli ESBL - SUE*     Ampicillin >=32.0 Resistant ug/mL     Piperacillin/Tazobactam 64.0 Intermediate ug/mL     Cefazolin* >=64.0 Resistant ug/mL      * Cefazolin SUE breakpoints are for the treatment of uncomplicated urinary tract infections. For the treatment of systemic infections, please contact the laboratory for additional testing.     Cefoxitin >=64.0 Resistant ug/mL     Ceftazidime  Resistant      Ceftriaxone  Resistant      Cefepime  Resistant      Meropenem* <=0.25 Susceptible ug/mL      * Enterobacterales that are susceptible to meropenem are usually susceptible to ertapenem.     Gentamicin <=1.0 Susceptible  ug/mL     Tobramycin <=1.0 Susceptible ug/mL     Ciprofloxacin <=0.25 Susceptible ug/mL     Levofloxacin <=0.12 Susceptible ug/mL     Nitrofurantoin <=16.0 Susceptible ug/mL     Trimethoprim/Sulfamethoxazole <=1/19 Susceptible ug/mL     * ESBL (extended spectrum beta lactamase) producing organisms require contact precautions.   Results for orders placed or performed during the hospital encounter of 06/21/21   Urine Culture Aerobic Bacterial    Collection Time: 06/21/21  1:54 PM    Specimen: Catheterized Urine   Result Value Ref Range    Specimen Description Catheterized Urine     Special Requests Specimen received in preservative     Culture Micro (A)      >100,000 colonies/mL  Escherichia coli  This isolate does not meet the criteria of an ESBL . A different resistance   mechanism may be present.      Culture Micro (A)      >100,000 colonies/mL  Strain 2  Escherichia coli ESBL  ESBL (extended beta lactamase) producing organisms require contact precautions.         Susceptibility    Escherichia coli ESBL - SUE     Ampicillin >=32 Resistant ug/mL     Cefazolin* >=64 Resistant ug/mL      * Cefazolin SUE breakpoints are for the treatment of uncomplicated urinary tract infections.  For the treatment of systemic infections, please contact the laboratory for additional testing.Cefazolin SUE breakpoints are for the treatment of uncomplicated urinary tract infections.  For the treatment of systemic infections, please contact the laboratory for additional testing.     Cefoxitin >=64 Resistant ug/mL     Ciprofloxacin <=0.25 Susceptible ug/mL     Gentamicin <=1 Susceptible ug/mL     Levofloxacin <=0.12 Susceptible ug/mL     Nitrofurantoin <=16 Susceptible ug/mL     Tobramycin <=1 Susceptible ug/mL     Trimethoprim/Sulfamethoxazole <=1/19 Susceptible ug/mL     Ampicillin/Sulbactam >=32 Resistant ug/mL     Piperacillin/Tazo >=128 Resistant ug/mL     Meropenem* <=0.25 Susceptible ug/mL      * Cefazolin SUE breakpoints are  for the treatment of uncomplicated urinary tract infections.  For the treatment of systemic infections, please contact the laboratory for additional testing.Cefazolin SUE breakpoints are for the treatment of uncomplicated urinary tract infections.  For the treatment of systemic infections, please contact the laboratory for additional testing.Enterobacteriaceae that are susceptible to meropenem are usually susceptible to ertapenem.     Ceftazidime >64.0 Resistant ug/mL     Ceftriaxone >64.0 Resistant ug/mL     Cefepime  Resistant ug/mL    Escherichia coli - SUE     Ampicillin >=32 Resistant ug/mL     Cefazolin* >=64 Resistant ug/mL      * Cefazolin SUE breakpoints are for the treatment of uncomplicated urinary tract infections.  For the treatment of systemic infections, please contact the laboratory for additional testing.     Cefoxitin >=64 Resistant ug/mL     Ciprofloxacin <=0.25 Susceptible ug/mL     Gentamicin <=1 Susceptible ug/mL     Levofloxacin <=0.12 Susceptible ug/mL     Nitrofurantoin <=16 Susceptible ug/mL     Tobramycin <=1 Susceptible ug/mL     Trimethoprim/Sulfamethoxazole <=1/19 Susceptible ug/mL     Ampicillin/Sulbactam >=32 Resistant ug/mL     Piperacillin/Tazo 32 Intermediate ug/mL     Meropenem <=0.25 Susceptible ug/mL     Ceftazidime >32.0 Resistant ug/mL     Ceftriaxone >256.0 Resistant ug/mL     Cefepime 1.0 Susceptible ug/mL

## 2025-07-11 ENCOUNTER — APPOINTMENT (OUTPATIENT)
Dept: PHYSICAL THERAPY | Facility: CLINIC | Age: 85
DRG: 603 | End: 2025-07-11
Payer: COMMERCIAL

## 2025-07-11 VITALS
DIASTOLIC BLOOD PRESSURE: 72 MMHG | BODY MASS INDEX: 34.03 KG/M2 | RESPIRATION RATE: 16 BRPM | OXYGEN SATURATION: 99 % | WEIGHT: 217.3 LBS | SYSTOLIC BLOOD PRESSURE: 139 MMHG | TEMPERATURE: 97.7 F | HEART RATE: 82 BPM

## 2025-07-11 PROCEDURE — 250N000013 HC RX MED GY IP 250 OP 250 PS 637: Performed by: PHYSICIAN ASSISTANT

## 2025-07-11 PROCEDURE — 250N000013 HC RX MED GY IP 250 OP 250 PS 637: Performed by: INTERNAL MEDICINE

## 2025-07-11 PROCEDURE — 272N000748 HC KIT, CATH 3FR OR 4FR SINGLE LUMEN POWERMIDLINE

## 2025-07-11 PROCEDURE — 36569 INSJ PICC 5 YR+ W/O IMAGING: CPT

## 2025-07-11 PROCEDURE — 99232 SBSQ HOSP IP/OBS MODERATE 35: CPT | Performed by: INTERNAL MEDICINE

## 2025-07-11 PROCEDURE — 250N000011 HC RX IP 250 OP 636: Performed by: INTERNAL MEDICINE

## 2025-07-11 PROCEDURE — 97530 THERAPEUTIC ACTIVITIES: CPT | Mod: GP

## 2025-07-11 PROCEDURE — 250N000013 HC RX MED GY IP 250 OP 250 PS 637: Performed by: STUDENT IN AN ORGANIZED HEALTH CARE EDUCATION/TRAINING PROGRAM

## 2025-07-11 PROCEDURE — 97116 GAIT TRAINING THERAPY: CPT | Mod: GP

## 2025-07-11 RX ORDER — CEFTRIAXONE 1 G/1
2000 INJECTION, POWDER, FOR SOLUTION INTRAMUSCULAR; INTRAVENOUS DAILY
DISCHARGE
Start: 2025-07-11 | End: 2025-07-18

## 2025-07-11 RX ORDER — LIDOCAINE 40 MG/G
CREAM TOPICAL
Status: DISCONTINUED | OUTPATIENT
Start: 2025-07-11 | End: 2025-07-11 | Stop reason: HOSPADM

## 2025-07-11 RX ADMIN — CETIRIZINE HYDROCHLORIDE 10 MG: 10 TABLET, FILM COATED ORAL at 08:40

## 2025-07-11 RX ADMIN — METOPROLOL TARTRATE 50 MG: 50 TABLET, FILM COATED ORAL at 08:40

## 2025-07-11 RX ADMIN — OXYCODONE HYDROCHLORIDE AND ACETAMINOPHEN 1 TABLET: 7.5; 325 TABLET ORAL at 14:06

## 2025-07-11 RX ADMIN — Medication 400 MG: at 08:40

## 2025-07-11 RX ADMIN — OXYCODONE HYDROCHLORIDE AND ACETAMINOPHEN 1 TABLET: 7.5; 325 TABLET ORAL at 07:02

## 2025-07-11 RX ADMIN — FERROUS GLUCONATE 324 MG: 324 TABLET ORAL at 08:40

## 2025-07-11 RX ADMIN — CEFTRIAXONE 2 G: 2 INJECTION, POWDER, FOR SOLUTION INTRAMUSCULAR; INTRAVENOUS at 10:27

## 2025-07-11 RX ADMIN — SENNOSIDES AND DOCUSATE SODIUM 1 TABLET: 50; 8.6 TABLET ORAL at 14:59

## 2025-07-11 RX ADMIN — SENNOSIDES 1 TABLET: 8.6 TABLET, FILM COATED ORAL at 08:40

## 2025-07-11 RX ADMIN — APIXABAN 5 MG: 5 TABLET, FILM COATED ORAL at 08:40

## 2025-07-11 RX ADMIN — TOLTERODINE TARTRATE 2 MG: 1 TABLET, FILM COATED ORAL at 08:40

## 2025-07-11 RX ADMIN — OXYCODONE HYDROCHLORIDE AND ACETAMINOPHEN 1 TABLET: 7.5; 325 TABLET ORAL at 01:02

## 2025-07-11 ASSESSMENT — ACTIVITIES OF DAILY LIVING (ADL)
ADLS_ACUITY_SCORE: 67
ADLS_ACUITY_SCORE: 66
ADLS_ACUITY_SCORE: 70
ADLS_ACUITY_SCORE: 67

## 2025-07-11 NOTE — PLAN OF CARE
"Goal Outcome Evaluation:    Plan of Care Reviewed With: patient    Overall Patient Progress: improving    Outcome Evaluation: Up with A1-2 GB/walker. Forgetful. PIV saline locked. Rocephin continued. Remained on contact precaution. PRN percocet given for pain. Medium BM this morning. Plan for discharge back home with daughter today.    Problem: Adult Inpatient Plan of Care  Goal: Plan of Care Review  Description: The Plan of Care Review/Shift note should be completed every shift.  The Outcome Evaluation is a brief statement about your assessment that the patient is improving, declining, or no change.  This information will be displayed automatically on your shift  note.  Outcome: Progressing  Flowsheets (Taken 7/11/2025 0249)  Outcome Evaluation: Up with A1-2 GB/walker. Forgetful. PIV saline locked. Rocephin continued. Remained on contact precaution. PRN percocet given for pain. Medium BM this morning. Plan for discharge back home with daughter today.  Plan of Care Reviewed With: patient  Overall Patient Progress: improving  Goal: Patient-Specific Goal (Individualized)  Description: You can add care plan individualizations to a care plan. Examples of Individualization might be:  \"Parent requests to be called daily at 9am for status\", \"I have a hard time hearing out of my right ear\", or \"Do not touch me to wake me up as it startles  me\".  Outcome: Progressing  Goal: Absence of Hospital-Acquired Illness or Injury  Outcome: Progressing  Intervention: Identify and Manage Fall Risk  Recent Flowsheet Documentation  Taken 7/10/2025 2005 by Mohinder Bermudez RN  Safety Promotion/Fall Prevention:   activity supervised   clutter free environment maintained   lighting adjusted   mobility aid in reach   nonskid shoes/slippers when out of bed   patient and family education   room near nurse's station   room organization consistent   safety round/check completed  Intervention: Prevent Skin Injury  Recent Flowsheet " Documentation  Taken 7/10/2025 2005 by Mohinder Bermudez RN  Body Position: supine, head elevated  Skin Protection:   adhesive use limited   incontinence pads utilized   tubing/devices free from skin contact  Intervention: Prevent and Manage VTE (Venous Thromboembolism) Risk  Recent Flowsheet Documentation  Taken 7/10/2025 2005 by Mohinder Bermudez RN  VTE Prevention/Management: SCDs off (sequential compression devices)  Intervention: Prevent Infection  Recent Flowsheet Documentation  Taken 7/10/2025 2005 by Mohinder Bermudez RN  Infection Prevention:   cohorting utilized   hand hygiene promoted   single patient room provided  Goal: Optimal Comfort and Wellbeing  Outcome: Progressing  Intervention: Provide Person-Centered Care  Recent Flowsheet Documentation  Taken 7/10/2025 2005 by Mohinder Bermudez RN  Trust Relationship/Rapport:   care explained   choices provided   questions answered   questions encouraged   thoughts/feelings acknowledged  Goal: Readiness for Transition of Care  Outcome: Progressing     Problem: Skin or Soft Tissue Infection  Goal: Absence of Infection Signs and Symptoms  Outcome: Progressing  Intervention: Minimize and Manage Infection Progression  Recent Flowsheet Documentation  Taken 7/10/2025 2005 by Mohinder Bermudez RN  Infection Prevention:   cohorting utilized   hand hygiene promoted   single patient room provided  Isolation Precautions: contact precautions maintained     Problem: Skin Injury Risk Increased  Goal: Skin Health and Integrity  Outcome: Progressing  Intervention: Plan: Nurse Driven Intervention: Moisture Management  Recent Flowsheet Documentation  Taken 7/10/2025 2005 by Mohinder Bermudez RN  Moisture Interventions: Encourage regular toileting  Taken 7/10/2025 2000 by Mohinder Bermudez RN  Moisture Interventions:   Encourage regular toileting   No brief in bed   Incontinence pad   Urinary collection device  Bathing/Skin Care: incontinence care  Intervention: Plan:  Nurse Driven Intervention: Friction and Shear  Recent Flowsheet Documentation  Taken 7/10/2025 2005 by Mohinder Bermudez RN  Friction/Shear Interventions: HOB 30 degrees or less  Intervention: Optimize Skin Protection  Recent Flowsheet Documentation  Taken 7/10/2025 2005 by Mohinder Bermudez RN  Skin Protection:   adhesive use limited   incontinence pads utilized   tubing/devices free from skin contact  Activity Management: activity adjusted per tolerance  Head of Bed (HOB) Positioning: HOB at 30-45 degrees     Problem: Comorbidity Management  Goal: Blood Pressure in Desired Range  Outcome: Progressing  Intervention: Maintain Blood Pressure Management  Recent Flowsheet Documentation  Taken 7/10/2025 2005 by Mohinder Bermudez RN  Medication Review/Management: medications reviewed     Problem: Pain Acute  Goal: Optimal Pain Control and Function  Outcome: Progressing  Intervention: Prevent or Manage Pain  Recent Flowsheet Documentation  Taken 7/10/2025 2005 by Mohinder Bermudez RN  Sleep/Rest Enhancement: regular sleep/rest pattern promoted  Medication Review/Management: medications reviewed

## 2025-07-11 NOTE — PROCEDURES
[facility]    Single Lumen Midline Placement    Date/Time: 7/11/2025 2:00 PM    Performed by: Trang Miller RN  Authorized by: Brayan Dawn MD  Indications: vascular access      UNIVERSAL PROTOCOL   Site Marked: Yes  Prior Images Obtained and Reviewed:  Yes  Required items: Required blood products, implants, devices and special equipment available    Patient identity confirmed:  Verbally with patient, arm band, provided demographic data and hospital-assigned identification number  NA - No sedation, light sedation, or local anesthesia  Confirmation Checklist:  Patient's identity using two indicators, relevant allergies, procedure was appropriate and matched the consent or emergent situation and correct equipment/implants were available  Time out: Immediately prior to the procedure a time out was called    Universal Protocol: the Joint Commission Universal Protocol was followed    Preparation: Patient was prepped and draped in usual sterile fashion       ANESTHESIA    Anesthesia:  Local infiltration  Local Anesthetic:  Lidocaine 1% without epinephrine  Anesthetic Total (mL):  2      SEDATION    Patient Sedated: No        Preparation: skin prepped with ChloraPrep  Skin prep agent: skin prep agent completely dried prior to procedure  Sterile barriers: maximum sterile barriers were used: cap, mask, sterile gown, sterile gloves, and large sterile sheet  Hand hygiene: hand hygiene performed prior to central venous catheter insertion  Type of line used: Midline  Catheter type: single lumen  Lumen type: non-valved  Catheter size: 4 Fr  Brand: Bard  Lot number: [id number]  Placement method: venipuncture, MST and ultrasound  Number of attempts: 1  Difficulty threading catheter: no  Successful placement: yes  Orientation: left  Catheter to Vein (%): 37  Location: cephalic vein  Tip Location: distal to axillary vein  Site rationale: pt preference due to ROM  Arm circumference: adults 10 cm  Extremity  circumference: 45  Visible catheter length: 2  Total catheter length: 15  Dressing and securement: additional securement method (see comment), alcohol impregnated caps, blood cleaned with CHG, chlorhexidine disc applied, dressing applied, securement device, site cleansed, subcutaneous anchor securement system and transparent dressing  Post procedure assessment: blood return through all ports and free fluid flow  PROCEDURE    CHG bathing and linen change completed prior to midline insertion. Midline inserted into LUE cephalic vein without difficulty. However, pt restless and anxious throughout procedure due to BM concerns. Multiple distraction techniques utilized by VAT RN and was able to finish midline successfully. Bedside RN updated, see flowsheet for additional information.  Patient Tolerance:  Patient tolerated the procedure well with no immediate complications

## 2025-07-11 NOTE — PROGRESS NOTES
ID brief note:   Repeat blood culture negative at 2 days     Continue on ceftriaxone  Given patient was bacteremic with GAS even though transiently will need IV antibiotics   Ok to place midline   OPIV antibiotics orders are in the chart for when ready     Brayan Dawn MD  Infectious Disease

## 2025-07-11 NOTE — PROGRESS NOTES
Care Management Discharge Note    Discharge Date: 07/11/2025       Discharge Disposition: Home Care w [facility], HHA w [facility], Home Infusion w [facility], PCA w [facility] for 4hrs/week    Discharge Services: Home Care    Discharge DME: None    Discharge Transportation: family or friend will provide    Patient/family educated on Medicare website which has current facility and service quality ratings: yes    Education Provided on the Discharge Plan:  yes  Persons Notified of Discharge Plans: patient, bedside/charge RN, hospitalist, [facility], [facility] , [facility]  Patient/Family in Agreement with the Plan: yes    Handoff Referral Completed: No, handoff not indicated or clinically appropriate    Additional Information:  Discharge order in place and signed today.     Patient will be discharging home w home infusion services for IV abx provided by [facility]. [facility]ice [facility] RN [facility] ph: [phone] completed home infusion teaching w patient at bedside today. CM updated Consuelo w [facility] ph: [phone] that patient will discharge home this afternoon, is having a midline placed, and final abx was determined to be ceftriaxone per ID. Discharge orders were faxed to them at [phone], they were encouraged to reach out w questions. Meds/supplies to be delivered to patient's home this evening and [facility] RN to do reinforcement of infusion teaching at patient's home tomorrow. Their contact info was added to AVS.    HH RN/PT will be provided by [facility] , discharge orders were sent and they were notified patient will discharge today. Their contact info was added to AVS.    HHA services will continue to be provided by [facility], orders were sent.     Addendum 1600: Vascular access documentation of midline placement faxed to [facility] fax: [phone] per their request.    Jane Pineda, RN, BSN  Inpatient Care Coordination  [facility]  [facility]  [phone]

## 2025-07-11 NOTE — PLAN OF CARE
Physical Therapy Discharge Summary    Reason for therapy discharge:    Discharged to home with home therapy.    Progress towards therapy goal(s). See goals on Care Plan in [facility] electronic health record for goal details.  Goals partially met.  Barriers to achieving goals:   discharge from facility.    Therapy recommendation(s):    Continued therapy is recommended.  Rationale/Recommendations:  Home with assist and homecare to maximize return to PLOF.

## 2025-07-11 NOTE — DISCHARGE SUMMARY
Hospitalist Discharge Summary  [facility]    Raven Carlin MRN# [id number]   YOB: 1940 Age: 85 year old     Date of Admission:  7/7/2025  Date of Discharge:  7/11/2025  Admitting Physician:  Geovani Everett MD  Discharge Physician:  Montana Saul DO  Discharging Service:  Hospitalist     Primary Provider: Rola Burgess          Discharge Diagnosis:     RLE Cellulitis  Strep Pyogenes Bacteremia  Chronic RLE Wounds, Perianal Wound, Abdominal Fold Wounds, Bilateral Buttocks and Sacrum, L Dorsal Foot - POA  Bilateral buttocks and sacrum, Stage 2 Pressure Injury, Present on Admission   - Appreciate ID recommendations  - Sepsis ruled out  - Vanco, ceftriaxone, clindamycin.  Simplified to IV ceftriaxone alone on 7/11  - 1/2 blood cultures positive for strep pyogenes.  Repeat blood cultures NTD  - Elevate RLE  - Appreciate WOC recommendations  - Appreciate PT recommendations     Chronic Medical Problems:  Paroxysmal Atrial Fibrillation on Eliquis  Chronic Kidney Disease Stage 3b  Hypertension  GERD  Hx of MRSA  OAB  SAURABH  Chronic Pain  Obesity - BMI 34.03             Discharge Disposition:     Discharged to home with home infusion           Hospital Course:     Raven Carlin is a 85 year old female with a history of chronic right lower extremity wounds and perianal wounds and abdominal fold wounds, paroxysmal atrial fibrillation on Eliquis, chronic kidney disease stage IIIb, hypertension, GERD, history of MRSA, OAB, SAURABH, chronic pain, obesity admitted on 7/7/2025 with right lower extremity pain.  In the emergency department, the patient was found to have a temperature of 99.2  F, blood pressure 133/72, heart rate 84, respiratory rate 18, SpO2 95% on room air.  Initial lab work showed sodium 133, BUN/creatinine 30.7/1.23, leukocytosis of 16.3, lactic acid 1.8.  The patient was started on vancomycin and IV Zosyn in the setting of right lower extremity cellulitis with sepsis.  1 out of  2 blood cultures returned positive for Streptococcus pyogenes.  Infectious disease was consulted to see the patient who adjusted the antibiotics to vancomycin, ceftriaxone, and clindamycin, which was simplified to IV ceftriaxone on 7/11.  Repeat blood cultures negative.  On 7/11, a midline was placed and the patient was discharged home with IV ceftriaxone for 7 days.    The patient was seen, examined, and counseled on this day. The hospitalization and plan of care was reviewed with the patient extensively. All questions were addressed and the patient agreed to follow-up as noted above.           Allergies:     Allergies   Allergen Reactions    Bactrim [Sulfamethoxazole W-Trimethoprim] Nausea    Epinephrine Other (See Comments)     Heart races    Ketamine      Felt like she was dying    Lisinopril Cough    Simvastatin Itching              Discharge Medications:     Current Discharge Medication List        START taking these medications    Details   cefTRIAXone (ROCEPHIN) 1 GM vial Inject 2 g (2,000 mg) over 15-30 minutes into the vein daily for 7 days. CBC with differential, creatinine, SGOT weekly while on this medication to be faxed to Dr. Rivas office.    Associated Diagnoses: Bacteremia           CONTINUE these medications which have NOT CHANGED    Details   Biotin 10 MG CAPS Take 1 capsule by mouth daily.      calcium carbonate (OS-LAUREL) 500 MG tablet Take 1 tablet by mouth daily.      cetirizine (ZYRTEC) 5 MG tablet Take 2 tablets (10 mg) by mouth daily    Associated Diagnoses: Seasonal allergic rhinitis, unspecified trigger      CRANBERRY PO Take 1 capsule by mouth daily.      ELIQUIS ANTICOAGULANT 5 MG tablet Take 5 mg by mouth 2 times daily.      estradiol (VAGIFEM) 10 MCG TABS vaginal tablet Place 10 mcg vaginally twice a week. On Mondays and Thursdays      famotidine (PEPCID) 20 MG tablet Take 1 tablet (20 mg) by mouth every evening    Associated Diagnoses: Gastroesophageal reflux disease without  esophagitis      Ferrous Gluconate 324 (37.5 Fe) MG TABS Take 1 tablet (324 mg) by mouth daily    Associated Diagnoses: Chronic renal failure, stage 2 (mild)      fluticasone (FLONASE) 50 MCG/ACT nasal spray Spray 2 sprays into both nostrils daily    Associated Diagnoses: Seasonal allergic rhinitis, unspecified trigger      magnesium oxide 400 MG tablet Take 400 mg by mouth 2 times daily.      methenamine hippurate (HIPREX) 1 g tablet Take 1 g by mouth daily.      metoprolol tartrate (LOPRESSOR) 50 MG tablet Take 1 tablet (50 mg) by mouth 2 times daily Appointment required for further refills  Qty: 180 tablet, Refills: 0    Comments: Hold for sbp<120., HR<60  Associated Diagnoses: Permanent atrial fibrillation (H)      nystatin (MYCOSTATIN) 688981 UNIT/GM external cream Apply topically 2 times daily.      ondansetron (ZOFRAN) 4 MG tablet Take 4 mg by mouth every 8 hours as needed for nausea or vomiting.      oxyCODONE-acetaminophen (PERCOCET) 7.5-325 MG per tablet Take 1 tablet by mouth every 6 hours.      trospium (SANCTURA) 20 MG tablet Take 20 mg by mouth 2 times daily.                    Condition on Discharge:     Discharge condition: Fair   Discharge vitals: Blood pressure 139/72, pulse 82, temperature 97.7  F (36.5  C), temperature source Temporal, resp. rate 16, weight 98.6 kg (217 lb 4.8 oz), SpO2 99%, not currently breastfeeding.   Code status on discharge: Full Code      BASIC PHYSICAL EXAMINATION:  GENERAL: No apparent distress.  CARDIOVASCULAR: Regular rate and rhythm without murmurs.  PULMONARY: Clear to auscultation bilaterally.   GASTROINTESTINAL: Abdomen soft, non-tender.  EXTREMITIES: No edema, pulses intact.  NEUROLOGIC: No focal deficits.            History of Illness:   See detailed admission note for full details.               Procedures excluding imaging which is summarized below:     Please see details in the electronic medical record.           Consultations:     PHARMACY TO DOSE  VANCO  PHARMACY TO DOSE VANCO  WOUND OSTOMY CONTINENCE NURSE  IP CONSULT  PHYSICAL THERAPY ADULT IP CONSULT  CARE MANAGEMENT / SOCIAL WORK IP CONSULT  INFECTIOUS DISEASES IP CONSULT  WOUND OSTOMY CONTINENCE NURSE  IP CONSULT  WOUND OSTOMY CONTINENCE NURSE  IP CONSULT  PHYSICAL THERAPY ADULT IP CONSULT  VASCULAR ACCESS ADULT IP CONSULT          Significant Results:     Results for orders placed or performed during the hospital encounter of 01/31/25   US Lower Extremity Venous Duplex Right    Narrative    EXAM: US LOWER EXTREMITY VENOUS DUPLEX RIGHT  LOCATION: [facility]  DATE: 1/31/2025    INDICATION: swelling and redness to right shin  COMPARISON: None.  TECHNIQUE: Venous Duplex ultrasound of the right lower extremity with and without compression, augmentation and duplex. Color flow and spectral Doppler with waveform analysis performed.    FINDINGS: Exam includes the common femoral, femoral, popliteal, and contralateral common femoral veins as well as segmentally visualized deep calf veins and greater saphenous vein.     RIGHT: No deep vein thrombosis. No superficial thrombophlebitis. No popliteal cyst.      Impression    IMPRESSION:  1.  No deep venous thrombosis in the right lower extremity.       Transthoracic Echocardiogram Results:  No results found for this or any previous visit (from the past 4320 hours).             Pending Results:     Unresulted Labs Ordered in the Past 30 Days of this Admission       Date and Time Order Name Status Description    7/8/2025 10:13 AM Blood Culture Peripheral blood (BC) Hand, Right Preliminary     7/8/2025 10:13 AM Blood Culture Peripheral blood (BC) Hand, Left Preliminary     7/7/2025 10:27 AM Blood Culture Peripheral blood (BC) Arm, Right Preliminary                         Discharge Instructions and Follow-Up:     Discharge instructions and follow-up:   Discharge Procedure Orders   Home Care Referral   Referral Priority: Routine: Next available  opening Referral Type: Home Health Therapies & Aides   Number of Visits Requested: 1     Home Infusion Referral   Referral Priority: Routine: Next available opening Referral Type: Consultation   Number of Visits Requested: 1     Reason for your hospital stay   Order Comments: Right Leg Cellulitis with Streptococcus Pyogenes Bacteremia     Activity   Order Comments: Your activity upon discharge: activity as tolerated     Order Specific Question Answer Comments   Is discharge order? Yes      Diet   Order Comments: Follow this diet upon discharge: Current Diet:Orders Placed This Encounter      Combination Diet Regular Diet Adult     Order Specific Question Answer Comments   Is discharge order? Yes      Hospital Follow-up with Existing Primary Care Provider (PCP)   Standing Status: Future Standing Exp. Date: 08/10/25   Order Comments:       Order Specific Question Answer Comments   Schedule Primary Care visit within 14 Days           Montana Saul DO  [facility] Hospitalist  [address] [place] [address]  07/11/2025

## 2025-07-11 NOTE — PLAN OF CARE
Goal Outcome Evaluation:      Plan of Care Reviewed With: patient    Overall Patient Progress: improvingOverall Patient Progress: improving    Patient discharged to home with her son, home meds brought in by patient during admission returned to patient. IV removed and new midline placed by [facility] to deliver abx tonight. All personal belongings taken with patient.

## 2025-07-11 NOTE — PROGRESS NOTES
[facility]    Hospitalist Progress Note  Name: Raven Carlin    MRN: [id number]  Provider:  Montana Saul DO  Date of Service: 07/11/2025    Summary of Stay: Raven Carlin is a 85 year old female with a history of chronic right lower extremity wounds and perianal wounds and abdominal fold wounds, paroxysmal atrial fibrillation on Eliquis, chronic kidney disease stage IIIb, hypertension, GERD, history of MRSA, OAB, SAURABH, chronic pain, obesity admitted on 7/7/2025 with right lower extremity pain.  In the emergency department, the patient was found to have a temperature of 99.2  F, blood pressure 133/72, heart rate 84, respiratory rate 18, SpO2 95% on room air.  Initial lab work showed sodium 133, BUN/creatinine 30.7/1.23, leukocytosis of 16.3, lactic acid 1.8.  The patient was started on vancomycin and IV Zosyn in the setting of right lower extremity cellulitis with sepsis.  1 out of 2 blood cultures returned positive for Streptococcus pyogenes.  Infectious disease was consulted to see the patient who adjusted the antibiotics to vancomycin, ceftriaxone, and clindamycin, which was simplified to IV ceftriaxone on 7/11.    TODAY'S PLAN: Appreciate infectious disease recommendations.  Patient will ultimately need IV antibiotics at discharge.  Awaiting to ensure blood cultures are negative prior to placing PICC line or midline and will await for ID clearance for line placement.  Plan is for discharge home with home infusion.  Patient states that she lives with her daughter who can help her out as well.  Possible discharge home later today pending ID final guidance.    Problem List:   RLE Cellulitis  Strep Pyogenes Bacteremia  Chronic RLE Wounds, Perianal Wound, Abdominal Fold Wounds, Bilateral Buttocks and Sacrum, L Dorsal Foot - POA  Bilateral buttocks and sacrum, Stage 2 Pressure Injury, Present on Admission   - Appreciate ID recommendations  - Sepsis ruled out  - Vanco, ceftriaxone, clindamycin.   Simplified to IV ceftriaxone alone on 7/11  - 1/2 blood cultures positive for strep pyogenes.  Repeat blood cultures NTD  - Elevate RLE  - Appreciate WOC recommendations  - Appreciate PT recommendations     Chronic Medical Problems:  Paroxysmal Atrial Fibrillation on Eliquis  Chronic Kidney Disease Stage 3b  Hypertension  GERD  Hx of MRSA  OAB  SAURABH  Chronic Pain  Obesity - BMI 34.03    I spent 43 minutes in reviewing this patient's labs, imaging, medications, medical history.  In addition time was spent interviewing the patient, communicating with family, and medical decision making.      DVT Prophylaxis: Pneumatic Compression Devices  Code Status: Full Code  Diet: Combination Diet Regular Diet Adult    Thornton Catheter: Not present    Disposition: Medically Ready for Discharge: Anticipated Today vs tomorrow    Goals to discharge include: abx plan established  Family updated today: No     Interval History   Pt seen and examined.  Pt reports feeling well.  Had a BM yesterday.    -Data reviewed today: I personally reviewed all new labs and imaging results over the last 24 hours.     Physical Exam   Temp: 97.7  F (36.5  C) Temp src: Temporal BP: 139/72 Pulse: 82   Resp: 16 SpO2: 99 % O2 Device: None (Room air)    Vitals:    07/07/25 2145   Weight: 98.6 kg (217 lb 4.8 oz)     Vital Signs with Ranges  Temp:  [97.7  F (36.5  C)-98.9  F (37.2  C)] 97.7  F (36.5  C)  Pulse:  [72-82] 82  Resp:  [16-18] 16  BP: (108-139)/(50-72) 139/72  SpO2:  [92 %-99 %] 99 %  I/O last 3 completed shifts:  In: 460 [P.O.:460]  Out: 1100 [Urine:1100]    GENERAL: No apparent distress. Awake, alert, and fully oriented.  HEENT: Normocephalic, atraumatic. Extraocular movements intact.  CARDIOVASCULAR: Regular rate and rhythm without murmurs or rubs. No S3.  PULMONARY: Clear bilaterally.  GASTROINTESTINAL: Soft, non-tender, non-distended. Bowel sounds normoactive.   EXTREMITIES: No cyanosis or clubbing. Erythema and edema to RLE  NEUROLOGICAL: CN  2-12 grossly intact, no focal neurological deficits.  DERMATOLOGICAL: No rash, ulcer, bruising, nor jaundice.    Medications   Current Facility-Administered Medications   Medication Dose Route Frequency Provider Last Rate Last Admin    Patient is already receiving anticoagulation with heparin, enoxaparin (LOVENOX), warfarin (COUMADIN)  or other anticoagulant medication   Does not apply Continuous PRN Radha Salazar PA-C         Current Facility-Administered Medications   Medication Dose Route Frequency Provider Last Rate Last Admin    apixaban ANTICOAGULANT (ELIQUIS) tablet 5 mg  5 mg Oral BID Radha Salazar PA-C   5 mg at 07/11/25 0840    cefTRIAXone (ROCEPHIN) 2 g vial to attach to  ml bag for ADULTS or NS 50 ml bag for PEDS  2 g Intravenous Q24H Brayan Dawn MD   2 g at 07/11/25 1027    cetirizine (zyrTEC) tablet 10 mg  10 mg Oral Daily Radha Salazar PA-C   10 mg at 07/11/25 0840    famotidine (PEPCID) tablet 20 mg  20 mg Oral QPM Radha Salazar PA-C   20 mg at 07/10/25 1958    ferrous gluconate (FERGON) tablet 324 mg  324 mg Oral Daily Radha Salazar PA-C   324 mg at 07/11/25 0840    magnesium oxide (MAG-OX) tablet 400 mg  400 mg Oral BID Radha Salazar PA-C   400 mg at 07/11/25 0840    metoprolol tartrate (LOPRESSOR) tablet 50 mg  50 mg Oral BID Radha Salazar PA-C   50 mg at 07/11/25 0840    sennosides (SENOKOT) tablet 1-2 tablet  1-2 tablet Oral Daily Montana Saul DO   1 tablet at 07/11/25 0840    sodium chloride (PF) 0.9% PF flush 3 mL  3 mL Intracatheter Q8H DON Radha Salazar PA-C   3 mL at 07/11/25 0703    tolterodine (DETROL) tablet 2 mg  2 mg Oral BID Radha Salzaar PA-C   2 mg at 07/11/25 0840     Data     Laboratory:  Recent Labs   Lab 07/10/25  0919 07/09/25  0653 07/08/25  0749   WBC 9.5 10.9 13.9*   HGB 11.4* 11.1* 11.1*   HCT 33.8* 33.7* 33.6*   MCV 97 97 98    222 195     Recent Labs   Lab 07/10/25  0919 07/09/25  0653  "07/08/25  0749   * 137 134*   POTASSIUM 4.0 3.8 4.0   CHLORIDE 100 103 100   CO2 23 24 24   ANIONGAP 11 10 10   GLC 91 92 90   BUN 20.8 23.4* 24.1*   CR 1.08* 1.11* 1.12*   GFRESTIMATED 50* 49* 48*   LAUREL 8.6* 8.5* 8.4*     No results for input(s): \"CULT\" in the last 168 hours.  Troponin I ES   Date Value Ref Range Status   03/12/2020 <0.015 0.000 - 0.045 ug/L Final     Comment:     The 99th percentile for upper reference range is 0.045 ug/L.  Troponin values   in the range of 0.045 - 0.120 ug/L may be associated with risks of adverse   clinical events.         Imaging:  No results found for this or any previous visit (from the past 24 hours).      Montana Saul DO  [facility] Hospitalist  [address] [place] [address]  Securely message with [employer] (more info)  Text page via [facility] Paging/Directory   07/11/2025   "

## 2025-07-12 LAB — BACTERIA SPEC CULT: NO GROWTH

## 2025-07-13 LAB
BACTERIA SPEC CULT: NO GROWTH
BACTERIA SPEC CULT: NO GROWTH

## (undated) DEVICE — LINEN TOWEL PACK X10 5473

## (undated) DEVICE — COVER FOOTSWITCH URO

## (undated) DEVICE — SU MONOCRYL 4-0 P-3 18" UND Y494G

## (undated) DEVICE — TUBING SMOKE EVAC ATTACHMENT E3590

## (undated) DEVICE — BALLOON EXTRACTION 3-LUMEN 15X190MM 2.8MM TL B-V233P-A

## (undated) DEVICE — LINEN HALF SHEET 5512

## (undated) DEVICE — SOL NACL 0.9% IRRIG 1000ML BOTTLE 2F7124

## (undated) DEVICE — ESU PENCIL W/HOLSTER E2350H

## (undated) DEVICE — BAG CLEAR TRASH 1.3M 39X33" P4040C

## (undated) DEVICE — DRSG TELFA 2X3"

## (undated) DEVICE — SLEEVE PROTECTIVE BREAST BIOPSY  GMSLV001-10

## (undated) DEVICE — ESU HANDPIECE THUNDERBEAT ENDOSCOPIC FINE JAW TB-00090F

## (undated) DEVICE — SPHINCTEROTOME 7FRX25MM TRITOME G22555

## (undated) DEVICE — TUBING SMOKE EVAC 3/8"X10' E3645

## (undated) DEVICE — PREFILTER SMOKE EVAC E6330

## (undated) DEVICE — KIT PROCEDURE W/CLEAN-A-SCOPE LINERS V2 200800

## (undated) DEVICE — PACK ERCP CUSTOM RIDGES

## (undated) DEVICE — SU VICRYL 2-0 TIE 12X18" J905T

## (undated) DEVICE — PACK MAJOR HEAD AND NECK RIDGES

## (undated) DEVICE — DECANTER BAG 2002S

## (undated) DEVICE — SU VICRYL 4-0 TIE 12X18" DYED J103T

## (undated) DEVICE — RAD RX ISOVUE 300 (50ML) 61% IOPAMIDOL CHARGE PER ML

## (undated) DEVICE — SYR 03ML LL W/O NDL 309657

## (undated) DEVICE — SOL WATER IRRIG 1000ML BOTTLE 2F7114

## (undated) DEVICE — WIRE GUIDE 0.035"X450CM JAGWIRE HP STR TIP M00556580

## (undated) DEVICE — DECANTER VIAL 2006S

## (undated) DEVICE — CATH TRAY FOLEY SURESTEP 16FR DRAIN BAG STATOCK A899916

## (undated) DEVICE — LINEN FULL SHEET 5511

## (undated) DEVICE — BAG RED BIOHAZARD 37X50" 40GAL A7450PR

## (undated) DEVICE — LINEN TOWEL PACK X5 5464

## (undated) DEVICE — PROBE NEUROSIGN MAGSTIM BIPOLAR 3601-00-TE

## (undated) DEVICE — NDL 22GA 1.5"

## (undated) DEVICE — SU VICRYL 3-0 SH 27" UND J416H

## (undated) DEVICE — SUCTION CANISTER MEDIVAC LINER 3000ML W/LID 65651-530

## (undated) DEVICE — SOL NACL 0.9% INJ 250ML BAG 2B1322Q

## (undated) DEVICE — ESU GROUND PAD ADULT W/CORD E7507

## (undated) DEVICE — GLOVE PROTEXIS POWDER FREE 6.5 ORTHOPEDIC 2D73ET65

## (undated) DEVICE — GLOVE PROTEXIS BLUE W/NEU-THERA 6.5  2D73EB65

## (undated) DEVICE — MANIFOLD NEPTUNE 4 PORT 700-20

## (undated) RX ORDER — GLYCOPYRROLATE 0.2 MG/ML
INJECTION INTRAMUSCULAR; INTRAVENOUS
Status: DISPENSED
Start: 2019-11-25

## (undated) RX ORDER — PROPOFOL 10 MG/ML
INJECTION, EMULSION INTRAVENOUS
Status: DISPENSED
Start: 2019-11-25

## (undated) RX ORDER — ONDANSETRON 2 MG/ML
INJECTION INTRAMUSCULAR; INTRAVENOUS
Status: DISPENSED
Start: 2022-04-14

## (undated) RX ORDER — FENTANYL CITRATE 50 UG/ML
INJECTION, SOLUTION INTRAMUSCULAR; INTRAVENOUS
Status: DISPENSED
Start: 2019-11-25

## (undated) RX ORDER — GLYCOPYRROLATE 0.2 MG/ML
INJECTION INTRAMUSCULAR; INTRAVENOUS
Status: DISPENSED
Start: 2022-04-14

## (undated) RX ORDER — ONDANSETRON 2 MG/ML
INJECTION INTRAMUSCULAR; INTRAVENOUS
Status: DISPENSED
Start: 2019-11-25

## (undated) RX ORDER — BUPIVACAINE HYDROCHLORIDE AND EPINEPHRINE 2.5; 5 MG/ML; UG/ML
INJECTION, SOLUTION EPIDURAL; INFILTRATION; INTRACAUDAL; PERINEURAL
Status: DISPENSED
Start: 2022-04-15

## (undated) RX ORDER — LIDOCAINE HYDROCHLORIDE 10 MG/ML
INJECTION, SOLUTION EPIDURAL; INFILTRATION; INTRACAUDAL; PERINEURAL
Status: DISPENSED
Start: 2019-11-25

## (undated) RX ORDER — INDOMETHACIN 50 MG/1
SUPPOSITORY RECTAL
Status: DISPENSED
Start: 2022-04-14

## (undated) RX ORDER — DEXAMETHASONE SODIUM PHOSPHATE 4 MG/ML
INJECTION, SOLUTION INTRA-ARTICULAR; INTRALESIONAL; INTRAMUSCULAR; INTRAVENOUS; SOFT TISSUE
Status: DISPENSED
Start: 2022-04-14

## (undated) RX ORDER — LIDOCAINE HYDROCHLORIDE 10 MG/ML
INJECTION, SOLUTION EPIDURAL; INFILTRATION; INTRACAUDAL; PERINEURAL
Status: DISPENSED
Start: 2022-04-14

## (undated) RX ORDER — PROPOFOL 10 MG/ML
INJECTION, EMULSION INTRAVENOUS
Status: DISPENSED
Start: 2022-04-14

## (undated) RX ORDER — CEFAZOLIN SODIUM 2 G/100ML
INJECTION, SOLUTION INTRAVENOUS
Status: DISPENSED
Start: 2019-11-25

## (undated) RX ORDER — FENTANYL CITRATE 50 UG/ML
INJECTION, SOLUTION INTRAMUSCULAR; INTRAVENOUS
Status: DISPENSED
Start: 2022-04-14

## (undated) RX ORDER — BUPIVACAINE HYDROCHLORIDE 2.5 MG/ML
INJECTION, SOLUTION EPIDURAL; INFILTRATION; INTRACAUDAL
Status: DISPENSED
Start: 2019-11-25

## (undated) RX ORDER — DEXAMETHASONE SODIUM PHOSPHATE 4 MG/ML
INJECTION, SOLUTION INTRA-ARTICULAR; INTRALESIONAL; INTRAMUSCULAR; INTRAVENOUS; SOFT TISSUE
Status: DISPENSED
Start: 2019-11-25

## (undated) RX ORDER — PHENYLEPHRINE HCL IN 0.9% NACL 1 MG/10 ML
SYRINGE (ML) INTRAVENOUS
Status: DISPENSED
Start: 2019-11-25